# Patient Record
Sex: FEMALE | Race: WHITE | NOT HISPANIC OR LATINO | Employment: OTHER | URBAN - METROPOLITAN AREA
[De-identification: names, ages, dates, MRNs, and addresses within clinical notes are randomized per-mention and may not be internally consistent; named-entity substitution may affect disease eponyms.]

---

## 2020-01-23 ENCOUNTER — OFFICE VISIT CONVERTED (OUTPATIENT)
Dept: GASTROENTEROLOGY | Facility: CLINIC | Age: 55
End: 2020-01-23
Attending: PHYSICIAN ASSISTANT

## 2020-02-06 ENCOUNTER — HOSPITAL ENCOUNTER (OUTPATIENT)
Dept: GASTROENTEROLOGY | Facility: HOSPITAL | Age: 55
Setting detail: HOSPITAL OUTPATIENT SURGERY
Discharge: HOME | End: 2020-02-06
Attending: INTERNAL MEDICINE

## 2020-02-06 LAB — GLUCOSE BLD-MCNC: 160 MG/DL (ref 65–99)

## 2020-05-06 ENCOUNTER — TELEPHONE CONVERTED (OUTPATIENT)
Dept: GASTROENTEROLOGY | Facility: CLINIC | Age: 55
End: 2020-05-06
Attending: PHYSICIAN ASSISTANT

## 2021-01-05 ENCOUNTER — OFFICE VISIT CONVERTED (OUTPATIENT)
Dept: GASTROENTEROLOGY | Facility: CLINIC | Age: 56
End: 2021-01-05
Attending: NURSE PRACTITIONER

## 2021-01-12 ENCOUNTER — HOSPITAL ENCOUNTER (OUTPATIENT)
Dept: MRI IMAGING | Facility: HOSPITAL | Age: 56
Discharge: HOME | End: 2021-01-12
Attending: NURSE PRACTITIONER

## 2021-01-12 LAB
CREAT BLD-MCNC: 0.8 MG/DL (ref 0.6–1.4)
GFR SERPLBLD BASED ON 1.73 SQ M-ARVRAT: >60 ML/MIN/{1.73_M2}

## 2021-05-13 NOTE — PROGRESS NOTES
Quick Note      Patient Name: Tawny Lennon   Patient ID: [id number]   Sex: Female   YOB: 1965    Primary Care Provider: Apolinar Valentine MD    Visit Date: May 6, 2020    Provider: JorgeL uis Raman PA-C   Location: [facility]   Location Address: [address] [place] [address]  [id number]   Location Phone: [phone]          History Of Present Illness  TELEHEALTH TELEPHONE VISIT  Chief Complaint: Follow up EGD/colonoscopy and cirrhosis   Tawny Lennon is a 54 year old /White female who is presenting for evaluation via telehealth telephone visit. Verbal consent obtained before beginning visit.   Provider spent 11 minutes with the patient during telehealth visit.   The following staff were present during this visit: Hugo Hennessy MA   Past Medical History/Overview of Patient Symptoms     54 year old female with a history of GERD, DMII, and hepatic steatosis follows up for her EGD/colonoscopy and extensive liver workup results. Review of her EGD/colonoscopy by Dr. Watkins 02/2020 showed intestinal metaplasia of the antrum on biopsy, 2 cord of grade I varices in the lower esophagus, and 3 tubular adenomas removed from the colon.  Recommended EGD in 2 years and colonoscopy in 3 years.  She reports one episode of melena and her esophageal reflux is worse despite omeprazole 20mg daily.  Her most recent labwork showed platelets 107, INR 1.1, AST 57, t. bili 1.6.  Her extensive liver labwork was otherwise unremarkable.  She denies a history of alcohol or family history of liver disease, but admits that she has had a fatty liver since 2011.  Her DMII is also poorly controlled.               Assessment  · Esophageal varices     456.1/I85.00  · Colon polyps     211.3/K63.5  · Cirrhosis     571.5/K74.60  · Intestinal metaplasia of gastric mucosa     537.89/K31.89      Plan  · Orders  o CBC with Auto Diff [facility] (67965) - 456.1/I85.00, 211.3/K63.5, 571.5/K74.60, 537.89/K31.89 - 05/06/2020  o CMP  [facility] (04478) - 456.1/I85.00, 211.3/K63.5, 571.5/K74.60, 537.89/K31.89 - 05/06/2020  o Physician Telephone Evaluation, 11-20 minutes (62060) - 456.1/I85.00, 211.3/K63.5, 571.5/K74.60, 537.89/K31.89 - 05/06/2020  o UGI w/ air & SBFT (77669) - 456.1/I85.00, 211.3/K63.5, 571.5/K74.60, 537.89/K31.89 - 05/06/2020  o RUQ US (right upper quadrant ultrasound) (07267) - 456.1/I85.00, 211.3/K63.5, 571.5/K74.60, 537.89/K31.89 - 05/06/2020  o PT/INR (85517) - 456.1/I85.00, 211.3/K63.5, 571.5/K74.60, 537.89/K31.89 - 05/06/2020  o AFP (26875, 70155) - 456.1/I85.00, 211.3/K63.5, 571.5/K74.60, 537.89/K31.89 - 05/06/2020  o Ammonia blood (08435) - 456.1/I85.00, 211.3/K63.5, 571.5/K74.60, 537.89/K31.89 - 05/06/2020  · Medications  o omeprazole 40 mg oral capsule,delayed release(DR/EC)   SIG: take 1 capsule (40 mg) by oral route once daily before a meal for 90 days   DISP: (90) capsules with 2 refills  Prescribed on 05/06/2020     o Medications have been Reconciled  o Transition of Care or Provider Policy  · Instructions  o Plan Of Care: 54 year old female with cirrhosis in the probable setting of DMII and hepatic steatosis. She is to have a RUQ US, UGI, and the labwork above. I will increase her omeprazole to 40mg. I recommended strict control of blood glucose levels and weight loss. She will follow up in 6 months.  o Chronic conditions reviewed and taken into consideration for today's treatment plan.  o Patient instructed to seek medical attention urgently for new or worsening symptoms.  o Patient was educated/instructed on their diagnosis, treatment and medications prior to discharge from the clinic today.            Electronically Signed by: Jorge Luis Raman PA-C -Author on May 6, 2020 09:16:35 AM  Electronically Co-signed by: Jesse Watkins MD -Reviewer on May 6, 2020 11:45:55 AM

## 2021-05-14 VITALS
HEART RATE: 76 BPM | SYSTOLIC BLOOD PRESSURE: 126 MMHG | DIASTOLIC BLOOD PRESSURE: 70 MMHG | BODY MASS INDEX: 35.15 KG/M2 | HEIGHT: 62 IN | OXYGEN SATURATION: 98 % | WEIGHT: 191 LBS | RESPIRATION RATE: 16 BRPM

## 2021-05-14 NOTE — PROGRESS NOTES
Progress Note      Patient Name: Tawny Lennon   Patient ID: [id number]   Sex: Female   YOB: 1965    Primary Care Provider: Apolinar Valentine MD    Visit Date: January 5, 2021    Provider: ANDRE Singh   [facility]   Location Address: [address] [place], [place] [address]  [id number]   Location Phone: [phone]          Chief Complaint  · Cirrhosis  · Esophageal varices  · Colon polpys  · Intestinal metaplasia of gastric mucosa      History Of Present Illness     55-year-old female with a history of esophageal varices, colon polyps, cirrhosis, intestinal metaplasia of gastric mucosa returns for follow-up.  She denies hematemesis, but she does report having clots of blood with clearing of her throat, especially in the morning that occurs.  She does have occasional melena, and she does not take iron or Pepto-Bismol.  She does complain of excessive.  EGD by Dr. Novoa October 2020 revealed grade 2 esophagitis at the GE junction, 2 cords of grade 1 varices in the lower third of the esophagus which were not bleeding, and mild portal hypertensive gastropathy.  She denies jaundice change in mental status.  She does have occasional pedal edema and her weight fluctuates.  She does not have any pedal edema in office today.  She will have occasional abdominal swelling.  She does have a lot of nausea, and will take Phenergan when she is really nauseated.  She does have lower abdominal pain occasionally.  She is taking nadolol 20 mg daily and pantoprazole 40 mg daily.  She has tried docusate, but is not effective for her bowel movements.  Review of her colonoscopy 02/2020 by Dr. Watkins revealed diverticula in the sigmoid, grade 1 internal hemorrhoids, and 3 tubular adenomas removed from the colon.  Recommend repeat colonoscopy 2023 and EGD 2022         Past Medical History  Colon Polyps; COPD; Depression; Diabetes; Diverticulitis; Hematuria; High blood pressure; High  "cholesterol; Interstitial cystitis; Migraine Headaches; Narcolepsy; Sleep apnea         Past Surgical History  Bladder Repair; carpal tunnel; Cholecystectomy; Colonoscopy; EGD; Hernia Repair; Hysterectomy; Rectocele Repair         Medication List  docusate sodium 100 mg oral tablet; Jardiance 25 mg oral tablet; losartan-hydrochlorothiazide 50-12.5 mg oral tablet; metformin 500 mg oral tablet; metoprolol succinate 100 mg oral tablet extended release 24 hr; nadolol 20 mg oral tablet; pantoprazole 40 mg oral tablet,delayed release (DR/EC); ProAir HFA 90 mcg/actuation inhalation HFA aerosol inhaler; sumatriptan succinate 50 mg oral tablet         Allergy List  NO KNOWN DRUG ALLERGIES       Allergies Reconciled  Family Medical History  Liver Neoplasm, Malignant         Social History  Alcohol (Never); Tobacco (Never)         Review of Systems  · Constitutional  o Denies  o : chills, fever  · Cardiovascular  o Denies  o : chest pain  · Respiratory  o Denies  o : shortness of breath  · Gastrointestinal  o Admits  o : nausea  o Denies  o : vomiting, dysphagia  · Endocrine  o Denies  o : weight gain, weight loss      Vitals  Date Time BP Position Site L\R Cuff Size HR RR TEMP (F) WT  HT  BMI kg/m2 BSA m2 O2 Sat FR L/min FiO2 HC       01/05/2021 12:53 /70 Sitting    76 - R 16  191lbs 0oz 5'  2\" 34.93 1.95 98 %            Physical Examination  · Constitutional  o Appearance  o : obese, well developed  · Head and Face  o Head  o : Normocephalic with no worriesome skin lesions  · Eyes  o Vision  o :   § Visual Fields  § : eyes move symmetrical in all directions  o Sclerae  o : sclerae anicteric  · Neck  o Inspection/Palpation  o : Trachea is midline, no adenopathy  · Respiratory  o Respiratory Effort  o : Breathing is unlabored.  o Inspection of Chest  o : normal appearance  o Auscultation of Lungs  o : Chest is clear to auscultation bilaterally.  · Cardiovascular  o Heart  o :   § Auscultation of Heart  § : no murmurs, " rubs, or gallops  o Peripheral Vascular System  o :   § Extremities  § : no cyanosis, clubbing or edema;   · Gastrointestinal  o Abdominal Examination  o : Abdomen is soft, nontender to palpation, with normal active bowel sounds, no appreciable hepatosplenomegaly.          Assessment  · Cirrhosis Of Liver     571.5/K74.60  · Colonic Polyps, Personal History of     V12.72  · Esophageal Varices     456.1/I85.00  · Nausea     787.02/R11.0  · Intestinal metaplasia of gastric mucosa     537.89/K31.89      Plan  · Orders  o RUQ US (right upper quadrant ultrasound) (80072) - 571.5/K74.60 - 01/05/2021  o PT/INR (41280) - 571.5/K74.60 - 01/05/2021  o Ammonia level (01289) - 571.5/K74.60 - 01/05/2021  o Alpha fetoprotein test (89775, 13442) - 571.5/K74.60 - 01/05/2021  o CMP [facility] (86948) - 571.5/K74.60 - 01/05/2021  o CBC with Auto Diff [facility] (86659) - 571.5/K74.60 - 01/05/2021  o Iron Profile (Iron 04570 TIBC 76113 and Transferrin 05968) (IRONP) - 571.5/K74.60, 537.89/K31.89, 456.1/I85.00 - 01/05/2021  · Medications  o Medications have been Reconciled  o Transition of Care or Provider Policy  · Instructions  o 55-year-old female with a history of esophageal varices, colon polyps, cirrhosis, and intestinal metaplasia of gastric mucosa returns for follow-up. She has been coughing up clots of blood when she clears her throat and has occasional incidences of melena. Her EGD by Dr. Novoa in October 2020 revealed 2 cords of grade 1 varices in the lower third of the esophagus, but they were not bleeding. I have consulted Dr. Watkins to see if she needs a repeat EGD. She is to have her bloodwork done so, and if her Hbg shows anemia, we will schedule her for an EGD. I have recommended increasing fiber by using an over-the-counter supplement to help her bowel movements improve. I have ordered a right upper quadrant ultrasound with labs; we will call her with those results. She is going to follow-up in 6 months.            Electronically  Signed by: ANDRE Singh -Author on January 5, 2021 04:27:56 PM  Electronically Co-signed by: Jesse Watkins MD -Reviewer on January 10, 2021 04:33:19 PM

## 2021-05-15 VITALS
OXYGEN SATURATION: 100 % | HEART RATE: 90 BPM | BODY MASS INDEX: 33.86 KG/M2 | RESPIRATION RATE: 16 BRPM | SYSTOLIC BLOOD PRESSURE: 123 MMHG | HEIGHT: 62 IN | WEIGHT: 184 LBS | DIASTOLIC BLOOD PRESSURE: 71 MMHG

## 2021-07-06 ENCOUNTER — PREP FOR SURGERY (OUTPATIENT)
Dept: OTHER | Facility: HOSPITAL | Age: 56
End: 2021-07-06

## 2021-07-06 ENCOUNTER — OFFICE VISIT (OUTPATIENT)
Dept: GASTROENTEROLOGY | Facility: CLINIC | Age: 56
End: 2021-07-06

## 2021-07-06 VITALS
BODY MASS INDEX: 34.56 KG/M2 | SYSTOLIC BLOOD PRESSURE: 121 MMHG | WEIGHT: 187.8 LBS | HEART RATE: 73 BPM | TEMPERATURE: 98.3 F | RESPIRATION RATE: 18 BRPM | DIASTOLIC BLOOD PRESSURE: 72 MMHG | HEIGHT: 62 IN | OXYGEN SATURATION: 100 %

## 2021-07-06 DIAGNOSIS — R18.8 CIRRHOSIS OF LIVER WITH ASCITES, UNSPECIFIED HEPATIC CIRRHOSIS TYPE (HCC): ICD-10-CM

## 2021-07-06 DIAGNOSIS — I85.10 SECONDARY ESOPHAGEAL VARICES WITHOUT BLEEDING (HCC): ICD-10-CM

## 2021-07-06 DIAGNOSIS — K63.5 POLYP OF COLON, UNSPECIFIED PART OF COLON, UNSPECIFIED TYPE: Primary | ICD-10-CM

## 2021-07-06 DIAGNOSIS — K21.9 GASTROESOPHAGEAL REFLUX DISEASE WITHOUT ESOPHAGITIS: ICD-10-CM

## 2021-07-06 DIAGNOSIS — K74.60 CIRRHOSIS OF LIVER WITHOUT ASCITES, UNSPECIFIED HEPATIC CIRRHOSIS TYPE (HCC): Primary | ICD-10-CM

## 2021-07-06 DIAGNOSIS — K57.92 DIVERTICULITIS: ICD-10-CM

## 2021-07-06 DIAGNOSIS — K74.60 CIRRHOSIS OF LIVER WITH ASCITES, UNSPECIFIED HEPATIC CIRRHOSIS TYPE (HCC): ICD-10-CM

## 2021-07-06 PROBLEM — E78.00 HIGH CHOLESTEROL: Status: ACTIVE | Noted: 2021-07-06

## 2021-07-06 PROBLEM — E66.9 OBESITY: Status: ACTIVE | Noted: 2021-07-06

## 2021-07-06 PROBLEM — R31.9 HEMATURIA: Status: ACTIVE | Noted: 2021-07-06

## 2021-07-06 PROBLEM — F41.0 PANIC ATTACKS: Status: ACTIVE | Noted: 2021-07-06

## 2021-07-06 PROBLEM — J44.9 OTHER SPECIFIED CHRONIC OBSTRUCTIVE AIRWAYS DISEASE: Status: ACTIVE | Noted: 2021-07-06

## 2021-07-06 PROBLEM — I10 HIGH BLOOD PRESSURE: Status: ACTIVE | Noted: 2021-07-06

## 2021-07-06 PROBLEM — N30.10 INTERSTITIAL CYSTITIS: Status: ACTIVE | Noted: 2021-07-06

## 2021-07-06 PROBLEM — G47.30 SLEEP APNEA: Status: ACTIVE | Noted: 2021-07-06

## 2021-07-06 PROBLEM — J44.89 OTHER SPECIFIED CHRONIC OBSTRUCTIVE AIRWAYS DISEASE: Status: ACTIVE | Noted: 2021-07-06

## 2021-07-06 PROBLEM — I10 BENIGN ESSENTIAL HTN: Status: ACTIVE | Noted: 2021-07-06

## 2021-07-06 PROBLEM — G47.419 NARCOLEPSY: Status: ACTIVE | Noted: 2021-07-06

## 2021-07-06 PROBLEM — F32.A DEPRESSION: Status: ACTIVE | Noted: 2021-07-06

## 2021-07-06 PROBLEM — G43.009 MIGRAINE WITHOUT AURA: Status: ACTIVE | Noted: 2021-07-06

## 2021-07-06 PROCEDURE — 99214 OFFICE O/P EST MOD 30 MIN: CPT | Performed by: NURSE PRACTITIONER

## 2021-07-06 RX ORDER — HYDROCHLOROTHIAZIDE 25 MG/1
TABLET ORAL
COMMUNITY
Start: 2021-06-01 | End: 2021-07-06

## 2021-07-06 RX ORDER — NADOLOL 20 MG/1
20 TABLET ORAL DAILY
COMMUNITY
Start: 2021-05-05 | End: 2023-02-28

## 2021-07-06 RX ORDER — ONDANSETRON HYDROCHLORIDE 8 MG/1
8 TABLET, FILM COATED ORAL EVERY 8 HOURS PRN
COMMUNITY
Start: 2021-05-05 | End: 2023-02-28

## 2021-07-06 RX ORDER — SPIRONOLACTONE 25 MG/1
50 TABLET ORAL DAILY
Qty: 30 TABLET | Refills: 2 | Status: SHIPPED | OUTPATIENT
Start: 2021-07-06 | End: 2021-08-02

## 2021-07-06 RX ORDER — PANTOPRAZOLE SODIUM 40 MG/1
40 TABLET, DELAYED RELEASE ORAL DAILY
COMMUNITY
Start: 2021-06-29 | End: 2023-02-28

## 2021-07-06 RX ORDER — ALBUTEROL SULFATE 90 UG/1
1 AEROSOL, METERED RESPIRATORY (INHALATION) EVERY 4 HOURS PRN
COMMUNITY
Start: 2021-05-05 | End: 2023-02-28

## 2021-07-06 RX ORDER — LOSARTAN POTASSIUM 25 MG/1
TABLET ORAL
COMMUNITY
Start: 2021-05-19 | End: 2021-08-02 | Stop reason: SDUPTHER

## 2021-07-06 RX ORDER — DULAGLUTIDE 0.75 MG/.5ML
10 INJECTION, SOLUTION SUBCUTANEOUS
Status: ON HOLD | COMMUNITY
End: 2022-06-23

## 2021-07-06 RX ORDER — LACTULOSE 10 G/15ML
SOLUTION ORAL
COMMUNITY
Start: 2021-06-30 | End: 2021-07-20

## 2021-07-06 RX ORDER — ALBUTEROL SULFATE 90 UG/1
AEROSOL, METERED RESPIRATORY (INHALATION)
COMMUNITY
End: 2021-10-26

## 2021-07-06 RX ORDER — FUROSEMIDE 20 MG/1
20 TABLET ORAL DAILY
Qty: 30 TABLET | Refills: 2 | Status: SHIPPED | OUTPATIENT
Start: 2021-07-06 | End: 2021-08-02

## 2021-07-06 RX ORDER — SUMATRIPTAN 50 MG/1
TABLET, FILM COATED ORAL
COMMUNITY
Start: 2021-05-05 | End: 2022-02-22

## 2021-07-06 NOTE — PATIENT INSTRUCTIONS
Cirrhosis    Cirrhosis is long-term (chronic) liver injury. The liver is the body's largest internal organ, and it performs many functions. It converts food into energy, removes toxic material from the blood, makes important proteins, and absorbs necessary vitamins from food.  In cirrhosis, healthy liver cells are replaced by scar tissue. This prevents blood from flowing through the liver, making it difficult for the liver to function. Scarring of the liver cannot be reversed, but treatment can prevent it from getting worse.  What are the causes?  Common causes of this condition are hepatitis C and long-term alcohol abuse. Other causes include:  · Nonalcoholic fatty liver disease. This happens when fat is deposited in the liver by causes other than alcohol.  · Hepatitis B infection.  · Autoimmune hepatitis. In this condition, the body's defense system (immune system) mistakenly attacks the liver cells, causing irritation and swelling (inflammation).  · Diseases that cause blockage of ducts inside the liver.  · Inherited liver diseases, such as hemochromatosis. This is one of the most common inherited liver diseases. In this disease, deposits of iron collect in the liver and other organs.  · Reactions to certain long-term medicines, such as amiodarone, a heart medicine.  · Parasitic infections. These include schistosomiasis, which is caused by a flatworm.  · Long-term contact to certain toxins. These toxins include certain organic solvents, such as toluene and chloroform.  What increases the risk?  You are more likely to develop this condition if:  · You have certain types of viral hepatitis.  · You abuse alcohol, especially if you are female.  · You are overweight.  · You share needles.  · You have unprotected sex with someone who has viral hepatitis.  What are the signs or symptoms?  You may not have any signs and symptoms at first. Symptoms may not develop until the damage to your liver starts to get worse.  Early  symptoms may include:  · Weakness and tiredness (fatigue).  · Changes in sleep patterns or having trouble sleeping.  · Itchiness.  · Tenderness in the right-upper part of your abdomen.  · Weight loss and muscle loss.  · Nausea.  · Loss of appetite.  · Appearance of tiny blood vessels under the skin.  Later symptoms may include:  · Fatigue or weakness that is getting worse.  · Yellow skin and eyes (jaundice).  · Buildup of fluid in the abdomen (ascites). You may notice that your clothes are tight around your waist.  · Weight gain.  · Swelling of the feet and ankles (edema).  · Trouble breathing.  · Easy bruising and bleeding.  · Vomiting blood.  · Black or bloody stool.  · Mental confusion.  How is this diagnosed?  Your health care provider may suspect cirrhosis based on your symptoms and medical history, especially if you have other medical conditions or a history of alcohol abuse. Your health care provider will do a physical exam to feel your liver and to check for signs of cirrhosis. He or she may perform other tests, including:  · Blood tests to check:  ? For hepatitis B or C.  ? Kidney function.  ? Liver function.  · Imaging tests such as:  ? MRI or CT scan to look for changes seen in advanced cirrhosis.  ? Ultrasound to see if normal liver tissue is being replaced by scar tissue.  · A procedure in which a long needle is used to take a sample of liver tissue to be checked in a lab (biopsy). Liver biopsy can confirm the diagnosis of cirrhosis.  How is this treated?  Treatment for this condition depends on how damaged your liver is and what caused the damage. It may include treating the symptoms of cirrhosis, or treating the underlying causes in order to slow the damage. Treatment may include:  · Making lifestyle changes, such as:  ? Eating a healthy diet. You may need to work with your health care provider or a diet and nutrition specialist (dietitian) to develop an eating plan.  ? Restricting salt  intake.  ? Maintaining a healthy weight.  ? Not abusing drugs or alcohol.  · Taking medicines to:  ? Treat liver infections or other infections.  ? Control itching.  ? Reduce fluid buildup.  ? Reduce certain blood toxins.  ? Reduce risk of bleeding from enlarged blood vessels in the stomach or esophagus (varices).  · Liver transplant. In this procedure, a liver from a donor is used to replace your diseased liver. This is done if cirrhosis has caused liver failure.  Other treatments and procedures may be done depending on the problems that you get from cirrhosis. Common problems include liver-related kidney failure (hepatorenal syndrome).  Follow these instructions at home:    · Take medicines only as told by your health care provider. Do not use medicines that are toxic to your liver. Ask your health care provider before taking any new medicines, including over-the-counter medicines.  · Rest as needed.  · Eat a well-balanced diet. Ask your health care provider or dietitian for more information.  · Limit your salt or water intake, if your health care provider asks you to do this.  · Do not drink alcohol. This is especially important if you are taking acetaminophen.  · Keep all follow-up visits as told by your health care provider. This is important.  Contact a health care provider if you:  · Have fatigue or weakness that is getting worse.  · Develop swelling of the hands, feet, legs, or face.  · Have a fever.  · Develop loss of appetite.  · Have nausea or vomiting.  · Develop jaundice.  · Develop easy bruising or bleeding.  Get help right away if you:  · Vomit bright red blood or a material that looks like coffee grounds.  · Have blood in your stools.  · Notice that your stools appear black and tarry.  · Become confused.  · Have chest pain or trouble breathing.  Summary  · Cirrhosis is chronic liver injury. Liver damage cannot be reversed. Common causes are hepatitis C and long-term alcohol abuse.  · Tests used to  diagnose cirrhosis include blood tests, imaging tests, and liver biopsy.  · Treatment for this condition involves treating the underlying cause. Avoid alcohol, drugs, salt, and medicines that may damage your liver.  · Contact your health care provider if you develop ascites, edema, jaundice, fever, nausea or vomiting, easy bruising or bleeding, or worsening fatigue.  This information is not intended to replace advice given to you by your health care provider. Make sure you discuss any questions you have with your health care provider.  Document Revised: 04/08/2020 Document Reviewed: 11/07/2018  [employer]vier Patient Education © 2021 [employer]

## 2021-07-06 NOTE — PROGRESS NOTES
Chief Complaint  Follow-up (6 months), Cirrhosis, and Nausea    History of Present Illness  Tawny Lennon is a 55 y.o. female who presents to [facility] for follow-up with a history of esophageal varices, colon polyps, nonalcoholic cirrhosis, intestinal metaplasia of the gastric mucosa.  I have reviewed her previous labs, EGD, colonoscopy and MRI of the abdomen.  Patient continues on nadolol 20 mg daily, lactulose, and pantoprazole 40 mg daily.  Patient reports that she has been experiencing weight gain, abdominal swelling, leg swelling, fatigue, some confusion, intermittent bruising and intermittent shortness of breath.  She reports last week and 1 day she gained 7 pounds.  Patient is a diabetic that was poorly  controlled for several years with an A1c above 15.  She reports A1c currently 7.1.  Patient reports she is taking her lactulose and having 3-5 bowel movements a day.  She denies jaundice yellowing of the eyes or skin.  Patient reports her reflux is doing well on her current regimen she is having some dysphagia 3-5 times a week with solid foods and coughing up some bright red blood occasionally.  Patient reports her stools are dark at times.      Review of her colonoscopy 02/2020 by Dr. Watkins revealed diverticula in the sigmoid, grade 1 internal hemorrhoids, and 3 tubular adenomas removed from the colon.  Recommend repeat colonoscopy 2023 and EGD 2022.     Labs: RBC-3.89, hemoglobin 11.7, hematocrit 35.8, platelet 90 ammonia 80 Glucose 314, AST-49, ALT 22, INR 1.04    EGD: Review of the patient's most recent EGD 10/9/2020 performed by Dr. Novoa revealed grade B esophagitis in the GE junction, 2 cords of grade 1 varices in the lower third of the esophagus mild portal hypertension with gastropathy.    MRI of the abdomen with and without contrast displays a normal pancreas, spleen is enlarged and unchanged from prior CT scan, stomach is mildly distended with fluid and food  debris and I believe the stomach lies very close to the anterior margin of the body of the pancreas and this might have the appearance of a pancreatic mass on ultrasound.    @[facility]@    Past Medical History:   Diagnosis Date   • Cirrhosis (CMS/HCC)     liver   • Colon polyps    • COPD (chronic obstructive pulmonary disease) (CMS/HCC)    • Depression    • Diabetes (CMS/HCC)    • Diverticulitis    • Hematuria    • High blood pressure    • High cholesterol    • Interstitial cystitis    • Migraine headache    • Narcolepsy    • Sleep apnea        Past Surgical History:   Procedure Laterality Date   • BLADDER REPAIR  2009   • CHOLECYSTECTOMY     • COLONOSCOPY  2014 x2 2020   • ENDOSCOPY  2014 2020   • HAND SURGERY     • HERNIA REPAIR     • HYSTERECTOMY  2009   • OTHER SURGICAL HISTORY      rectocele repair         Current Outpatient Medications:   •  albuterol sulfate  (90 Base) MCG/ACT inhaler, Inhale., Disp: , Rfl:   •  Dulaglutide (Trulicity) 0.75 MG/0.5ML solution pen-injector, Inject 10 mL under the skin into the appropriate area as directed. Once per week on Tuesdays, Disp: , Rfl:   •  empagliflozin (Jardiance) 25 MG tablet tablet, Take  by mouth Daily., Disp: , Rfl:   •  lactulose (CHRONULAC) 10 GM/15ML solution, , Disp: , Rfl:   •  losartan (COZAAR) 25 MG tablet, , Disp: , Rfl:   •  metFORMIN (GLUCOPHAGE) 500 MG tablet, metformin oral tablet 500 mg take 1 tablet (500 mg) by oral route 2 times per day with morning and evening meals   Active, Disp: , Rfl:   •  nadolol (CORGARD) 20 MG tablet, , Disp: , Rfl:   •  ondansetron (ZOFRAN) 8 MG tablet, , Disp: , Rfl:   •  pantoprazole (PROTONIX) 40 MG EC tablet, , Disp: , Rfl:   •  SUMAtriptan (IMITREX) 50 MG tablet, , Disp: , Rfl:   •  albuterol sulfate  (90 Base) MCG/ACT inhaler, , Disp: , Rfl:   •  furosemide (LASIX) 20 MG tablet, Take 1 tablet by mouth Daily., Disp: 30 tablet, Rfl: 2  •  spironolactone (ALDACTONE) 25 MG tablet, Take 2 tablets by mouth  "Daily., Disp: 30 tablet, Rfl: 2     No Known Allergies    Family History   Problem Relation Age of Onset   • Liver cancer Father    • Dementia Mother         Social History     Social History Narrative   • Not on file       Objective     Review of Systems   Constitutional: Positive for fatigue and unexpected weight gain. Negative for fever and unexpected weight loss.   Gastrointestinal: Positive for abdominal distention, abdominal pain, blood in stool, nausea and GERD. Negative for anal bleeding, constipation, diarrhea, rectal pain, vomiting and indigestion.        Vital Signs:   /72 (BP Location: Right arm, Patient Position: Sitting, Cuff Size: Adult)   Pulse 73   Temp 98.3 °F (36.8 °C) (Oral)   Resp 18   Ht 157.5 cm (62\")   Wt 85.2 kg (187 lb 12.8 oz)   SpO2 100% Comment: room air  BMI 34.35 kg/m²       Physical Exam  Constitutional:       General: She is not in acute distress.     Appearance: Normal appearance.   HENT:      Head: Normocephalic.   Eyes:      Conjunctiva/sclera: Conjunctivae normal.      Pupils: Pupils are equal, round, and reactive to light.      Visual Fields: Right eye visual fields normal and left eye visual fields normal.   Neck:      Trachea: Trachea normal.   Cardiovascular:      Rate and Rhythm: Normal rate and regular rhythm.      Heart sounds: Normal heart sounds.   Pulmonary:      Effort: Pulmonary effort is normal.      Breath sounds: Normal breath sounds and air entry.      Comments: Inspection of chest: normal appearance  Abdominal:      General: Abdomen is flat. Bowel sounds are normal. There is distension.      Palpations: Abdomen is soft. There is fluid wave, hepatomegaly and splenomegaly. There is no mass.      Tenderness: There is abdominal tenderness in the right upper quadrant. There is no guarding or rebound. Negative signs include Diggs's sign and McBurney's sign.   Musculoskeletal:      Right lower leg: No edema.      Left lower leg: No edema.   Skin:     " Findings: No lesion.      Comments: Turgor is normal   Neurological:      Mental Status: She is alert and oriented to person, place, and time.   Psychiatric:         Mood and Affect: Mood and affect normal.         Result Review :                  Assessment and Plan    Diagnoses and all orders for this visit:    1. Polyp of colon, unspecified part of colon, unspecified type (Primary)  -     Cancel: US Abdomen Limited; Future  -     Cancel: CBC (No Diff); Future  -     Cancel: Comprehensive Metabolic Panel; Future  -     Cancel: Protime-INR; Future  -     Cancel: AFP Tumor Marker; Future  -     Cancel: Ammonia; Future  -     AFP Tumor Marker; Future  -     Ammonia; Future  -     CBC (No Diff); Future  -     Comprehensive Metabolic Panel; Future  -     Protime-INR; Future  -     US Abdomen Limited; Future  -     Comprehensive Metabolic Panel; Future    2. Diverticulitis  -     Cancel: US Abdomen Limited; Future  -     Cancel: CBC (No Diff); Future  -     Cancel: Comprehensive Metabolic Panel; Future  -     Cancel: Protime-INR; Future  -     Cancel: AFP Tumor Marker; Future  -     Cancel: Ammonia; Future  -     AFP Tumor Marker; Future  -     Ammonia; Future  -     CBC (No Diff); Future  -     Comprehensive Metabolic Panel; Future  -     Protime-INR; Future  -     US Abdomen Limited; Future  -     Comprehensive Metabolic Panel; Future    3. Cirrhosis of liver with ascites, unspecified hepatic cirrhosis type (CMS/HCC)  -     Cancel: US Abdomen Limited; Future  -     Cancel: CBC (No Diff); Future  -     Cancel: Comprehensive Metabolic Panel; Future  -     Cancel: Protime-INR; Future  -     Cancel: AFP Tumor Marker; Future  -     Cancel: Ammonia; Future  -     AFP Tumor Marker; Future  -     Ammonia; Future  -     CBC (No Diff); Future  -     Comprehensive Metabolic Panel; Future  -     Protime-INR; Future  -     US Abdomen Limited; Future  -     Comprehensive Metabolic Panel; Future    4. Gastroesophageal reflux disease  without esophagitis  -     Cancel: US Abdomen Limited; Future  -     Cancel: CBC (No Diff); Future  -     Cancel: Comprehensive Metabolic Panel; Future  -     Cancel: Protime-INR; Future  -     Cancel: AFP Tumor Marker; Future  -     Cancel: Ammonia; Future  -     AFP Tumor Marker; Future  -     Ammonia; Future  -     CBC (No Diff); Future  -     Comprehensive Metabolic Panel; Future  -     Protime-INR; Future  -     US Abdomen Limited; Future  -     Comprehensive Metabolic Panel; Future    5. Secondary esophageal varices without bleeding (CMS/HCC)  -     Cancel: US Abdomen Limited; Future  -     Cancel: CBC (No Diff); Future  -     Cancel: Comprehensive Metabolic Panel; Future  -     Cancel: Protime-INR; Future  -     Cancel: AFP Tumor Marker; Future  -     Cancel: Ammonia; Future  -     AFP Tumor Marker; Future  -     Ammonia; Future  -     CBC (No Diff); Future  -     Comprehensive Metabolic Panel; Future  -     Protime-INR; Future  -     US Abdomen Limited; Future  -     Comprehensive Metabolic Panel; Future    Other orders  -     furosemide (LASIX) 20 MG tablet; Take 1 tablet by mouth Daily.  Dispense: 30 tablet; Refill: 2  -     spironolactone (ALDACTONE) 25 MG tablet; Take 2 tablets by mouth Daily.  Dispense: 30 tablet; Refill: 2    55-year-old female for follow-up with a history of esophageal varices, colon polyps, cirrhosis, intestinal metaplasia of the gastric mucosa.  Patient continues on nadolol 20 mg daily, lactulose, and pantoprazole 40 mg daily.  Patient is experiencing uncompensated cirrhosis she is having bruising, leg swelling, ascites fatigue and intermittent shortness of breath.  I will discontinue her hydrochlorothiazide and start Lasix at 20 mg and spironolactone at 50 mg.  I have ordered lab work to include CBC, CMP, PT/INR, AFP, ammonia.  I have ordered an ultrasound of her right upper quadrant.  I have set the patient up next week for an EGD and I have reviewed the risk and benefits of the  procedure.  I have ordered a CMP to repeat in 2 weeks prior to her follow-up appointment in 2 weeks.  We discussed healthy lifestyle including control of her diabetes, weight loss, diet choices and being active.  We discussed adding vitamin E into her current regimen.  Patient to call with any questions or concerns.  We discussed reasons to go to the emergency department.  Patient is agreeable to this plan.  Reinforced healthy diet, lifestyle, and exercise.  EGD recommended,         Follow Up   Return in about 2 weeks (around 7/20/2021).  Patient was given instructions and counseling regarding her condition or for health maintenance advice. Please see specific information pulled into the AVS if appropriate.

## 2021-07-07 ENCOUNTER — LAB (OUTPATIENT)
Dept: LAB | Facility: HOSPITAL | Age: 56
End: 2021-07-07

## 2021-07-07 DIAGNOSIS — I85.10 SECONDARY ESOPHAGEAL VARICES WITHOUT BLEEDING (HCC): ICD-10-CM

## 2021-07-07 DIAGNOSIS — K74.60 CIRRHOSIS OF LIVER WITHOUT ASCITES, UNSPECIFIED HEPATIC CIRRHOSIS TYPE (HCC): ICD-10-CM

## 2021-07-07 LAB — SARS-COV-2 RNA RESP QL NAA+PROBE: NOT DETECTED

## 2021-07-07 PROCEDURE — C9803 HOPD COVID-19 SPEC COLLECT: HCPCS

## 2021-07-07 PROCEDURE — U0003 INFECTIOUS AGENT DETECTION BY NUCLEIC ACID (DNA OR RNA); SEVERE ACUTE RESPIRATORY SYNDROME CORONAVIRUS 2 (SARS-COV-2) (CORONAVIRUS DISEASE [COVID-19]), AMPLIFIED PROBE TECHNIQUE, MAKING USE OF HIGH THROUGHPUT TECHNOLOGIES AS DESCRIBED BY CMS-2020-01-R: HCPCS

## 2021-07-07 PROCEDURE — U0005 INFEC AGEN DETEC AMPLI PROBE: HCPCS

## 2021-07-07 NOTE — PRE-PROCEDURE INSTRUCTIONS
Pt. Instructed on NPO after midnight, pre-op meds. Ok to take inhaler, Zofran, Imitrex a.m. of procedure

## 2021-07-12 ENCOUNTER — ANESTHESIA (OUTPATIENT)
Dept: GASTROENTEROLOGY | Facility: HOSPITAL | Age: 56
End: 2021-07-12

## 2021-07-12 ENCOUNTER — ANESTHESIA EVENT (OUTPATIENT)
Dept: GASTROENTEROLOGY | Facility: HOSPITAL | Age: 56
End: 2021-07-12

## 2021-07-12 ENCOUNTER — HOSPITAL ENCOUNTER (OUTPATIENT)
Facility: HOSPITAL | Age: 56
Setting detail: HOSPITAL OUTPATIENT SURGERY
Discharge: HOME | End: 2021-07-12
Attending: INTERNAL MEDICINE | Admitting: INTERNAL MEDICINE

## 2021-07-12 VITALS
DIASTOLIC BLOOD PRESSURE: 73 MMHG | RESPIRATION RATE: 20 BRPM | SYSTOLIC BLOOD PRESSURE: 119 MMHG | OXYGEN SATURATION: 99 % | WEIGHT: 187.39 LBS | BODY MASS INDEX: 34.27 KG/M2 | HEART RATE: 79 BPM | TEMPERATURE: 97 F

## 2021-07-12 DIAGNOSIS — I85.10 SECONDARY ESOPHAGEAL VARICES WITHOUT BLEEDING (HCC): ICD-10-CM

## 2021-07-12 DIAGNOSIS — K74.60 CIRRHOSIS OF LIVER WITHOUT ASCITES, UNSPECIFIED HEPATIC CIRRHOSIS TYPE (HCC): ICD-10-CM

## 2021-07-12 LAB — GLUCOSE BLDC GLUCOMTR-MCNC: 87 MG/DL (ref 70–130)

## 2021-07-12 PROCEDURE — 43239 EGD BIOPSY SINGLE/MULTIPLE: CPT | Performed by: INTERNAL MEDICINE

## 2021-07-12 PROCEDURE — 88305 TISSUE EXAM BY PATHOLOGIST: CPT | Performed by: INTERNAL MEDICINE

## 2021-07-12 PROCEDURE — 82962 GLUCOSE BLOOD TEST: CPT

## 2021-07-12 PROCEDURE — 25010000002 PROPOFOL 10 MG/ML EMULSION: Performed by: NURSE ANESTHETIST, CERTIFIED REGISTERED

## 2021-07-12 RX ORDER — PROPOFOL 10 MG/ML
VIAL (ML) INTRAVENOUS AS NEEDED
Status: DISCONTINUED | OUTPATIENT
Start: 2021-07-12 | End: 2021-07-12 | Stop reason: SURG

## 2021-07-12 RX ORDER — SODIUM CHLORIDE, SODIUM LACTATE, POTASSIUM CHLORIDE, CALCIUM CHLORIDE 600; 310; 30; 20 MG/100ML; MG/100ML; MG/100ML; MG/100ML
30 INJECTION, SOLUTION INTRAVENOUS CONTINUOUS
Status: DISCONTINUED | OUTPATIENT
Start: 2021-07-12 | End: 2021-07-12 | Stop reason: HOSPADM

## 2021-07-12 RX ORDER — LIDOCAINE HYDROCHLORIDE 20 MG/ML
INJECTION, SOLUTION INFILTRATION; PERINEURAL AS NEEDED
Status: DISCONTINUED | OUTPATIENT
Start: 2021-07-12 | End: 2021-07-12 | Stop reason: SURG

## 2021-07-12 RX ADMIN — SODIUM CHLORIDE, POTASSIUM CHLORIDE, SODIUM LACTATE AND CALCIUM CHLORIDE 30 ML/HR: 600; 310; 30; 20 INJECTION, SOLUTION INTRAVENOUS at 13:37

## 2021-07-12 RX ADMIN — PROPOFOL 100 MG: 10 INJECTION, EMULSION INTRAVENOUS at 14:47

## 2021-07-12 RX ADMIN — LIDOCAINE HYDROCHLORIDE 100 MG: 20 INJECTION, SOLUTION INFILTRATION; PERINEURAL at 14:47

## 2021-07-12 RX ADMIN — PROPOFOL 250 MCG/KG/MIN: 10 INJECTION, EMULSION INTRAVENOUS at 14:47

## 2021-07-12 RX ADMIN — PROPOFOL 20 MCG/KG/MIN: 10 INJECTION, EMULSION INTRAVENOUS at 14:51

## 2021-07-12 NOTE — ANESTHESIA POSTPROCEDURE EVALUATION
Patient: Tawny Lennon    Procedure Summary     Date: 07/12/21 Room / Location: [facility] ENDOSCOPY 5 / [facility] ENDOSCOPY    Anesthesia Start: 1446 Anesthesia Stop: 1459    Procedure: ESOPHAGOGASTRODUODENOSCOPY with biopsies (N/A ) Diagnosis:       Cirrhosis of liver without ascites, unspecified hepatic cirrhosis type (CMS/HCC)      Secondary esophageal varices without bleeding (CMS/HCC)      (Cirrhosis of liver without ascites, unspecified hepatic cirrhosis type (CMS/HCC) [K74.60])      (Secondary esophageal varices without bleeding (CMS/HCC) [I85.10])    Surgeons: Jesse Watkins MD Provider: Stephie Norris MD    Anesthesia Type: general ASA Status: 4          Anesthesia Type: general    Vitals  Vitals Value Taken Time   /65 07/12/21 1508   Temp 36.2 °C (97.2 °F) 07/12/21 1502   Pulse 79 07/12/21 1510   Resp 16 07/12/21 1507   SpO2 99 % 07/12/21 1510   Vitals shown include unvalidated device data.        Anesthesia Post Evaluation

## 2021-07-12 NOTE — ANESTHESIA PREPROCEDURE EVALUATION
Anesthesia Evaluation     history of anesthetic complications: PONV  NPO Solid Status: > 8 hours  NPO Liquid Status: > 8 hours           Airway   Mallampati: I  TM distance: >3 FB  Neck ROM: limited  No difficulty expected  Dental      Pulmonary - normal exam    breath sounds clear to auscultation  (+) COPD moderate, sleep apnea on CPAP,   Cardiovascular - normal exam    Rhythm: regular    (+) hypertension well controlled, dysrhythmias, hyperlipidemia,     ROS comment: SVT    Neuro/Psych  (+) CVA, headaches, psychiatric history,     GI/Hepatic/Renal/Endo    (+) obesity, morbid obesity,  liver disease, diabetes mellitus,     ROS Comment: MEDRANO, with cirrhosis.  Pt has esophageal varices and is here for re-evaluation.    Musculoskeletal     Abdominal    Substance History      OB/GYN          Other                        Anesthesia Plan    ASA 4     general   total IV anesthesia  intravenous induction     Anesthetic plan, all risks, benefits, and alternatives have been provided, discussed and informed consent has been obtained with: patient.    Plan discussed with CRNA.

## 2021-07-12 NOTE — ANESTHESIA POSTPROCEDURE EVALUATION
Patient: Tawny Lennon    Procedure Summary     Date: 07/12/21 Room / Location: [facility] ENDOSCOPY 5 / [facility] ENDOSCOPY    Anesthesia Start: 1446 Anesthesia Stop: 1459    Procedure: ESOPHAGOGASTRODUODENOSCOPY with biopsies (N/A ) Diagnosis:       Cirrhosis of liver without ascites, unspecified hepatic cirrhosis type (CMS/HCC)      Secondary esophageal varices without bleeding (CMS/HCC)      (Cirrhosis of liver without ascites, unspecified hepatic cirrhosis type (CMS/HCC) [K74.60])      (Secondary esophageal varices without bleeding (CMS/HCC) [I85.10])    Surgeons: Jesse Watkins MD Provider: Stephie Norris MD    Anesthesia Type: general ASA Status: 4          Anesthesia Type: general    Vitals  Vitals Value Taken Time   /73 07/12/21 1518   Temp 36.1 °C (97 °F) 07/12/21 1517   Pulse 79 07/12/21 1519   Resp 20 07/12/21 1517   SpO2 99 % 07/12/21 1519   Vitals shown include unvalidated device data.        Post Anesthesia Care and Evaluation    Patient location during evaluation: bedside  Patient participation: complete - patient participated  Level of consciousness: awake  Pain score: 0  Pain management: adequate  Airway patency: patent  Anesthetic complications: No anesthetic complications  PONV Status: none  Cardiovascular status: acceptable and stable  Respiratory status: acceptable and room air  Hydration status: acceptable

## 2021-07-12 NOTE — H&P
Pre Procedure History & Physical    Chief Complaint:   cirrhosis    Subjective     HPI:   Cirrhosis  vomiting    Past Medical History:   Past Medical History:   Diagnosis Date   • Cirrhosis (CMS/HCC)     liver   • Colon polyps    • COPD (chronic obstructive pulmonary disease) (CMS/HCC)    • Depression    • Diabetes (CMS/HCC)    • Diverticulitis    • Hematuria    • High blood pressure    • High cholesterol    • Interstitial cystitis    • Migraine headache    • Narcolepsy    • PONV (postoperative nausea and vomiting)    • Sleep apnea    • Spinal headache     DURING PREGNANCY       Past Surgical History:  Past Surgical History:   Procedure Laterality Date   • BLADDER REPAIR  2009   • CHOLECYSTECTOMY     • COLONOSCOPY  2014 x2 2020   • ENDOSCOPY  2014 2020   • HAND SURGERY     • HERNIA REPAIR     • HYSTERECTOMY  2009   • OTHER SURGICAL HISTORY      rectocele repair       Family History:  Family History   Problem Relation Age of Onset   • Liver cancer Father    • Dementia Mother    • Malig Hyperthermia Neg Hx        Social History:   reports that she has never smoked. She has never used smokeless tobacco. She reports that she does not drink alcohol and does not use drugs.    Medications:   Medications Prior to Admission   Medication Sig Dispense Refill Last Dose   • albuterol sulfate  (90 Base) MCG/ACT inhaler    7/11/2021   • albuterol sulfate  (90 Base) MCG/ACT inhaler Inhale.   7/11/2021   • Dulaglutide (Trulicity) 0.75 MG/0.5ML solution pen-injector Inject 10 mL under the skin into the appropriate area as directed. Once per week on Tuesdays 7/5/2021 at Unknown time   • empagliflozin (Jardiance) 25 MG tablet tablet Take  by mouth Daily.   7/11/2021   • furosemide (LASIX) 20 MG tablet Take 1 tablet by mouth Daily. 30 tablet 2 7/11/2021   • lactulose (CHRONULAC) 10 GM/15ML solution    7/11/2021   • losartan (COZAAR) 25 MG tablet    7/11/2021   • metFORMIN (GLUCOPHAGE) 500 MG tablet metformin oral tablet  500 mg take 1 tablet (500 mg) by oral route 2 times per day with morning and evening meals   Active   7/11/2021   • nadolol (CORGARD) 20 MG tablet    7/11/2021   • ondansetron (ZOFRAN) 8 MG tablet    7/11/2021   • pantoprazole (PROTONIX) 40 MG EC tablet    7/11/2021   • spironolactone (ALDACTONE) 25 MG tablet Take 2 tablets by mouth Daily. 30 tablet 2 7/11/2021   • SUMAtriptan (IMITREX) 50 MG tablet    7/11/2021       Allergies:  Patient has no known allergies.        Objective     Blood pressure 114/69, pulse 73, temperature 97.7 °F (36.5 °C), temperature source Temporal, resp. rate 20, weight 85 kg (187 lb 6.3 oz), SpO2 98 %.    Physical Exam   Constitutional: Pt is oriented to person, place, and time and well-developed, well-nourished, and in no distress.   Mouth/Throat: Oropharynx is clear and moist.   Neck: Normal range of motion.   Cardiovascular: Normal rate, regular rhythm and normal heart sounds.    Pulmonary/Chest: Effort normal and breath sounds normal.   Abdominal: Soft. Nontender  Skin: Skin is warm and dry.   Psychiatric: Mood, memory, affect and judgment normal.     Assessment/Plan     Diagnosis:  Cirrhosis  vomiting    Anticipated Surgical Procedure:  egd    The risks, benefits, and alternatives of this procedure have been discussed with the patient or the responsible party- the patient understands and agrees to proceed.

## 2021-07-14 LAB
CYTO UR: NORMAL
LAB AP CASE REPORT: NORMAL
LAB AP CLINICAL INFORMATION: NORMAL
PATH REPORT.FINAL DX SPEC: NORMAL
PATH REPORT.GROSS SPEC: NORMAL

## 2021-07-20 ENCOUNTER — OFFICE VISIT (OUTPATIENT)
Dept: GASTROENTEROLOGY | Facility: CLINIC | Age: 56
End: 2021-07-20

## 2021-07-20 VITALS
SYSTOLIC BLOOD PRESSURE: 117 MMHG | BODY MASS INDEX: 34.91 KG/M2 | RESPIRATION RATE: 18 BRPM | DIASTOLIC BLOOD PRESSURE: 64 MMHG | HEART RATE: 73 BPM | OXYGEN SATURATION: 100 % | HEIGHT: 62 IN | WEIGHT: 189.7 LBS

## 2021-07-20 DIAGNOSIS — R18.8 CIRRHOSIS OF LIVER WITH ASCITES, UNSPECIFIED HEPATIC CIRRHOSIS TYPE (HCC): ICD-10-CM

## 2021-07-20 DIAGNOSIS — K74.60 CIRRHOSIS OF LIVER WITH ASCITES, UNSPECIFIED HEPATIC CIRRHOSIS TYPE (HCC): ICD-10-CM

## 2021-07-20 DIAGNOSIS — I85.10 SECONDARY ESOPHAGEAL VARICES WITHOUT BLEEDING (HCC): ICD-10-CM

## 2021-07-20 DIAGNOSIS — K74.60 CIRRHOSIS OF LIVER WITHOUT ASCITES, UNSPECIFIED HEPATIC CIRRHOSIS TYPE (HCC): Primary | ICD-10-CM

## 2021-07-20 PROCEDURE — 99214 OFFICE O/P EST MOD 30 MIN: CPT | Performed by: NURSE PRACTITIONER

## 2021-07-20 RX ORDER — METOPROLOL SUCCINATE 100 MG/1
100 TABLET, EXTENDED RELEASE ORAL DAILY
COMMUNITY
End: 2022-07-11 | Stop reason: HOSPADM

## 2021-07-20 RX ORDER — METRONIDAZOLE 500 MG/1
500 TABLET ORAL 3 TIMES DAILY
COMMUNITY
Start: 2021-04-24 | End: 2021-10-26

## 2021-07-20 RX ORDER — LACTULOSE 10 G/15ML
20 SOLUTION ORAL 2 TIMES DAILY
Qty: 1800 ML | Refills: 1 | Status: SHIPPED | OUTPATIENT
Start: 2021-07-20 | End: 2021-08-02

## 2021-07-20 RX ORDER — METOCLOPRAMIDE 5 MG/1
5 TABLET ORAL
COMMUNITY
Start: 2021-06-29 | End: 2023-02-28

## 2021-07-20 RX ORDER — LOSARTAN POTASSIUM AND HYDROCHLOROTHIAZIDE 12.5; 5 MG/1; MG/1
TABLET ORAL
COMMUNITY
End: 2021-08-02 | Stop reason: SDUPTHER

## 2021-07-20 RX ORDER — DULAGLUTIDE 1.5 MG/.5ML
1.5 INJECTION, SOLUTION SUBCUTANEOUS
COMMUNITY
End: 2021-10-26

## 2021-07-20 RX ORDER — PROMETHAZINE HYDROCHLORIDE 25 MG/1
25 TABLET ORAL
Status: ON HOLD | COMMUNITY
End: 2022-06-23

## 2021-07-20 NOTE — PROGRESS NOTES
Chief Complaint  Follow-up (2 week) and EGD    History of Present Illness  Tawny Lennon is a 55 y.o. female who presents to [facility] for follow-up     55-year-old female presented to the office today for a 2-week follow-up with a history of esophageal varices, colon polyps, nonalcoholic cirrhosis, intestinal metaplasia of the gastric mucosa.   At the last office visit patient was experiencing decompensated cirrhosis with abdominal swelling, leg swelling, fatigue, confusion, intermittent bruising and shortness of breath.  She was set up for an EGD.  We have reviewed EGD and pathology at this time.  Patient continues on nadolol 20 mg daily, lactulose 15 mL twice daily , pantoprazole 40 mg, Lasix 20 mg and spironolactone 50 mg.  Patient has labs pending to include a CBC, CMP, PT/INR, AFP, ammonia.  She will also have an ultrasound of the liver upcoming.  Patient continued to like she has fluid on her abdomen.  She has minimal swelling in the lower legs and feels like this is improved since starting the Lasix and spironolactone.  She reports that she is going to the bathroom a lot voiding multiple times a day.  Patient reports that her bowel movements are only occurring 1-2 times a day and she is experiencing some confusion.  She is currently taking lactulose 15 mL twice a day.  Patient has had provement in shortness of breath since starting Lasix and spironolactone.  She denies jaundice.    Past Medical History:   Diagnosis Date   • Cirrhosis (CMS/HCC)     liver   • Colon polyps    • COPD (chronic obstructive pulmonary disease) (CMS/HCC)    • Depression    • Diabetes (CMS/HCC)    • Diverticulitis    • Hematuria    • High blood pressure    • High cholesterol    • Interstitial cystitis    • Migraine headache    • Narcolepsy    • PONV (postoperative nausea and vomiting)    • Sleep apnea    • Spinal headache     DURING PREGNANCY       Past Surgical History:   Procedure Laterality Date    • BLADDER REPAIR  2009   • CHOLECYSTECTOMY     • COLONOSCOPY  2014 x2 2020   • ENDOSCOPY  2014 2020   • ENDOSCOPY N/A 7/12/2021    Procedure: ESOPHAGOGASTRODUODENOSCOPY with biopsies;  Surgeon: Jesse Watkins MD;  Location: [facility] ENDOSCOPY;  Service: Gastroenterology;  Laterality: N/A;  esophageal varices   • HAND SURGERY     • HERNIA REPAIR     • HYSTERECTOMY  2009   • OTHER SURGICAL HISTORY      rectocele repair         Current Outpatient Medications:   •  albuterol sulfate  (90 Base) MCG/ACT inhaler, , Disp: , Rfl:   •  Dulaglutide (Trulicity) 1.5 MG/0.5ML solution pen-injector, Inject 1.5 mg under the skin into the appropriate area as directed., Disp: , Rfl:   •  empagliflozin (Jardiance) 25 MG tablet tablet, Take  by mouth Daily., Disp: , Rfl:   •  furosemide (LASIX) 20 MG tablet, Take 1 tablet by mouth Daily., Disp: 30 tablet, Rfl: 2  •  losartan (COZAAR) 25 MG tablet, , Disp: , Rfl:   •  metFORMIN (GLUCOPHAGE) 500 MG tablet, metformin oral tablet 500 mg take 1 tablet (500 mg) by oral route 2 times per day with morning and evening meals   Active, Disp: , Rfl:   •  metoclopramide (REGLAN) 5 MG tablet, , Disp: , Rfl:   •  metoprolol succinate XL (TOPROL-XL) 100 MG 24 hr tablet, metoprolol succinate oral tablet extended release 24 hr 100 mg take 1 tablet (100 mg) by oral route once daily   Active, Disp: , Rfl:   •  metroNIDAZOLE (FLAGYL) 500 MG tablet, Take 500 mg by mouth 3 (Three) Times a Day., Disp: , Rfl:   •  nadolol (CORGARD) 20 MG tablet, , Disp: , Rfl:   •  ondansetron (ZOFRAN) 8 MG tablet, , Disp: , Rfl:   •  pantoprazole (PROTONIX) 40 MG EC tablet, , Disp: , Rfl:   •  promethazine (PHENERGAN) 25 MG tablet, promethazine oral tablet 25 mg take 1 tablet (25 mg) by oral route once daily at bedtime   Suspended, Disp: , Rfl:   •  spironolactone (ALDACTONE) 25 MG tablet, Take 2 tablets by mouth Daily., Disp: 30 tablet, Rfl: 2  •  SUMAtriptan (IMITREX) 50 MG tablet, , Disp: , Rfl:   •   "albuterol sulfate  (90 Base) MCG/ACT inhaler, Inhale., Disp: , Rfl:   •  Dulaglutide (Trulicity) 0.75 MG/0.5ML solution pen-injector, Inject 10 mL under the skin into the appropriate area as directed. Once per week on Tuesdays, Disp: , Rfl:   •  empagliflozin (Jardiance) 25 MG tablet tablet, Jardiance 25 mg oral tablet take 1 tablet (25 mg) by oral route once daily in the morning   Active, Disp: , Rfl:   •  lactulose (CHRONULAC) 10 GM/15ML solution, Take 30 mL by mouth 2 (Two) Times a Day for 30 days., Disp: 1800 mL, Rfl: 1  •  losartan-hydrochlorothiazide (HYZAAR) 50-12.5 MG per tablet, losartan-hydrochlorothiazide oral tablet 50-12.5 mg take 1 tablet by oral route once daily   Active, Disp: , Rfl:      No Known Allergies    Family History   Problem Relation Age of Onset   • Liver cancer Father    • Dementia Mother    • Malig Hyperthermia Neg Hx         Social History     Social History Narrative   • Not on file       Objective     Review of Systems   Constitutional: Positive for fatigue. Negative for fever, unexpected weight gain and unexpected weight loss.   Gastrointestinal: Positive for abdominal distention. Negative for abdominal pain, blood in stool, constipation, diarrhea, GERD and indigestion.        Vital Signs:   /64 (BP Location: Left arm, Patient Position: Sitting, Cuff Size: Adult)   Pulse 73   Resp 18   Ht 157.5 cm (62\")   Wt 86 kg (189 lb 11.2 oz)   SpO2 100% Comment: room air  BMI 34.70 kg/m²       Physical Exam  Constitutional:       General: She is not in acute distress.     Appearance: Normal appearance.   HENT:      Head: Normocephalic.   Eyes:      Conjunctiva/sclera: Conjunctivae normal.      Pupils: Pupils are equal, round, and reactive to light.      Visual Fields: Right eye visual fields normal and left eye visual fields normal.   Neck:      Trachea: Trachea normal.   Cardiovascular:      Rate and Rhythm: Normal rate and regular rhythm.      Heart sounds: Normal heart " sounds.   Pulmonary:      Effort: Pulmonary effort is normal.      Breath sounds: Normal breath sounds and air entry.      Comments: Inspection of chest: normal appearance  Abdominal:      General: Bowel sounds are normal. There is distension.      Palpations: Abdomen is soft. There is no mass.      Tenderness: There is no guarding.   Musculoskeletal:      Right lower leg: No edema.      Left lower leg: No edema.   Skin:     Findings: No lesion.      Comments: Turgor is normal   Neurological:      Mental Status: She is alert and oriented to person, place, and time.   Psychiatric:         Mood and Affect: Mood and affect normal.         Result Review :                  Assessment and Plan    Diagnoses and all orders for this visit:    1. Cirrhosis of liver without ascites, unspecified hepatic cirrhosis type (CMS/HCC) (Primary)  -     CBC (No Diff); Future  -     Comprehensive Metabolic Panel; Future  -     Protime-INR; Future  -     Ammonia; Future  -     Cancel: ABDOMINAL PARACENTESIS  -     ABDOMINAL PARACENTESIS    2. Secondary esophageal varices without bleeding (CMS/HCC)  -     CBC (No Diff); Future  -     Comprehensive Metabolic Panel; Future  -     Protime-INR; Future  -     Ammonia; Future  -     Cancel: ABDOMINAL PARACENTESIS  -     ABDOMINAL PARACENTESIS    3. Cirrhosis of liver with ascites, unspecified hepatic cirrhosis type (CMS/HCC)  -     CBC (No Diff); Future  -     Comprehensive Metabolic Panel; Future  -     Protime-INR; Future  -     Ammonia; Future  -     Cancel: ABDOMINAL PARACENTESIS  -     ABDOMINAL PARACENTESIS    Other orders  -     lactulose (CHRONULAC) 10 GM/15ML solution; Take 30 mL by mouth 2 (Two) Times a Day for 30 days.  Dispense: 1800 mL; Refill: 1      55-year-old female presented to the office today for a 2-week follow-up with a history of esophageal varices, colon polyps, nonalcoholic cirrhosis, intestinal metaplasia of the gastric mucosa.   At the last office visit patient was  experiencing decompensated cirrhosis with abdominal swelling, leg swelling, fatigue, confusion, intermittent bruising and shortness of breath.  She was set up for an EGD.  We have reviewed EGD and pathology at this time.  Patient continues on nadolol 20 mg daily, lactulose 15 mL twice daily , pantoprazole 40 mg, Lasix 20 mg and spironolactone 50 mg.  Patient has labs pending to include a CBC, CMP, PT/INR, AFP, ammonia.  Patient continues to have some abdominal swelling I will set her up for a paracentesis today and she will have labs completed today.  Patient is tolerating Lasix and spironolactone well we will increase her dose to 40mg of Lasix and 100mg of spironolactone to help decrease fluid on her abdomen and legs.  Patient will also have right upper quadrant ultrasound.  I have increased her lactulose to 30 mL twice daily.  Patient is agreeable to this plan.  I continue to educate about healthy lifestyle.  We will set up for a follow-up in 2 weeks.        Follow Up   Return in about 2 weeks (around 8/3/2021).  Patient was given instructions and counseling regarding her condition or for health maintenance advice. Please see specific information pulled into the AVS if appropriate.

## 2021-07-23 DIAGNOSIS — K74.60 CIRRHOSIS OF LIVER WITHOUT ASCITES, UNSPECIFIED HEPATIC CIRRHOSIS TYPE (HCC): Primary | ICD-10-CM

## 2021-07-23 DIAGNOSIS — K21.9 GASTROESOPHAGEAL REFLUX DISEASE WITHOUT ESOPHAGITIS: ICD-10-CM

## 2021-07-23 DIAGNOSIS — I85.10 SECONDARY ESOPHAGEAL VARICES WITHOUT BLEEDING (HCC): ICD-10-CM

## 2021-07-28 ENCOUNTER — TRANSCRIBE ORDERS (OUTPATIENT)
Dept: GASTROENTEROLOGY | Facility: CLINIC | Age: 56
End: 2021-07-28

## 2021-08-02 ENCOUNTER — TELEPHONE (OUTPATIENT)
Dept: GASTROENTEROLOGY | Facility: CLINIC | Age: 56
End: 2021-08-02

## 2021-08-02 DIAGNOSIS — R18.8 CIRRHOSIS OF LIVER WITH ASCITES, UNSPECIFIED HEPATIC CIRRHOSIS TYPE (HCC): ICD-10-CM

## 2021-08-02 DIAGNOSIS — K74.60 CIRRHOSIS OF LIVER WITHOUT ASCITES, UNSPECIFIED HEPATIC CIRRHOSIS TYPE (HCC): ICD-10-CM

## 2021-08-02 DIAGNOSIS — K21.9 GASTROESOPHAGEAL REFLUX DISEASE WITHOUT ESOPHAGITIS: ICD-10-CM

## 2021-08-02 DIAGNOSIS — K74.60 CIRRHOSIS OF LIVER WITH ASCITES, UNSPECIFIED HEPATIC CIRRHOSIS TYPE (HCC): ICD-10-CM

## 2021-08-02 DIAGNOSIS — K63.5 POLYP OF COLON, UNSPECIFIED PART OF COLON, UNSPECIFIED TYPE: ICD-10-CM

## 2021-08-02 DIAGNOSIS — I85.10 SECONDARY ESOPHAGEAL VARICES WITHOUT BLEEDING (HCC): ICD-10-CM

## 2021-08-02 DIAGNOSIS — K57.92 DIVERTICULITIS: ICD-10-CM

## 2021-08-02 RX ORDER — SPIRONOLACTONE 100 MG/1
100 TABLET, FILM COATED ORAL DAILY
Qty: 30 TABLET | Refills: 2 | Status: ON HOLD | OUTPATIENT
Start: 2021-08-02 | End: 2022-06-23

## 2021-08-02 RX ORDER — FUROSEMIDE 40 MG/1
40 TABLET ORAL DAILY
Qty: 30 TABLET | Refills: 2 | Status: SHIPPED | OUTPATIENT
Start: 2021-08-02 | End: 2022-04-04 | Stop reason: SDUPTHER

## 2021-08-02 RX ORDER — LACTULOSE 10 G/15ML
20 SOLUTION ORAL 2 TIMES DAILY
Qty: 1800 ML | Refills: 1 | Status: SHIPPED | OUTPATIENT
Start: 2021-08-02 | End: 2021-09-01

## 2021-08-03 ENCOUNTER — OFFICE VISIT (OUTPATIENT)
Dept: GASTROENTEROLOGY | Facility: CLINIC | Age: 56
End: 2021-08-03

## 2021-08-03 VITALS
WEIGHT: 192.4 LBS | OXYGEN SATURATION: 99 % | DIASTOLIC BLOOD PRESSURE: 62 MMHG | BODY MASS INDEX: 35.41 KG/M2 | SYSTOLIC BLOOD PRESSURE: 126 MMHG | HEART RATE: 77 BPM | HEIGHT: 62 IN

## 2021-08-03 DIAGNOSIS — K74.60 CIRRHOSIS OF LIVER WITHOUT ASCITES, UNSPECIFIED HEPATIC CIRRHOSIS TYPE (HCC): ICD-10-CM

## 2021-08-03 DIAGNOSIS — D12.6 ADENOMATOUS POLYP OF COLON, UNSPECIFIED PART OF COLON: Primary | ICD-10-CM

## 2021-08-03 DIAGNOSIS — I85.00 ESOPHAGEAL VARICES WITHOUT BLEEDING, UNSPECIFIED ESOPHAGEAL VARICES TYPE (HCC): ICD-10-CM

## 2021-08-03 PROCEDURE — 99214 OFFICE O/P EST MOD 30 MIN: CPT | Performed by: INTERNAL MEDICINE

## 2021-08-03 NOTE — PROGRESS NOTES
Chief Complaint    Tawny Lennon is a 55 y.o. 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  Is doing fairly well she had recent labs that showed hemoglobin over 10 platelets 91,000.  She also had an ultrasound for possible paracentesis but not enough fluid was available to be drained.  She continues on Lasix, Aldactone, nadolol for history of grade 1 esophageal varices by EGD in February of this year.  She denies any lower extremity swelling.  Her weight is up 3 pounds from her appointment 3 weeks ago.  Her heart rate is 77 today.  She recently moved in with her sister and lives in [place].    Result Review :     The following data was reviewed by: Jesse Watkins MD on 08/03/2021:    CMP    CMP 10/8/20   Glucose 127 (A)   BUN 15   Creatinine 0.79   Sodium 136   Potassium 4.3   Chloride 104   Calcium 10.9 (A)   Albumin 3.4 (A)   Total Bilirubin 1.31 (A)   Alkaline Phosphatase 115   AST (SGOT) 44   ALT (SGPT) 21   (A) Abnormal value       Comments are available for some flowsheets but are not being displayed.           CBC    CBC 10/8/20 10/9/20 10/9/20 10/10/20     0014 0427    WBC 5.64      RBC 3.94 (A)      Hemoglobin 11.9 (A) 11.1 (A) 10.8 (A) 10.7 (A)   Hematocrit 35.7 (A) 32.6 (A) 32.5 (A) 31.7 (A)   MCV 90.6      MCH 30.2      MCHC 33.3      RDW 13.8      Platelets 93 (A)      (A) Abnormal value              Data reviewed: GI studies EGD from February 2020 reviewed showing grade 1 esophageal varices     Past Medical History:   Diagnosis Date   • Cirrhosis (CMS/HCC)     liver   • Colon polyps    • COPD (chronic obstructive pulmonary disease) (CMS/HCC)    • Depression    • Diabetes (CMS/HCC)    • Diverticulitis    • Hematuria    • High blood pressure    • High cholesterol    • Interstitial cystitis    • Migraine headache    • Narcolepsy    • PONV (postoperative nausea and vomiting)    • Sleep apnea    •  "Spinal headache     DURING PREGNANCY       Past Surgical History:   Procedure Laterality Date   • BLADDER REPAIR  2009   • CHOLECYSTECTOMY     • COLONOSCOPY  2014 x2 2020   • ENDOSCOPY  2014 2020   • ENDOSCOPY N/A 7/12/2021    Procedure: ESOPHAGOGASTRODUODENOSCOPY with biopsies;  Surgeon: Jesse Watkins MD;  Location: [facility] ENDOSCOPY;  Service: Gastroenterology;  Laterality: N/A;  esophageal varices   • HAND SURGERY     • HERNIA REPAIR     • HYSTERECTOMY  2009   • OTHER SURGICAL HISTORY      rectocele repair       Social History     Social History Narrative   • Not on file       Objective     Vital Signs:   /62 (BP Location: Right arm, Patient Position: Sitting, Cuff Size: Adult)   Pulse 77   Ht 157.5 cm (62\")   Wt 87.3 kg (192 lb 6.4 oz)   SpO2 99%   BMI 35.19 kg/m²     Body mass index is 35.19 kg/m².    Physical Exam            Assessment and Plan    Diagnoses and all orders for this visit:    1. Adenomatous polyp of colon, unspecified part of colon (Primary)    2. Cirrhosis of liver without ascites, unspecified hepatic cirrhosis type (CMS/HCC)    The patient has no obvious ascites today nor lower extremity edema and therefore I will continue her dose of spironolactone 100 mg daily, Lasix 40 mg daily, nadolol 20 mg daily, and have her follow-up with us again in 6 to 8 weeks.  She will need right upper quadrant ultrasound every 6 months as well as periodic lab evaluation.  She is relatively stable at this point without significant decompensation.              Follow Up   No follow-ups on file.  Patient was given instructions and counseling regarding her condition or for health maintenance advice. Please see specific information pulled into the AVS if appropriate.     "

## 2021-08-12 DIAGNOSIS — K74.60 CIRRHOSIS OF LIVER WITHOUT ASCITES, UNSPECIFIED HEPATIC CIRRHOSIS TYPE (HCC): Primary | ICD-10-CM

## 2021-08-12 DIAGNOSIS — D64.9 ANEMIA, UNSPECIFIED TYPE: ICD-10-CM

## 2021-09-01 DIAGNOSIS — D64.9 ANEMIA, UNSPECIFIED TYPE: ICD-10-CM

## 2021-09-01 DIAGNOSIS — K74.60 CIRRHOSIS OF LIVER WITHOUT ASCITES, UNSPECIFIED HEPATIC CIRRHOSIS TYPE (HCC): ICD-10-CM

## 2021-10-25 NOTE — PROGRESS NOTES
Chief Complaint   Cirrhosis     History of Present Illness       Tawny Lennon is a 56 y.o. 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  Patient continues on Lasix, Aldactone and nadolol for grade 1 esophageal varices by EGD in February.  Heart rate in the office today is 69.  Blood pressure stable at 117/52.  Weight is stable at 195.  Most recent hemoglobin over 10 and platelets 91,000.  Patient reports overall she is doing well.  On the way to the office she developed back pain that was intense causing some mild shortness of breath.  She denies jaw pain, dizziness, headache, and vomiting, nausea.  She denies any abdominal swelling reports only mild leg swelling, denies confusion.  She is experiencing some itching at night.  She reports normal bowel movements daily.    Abdominal ultrasound performed on 10/11/2021 fatty liver infiltrates    EGD: Review of the patient's most recent EGD performed by Dr. Watkins on 07/12/2021 2 columns of grade 1 esophageal varices.    Endoscopy: Review of the patient's most recent EGD and colonoscopy performed by Dr. Watkins on 02/06/2020 2 cords of grade 1 esophageal varices.  Mild thickening of the antrum.  Diverticuli grade 1 internal hemorrhoids.    Labs: 8/31/21 iron saturation- 16%, iron 59,  RBC- 3.59, hemoglobin-11, platelets-83    Results       Result Review :                   Past Medical History       Past Medical History:   Diagnosis Date   • Cirrhosis (HCC)     liver   • Colon polyps    • COPD (chronic obstructive pulmonary disease) (HCC)    • Depression    • Diabetes (HCC)    • Diverticulitis    • Hematuria    • High blood pressure    • High cholesterol    • Interstitial cystitis    • Migraine headache    • Narcolepsy    • PONV (postoperative nausea and vomiting)    • Sleep apnea    • Spinal headache     DURING PREGNANCY       Past Surgical History:   Procedure  Laterality Date   • BLADDER REPAIR  2009   • CHOLECYSTECTOMY     • COLONOSCOPY  2014 x2 2020   • ENDOSCOPY  2014 2020   • ENDOSCOPY N/A 7/12/2021    Procedure: ESOPHAGOGASTRODUODENOSCOPY with biopsies;  Surgeon: Jesse Watkins MD;  Location: [facility] ENDOSCOPY;  Service: Gastroenterology;  Laterality: N/A;  esophageal varices   • HAND SURGERY     • HERNIA REPAIR     • HYSTERECTOMY  2009   • OTHER SURGICAL HISTORY      rectocele repair   • UPPER GASTROINTESTINAL ENDOSCOPY           Current Outpatient Medications:   •  albuterol sulfate  (90 Base) MCG/ACT inhaler, , Disp: , Rfl:   •  Dulaglutide (Trulicity) 0.75 MG/0.5ML solution pen-injector, Inject 10 mL under the skin into the appropriate area as directed. Once per week on Tuesdays, Disp: , Rfl:   •  empagliflozin (Jardiance) 25 MG tablet tablet, Take  by mouth Daily., Disp: , Rfl:   •  furosemide (LASIX) 40 MG tablet, Take 1 tablet by mouth Daily., Disp: 30 tablet, Rfl: 2  •  lactulose (CHRONULAC) 10 GM/15ML solution, , Disp: , Rfl:   •  metFORMIN (GLUCOPHAGE) 500 MG tablet, metformin oral tablet 500 mg take 1 tablet (500 mg) by oral route 2 times per day with morning and evening meals   Active, Disp: , Rfl:   •  metoclopramide (REGLAN) 5 MG tablet, , Disp: , Rfl:   •  metoprolol succinate XL (TOPROL-XL) 100 MG 24 hr tablet, metoprolol succinate oral tablet extended release 24 hr 100 mg take 1 tablet (100 mg) by oral route once daily   Active, Disp: , Rfl:   •  nadolol (CORGARD) 20 MG tablet, , Disp: , Rfl:   •  ondansetron (ZOFRAN) 8 MG tablet, , Disp: , Rfl:   •  pantoprazole (PROTONIX) 40 MG EC tablet, , Disp: , Rfl:   •  promethazine (PHENERGAN) 25 MG tablet, promethazine oral tablet 25 mg take 1 tablet (25 mg) by oral route once daily at bedtime   Suspended, Disp: , Rfl:   •  spironolactone (ALDACTONE) 100 MG tablet, Take 1 tablet by mouth Daily., Disp: 30 tablet, Rfl: 2  •  SUMAtriptan (IMITREX) 50 MG tablet, , Disp: , Rfl:   •  colestipol  "(Colestid) 1 g tablet, Take 1 tablet by mouth Daily., Disp: 30 tablet, Rfl: 3     No Known Allergies    Family History   Problem Relation Age of Onset   • Liver cancer Father    • Dementia Mother    • Malig Hyperthermia Neg Hx    • Colon cancer Neg Hx         Social History     Social History Narrative   • Not on file       Objective       Vital Signs:   /52 (BP Location: Left arm, Patient Position: Sitting, Cuff Size: Adult)   Pulse 69   Ht 157.5 cm (62\")   Wt 88.5 kg (195 lb 3.2 oz)   SpO2 96%   BMI 35.70 kg/m²       Physical Exam  Constitutional:       General: She is not in acute distress.     Appearance: Normal appearance. She is well-developed. She is obese.   Eyes:      Conjunctiva/sclera: Conjunctivae normal.      Pupils: Pupils are equal, round, and reactive to light.      Visual Fields: Right eye visual fields normal and left eye visual fields normal.   Cardiovascular:      Rate and Rhythm: Normal rate and regular rhythm.      Heart sounds: Normal heart sounds.   Pulmonary:      Effort: Pulmonary effort is normal. No retractions.      Breath sounds: Normal breath sounds and air entry.      Comments: Inspection of chest: normal appearance  Abdominal:      General: Bowel sounds are normal.      Palpations: Abdomen is soft.      Tenderness: There is no abdominal tenderness.      Comments: No appreciable hepatosplenomegaly   Musculoskeletal:      Cervical back: Neck supple.      Right lower leg: No edema.      Left lower leg: No edema.   Lymphadenopathy:      Cervical: No cervical adenopathy.   Skin:     Findings: No lesion.      Comments: Turgor normal   Neurological:      Mental Status: She is alert and oriented to person, place, and time.   Psychiatric:         Mood and Affect: Mood and affect normal.           Assessment & Plan          Assessment and Plan    Diagnoses and all orders for this visit:    1. MEDRANO (nonalcoholic steatohepatitis) (Primary)    2. Cirrhosis of liver with ascites, " unspecified hepatic cirrhosis type (HCC)    3. Secondary esophageal varices without bleeding (HCC)    Other orders  -     colestipol (Colestid) 1 g tablet; Take 1 tablet by mouth Daily.  Dispense: 30 tablet; Refill: 3      56-year-old female presenting the office today for a follow-up with a history of Reich related cirrhosis and esophageal varices.  Patient continues Lasix, Aldactone and nadolol.  She is experiencing itching I have prescribed colestipol 1 g daily and educated the patient how to use this medication and that this could cause constipation.  With the patient experiencing new onset of back pain in the center of her back and shortness of breath I have recommended the patient be evaluated in the emergency department.  I have educated her on in my symptoms and a female adult.  Patient plans to get evaluated at [facility].  I have offered the patient ambulance which she refused.  We will follow up in the office in 1 month.  Patient agreeable to this plan will call with any questions or concerns.  At the follow-up well we will order labs to include a CBC, CMP, PT/INR, alpha-fetoprotein and ultrasound of the liver.          Follow Up       Follow Up   Return in about 1 month (around 11/26/2021) for Follow up with .  Patient was given instructions and counseling regarding her condition or for health maintenance advice. Please see specific information pulled into the AVS if appropriate.

## 2021-10-26 ENCOUNTER — OFFICE VISIT (OUTPATIENT)
Dept: GASTROENTEROLOGY | Facility: CLINIC | Age: 56
End: 2021-10-26

## 2021-10-26 VITALS
OXYGEN SATURATION: 96 % | HEIGHT: 62 IN | DIASTOLIC BLOOD PRESSURE: 52 MMHG | WEIGHT: 195.2 LBS | HEART RATE: 69 BPM | BODY MASS INDEX: 35.92 KG/M2 | SYSTOLIC BLOOD PRESSURE: 117 MMHG

## 2021-10-26 DIAGNOSIS — K74.60 CIRRHOSIS OF LIVER WITH ASCITES, UNSPECIFIED HEPATIC CIRRHOSIS TYPE (HCC): ICD-10-CM

## 2021-10-26 DIAGNOSIS — I85.10 SECONDARY ESOPHAGEAL VARICES WITHOUT BLEEDING (HCC): ICD-10-CM

## 2021-10-26 DIAGNOSIS — R18.8 CIRRHOSIS OF LIVER WITH ASCITES, UNSPECIFIED HEPATIC CIRRHOSIS TYPE (HCC): ICD-10-CM

## 2021-10-26 DIAGNOSIS — K75.81 NASH (NONALCOHOLIC STEATOHEPATITIS): Primary | ICD-10-CM

## 2021-10-26 PROCEDURE — 99213 OFFICE O/P EST LOW 20 MIN: CPT | Performed by: NURSE PRACTITIONER

## 2021-10-26 RX ORDER — LACTULOSE 10 G/15ML
SOLUTION ORAL
Status: ON HOLD | COMMUNITY
Start: 2021-07-20 | End: 2022-06-23

## 2021-10-26 RX ORDER — MONTELUKAST SODIUM 4 MG/1
1 TABLET, CHEWABLE ORAL DAILY
Qty: 30 TABLET | Refills: 3 | Status: ON HOLD | OUTPATIENT
Start: 2021-10-26 | End: 2022-06-23

## 2021-10-27 ENCOUNTER — TELEPHONE (OUTPATIENT)
Dept: GASTROENTEROLOGY | Facility: CLINIC | Age: 56
End: 2021-10-27

## 2021-10-27 NOTE — TELEPHONE ENCOUNTER
Called patient to schedule a follow up in December per Sharri POWELL request.  Left voicemail for patient to call back and sched appt

## 2022-02-21 ENCOUNTER — TELEPHONE (OUTPATIENT)
Dept: GASTROENTEROLOGY | Facility: CLINIC | Age: 57
End: 2022-02-21

## 2022-02-21 NOTE — TELEPHONE ENCOUNTER
I spoke with pt. She called requesting a sooner appointment due to recent ER visit at [facility]. I offered patient two separate appointments for this week, but she states she doesn't know if she will be able to come due to transportation. I advised pt to keep appt at the end of March, and follow up with PCP in the mean time since the PCP is closer to home for her. Pt agreed to this plan.

## 2022-02-22 ENCOUNTER — PREP FOR SURGERY (OUTPATIENT)
Dept: OTHER | Facility: HOSPITAL | Age: 57
End: 2022-02-22

## 2022-02-22 ENCOUNTER — OFFICE VISIT (OUTPATIENT)
Dept: GASTROENTEROLOGY | Facility: CLINIC | Age: 57
End: 2022-02-22

## 2022-02-22 VITALS
HEART RATE: 65 BPM | DIASTOLIC BLOOD PRESSURE: 74 MMHG | OXYGEN SATURATION: 98 % | WEIGHT: 188.4 LBS | BODY MASS INDEX: 34.67 KG/M2 | HEIGHT: 62 IN | SYSTOLIC BLOOD PRESSURE: 121 MMHG

## 2022-02-22 DIAGNOSIS — R18.8 CIRRHOSIS OF LIVER WITH ASCITES, UNSPECIFIED HEPATIC CIRRHOSIS TYPE: ICD-10-CM

## 2022-02-22 DIAGNOSIS — D64.9 ANEMIA, UNSPECIFIED TYPE: ICD-10-CM

## 2022-02-22 DIAGNOSIS — K74.60 CIRRHOSIS OF LIVER WITHOUT ASCITES, UNSPECIFIED HEPATIC CIRRHOSIS TYPE: ICD-10-CM

## 2022-02-22 DIAGNOSIS — K75.81 NASH (NONALCOHOLIC STEATOHEPATITIS): Primary | ICD-10-CM

## 2022-02-22 DIAGNOSIS — D12.6 ADENOMATOUS POLYP OF COLON, UNSPECIFIED PART OF COLON: ICD-10-CM

## 2022-02-22 DIAGNOSIS — K74.60 CIRRHOSIS OF LIVER WITH ASCITES, UNSPECIFIED HEPATIC CIRRHOSIS TYPE: ICD-10-CM

## 2022-02-22 DIAGNOSIS — I85.10 SECONDARY ESOPHAGEAL VARICES WITHOUT BLEEDING: ICD-10-CM

## 2022-02-22 DIAGNOSIS — K21.9 GASTROESOPHAGEAL REFLUX DISEASE WITHOUT ESOPHAGITIS: ICD-10-CM

## 2022-02-22 DIAGNOSIS — K92.0 HEMATEMESIS, PRESENCE OF NAUSEA NOT SPECIFIED: ICD-10-CM

## 2022-02-22 DIAGNOSIS — I85.00 ESOPHAGEAL VARICES WITHOUT BLEEDING, UNSPECIFIED ESOPHAGEAL VARICES TYPE: ICD-10-CM

## 2022-02-22 PROCEDURE — 99215 OFFICE O/P EST HI 40 MIN: CPT | Performed by: NURSE PRACTITIONER

## 2022-02-22 PROCEDURE — G2212 PROLONG OUTPT/OFFICE VIS: HCPCS | Performed by: NURSE PRACTITIONER

## 2022-02-22 RX ORDER — LOSARTAN POTASSIUM 25 MG/1
1 TABLET ORAL DAILY
Status: ON HOLD | COMMUNITY
Start: 2022-01-13 | End: 2022-06-23

## 2022-02-22 RX ORDER — DICYCLOMINE HCL 20 MG
20 TABLET ORAL EVERY 6 HOURS
COMMUNITY
Start: 2022-02-12 | End: 2023-02-28

## 2022-02-22 NOTE — PROGRESS NOTES
Chief Complaint   Cirrhosis     History of Present Illness       Tawny Lennon is a 56 y.o. female who presents to [facility] GASTROENTEROLOGY for follow-up       Results       Result Review :                       Past Medical History       Past Medical History:   Diagnosis Date   • Cirrhosis (HCC)     liver   • Colon polyps    • COPD (chronic obstructive pulmonary disease) (HCC)    • Depression    • Diabetes (HCC)    • Diverticulitis    • Hematuria    • High blood pressure    • High cholesterol    • Interstitial cystitis    • Migraine headache    • Narcolepsy    • PONV (postoperative nausea and vomiting)    • Sleep apnea    • Spinal headache     DURING PREGNANCY       Past Surgical History:   Procedure Laterality Date   • BLADDER REPAIR  2009   • CHOLECYSTECTOMY     • COLONOSCOPY  2014 x2 2020   • ENDOSCOPY  2014 2020   • ENDOSCOPY N/A 7/12/2021    Procedure: ESOPHAGOGASTRODUODENOSCOPY with biopsies;  Surgeon: Jesse Watkins MD;  Location: [facility] ENDOSCOPY;  Service: Gastroenterology;  Laterality: N/A;  esophageal varices   • HAND SURGERY     • HERNIA REPAIR     • HYSTERECTOMY  2009   • OTHER SURGICAL HISTORY      rectocele repair   • UPPER GASTROINTESTINAL ENDOSCOPY           Current Outpatient Medications:   •  albuterol sulfate  (90 Base) MCG/ACT inhaler, , Disp: , Rfl:   •  colestipol (Colestid) 1 g tablet, Take 1 tablet by mouth Daily., Disp: 30 tablet, Rfl: 3  •  Dulaglutide (Trulicity) 0.75 MG/0.5ML solution pen-injector, Inject 10 mL under the skin into the appropriate area as directed. Once per week on Tuesdays, Disp: , Rfl:   •  empagliflozin (Jardiance) 25 MG tablet tablet, Take  by mouth Daily., Disp: , Rfl:   •  furosemide (LASIX) 40 MG tablet, Take 1 tablet by mouth Daily., Disp: 30 tablet, Rfl: 2  •  losartan (COZAAR) 25 MG tablet, Take 1 tablet by mouth Daily., Disp: , Rfl:   •  metFORMIN (GLUCOPHAGE) 500 MG tablet, metformin oral tablet 500 mg take 1 tablet (500  "mg) by oral route 2 times per day with morning and evening meals   Active, Disp: , Rfl:   •  metoclopramide (REGLAN) 5 MG tablet, , Disp: , Rfl:   •  metoprolol succinate XL (TOPROL-XL) 100 MG 24 hr tablet, metoprolol succinate oral tablet extended release 24 hr 100 mg take 1 tablet (100 mg) by oral route once daily   Active, Disp: , Rfl:   •  nadolol (CORGARD) 20 MG tablet, , Disp: , Rfl:   •  ondansetron (ZOFRAN) 8 MG tablet, , Disp: , Rfl:   •  pantoprazole (PROTONIX) 40 MG EC tablet, , Disp: , Rfl:   •  promethazine (PHENERGAN) 25 MG tablet, promethazine oral tablet 25 mg take 1 tablet (25 mg) by oral route once daily at bedtime   Suspended, Disp: , Rfl:   •  dicyclomine (BENTYL) 20 MG tablet, , Disp: , Rfl:   •  lactulose (CHRONULAC) 10 GM/15ML solution, , Disp: , Rfl:   •  spironolactone (ALDACTONE) 100 MG tablet, Take 1 tablet by mouth Daily., Disp: 30 tablet, Rfl: 2     No Known Allergies    Family History   Problem Relation Age of Onset   • Liver cancer Father    • Dementia Mother    • Malig Hyperthermia Neg Hx    • Colon cancer Neg Hx         Social History     Social History Narrative   • Not on file       Objective       Review of Systems     Vital Signs:   /74 (BP Location: Left arm, Patient Position: Sitting, Cuff Size: Adult)   Pulse 65   Ht 157.5 cm (62\")   Wt 85.5 kg (188 lb 6.4 oz)   SpO2 98%   BMI 34.46 kg/m²       Physical Exam      Assessment & Plan          Assessment and Plan    There are no diagnoses linked to this encounter.            Follow Up       Follow Up   No follow-ups on file.  Patient was given instructions and counseling regarding her condition or for health maintenance advice. Please see specific information pulled into the AVS if appropriate.       "

## 2022-02-22 NOTE — PROGRESS NOTES
Chief Complaint   No chief complaint on file.  Cirrhosis    History of Present Illness       Tawny Lennon is a 56 y.o. 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  Patient continues on Lasix, Aldactone, lactulose, and nadolol for grade 1 esophageal varices by EGD.    Patient reports that she is not compliant with her medications and does not take that Aldactone as prescribed.  Patient reports she also does not take the lactulose daily as she does not like to have that many bowel movements.  She reports some lower extremity swelling.  Patient reports occasionally she will feel her abdomen has fluid present.  Patient has lost weight since her last office visit prior she was 195 pounds today she is 188 pounds.  Her blood pressure is stable at 121/74.  Patient's heart rate is stable at 65 while on nadolol.  Patient is experiencing confusion and fatigue, she reports that she is only having about 1 bowel movement per day and is not taking her lactulose as prescribed.  Patient reports that she was seen in the emergency department 2/11/2022 for right upper quadrant abdominal pain, nausea and vomiting.  She was diagnosed with viral gastroenteritis.  Patient reports she continues to have right-sided lower back pain which she does have a bulging disc in the lower back.  She reports the pain radiates around to the side of her abdomen.  Patient reports over the past week she has been having blood in her mouth in the morning time.  Patient reports that her hemorrhoids have been flaring up and she has had some rectal bleeding.  She is unsure if this is coming from her esophagus or her gums.  Patient denies fever, nausea, vomiting, weight loss, night sweats, melena.    CT scan abdomen and pelvis with contrast displayed no acute abnormalities, cirrhosis with mild splenomegaly and portosystemic shunting consistent with  portal hypertension.    Abdominal ultrasound performed on 10/11/2021 fatty liver infiltrates     EGD: Review of the patient's most recent EGD performed by Dr. Watkins on 07/12/2021 2 columns of grade 1 esophageal varices.     Endoscopy: Review of the patient's most recent EGD and colonoscopy performed by Dr. Watkins on 02/06/2020 2 cords of grade 1 esophageal varices.  Mild thickening of the antrum.  Diverticuli grade 1 internal hemorrhoids.    Labs: 2/11/2022 hemoglobin 13.0, platelets 110, AST 69, ALT 26, bilirubin 3.26  Results       Result Review :                             Past Medical History       Past Medical History:   Diagnosis Date   • Cirrhosis (HCC)     liver   • Colon polyps    • COPD (chronic obstructive pulmonary disease) (HCC)    • Depression    • Diabetes (HCC)    • Diverticulitis    • Hematuria    • High blood pressure    • High cholesterol    • Interstitial cystitis    • Migraine headache    • Narcolepsy    • PONV (postoperative nausea and vomiting)    • Sleep apnea    • Spinal headache     DURING PREGNANCY       Past Surgical History:   Procedure Laterality Date   • BLADDER REPAIR  2009   • CHOLECYSTECTOMY     • COLONOSCOPY  2014 x2 2020   • ENDOSCOPY  2014 2020   • ENDOSCOPY N/A 7/12/2021    Procedure: ESOPHAGOGASTRODUODENOSCOPY with biopsies;  Surgeon: Jesse Watkins MD;  Location: [facility];  Service: Gastroenterology;  Laterality: N/A;  esophageal varices   • HAND SURGERY     • HERNIA REPAIR     • HYSTERECTOMY  2009   • OTHER SURGICAL HISTORY      rectocele repair   • UPPER GASTROINTESTINAL ENDOSCOPY           Current Outpatient Medications:   •  albuterol sulfate  (90 Base) MCG/ACT inhaler, , Disp: , Rfl:   •  colestipol (Colestid) 1 g tablet, Take 1 tablet by mouth Daily., Disp: 30 tablet, Rfl: 3  •  Dulaglutide (Trulicity) 0.75 MG/0.5ML solution pen-injector, Inject 10 mL under the skin into the appropriate area as directed. Once per week on Tuesdays, Disp: , Rfl:  "  •  empagliflozin (Jardiance) 25 MG tablet tablet, Take  by mouth Daily., Disp: , Rfl:   •  furosemide (LASIX) 40 MG tablet, Take 1 tablet by mouth Daily., Disp: 30 tablet, Rfl: 2  •  losartan (COZAAR) 25 MG tablet, Take 1 tablet by mouth Daily., Disp: , Rfl:   •  metFORMIN (GLUCOPHAGE) 500 MG tablet, metformin oral tablet 500 mg take 1 tablet (500 mg) by oral route 2 times per day with morning and evening meals   Active, Disp: , Rfl:   •  metoclopramide (REGLAN) 5 MG tablet, , Disp: , Rfl:   •  metoprolol succinate XL (TOPROL-XL) 100 MG 24 hr tablet, metoprolol succinate oral tablet extended release 24 hr 100 mg take 1 tablet (100 mg) by oral route once daily   Active, Disp: , Rfl:   •  nadolol (CORGARD) 20 MG tablet, , Disp: , Rfl:   •  ondansetron (ZOFRAN) 8 MG tablet, , Disp: , Rfl:   •  pantoprazole (PROTONIX) 40 MG EC tablet, , Disp: , Rfl:   •  promethazine (PHENERGAN) 25 MG tablet, promethazine oral tablet 25 mg take 1 tablet (25 mg) by oral route once daily at bedtime   Suspended, Disp: , Rfl:   •  dicyclomine (BENTYL) 20 MG tablet, , Disp: , Rfl:   •  lactulose (CHRONULAC) 10 GM/15ML solution, , Disp: , Rfl:   •  spironolactone (ALDACTONE) 100 MG tablet, Take 1 tablet by mouth Daily., Disp: 30 tablet, Rfl: 2     No Known Allergies    Family History   Problem Relation Age of Onset   • Liver cancer Father    • Dementia Mother    • Malig Hyperthermia Neg Hx    • Colon cancer Neg Hx         Social History     Social History Narrative   • Not on file       Objective     Vital Signs:   /74 (BP Location: Left arm, Patient Position: Sitting, Cuff Size: Adult)   Pulse 65   Ht 157.5 cm (62\")   Wt 85.5 kg (188 lb 6.4 oz)   SpO2 98%   BMI 34.46 kg/m²       Physical Exam  Constitutional:       General: She is not in acute distress.     Appearance: Normal appearance. She is well-developed and normal weight.   Eyes:      Conjunctiva/sclera: Conjunctivae normal.      Pupils: Pupils are equal, round, and " reactive to light.      Visual Fields: Right eye visual fields normal and left eye visual fields normal.   Cardiovascular:      Rate and Rhythm: Normal rate and regular rhythm.      Heart sounds: Normal heart sounds.   Pulmonary:      Effort: Pulmonary effort is normal. No retractions.      Breath sounds: Normal breath sounds and air entry.      Comments: Inspection of chest: normal appearance  Abdominal:      General: Bowel sounds are normal.      Palpations: Abdomen is soft.      Tenderness: There is no abdominal tenderness.      Comments: No appreciable hepatosplenomegaly   Musculoskeletal:      Cervical back: Neck supple.      Right lower leg: Edema present.      Left lower leg: Edema present.      Comments: Trace edema   Lymphadenopathy:      Cervical: No cervical adenopathy.   Skin:     Findings: No lesion.      Comments: Turgor normal   Neurological:      Mental Status: She is alert and oriented to person, place, and time.   Psychiatric:         Mood and Affect: Mood and affect normal.           Assessment & Plan          Assessment and Plan {CC Problem List  Visit Diagnosis  ROS  Review (Popup)  [facility] Maintenance  Quality  BestPractice  Medications  SmartSets  SnapShot Encounters  Media :23}   Diagnoses and all orders for this visit:    1. MEDRANO (nonalcoholic steatohepatitis) (Primary)  -     MRI Abdomen With & Without Contrast; Future  -     CBC (No Diff); Future  -     Comprehensive Metabolic Panel; Future  -     Protime-INR; Future  -     AFP Tumor Marker; Future  -     Ammonia; Future  -     COVID-19,APTIMA PANTHER(CAROLYN),[facility], NP/OP SWAB IN UTM/VTM/SALINE TRANSPORT MEDIA,24 HR TAT - Swab, Nasal Cavity; Future    2. Cirrhosis of liver with ascites, unspecified hepatic cirrhosis type (HCC)  -     MRI Abdomen With & Without Contrast; Future  -     CBC (No Diff); Future  -     Comprehensive Metabolic Panel; Future  -     Protime-INR; Future  -     AFP Tumor Marker; Future  -     Ammonia;  Future  -     COVID-19,APTIMA PANTHER(CAROLYN),[facility], NP/OP SWAB IN UTM/VTM/SALINE TRANSPORT MEDIA,24 HR TAT - Swab, Nasal Cavity; Future    3. Secondary esophageal varices without bleeding (HCC)  -     MRI Abdomen With & Without Contrast; Future  -     CBC (No Diff); Future  -     Comprehensive Metabolic Panel; Future  -     Protime-INR; Future  -     AFP Tumor Marker; Future  -     Ammonia; Future  -     COVID-19,APTIMA PANTHER(CAROLYN),[facility], NP/OP SWAB IN UTM/VTM/SALINE TRANSPORT MEDIA,24 HR TAT - Swab, Nasal Cavity; Future    4. Cirrhosis of liver without ascites, unspecified hepatic cirrhosis type (HCC)  -     MRI Abdomen With & Without Contrast; Future  -     CBC (No Diff); Future  -     Comprehensive Metabolic Panel; Future  -     Protime-INR; Future  -     AFP Tumor Marker; Future  -     Ammonia; Future  -     COVID-19,APTIMA PANTHER(CAROLYN),[facility], NP/OP SWAB IN UTM/VTM/SALINE TRANSPORT MEDIA,24 HR TAT - Swab, Nasal Cavity; Future    5. Anemia, unspecified type  -     MRI Abdomen With & Without Contrast; Future  -     CBC (No Diff); Future  -     Comprehensive Metabolic Panel; Future  -     Protime-INR; Future  -     AFP Tumor Marker; Future  -     Ammonia; Future  -     COVID-19,APTIMA PANTHER(CAROLYN),[facility], NP/OP SWAB IN UTM/VTM/SALINE TRANSPORT MEDIA,24 HR TAT - Swab, Nasal Cavity; Future    6. Adenomatous polyp of colon, unspecified part of colon  -     MRI Abdomen With & Without Contrast; Future  -     CBC (No Diff); Future  -     Comprehensive Metabolic Panel; Future  -     Protime-INR; Future  -     AFP Tumor Marker; Future  -     Ammonia; Future  -     COVID-19,APTIMA PANTHER(CAROLYN),[facility], NP/OP SWAB IN UTM/VTM/SALINE TRANSPORT MEDIA,24 HR TAT - Swab, Nasal Cavity; Future    7. Esophageal varices without bleeding, unspecified esophageal varices type (HCC)  -     MRI Abdomen With & Without Contrast; Future  -     CBC (No Diff); Future  -     Comprehensive Metabolic Panel;  Future  -     Protime-INR; Future  -     AFP Tumor Marker; Future  -     Ammonia; Future  -     COVID-19,APTIMA PANTHER(CAROLYN),[facility], NP/OP SWAB IN UTM/VTM/SALINE TRANSPORT MEDIA,24 HR TAT - Swab, Nasal Cavity; Future    8. Gastroesophageal reflux disease without esophagitis  -     MRI Abdomen With & Without Contrast; Future  -     CBC (No Diff); Future  -     Comprehensive Metabolic Panel; Future  -     Protime-INR; Future  -     AFP Tumor Marker; Future  -     Ammonia; Future  -     COVID-19,APTIMA PANTHER(CAROLYN),[facility], NP/OP SWAB IN UTM/VTM/SALINE TRANSPORT MEDIA,24 HR TAT - Swab, Nasal Cavity; Future    9. 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  Patient continues on Lasix 40 mg, Aldactone 100 mg, lactulose 30 mils twice daily, and nadolol 20 mg for grade 1 esophageal varices by EGD.  I have stressed the importance of compliance the patient and the importance of each of her medications.  I have ordered labs to include CBC, CMP, PT/INR, alpha-fetoprotein and ammonia level.  Have ordered further imaging of her abdomen with an MRI with and without contrast.  I have recommended that the patient undergo further evaluation with an EGD.  I have discussed this procedure in detail with the patient.  I have discussed the risks, benefits and alternatives.  I have discussed the risk of anesthesia, bleeding and perforation.  Patient understands these risks, benefits and alternatives and wishes to proceed.  I will schedule her at her earliest convenience.        I spent 70 minutes caring for Tawny on this date of service. This time includes time spent by me in the following activities:preparing for the visit, reviewing tests, obtaining and/or reviewing a separately obtained history, performing a medically appropriate examination  and/or evaluation , counseling and educating the patient/family/caregiver, ordering medications, tests, or procedures, documenting information in the medical record, independently interpreting results and communicating that information with the patient/family/caregiver and care coordination    Follow Up       Follow Up   Return in about 1 month (around 3/22/2022).  Patient was given instructions and counseling regarding her condition or for health maintenance advice. Please see specific information pulled into the AVS if appropriate.

## 2022-02-23 DIAGNOSIS — K21.9 GASTROESOPHAGEAL REFLUX DISEASE WITHOUT ESOPHAGITIS: ICD-10-CM

## 2022-02-23 DIAGNOSIS — K75.81 NASH (NONALCOHOLIC STEATOHEPATITIS): ICD-10-CM

## 2022-02-23 DIAGNOSIS — R18.8 CIRRHOSIS OF LIVER WITH ASCITES, UNSPECIFIED HEPATIC CIRRHOSIS TYPE: ICD-10-CM

## 2022-02-23 DIAGNOSIS — D12.6 ADENOMATOUS POLYP OF COLON, UNSPECIFIED PART OF COLON: ICD-10-CM

## 2022-02-23 DIAGNOSIS — K74.60 CIRRHOSIS OF LIVER WITHOUT ASCITES, UNSPECIFIED HEPATIC CIRRHOSIS TYPE: ICD-10-CM

## 2022-02-23 DIAGNOSIS — K74.60 CIRRHOSIS OF LIVER WITH ASCITES, UNSPECIFIED HEPATIC CIRRHOSIS TYPE: ICD-10-CM

## 2022-02-23 DIAGNOSIS — I85.10 SECONDARY ESOPHAGEAL VARICES WITHOUT BLEEDING: ICD-10-CM

## 2022-02-23 DIAGNOSIS — D64.9 ANEMIA, UNSPECIFIED TYPE: ICD-10-CM

## 2022-02-23 DIAGNOSIS — I85.00 ESOPHAGEAL VARICES WITHOUT BLEEDING, UNSPECIFIED ESOPHAGEAL VARICES TYPE: ICD-10-CM

## 2022-02-28 DIAGNOSIS — K74.60 CIRRHOSIS OF LIVER WITHOUT ASCITES, UNSPECIFIED HEPATIC CIRRHOSIS TYPE: ICD-10-CM

## 2022-02-28 DIAGNOSIS — I85.00 ESOPHAGEAL VARICES WITHOUT BLEEDING, UNSPECIFIED ESOPHAGEAL VARICES TYPE: ICD-10-CM

## 2022-02-28 DIAGNOSIS — K75.81 NASH (NONALCOHOLIC STEATOHEPATITIS): ICD-10-CM

## 2022-02-28 DIAGNOSIS — R18.8 CIRRHOSIS OF LIVER WITH ASCITES, UNSPECIFIED HEPATIC CIRRHOSIS TYPE: ICD-10-CM

## 2022-02-28 DIAGNOSIS — I85.10 SECONDARY ESOPHAGEAL VARICES WITHOUT BLEEDING: ICD-10-CM

## 2022-02-28 DIAGNOSIS — K74.60 CIRRHOSIS OF LIVER WITH ASCITES, UNSPECIFIED HEPATIC CIRRHOSIS TYPE: ICD-10-CM

## 2022-02-28 DIAGNOSIS — D64.9 ANEMIA, UNSPECIFIED TYPE: ICD-10-CM

## 2022-02-28 DIAGNOSIS — D12.6 ADENOMATOUS POLYP OF COLON, UNSPECIFIED PART OF COLON: ICD-10-CM

## 2022-02-28 DIAGNOSIS — K21.9 GASTROESOPHAGEAL REFLUX DISEASE WITHOUT ESOPHAGITIS: ICD-10-CM

## 2022-03-01 ENCOUNTER — TELEPHONE (OUTPATIENT)
Dept: GASTROENTEROLOGY | Facility: CLINIC | Age: 57
End: 2022-03-01

## 2022-03-01 NOTE — TELEPHONE ENCOUNTER
----- Message from Sharri Mei NP sent at 2/28/2022  4:05 PM EST -----  Please call patient inform her that I reviewed her labs.  Her ammonia is elevated at 38 ensure that she is taking her lactulose and having 3-5 loose bowel movements per day.  Glucose was elevated at 258 when labs are drawn.  Liver enzymes mildly elevated at 49.  Bilirubin elevated at 2.69.  Alpha-fetoprotein is a tumor marker is normal.  Have patient maintain follow-up appointment in April.  Stressed the importance of adherence to her medications Lasix 40 mg daily, Aldactone 100 mg daily, lactulose 30 mL twice daily and nadolol 20 mg daily.

## 2022-03-08 DIAGNOSIS — D12.6 ADENOMATOUS POLYP OF COLON, UNSPECIFIED PART OF COLON: ICD-10-CM

## 2022-03-08 DIAGNOSIS — K74.60 CIRRHOSIS OF LIVER WITHOUT ASCITES, UNSPECIFIED HEPATIC CIRRHOSIS TYPE: ICD-10-CM

## 2022-03-08 DIAGNOSIS — K21.9 GASTROESOPHAGEAL REFLUX DISEASE WITHOUT ESOPHAGITIS: ICD-10-CM

## 2022-03-08 DIAGNOSIS — D64.9 ANEMIA, UNSPECIFIED TYPE: ICD-10-CM

## 2022-03-08 DIAGNOSIS — K74.60 CIRRHOSIS OF LIVER WITH ASCITES, UNSPECIFIED HEPATIC CIRRHOSIS TYPE: ICD-10-CM

## 2022-03-08 DIAGNOSIS — I85.00 ESOPHAGEAL VARICES WITHOUT BLEEDING, UNSPECIFIED ESOPHAGEAL VARICES TYPE: ICD-10-CM

## 2022-03-08 DIAGNOSIS — K75.81 NASH (NONALCOHOLIC STEATOHEPATITIS): ICD-10-CM

## 2022-03-08 DIAGNOSIS — I85.10 SECONDARY ESOPHAGEAL VARICES WITHOUT BLEEDING: ICD-10-CM

## 2022-03-08 DIAGNOSIS — R18.8 CIRRHOSIS OF LIVER WITH ASCITES, UNSPECIFIED HEPATIC CIRRHOSIS TYPE: ICD-10-CM

## 2022-03-09 ENCOUNTER — TELEPHONE (OUTPATIENT)
Dept: GASTROENTEROLOGY | Facility: CLINIC | Age: 57
End: 2022-03-09

## 2022-03-09 NOTE — TELEPHONE ENCOUNTER
Kalpana from [employer] contacted me and states pt's MRI needed a prior authorization. [facility] presented to her MRI appointment with the insurance of [employer]. I had the prior authorization attained through pt's previous insurance companies, Medicare and [place] Medicaid, in which no authorization was required due to Covid 19. I spoke with Jacquie NGUYEN At [facility] the [facility], who states a retro authorization cannot be completed, and that I must contact the pt's health plan directly. I spoke with Reshma NGUYEN (Reference # [id number]) who states we cannot initiate a retro authorization. Reshma states that the claim will be considered once it is submitted through billing. I spoke with Kalpana at [facility] and made her aware of the situation.

## 2022-03-21 ENCOUNTER — LAB (OUTPATIENT)
Dept: LAB | Facility: HOSPITAL | Age: 57
End: 2022-03-21

## 2022-03-21 DIAGNOSIS — K21.9 GASTROESOPHAGEAL REFLUX DISEASE WITHOUT ESOPHAGITIS: ICD-10-CM

## 2022-03-21 DIAGNOSIS — D64.9 ANEMIA, UNSPECIFIED TYPE: ICD-10-CM

## 2022-03-21 DIAGNOSIS — K74.60 CIRRHOSIS OF LIVER WITHOUT ASCITES, UNSPECIFIED HEPATIC CIRRHOSIS TYPE: ICD-10-CM

## 2022-03-21 DIAGNOSIS — I85.10 SECONDARY ESOPHAGEAL VARICES WITHOUT BLEEDING: ICD-10-CM

## 2022-03-21 DIAGNOSIS — R18.8 CIRRHOSIS OF LIVER WITH ASCITES, UNSPECIFIED HEPATIC CIRRHOSIS TYPE: ICD-10-CM

## 2022-03-21 DIAGNOSIS — D12.6 ADENOMATOUS POLYP OF COLON, UNSPECIFIED PART OF COLON: ICD-10-CM

## 2022-03-21 DIAGNOSIS — I85.00 ESOPHAGEAL VARICES WITHOUT BLEEDING, UNSPECIFIED ESOPHAGEAL VARICES TYPE: ICD-10-CM

## 2022-03-21 DIAGNOSIS — K75.81 NASH (NONALCOHOLIC STEATOHEPATITIS): ICD-10-CM

## 2022-03-21 DIAGNOSIS — K74.60 CIRRHOSIS OF LIVER WITH ASCITES, UNSPECIFIED HEPATIC CIRRHOSIS TYPE: ICD-10-CM

## 2022-03-21 PROCEDURE — U0004 COV-19 TEST NON-CDC HGH THRU: HCPCS

## 2022-03-21 NOTE — PRE-PROCEDURE INSTRUCTIONS
Called patient to discuss instructions for laxative. Patient stated understanding for 2 part prep. Discussed skin prep, clear liquid diet, and pre-op medications. Patient aware they can take Albuterol, Colestipol, Metoprolol, Nadolol, Zofran PRN, Phenergan PRN, and Protonix.  Patient stated understanding, No other issues or concerns noted currently per patient.  Blood glucose ordered.  Patient had covid swab completed on 3/21/22.

## 2022-03-22 LAB — SARS-COV-2 RNA PNL SPEC NAA+PROBE: NOT DETECTED

## 2022-03-25 ENCOUNTER — ANESTHESIA EVENT (OUTPATIENT)
Dept: GASTROENTEROLOGY | Facility: HOSPITAL | Age: 57
End: 2022-03-25

## 2022-03-25 ENCOUNTER — HOSPITAL ENCOUNTER (OUTPATIENT)
Facility: HOSPITAL | Age: 57
Setting detail: HOSPITAL OUTPATIENT SURGERY
Discharge: HOME | End: 2022-03-25
Attending: INTERNAL MEDICINE | Admitting: INTERNAL MEDICINE

## 2022-03-25 ENCOUNTER — ANESTHESIA (OUTPATIENT)
Dept: GASTROENTEROLOGY | Facility: HOSPITAL | Age: 57
End: 2022-03-25

## 2022-03-25 VITALS
HEART RATE: 84 BPM | RESPIRATION RATE: 19 BRPM | TEMPERATURE: 97.4 F | SYSTOLIC BLOOD PRESSURE: 111 MMHG | BODY MASS INDEX: 35.05 KG/M2 | HEIGHT: 62 IN | DIASTOLIC BLOOD PRESSURE: 67 MMHG | OXYGEN SATURATION: 97 % | WEIGHT: 190.48 LBS

## 2022-03-25 DIAGNOSIS — R18.8 CIRRHOSIS OF LIVER WITH ASCITES, UNSPECIFIED HEPATIC CIRRHOSIS TYPE: ICD-10-CM

## 2022-03-25 DIAGNOSIS — K75.81 NASH (NONALCOHOLIC STEATOHEPATITIS): ICD-10-CM

## 2022-03-25 DIAGNOSIS — K21.9 GASTROESOPHAGEAL REFLUX DISEASE WITHOUT ESOPHAGITIS: ICD-10-CM

## 2022-03-25 DIAGNOSIS — I85.10 SECONDARY ESOPHAGEAL VARICES WITHOUT BLEEDING: ICD-10-CM

## 2022-03-25 DIAGNOSIS — K74.60 CIRRHOSIS OF LIVER WITH ASCITES, UNSPECIFIED HEPATIC CIRRHOSIS TYPE: ICD-10-CM

## 2022-03-25 LAB — GLUCOSE BLDC GLUCOMTR-MCNC: 143 MG/DL (ref 70–99)

## 2022-03-25 PROCEDURE — 88305 TISSUE EXAM BY PATHOLOGIST: CPT | Performed by: INTERNAL MEDICINE

## 2022-03-25 PROCEDURE — 43239 EGD BIOPSY SINGLE/MULTIPLE: CPT | Performed by: INTERNAL MEDICINE

## 2022-03-25 PROCEDURE — 25010000002 PROPOFOL 10 MG/ML EMULSION

## 2022-03-25 PROCEDURE — 82962 GLUCOSE BLOOD TEST: CPT

## 2022-03-25 RX ORDER — PROPOFOL 10 MG/ML
VIAL (ML) INTRAVENOUS AS NEEDED
Status: DISCONTINUED | OUTPATIENT
Start: 2022-03-25 | End: 2022-03-25 | Stop reason: SURG

## 2022-03-25 RX ORDER — LIDOCAINE HYDROCHLORIDE 20 MG/ML
INJECTION, SOLUTION INFILTRATION; PERINEURAL AS NEEDED
Status: DISCONTINUED | OUTPATIENT
Start: 2022-03-25 | End: 2022-03-25 | Stop reason: SURG

## 2022-03-25 RX ORDER — SODIUM CHLORIDE, SODIUM LACTATE, POTASSIUM CHLORIDE, CALCIUM CHLORIDE 600; 310; 30; 20 MG/100ML; MG/100ML; MG/100ML; MG/100ML
30 INJECTION, SOLUTION INTRAVENOUS CONTINUOUS
Status: DISCONTINUED | OUTPATIENT
Start: 2022-03-25 | End: 2022-03-25 | Stop reason: HOSPADM

## 2022-03-25 RX ADMIN — PROPOFOL 100 MG: 10 INJECTION, EMULSION INTRAVENOUS at 15:18

## 2022-03-25 RX ADMIN — PROPOFOL 200 MCG/KG/MIN: 10 INJECTION, EMULSION INTRAVENOUS at 15:18

## 2022-03-25 RX ADMIN — SODIUM CHLORIDE, POTASSIUM CHLORIDE, SODIUM LACTATE AND CALCIUM CHLORIDE 30 ML/HR: 600; 310; 30; 20 INJECTION, SOLUTION INTRAVENOUS at 14:39

## 2022-03-25 RX ADMIN — LIDOCAINE HYDROCHLORIDE 50 MG: 20 INJECTION, SOLUTION INFILTRATION; PERINEURAL at 15:18

## 2022-03-25 NOTE — H&P
Pre Procedure History & Physical    Chief Complaint:   cirrhosis    Subjective     HPI:   cirrhosis    Past Medical History:   Past Medical History:   Diagnosis Date   • Cirrhosis (HCC)     liver   • Colon polyps    • COPD (chronic obstructive pulmonary disease) (HCC)    • Depression    • Diabetes (HCC)    • Diverticulitis    • Hematuria    • High blood pressure    • High cholesterol    • Interstitial cystitis    • Migraine headache    • Narcolepsy    • PONV (postoperative nausea and vomiting)    • Sleep apnea    • Spinal headache     DURING PREGNANCY       Past Surgical History:  Past Surgical History:   Procedure Laterality Date   • BLADDER REPAIR  2009   • CHOLECYSTECTOMY     • COLONOSCOPY  2014 x2 2020   • ENDOSCOPY  2014 2020   • ENDOSCOPY N/A 7/12/2021    Procedure: ESOPHAGOGASTRODUODENOSCOPY with biopsies;  Surgeon: Jesse Watkins MD;  Location: [facility] ENDOSCOPY;  Service: Gastroenterology;  Laterality: N/A;  esophageal varices   • HAND SURGERY     • HERNIA REPAIR     • HYSTERECTOMY  2009   • OTHER SURGICAL HISTORY      rectocele repair   • UPPER GASTROINTESTINAL ENDOSCOPY         Family History:  Family History   Problem Relation Age of Onset   • Liver cancer Father    • Dementia Mother    • Malig Hyperthermia Neg Hx    • Colon cancer Neg Hx        Social History:   reports that she is a non-smoker but has been exposed to tobacco smoke. She has never used smokeless tobacco. She reports that she does not drink alcohol and does not use drugs.    Medications:   Medications Prior to Admission   Medication Sig Dispense Refill Last Dose   • albuterol sulfate  (90 Base) MCG/ACT inhaler    Past Week at Unknown time   • dicyclomine (BENTYL) 20 MG tablet Take 20 mg by mouth Every 6 (Six) Hours.   3/21/2022 at Unknown time   • Dulaglutide (Trulicity) 0.75 MG/0.5ML solution pen-injector Inject 10 mL under the skin into the appropriate area as directed. Once per week on Tuesdays   3/19/2022 at Unknown time  "  • empagliflozin (JARDIANCE) 25 MG tablet tablet Take 25 mg by mouth Daily.   3/23/2022 at Unknown time   • furosemide (LASIX) 40 MG tablet Take 1 tablet by mouth Daily. 30 tablet 2 3/21/2022 at Unknown time   • lactulose (CHRONULAC) 10 GM/15ML solution    3/20/2022 at Unknown time   • losartan (COZAAR) 25 MG tablet Take 1 tablet by mouth Daily.   3/23/2022 at Unknown time   • metFORMIN (GLUCOPHAGE) 500 MG tablet Take 500 mg by mouth 2 (Two) Times a Day With Meals.   3/23/2022 at Unknown time   • metoclopramide (REGLAN) 5 MG tablet Take 5 mg by mouth.   3/21/2022 at Unknown time   • metoprolol succinate XL (TOPROL-XL) 100 MG 24 hr tablet Take 100 mg by mouth Daily.   3/23/2022 at Unknown time   • nadolol (CORGARD) 20 MG tablet Take 20 mg by mouth Daily.   3/23/2022 at Unknown time   • ondansetron (ZOFRAN) 8 MG tablet Take 8 mg by mouth.   Past Week at Unknown time   • pantoprazole (PROTONIX) 40 MG EC tablet Take 40 mg by mouth Daily.   3/24/2022 at Unknown time   • promethazine (PHENERGAN) 25 MG tablet Take 25 mg by mouth.   Past Week at Unknown time   • spironolactone (ALDACTONE) 100 MG tablet Take 1 tablet by mouth Daily. 30 tablet 2 Past Month at Unknown time   • colestipol (Colestid) 1 g tablet Take 1 tablet by mouth Daily. 30 tablet 3        Allergies:  Patient has no known allergies.        Objective     Blood pressure 116/60, pulse 79, temperature 97.8 °F (36.6 °C), temperature source Temporal, resp. rate 16, height 157.5 cm (62\"), weight 86.4 kg (190 lb 7.6 oz), SpO2 97 %.    Physical Exam   Constitutional: Pt is oriented to person, place, and time and well-developed, well-nourished, and in no distress.   Mouth/Throat: Oropharynx is clear and moist.   Neck: Normal range of motion.   Cardiovascular: Normal rate, regular rhythm and normal heart sounds.    Pulmonary/Chest: Effort normal and breath sounds normal.   Abdominal: Soft. Nontender  Skin: Skin is warm and dry.   Psychiatric: Mood, memory, affect and " judgment normal.     Assessment/Plan     Diagnosis:  cirrhosis    Anticipated Surgical Procedure:  egd    The risks, benefits, and alternatives of this procedure have been discussed with the patient or the responsible party- the patient understands and agrees to proceed.

## 2022-03-25 NOTE — ANESTHESIA POSTPROCEDURE EVALUATION
Patient: Tawny Lennon    Procedure Summary     Date: 03/25/22 Room / Location: [facility] ENDOSCOPY 4 / [facility]    Anesthesia Start: 1515 Anesthesia Stop: 1535    Procedure: ESOPHAGOGASTRODUODENOSCOPY WITH BIOPSIES (N/A ) Diagnosis:       MEDRANO (nonalcoholic steatohepatitis)      Cirrhosis of liver with ascites, unspecified hepatic cirrhosis type (HCC)      Secondary esophageal varices without bleeding (HCC)      Gastroesophageal reflux disease without esophagitis      (MEDRANO (nonalcoholic steatohepatitis) [K75.81])      (Cirrhosis of liver with ascites, unspecified hepatic cirrhosis type (HCC) [K74.60, R18.8])      (Secondary esophageal varices without bleeding (HCC) [I85.10])      (Gastroesophageal reflux disease without esophagitis [K21.9])    Surgeons: Jesse Watkins MD Provider: Priscila Carreon MD    Anesthesia Type: general ASA Status: 3          Anesthesia Type: general    Vitals  Vitals Value Taken Time   BP 98/56 03/25/22 1542   Temp 36.4 °C (97.6 °F) 03/25/22 1537   Pulse 83 03/25/22 1546   Resp 19 03/25/22 1542   SpO2 97 % 03/25/22 1546   Vitals shown include unvalidated device data.        Post Anesthesia Care and Evaluation    Patient location during evaluation: bedside  Patient participation: complete - patient participated  Level of consciousness: awake  Pain management: adequate  Airway patency: patent  Anesthetic complications: No anesthetic complications  PONV Status: none  Cardiovascular status: acceptable and stable  Respiratory status: acceptable  Hydration status: acceptable    Comments: An Anesthesiologist personally participated in the most demanding procedures (including induction and emergence if applicable) in the anesthesia plan, monitored the course of anesthesia administration at frequent intervals and remained physically present and available for immediate diagnosis and treatment of emergencies.

## 2022-03-25 NOTE — ANESTHESIA PREPROCEDURE EVALUATION
Anesthesia Evaluation     Patient summary reviewed and Nursing notes reviewed   history of anesthetic complications (spinal HA): PONV  NPO Solid Status: > 8 hours  NPO Liquid Status: > 2 hours           Airway   Mallampati: II  TM distance: >3 FB  No difficulty expected  Dental - normal exam     Pulmonary - normal exam   (+) COPD, sleep apnea,   Cardiovascular - normal exam  Exercise tolerance: poor (<4 METS)    (+) hypertension, dysrhythmias (SVT), DÍAZ, hyperlipidemia,       Neuro/Psych  (+) headaches, psychiatric history Depression,      ROS Comment: Narcolepsy  GI/Hepatic/Renal/Endo    (+) obesity, morbid obesity, GERD,  hepatitis, liver disease cirrhosis, diabetes mellitus,     ROS Comment: Esophageal varices    Musculoskeletal (-) negative ROS    Abdominal  - normal exam   Substance History - negative use     OB/GYN negative ob/gyn ROS         Other - negative ROS       ROS/Med Hx Other:                       Anesthesia Plan    ASA 3     general   (Total IV Anesthesia    Patient understands anesthesia not responsible for dental damage.  )  intravenous induction     Anesthetic plan, all risks, benefits, and alternatives have been provided, discussed and informed consent has been obtained with: patient.    Plan discussed with CRNA.        CODE STATUS:

## 2022-04-04 DIAGNOSIS — K74.60 CIRRHOSIS OF LIVER WITH ASCITES, UNSPECIFIED HEPATIC CIRRHOSIS TYPE: Primary | ICD-10-CM

## 2022-04-04 DIAGNOSIS — R18.8 CIRRHOSIS OF LIVER WITH ASCITES, UNSPECIFIED HEPATIC CIRRHOSIS TYPE: Primary | ICD-10-CM

## 2022-04-04 RX ORDER — FUROSEMIDE 40 MG/1
40 TABLET ORAL DAILY
Qty: 30 TABLET | Refills: 2 | Status: ON HOLD | OUTPATIENT
Start: 2022-04-04 | End: 2022-06-23

## 2022-06-08 ENCOUNTER — HOSPITAL ENCOUNTER (EMERGENCY)
Facility: HOSPITAL | Age: 57
Discharge: HOME | End: 2022-06-08
Attending: EMERGENCY MEDICINE | Admitting: EMERGENCY MEDICINE

## 2022-06-08 ENCOUNTER — TELEPHONE (OUTPATIENT)
Dept: ONCOLOGY | Facility: HOSPITAL | Age: 57
End: 2022-06-08

## 2022-06-08 VITALS
HEIGHT: 62 IN | DIASTOLIC BLOOD PRESSURE: 44 MMHG | HEART RATE: 74 BPM | WEIGHT: 202.38 LBS | RESPIRATION RATE: 16 BRPM | SYSTOLIC BLOOD PRESSURE: 101 MMHG | OXYGEN SATURATION: 91 % | BODY MASS INDEX: 37.24 KG/M2 | TEMPERATURE: 98.3 F

## 2022-06-08 DIAGNOSIS — D61.818 PANCYTOPENIA: Primary | ICD-10-CM

## 2022-06-08 LAB
ABO GROUP BLD: NORMAL
ABO GROUP BLD: NORMAL
ALBUMIN SERPL-MCNC: 3 G/DL (ref 3.5–5.2)
ALBUMIN/GLOB SERPL: 0.8 G/DL
ALP SERPL-CCNC: 99 U/L (ref 39–117)
ALT SERPL W P-5'-P-CCNC: 10 U/L (ref 1–33)
ANION GAP SERPL CALCULATED.3IONS-SCNC: 11.6 MMOL/L (ref 5–15)
AST SERPL-CCNC: 45 U/L (ref 1–32)
BASOPHILS # BLD AUTO: 0.02 10*3/MM3 (ref 0–0.2)
BASOPHILS NFR BLD AUTO: 0.6 % (ref 0–1.5)
BILIRUB SERPL-MCNC: 2.9 MG/DL (ref 0–1.2)
BLD GP AB SCN SERPL QL: NEGATIVE
BUN SERPL-MCNC: 11 MG/DL (ref 6–20)
BUN/CREAT SERPL: 12 (ref 7–25)
CALCIUM SPEC-SCNC: 9.7 MG/DL (ref 8.6–10.5)
CHLORIDE SERPL-SCNC: 100 MMOL/L (ref 98–107)
CO2 SERPL-SCNC: 19.4 MMOL/L (ref 22–29)
CREAT SERPL-MCNC: 0.92 MG/DL (ref 0.57–1)
DEPRECATED RDW RBC AUTO: 52.7 FL (ref 37–54)
EGFRCR SERPLBLD CKD-EPI 2021: 72.3 ML/MIN/1.73
EOSINOPHIL # BLD AUTO: 0.09 10*3/MM3 (ref 0–0.4)
EOSINOPHIL NFR BLD AUTO: 2.9 % (ref 0.3–6.2)
ERYTHROCYTE [DISTWIDTH] IN BLOOD BY AUTOMATED COUNT: 15.3 % (ref 12.3–15.4)
GLOBULIN UR ELPH-MCNC: 3.8 GM/DL
GLUCOSE SERPL-MCNC: 165 MG/DL (ref 65–99)
HCT VFR BLD AUTO: 27.1 % (ref 34–46.6)
HGB BLD-MCNC: 9.4 G/DL (ref 12–15.9)
HOLD SPECIMEN: NORMAL
IMM GRANULOCYTES # BLD AUTO: 0 10*3/MM3 (ref 0–0.05)
IMM GRANULOCYTES NFR BLD AUTO: 0 % (ref 0–0.5)
INR PPP: 1.6 (ref 0.86–1.15)
LYMPHOCYTES # BLD AUTO: 0.61 10*3/MM3 (ref 0.7–3.1)
LYMPHOCYTES NFR BLD AUTO: 19.8 % (ref 19.6–45.3)
MCH RBC QN AUTO: 33.1 PG (ref 26.6–33)
MCHC RBC AUTO-ENTMCNC: 34.7 G/DL (ref 31.5–35.7)
MCV RBC AUTO: 95.4 FL (ref 79–97)
MONOCYTES # BLD AUTO: 0.14 10*3/MM3 (ref 0.1–0.9)
MONOCYTES NFR BLD AUTO: 4.5 % (ref 5–12)
NEUTROPHILS NFR BLD AUTO: 2.22 10*3/MM3 (ref 1.7–7)
NEUTROPHILS NFR BLD AUTO: 72.2 % (ref 42.7–76)
NRBC BLD AUTO-RTO: 0 /100 WBC (ref 0–0.2)
PLATELET # BLD AUTO: 50 10*3/MM3 (ref 140–450)
PMV BLD AUTO: 12.6 FL (ref 6–12)
POTASSIUM SERPL-SCNC: 4.6 MMOL/L (ref 3.5–5.2)
PROT SERPL-MCNC: 6.8 G/DL (ref 6–8.5)
PROTHROMBIN TIME: 19.3 SECONDS (ref 11.8–14.9)
RBC # BLD AUTO: 2.84 10*6/MM3 (ref 3.77–5.28)
RH BLD: POSITIVE
RH BLD: POSITIVE
SODIUM SERPL-SCNC: 131 MMOL/L (ref 136–145)
T&S EXPIRATION DATE: NORMAL
WBC NRBC COR # BLD: 3.08 10*3/MM3 (ref 3.4–10.8)
WHOLE BLOOD HOLD COAG: NORMAL

## 2022-06-08 PROCEDURE — 86900 BLOOD TYPING SEROLOGIC ABO: CPT | Performed by: EMERGENCY MEDICINE

## 2022-06-08 PROCEDURE — 85610 PROTHROMBIN TIME: CPT | Performed by: EMERGENCY MEDICINE

## 2022-06-08 PROCEDURE — 86901 BLOOD TYPING SEROLOGIC RH(D): CPT | Performed by: EMERGENCY MEDICINE

## 2022-06-08 PROCEDURE — 80053 COMPREHEN METABOLIC PANEL: CPT | Performed by: EMERGENCY MEDICINE

## 2022-06-08 PROCEDURE — 86850 RBC ANTIBODY SCREEN: CPT | Performed by: EMERGENCY MEDICINE

## 2022-06-08 PROCEDURE — 85025 COMPLETE CBC W/AUTO DIFF WBC: CPT | Performed by: EMERGENCY MEDICINE

## 2022-06-08 PROCEDURE — 86901 BLOOD TYPING SEROLOGIC RH(D): CPT

## 2022-06-08 PROCEDURE — 36415 COLL VENOUS BLD VENIPUNCTURE: CPT

## 2022-06-08 PROCEDURE — 86900 BLOOD TYPING SEROLOGIC ABO: CPT

## 2022-06-08 PROCEDURE — 99283 EMERGENCY DEPT VISIT LOW MDM: CPT

## 2022-06-08 RX ORDER — SODIUM CHLORIDE 0.9 % (FLUSH) 0.9 %
10 SYRINGE (ML) INJECTION AS NEEDED
Status: DISCONTINUED | OUTPATIENT
Start: 2022-06-08 | End: 2022-06-08 | Stop reason: HOSPADM

## 2022-06-08 NOTE — ED PROVIDER NOTES
Time: 12:14 PM EDT  Arrived by: ambulance  Chief Complaint: abnormal lab  History provided by: Pt  History is limited by: N/A     History of Present Illness:  Patient is a 56 y.o. female that presents to the emergency department with an abnormal lab. Pt reports she had abdominal surgery 1 month ago. Also states she was sent to this ED for low platelets. Pt reports fatigue and difficulty breathing, but doesn't feel any worse today. Colostomy in RLQ filled with green stool. Pt also reports moderate diffuse lower abdominal pain that is intermittent. Nothing improves or worsens symptoms.       Pt denies hematemesis, hematochezia, fever, and SOB.     HPI    Similar Symptoms Previously: yes  Recently seen: Pt was seen by her provider today for an abnormal lab before being referred this to this ED.       Patient Care Team  Primary Care Provider: Zain Valentine MD    Past Medical History:     No Known Allergies  Past Medical History:   Diagnosis Date   • Cirrhosis (HCC)     liver   • Colon polyps    • COPD (chronic obstructive pulmonary disease) (HCC)    • Depression    • Diabetes (HCC)    • Diverticulitis    • Hematuria    • High blood pressure    • High cholesterol    • Interstitial cystitis    • Migraine headache    • Narcolepsy    • PONV (postoperative nausea and vomiting)    • Sleep apnea    • Spinal headache     DURING PREGNANCY     Past Surgical History:   Procedure Laterality Date   • BLADDER REPAIR  2009   • CHOLECYSTECTOMY     • COLONOSCOPY  2014 x2 2020   • ENDOSCOPY  2014 2020   • ENDOSCOPY N/A 7/12/2021    Procedure: ESOPHAGOGASTRODUODENOSCOPY with biopsies;  Surgeon: Jesse Watkins MD;  Location: [facility] ENDOSCOPY;  Service: Gastroenterology;  Laterality: N/A;  esophageal varices   • ENDOSCOPY N/A 3/25/2022    Procedure: ESOPHAGOGASTRODUODENOSCOPY WITH BIOPSIES;  Surgeon: Jesse Watkins MD;  Location: [facility] ENDOSCOPY;  Service: Gastroenterology;  Laterality: N/A;  ESOPHAGEAL VARICIES   •  HAND SURGERY     • HERNIA REPAIR     • HYSTERECTOMY  2009   • OTHER SURGICAL HISTORY      rectocele repair   • UPPER GASTROINTESTINAL ENDOSCOPY       Family History   Problem Relation Age of Onset   • Liver cancer Father    • Dementia Mother    • Malig Hyperthermia Neg Hx    • Colon cancer Neg Hx        Home Medications:  Prior to Admission medications    Medication Sig Start Date End Date Taking? Authorizing Provider   albuterol sulfate  (90 Base) MCG/ACT inhaler  5/5/21   Corrine Degroot MD   colestipol (Colestid) 1 g tablet Take 1 tablet by mouth Daily. 10/26/21   Sharri Mei APRN   dicyclomine (BENTYL) 20 MG tablet Take 20 mg by mouth Every 6 (Six) Hours. 2/12/22   Corrine Degroot MD   Dulaglutide (Trulicity) 0.75 MG/0.5ML solution pen-injector Inject 10 mL under the skin into the appropriate area as directed. Once per week on Tuesdays    Corrine Degroot MD   empagliflozin (JARDIANCE) 25 MG tablet tablet Take 25 mg by mouth Daily.    Corrine Degroot MD   furosemide (LASIX) 40 MG tablet Take 1 tablet by mouth Daily for 30 days. 4/4/22 5/4/22  Sharri Mei APRN   lactulose (CHRONULAC) 10 GM/15ML solution  7/20/21   Corrine Degroot MD   losartan (COZAAR) 25 MG tablet Take 1 tablet by mouth Daily. 1/13/22   Corrine Degroot MD   metFORMIN (GLUCOPHAGE) 500 MG tablet Take 500 mg by mouth 2 (Two) Times a Day With Meals.    Corrine Degroot MD   metoclopramide (REGLAN) 5 MG tablet Take 5 mg by mouth. 6/29/21   Corrine Degroot MD   metoprolol succinate XL (TOPROL-XL) 100 MG 24 hr tablet Take 100 mg by mouth Daily.    Corrine Degroot MD   nadolol (CORGARD) 20 MG tablet Take 20 mg by mouth Daily. 5/5/21   Corrine Degroot MD   ondansetron (ZOFRAN) 8 MG tablet Take 8 mg by mouth. 5/5/21   Corrine Degroot MD   pantoprazole (PROTONIX) 40 MG EC tablet Take 40 mg by mouth Daily. 6/29/21   Corrine Degroot MD   promethazine (PHENERGAN) 25 MG tablet  "Take 25 mg by mouth.    Provider, MD Corrine   spironolactone (ALDACTONE) 100 MG tablet Take 1 tablet by mouth Daily. 8/2/21   Sharri Mei APRN        Social History:   Social History     Tobacco Use   • Smoking status: Passive Smoke Exposure - Never Smoker   • Smokeless tobacco: Never Used   Vaping Use   • Vaping Use: Never used   Substance Use Topics   • Alcohol use: Never   • Drug use: Never     Recent travel: no     Review of Systems:  Review of Systems   Constitutional: Positive for fatigue. Negative for chills, diaphoresis and fever.   HENT: Negative for ear discharge and nosebleeds.    Eyes: Negative for photophobia.   Respiratory: Negative for shortness of breath.         Difficulty breathing   Cardiovascular: Negative for chest pain.   Gastrointestinal: Positive for abdominal pain. Negative for blood in stool, diarrhea, nausea and vomiting.   Genitourinary: Negative for dysuria.   Musculoskeletal: Negative for back pain and neck pain.   Skin: Negative for rash.   Neurological: Negative for headaches.        Physical Exam:  /44 (BP Location: Right arm, Patient Position: Sitting)   Pulse 74   Temp 98.3 °F (36.8 °C) (Oral)   Resp 16   Ht 157.5 cm (62\")   Wt 91.8 kg (202 lb 6.1 oz)   SpO2 91%   BMI 37.02 kg/m²     Physical Exam  Vitals and nursing note reviewed.   Constitutional:       General: She is not in acute distress.     Appearance: Normal appearance.   HENT:      Head: Normocephalic and atraumatic.      Nose: Nose normal.   Eyes:      General: No scleral icterus.  Cardiovascular:      Rate and Rhythm: Normal rate and regular rhythm.      Heart sounds: Normal heart sounds.   Pulmonary:      Effort: Pulmonary effort is normal. No respiratory distress.      Breath sounds: Normal breath sounds.   Abdominal:      General: The ostomy site is clean.      Palpations: Abdomen is soft.      Tenderness: There is abdominal tenderness (mild; diffuse). There is no guarding or rebound.      " Comments: RLQ ostomy; skin is clear, dry, and intact around ostomy site.    Musculoskeletal:         General: Normal range of motion.      Cervical back: Neck supple.      Right lower leg: No edema.      Left lower leg: No edema.   Skin:     General: Skin is warm and dry.      Findings: No rash.   Neurological:      General: No focal deficit present.      Mental Status: She is alert and oriented to person, place, and time.      Sensory: No sensory deficit.      Motor: No weakness.                Medications in the Emergency Department:  Medications - No data to display     Labs  Lab Results (last 24 hours)     Procedure Component Value Units Date/Time    Protime-INR [[id number]]  (Abnormal) Collected: 06/08/22 1206    Specimen: Blood Updated: 06/08/22 1235     Protime 19.3 Seconds      INR 1.60    Narrative:      Suggested Therapeutic Ranges For Oral Anticoagulant Therapy:  Level of Therapy                      INR Target Range  Standard Dose                            2.0-3.0  High Dose                                2.5-3.5  Patients not receiving anticoagulant  Therapy Normal Range                     0.86-1.15    Comprehensive Metabolic Panel [[id number]]  (Abnormal) Collected: 06/08/22 1207    Specimen: Blood Updated: 06/08/22 1251     Glucose 165 mg/dL      BUN 11 mg/dL      Creatinine 0.92 mg/dL      Sodium 131 mmol/L      Potassium 4.6 mmol/L      Comment: Specimen hemolyzed.  Results may be affected.        Chloride 100 mmol/L      CO2 19.4 mmol/L      Calcium 9.7 mg/dL      Total Protein 6.8 g/dL      Albumin 3.00 g/dL      ALT (SGPT) 10 U/L      Comment: Specimen hemolyzed.  Results may be affected.        AST (SGOT) 45 U/L      Comment: Specimen hemolyzed.  Results may be affected.        Alkaline Phosphatase 99 U/L      Total Bilirubin 2.9 mg/dL      Globulin 3.8 gm/dL      A/G Ratio 0.8 g/dL      BUN/Creatinine Ratio 12.0     Anion Gap 11.6 mmol/L      eGFR 72.3 mL/min/1.73      Comment: National Kidney  Foundation and American Society of Nephrology (ASN) Task Force recommended calculation based on the Chronic Kidney Disease Epidemiology Collaboration (CKD-EPI) equation refit without adjustment for race.       Narrative:      GFR Normal >60  Chronic Kidney Disease <60  Kidney Failure <15      CBC & Differential [[id number]]  (Abnormal) Collected: 06/08/22 1207    Specimen: Blood Updated: 06/08/22 1228    Narrative:      The following orders were created for panel order CBC & Differential.  Procedure                               Abnormality         Status                     ---------                               -----------         ------                     CBC Auto Differential[[id number]]        Abnormal            Final result                 Please view results for these tests on the individual orders.    CBC Auto Differential [[id number]]  (Abnormal) Collected: 06/08/22 1207    Specimen: Blood Updated: 06/08/22 1228     WBC 3.08 10*3/mm3      RBC 2.84 10*6/mm3      Hemoglobin 9.4 g/dL      Hematocrit 27.1 %      MCV 95.4 fL      MCH 33.1 pg      MCHC 34.7 g/dL      RDW 15.3 %      RDW-SD 52.7 fl      MPV 12.6 fL      Platelets 50 10*3/mm3      Neutrophil % 72.2 %      Lymphocyte % 19.8 %      Monocyte % 4.5 %      Eosinophil % 2.9 %      Basophil % 0.6 %      Immature Grans % 0.0 %      Neutrophils, Absolute 2.22 10*3/mm3      Lymphocytes, Absolute 0.61 10*3/mm3      Monocytes, Absolute 0.14 10*3/mm3      Eosinophils, Absolute 0.09 10*3/mm3      Basophils, Absolute 0.02 10*3/mm3      Immature Grans, Absolute 0.00 10*3/mm3      nRBC 0.0 /100 WBC            Imaging:  No Radiology Exams Resulted Within Past 24 Hours    Procedures:  Procedures    Progress  ED Course as of 06/08/22 2038 Wed Jun 08, 2022   1324 Hemoglobin(!): 9.4  Patient has had anemia/thrombocytopenia for years.  None of this is new.  No active bleeding.  States she has never seen a heme-onc specialist outside of the hospital.  Recent admission  to [place] [facility] confirms thrombocytopenia as low as 62 on 6/2/2022.  No indication for emergent transfusion or admission to the hospital. [RP]      ED Course User Index  [RP] Dennis Rich MD                            Medical Decision Making:  MDM  Number of Diagnoses or Management Options  Pancytopenia (HCC): established and worsening     Amount and/or Complexity of Data Reviewed  Clinical lab tests: reviewed  Decide to obtain previous medical records or to obtain history from someone other than the patient: yes  Independent visualization of images, tracings, or specimens: yes    Risk of Complications, Morbidity, and/or Mortality  Presenting problems: moderate  Management options: low    Patient Progress  Patient progress: stable       Final diagnoses:   Pancytopenia (HCC)        Disposition:  ED Disposition     ED Disposition   Discharge    Condition   Stable    Comment   --             Documentation assistance provided by CELINE FRANK acting as scribe for Dennis Rich MD. Information recorded by the scribe was done at my direction and has been verified and validated by me.        Celine Frank  06/08/22 1227       Dennis Rich MD  06/08/22 2039

## 2022-06-08 NOTE — TELEPHONE ENCOUNTER
Caller: SAVANNAH    Relationship: [facility]    Best call back number: [phone]      What was the call regarding: FACILITY CALLED TO SCHEDULE HOSP FOLLOW UP FOR PATIENT     Do you require a callback: YES

## 2022-06-08 NOTE — DISCHARGE INSTRUCTIONS
Your low blood counts today are not a new issue and this needs to be followed up as an outpatient with heme-onc specialist.  Return to the ER as needed for uncontrolled bleeding or if the hemoglobin is less than 7, platelet count less than 20,000.

## 2022-06-22 ENCOUNTER — HOSPITAL ENCOUNTER (INPATIENT)
Facility: HOSPITAL | Age: 57
LOS: 17 days | Discharge: HOME HEALTH | End: 2022-07-11
Attending: EMERGENCY MEDICINE | Admitting: INTERNAL MEDICINE

## 2022-06-22 ENCOUNTER — APPOINTMENT (OUTPATIENT)
Dept: CT IMAGING | Facility: HOSPITAL | Age: 57
End: 2022-06-22

## 2022-06-22 DIAGNOSIS — Z78.9 DECREASED ACTIVITIES OF DAILY LIVING (ADL): ICD-10-CM

## 2022-06-22 DIAGNOSIS — E87.5 HYPERKALEMIA: Primary | ICD-10-CM

## 2022-06-22 DIAGNOSIS — N17.0 ACUTE KIDNEY INJURY (AKI) WITH ACUTE TUBULAR NECROSIS (ATN): ICD-10-CM

## 2022-06-22 DIAGNOSIS — R26.2 DIFFICULTY IN WALKING: ICD-10-CM

## 2022-06-22 LAB
ALBUMIN SERPL-MCNC: 3.3 G/DL (ref 3.5–5.2)
ALBUMIN/GLOB SERPL: 0.8 G/DL
ALP SERPL-CCNC: 242 U/L (ref 39–117)
ALT SERPL W P-5'-P-CCNC: 15 U/L (ref 1–33)
ANION GAP SERPL CALCULATED.3IONS-SCNC: 10.2 MMOL/L (ref 5–15)
ANION GAP SERPL CALCULATED.3IONS-SCNC: 7.2 MMOL/L (ref 5–15)
AST SERPL-CCNC: 38 U/L (ref 1–32)
BASOPHILS # BLD AUTO: 0.05 10*3/MM3 (ref 0–0.2)
BASOPHILS NFR BLD AUTO: 0.6 % (ref 0–1.5)
BILIRUB SERPL-MCNC: 1.5 MG/DL (ref 0–1.2)
BUN SERPL-MCNC: 21 MG/DL (ref 6–20)
BUN SERPL-MCNC: 23 MG/DL (ref 6–20)
BUN/CREAT SERPL: 14.5 (ref 7–25)
BUN/CREAT SERPL: 15.8 (ref 7–25)
CALCIUM SPEC-SCNC: 10.2 MG/DL (ref 8.6–10.5)
CALCIUM SPEC-SCNC: 10.5 MG/DL (ref 8.6–10.5)
CHLORIDE SERPL-SCNC: 103 MMOL/L (ref 98–107)
CHLORIDE SERPL-SCNC: 99 MMOL/L (ref 98–107)
CO2 SERPL-SCNC: 15.8 MMOL/L (ref 22–29)
CO2 SERPL-SCNC: 18.8 MMOL/L (ref 22–29)
CREAT SERPL-MCNC: 1.33 MG/DL (ref 0.57–1)
CREAT SERPL-MCNC: 1.59 MG/DL (ref 0.57–1)
DEPRECATED RDW RBC AUTO: 51.8 FL (ref 37–54)
EGFRCR SERPLBLD CKD-EPI 2021: 37.5 ML/MIN/1.73
EGFRCR SERPLBLD CKD-EPI 2021: 46.5 ML/MIN/1.73
EOSINOPHIL # BLD AUTO: 0.17 10*3/MM3 (ref 0–0.4)
EOSINOPHIL NFR BLD AUTO: 1.9 % (ref 0.3–6.2)
ERYTHROCYTE [DISTWIDTH] IN BLOOD BY AUTOMATED COUNT: 15.2 % (ref 12.3–15.4)
GLOBULIN UR ELPH-MCNC: 4.4 GM/DL
GLUCOSE BLDC GLUCOMTR-MCNC: 152 MG/DL (ref 70–99)
GLUCOSE SERPL-MCNC: 137 MG/DL (ref 65–99)
GLUCOSE SERPL-MCNC: 196 MG/DL (ref 65–99)
HCT VFR BLD AUTO: 32 % (ref 34–46.6)
HGB BLD-MCNC: 10.7 G/DL (ref 12–15.9)
HOLD SPECIMEN: NORMAL
HOLD SPECIMEN: NORMAL
IMM GRANULOCYTES # BLD AUTO: 0.04 10*3/MM3 (ref 0–0.05)
IMM GRANULOCYTES NFR BLD AUTO: 0.5 % (ref 0–0.5)
LYMPHOCYTES # BLD AUTO: 1.23 10*3/MM3 (ref 0.7–3.1)
LYMPHOCYTES NFR BLD AUTO: 14.1 % (ref 19.6–45.3)
MCH RBC QN AUTO: 31.7 PG (ref 26.6–33)
MCHC RBC AUTO-ENTMCNC: 33.4 G/DL (ref 31.5–35.7)
MCV RBC AUTO: 94.7 FL (ref 79–97)
MONOCYTES # BLD AUTO: 0.67 10*3/MM3 (ref 0.1–0.9)
MONOCYTES NFR BLD AUTO: 7.7 % (ref 5–12)
NEUTROPHILS NFR BLD AUTO: 6.56 10*3/MM3 (ref 1.7–7)
NEUTROPHILS NFR BLD AUTO: 75.2 % (ref 42.7–76)
NRBC BLD AUTO-RTO: 0 /100 WBC (ref 0–0.2)
PLATELET # BLD AUTO: 165 10*3/MM3 (ref 140–450)
PMV BLD AUTO: 9.8 FL (ref 6–12)
POTASSIUM SERPL-SCNC: 5.8 MMOL/L (ref 3.5–5.2)
POTASSIUM SERPL-SCNC: 6.3 MMOL/L (ref 3.5–5.2)
PROT SERPL-MCNC: 7.7 G/DL (ref 6–8.5)
RBC # BLD AUTO: 3.38 10*6/MM3 (ref 3.77–5.28)
SODIUM SERPL-SCNC: 125 MMOL/L (ref 136–145)
SODIUM SERPL-SCNC: 129 MMOL/L (ref 136–145)
WBC NRBC COR # BLD: 8.72 10*3/MM3 (ref 3.4–10.8)
WHOLE BLOOD HOLD COAG: NORMAL
WHOLE BLOOD HOLD SPECIMEN: NORMAL

## 2022-06-22 PROCEDURE — 80053 COMPREHEN METABOLIC PANEL: CPT | Performed by: EMERGENCY MEDICINE

## 2022-06-22 PROCEDURE — 85025 COMPLETE CBC W/AUTO DIFF WBC: CPT

## 2022-06-22 PROCEDURE — 99284 EMERGENCY DEPT VISIT MOD MDM: CPT

## 2022-06-22 PROCEDURE — 0 IOPAMIDOL PER 1 ML: Performed by: EMERGENCY MEDICINE

## 2022-06-22 PROCEDURE — 93005 ELECTROCARDIOGRAM TRACING: CPT | Performed by: EMERGENCY MEDICINE

## 2022-06-22 PROCEDURE — 74177 CT ABD & PELVIS W/CONTRAST: CPT

## 2022-06-22 PROCEDURE — 93010 ELECTROCARDIOGRAM REPORT: CPT | Performed by: INTERNAL MEDICINE

## 2022-06-22 PROCEDURE — 63710000001 INSULIN REGULAR HUMAN PER 5 UNITS: Performed by: EMERGENCY MEDICINE

## 2022-06-22 PROCEDURE — 25010000002 CALCIUM GLUCONATE-NACL 1-0.675 GM/50ML-% SOLUTION: Performed by: EMERGENCY MEDICINE

## 2022-06-22 PROCEDURE — 82962 GLUCOSE BLOOD TEST: CPT

## 2022-06-22 RX ORDER — CALCIUM GLUCONATE 20 MG/ML
1 INJECTION, SOLUTION INTRAVENOUS ONCE
Status: COMPLETED | OUTPATIENT
Start: 2022-06-22 | End: 2022-06-22

## 2022-06-22 RX ORDER — SODIUM CHLORIDE 0.9 % (FLUSH) 0.9 %
10 SYRINGE (ML) INJECTION AS NEEDED
Status: DISCONTINUED | OUTPATIENT
Start: 2022-06-22 | End: 2022-07-11 | Stop reason: HOSPADM

## 2022-06-22 RX ORDER — DEXTROSE MONOHYDRATE 25 G/50ML
25 INJECTION, SOLUTION INTRAVENOUS ONCE
Status: COMPLETED | OUTPATIENT
Start: 2022-06-22 | End: 2022-06-22

## 2022-06-22 RX ADMIN — DEXTROSE MONOHYDRATE 25 G: 25 INJECTION, SOLUTION INTRAVENOUS at 20:03

## 2022-06-22 RX ADMIN — INSULIN HUMAN 10 UNITS: 100 INJECTION, SOLUTION PARENTERAL at 20:04

## 2022-06-22 RX ADMIN — CALCIUM GLUCONATE 1 G: 20 INJECTION, SOLUTION INTRAVENOUS at 19:23

## 2022-06-22 RX ADMIN — IOPAMIDOL 100 ML: 755 INJECTION, SOLUTION INTRAVENOUS at 20:48

## 2022-06-22 RX ADMIN — SODIUM BICARBONATE 50 MEQ: 84 INJECTION, SOLUTION INTRAVENOUS at 19:56

## 2022-06-22 RX ADMIN — SODIUM CHLORIDE 1000 ML: 9 INJECTION, SOLUTION INTRAVENOUS at 19:22

## 2022-06-23 PROBLEM — E87.5 HYPERKALEMIA: Status: ACTIVE | Noted: 2022-06-23

## 2022-06-23 LAB
ANION GAP SERPL CALCULATED.3IONS-SCNC: 9.2 MMOL/L (ref 5–15)
ANION GAP SERPL CALCULATED.3IONS-SCNC: 9.3 MMOL/L (ref 5–15)
BASOPHILS # BLD AUTO: 0.03 10*3/MM3 (ref 0–0.2)
BASOPHILS NFR BLD AUTO: 0.5 % (ref 0–1.5)
BUN SERPL-MCNC: 17 MG/DL (ref 6–20)
BUN SERPL-MCNC: 19 MG/DL (ref 6–20)
BUN/CREAT SERPL: 15.5 (ref 7–25)
BUN/CREAT SERPL: 17.8 (ref 7–25)
CALCIUM SPEC-SCNC: 10 MG/DL (ref 8.6–10.5)
CALCIUM SPEC-SCNC: 9.9 MG/DL (ref 8.6–10.5)
CHLORIDE SERPL-SCNC: 103 MMOL/L (ref 98–107)
CHLORIDE SERPL-SCNC: 105 MMOL/L (ref 98–107)
CO2 SERPL-SCNC: 16.7 MMOL/L (ref 22–29)
CO2 SERPL-SCNC: 16.8 MMOL/L (ref 22–29)
CREAT SERPL-MCNC: 1.07 MG/DL (ref 0.57–1)
CREAT SERPL-MCNC: 1.1 MG/DL (ref 0.57–1)
DEPRECATED RDW RBC AUTO: 53.2 FL (ref 37–54)
EGFRCR SERPLBLD CKD-EPI 2021: 58.4 ML/MIN/1.73
EGFRCR SERPLBLD CKD-EPI 2021: 60.3 ML/MIN/1.73
EOSINOPHIL # BLD AUTO: 0.1 10*3/MM3 (ref 0–0.4)
EOSINOPHIL NFR BLD AUTO: 1.7 % (ref 0.3–6.2)
ERYTHROCYTE [DISTWIDTH] IN BLOOD BY AUTOMATED COUNT: 15.4 % (ref 12.3–15.4)
GLUCOSE BLDC GLUCOMTR-MCNC: 103 MG/DL (ref 70–99)
GLUCOSE BLDC GLUCOMTR-MCNC: 144 MG/DL (ref 70–99)
GLUCOSE BLDC GLUCOMTR-MCNC: 150 MG/DL (ref 70–99)
GLUCOSE BLDC GLUCOMTR-MCNC: 194 MG/DL (ref 70–99)
GLUCOSE BLDC GLUCOMTR-MCNC: 251 MG/DL (ref 70–99)
GLUCOSE BLDC GLUCOMTR-MCNC: 268 MG/DL (ref 70–99)
GLUCOSE SERPL-MCNC: 101 MG/DL (ref 65–99)
GLUCOSE SERPL-MCNC: 134 MG/DL (ref 65–99)
HCT VFR BLD AUTO: 27.8 % (ref 34–46.6)
HGB BLD-MCNC: 9.5 G/DL (ref 12–15.9)
IMM GRANULOCYTES # BLD AUTO: 0.02 10*3/MM3 (ref 0–0.05)
IMM GRANULOCYTES NFR BLD AUTO: 0.3 % (ref 0–0.5)
LYMPHOCYTES # BLD AUTO: 0.91 10*3/MM3 (ref 0.7–3.1)
LYMPHOCYTES NFR BLD AUTO: 15 % (ref 19.6–45.3)
MCH RBC QN AUTO: 32.1 PG (ref 26.6–33)
MCHC RBC AUTO-ENTMCNC: 34.2 G/DL (ref 31.5–35.7)
MCV RBC AUTO: 93.9 FL (ref 79–97)
MONOCYTES # BLD AUTO: 0.5 10*3/MM3 (ref 0.1–0.9)
MONOCYTES NFR BLD AUTO: 8.3 % (ref 5–12)
NEUTROPHILS NFR BLD AUTO: 4.49 10*3/MM3 (ref 1.7–7)
NEUTROPHILS NFR BLD AUTO: 74.2 % (ref 42.7–76)
NRBC BLD AUTO-RTO: 0 /100 WBC (ref 0–0.2)
PLATELET # BLD AUTO: 109 10*3/MM3 (ref 140–450)
PMV BLD AUTO: 9.6 FL (ref 6–12)
POTASSIUM SERPL-SCNC: 5 MMOL/L (ref 3.5–5.2)
POTASSIUM SERPL-SCNC: 5.4 MMOL/L (ref 3.5–5.2)
QT INTERVAL: 417 MS
QT INTERVAL: 430 MS
RBC # BLD AUTO: 2.96 10*6/MM3 (ref 3.77–5.28)
SODIUM SERPL-SCNC: 129 MMOL/L (ref 136–145)
SODIUM SERPL-SCNC: 131 MMOL/L (ref 136–145)
WBC NRBC COR # BLD: 6.05 10*3/MM3 (ref 3.4–10.8)

## 2022-06-23 PROCEDURE — 80048 BASIC METABOLIC PNL TOTAL CA: CPT | Performed by: INTERNAL MEDICINE

## 2022-06-23 PROCEDURE — 80048 BASIC METABOLIC PNL TOTAL CA: CPT | Performed by: FAMILY MEDICINE

## 2022-06-23 PROCEDURE — 63710000001 INSULIN REGULAR HUMAN PER 5 UNITS: Performed by: FAMILY MEDICINE

## 2022-06-23 PROCEDURE — 93005 ELECTROCARDIOGRAM TRACING: CPT | Performed by: FAMILY MEDICINE

## 2022-06-23 PROCEDURE — 85025 COMPLETE CBC W/AUTO DIFF WBC: CPT | Performed by: INTERNAL MEDICINE

## 2022-06-23 PROCEDURE — 94799 UNLISTED PULMONARY SVC/PX: CPT

## 2022-06-23 PROCEDURE — 82962 GLUCOSE BLOOD TEST: CPT

## 2022-06-23 PROCEDURE — 99223 1ST HOSP IP/OBS HIGH 75: CPT | Performed by: INTERNAL MEDICINE

## 2022-06-23 PROCEDURE — 93010 ELECTROCARDIOGRAM REPORT: CPT | Performed by: INTERNAL MEDICINE

## 2022-06-23 PROCEDURE — G0378 HOSPITAL OBSERVATION PER HR: HCPCS

## 2022-06-23 PROCEDURE — 25010000002 CALCIUM GLUCONATE-NACL 1-0.675 GM/50ML-% SOLUTION: Performed by: FAMILY MEDICINE

## 2022-06-23 PROCEDURE — 63710000001 INSULIN LISPRO (HUMAN) PER 5 UNITS: Performed by: FAMILY MEDICINE

## 2022-06-23 RX ORDER — ONDANSETRON 4 MG/1
8 TABLET, FILM COATED ORAL EVERY 6 HOURS PRN
Status: DISCONTINUED | OUTPATIENT
Start: 2022-06-23 | End: 2022-07-11 | Stop reason: HOSPADM

## 2022-06-23 RX ORDER — ALBUTEROL SULFATE 2.5 MG/3ML
2.5 SOLUTION RESPIRATORY (INHALATION) EVERY 6 HOURS PRN
Status: DISCONTINUED | OUTPATIENT
Start: 2022-06-23 | End: 2022-07-11 | Stop reason: HOSPADM

## 2022-06-23 RX ORDER — NADOLOL 20 MG/1
20 TABLET ORAL DAILY
Status: DISCONTINUED | OUTPATIENT
Start: 2022-06-23 | End: 2022-07-11 | Stop reason: HOSPADM

## 2022-06-23 RX ORDER — SODIUM CHLORIDE 9 MG/ML
100 INJECTION, SOLUTION INTRAVENOUS CONTINUOUS
Status: DISCONTINUED | OUTPATIENT
Start: 2022-06-23 | End: 2022-06-26

## 2022-06-23 RX ORDER — SODIUM CHLORIDE 0.9 % (FLUSH) 0.9 %
10 SYRINGE (ML) INJECTION AS NEEDED
Status: DISCONTINUED | OUTPATIENT
Start: 2022-06-23 | End: 2022-07-11 | Stop reason: HOSPADM

## 2022-06-23 RX ORDER — DEXTROSE MONOHYDRATE 25 G/50ML
50 INJECTION, SOLUTION INTRAVENOUS ONCE
Status: COMPLETED | OUTPATIENT
Start: 2022-06-23 | End: 2022-06-23

## 2022-06-23 RX ORDER — INSULIN LISPRO 100 [IU]/ML
0-9 INJECTION, SOLUTION INTRAVENOUS; SUBCUTANEOUS
Status: DISCONTINUED | OUTPATIENT
Start: 2022-06-23 | End: 2022-07-11 | Stop reason: HOSPADM

## 2022-06-23 RX ORDER — FUROSEMIDE 40 MG/1
40 TABLET ORAL DAILY
COMMUNITY
End: 2023-02-28

## 2022-06-23 RX ORDER — SODIUM CHLORIDE 0.9 % (FLUSH) 0.9 %
10 SYRINGE (ML) INJECTION EVERY 12 HOURS SCHEDULED
Status: DISCONTINUED | OUTPATIENT
Start: 2022-06-23 | End: 2022-07-11 | Stop reason: HOSPADM

## 2022-06-23 RX ORDER — CALCIUM GLUCONATE 20 MG/ML
1 INJECTION, SOLUTION INTRAVENOUS ONCE
Status: COMPLETED | OUTPATIENT
Start: 2022-06-23 | End: 2022-06-23

## 2022-06-23 RX ORDER — PANTOPRAZOLE SODIUM 40 MG/1
40 TABLET, DELAYED RELEASE ORAL DAILY
Status: DISCONTINUED | OUTPATIENT
Start: 2022-06-23 | End: 2022-07-11 | Stop reason: HOSPADM

## 2022-06-23 RX ORDER — METOPROLOL SUCCINATE 50 MG/1
100 TABLET, EXTENDED RELEASE ORAL DAILY
Status: DISCONTINUED | OUTPATIENT
Start: 2022-06-23 | End: 2022-06-23

## 2022-06-23 RX ORDER — DULAGLUTIDE 1.5 MG/.5ML
0.5 INJECTION, SOLUTION SUBCUTANEOUS
COMMUNITY

## 2022-06-23 RX ORDER — LACTULOSE 10 G/15ML
10 SOLUTION ORAL 3 TIMES DAILY
Status: DISCONTINUED | OUTPATIENT
Start: 2022-06-23 | End: 2022-07-11 | Stop reason: HOSPADM

## 2022-06-23 RX ORDER — NICOTINE POLACRILEX 4 MG
24 LOZENGE BUCCAL
Status: DISCONTINUED | OUTPATIENT
Start: 2022-06-23 | End: 2022-07-11 | Stop reason: HOSPADM

## 2022-06-23 RX ORDER — METOCLOPRAMIDE 5 MG/1
5 TABLET ORAL
Status: DISCONTINUED | OUTPATIENT
Start: 2022-06-23 | End: 2022-06-23

## 2022-06-23 RX ORDER — NITROGLYCERIN 0.4 MG/1
0.4 TABLET SUBLINGUAL
Status: DISCONTINUED | OUTPATIENT
Start: 2022-06-23 | End: 2022-07-11 | Stop reason: HOSPADM

## 2022-06-23 RX ORDER — DEXTROSE MONOHYDRATE 25 G/50ML
25 INJECTION, SOLUTION INTRAVENOUS
Status: DISCONTINUED | OUTPATIENT
Start: 2022-06-23 | End: 2022-07-11 | Stop reason: HOSPADM

## 2022-06-23 RX ORDER — SODIUM CHLORIDE 9 MG/ML
100 INJECTION, SOLUTION INTRAVENOUS CONTINUOUS
Status: DISCONTINUED | OUTPATIENT
Start: 2022-06-23 | End: 2022-06-23

## 2022-06-23 RX ADMIN — LACTULOSE 10 G: 20 SOLUTION ORAL at 16:55

## 2022-06-23 RX ADMIN — INSULIN HUMAN 10 UNITS: 100 INJECTION, SOLUTION PARENTERAL at 09:21

## 2022-06-23 RX ADMIN — CALCIUM GLUCONATE 1 G: 20 INJECTION, SOLUTION INTRAVENOUS at 14:17

## 2022-06-23 RX ADMIN — PANTOPRAZOLE SODIUM 40 MG: 40 TABLET, DELAYED RELEASE ORAL at 09:05

## 2022-06-23 RX ADMIN — INSULIN LISPRO 2 UNITS: 100 INJECTION, SOLUTION INTRAVENOUS; SUBCUTANEOUS at 12:45

## 2022-06-23 RX ADMIN — SODIUM CHLORIDE 100 ML/HR: 9 INJECTION, SOLUTION INTRAVENOUS at 20:27

## 2022-06-23 RX ADMIN — SODIUM ZIRCONIUM CYCLOSILICATE 10 G: 10 POWDER, FOR SUSPENSION ORAL at 14:17

## 2022-06-23 RX ADMIN — DEXTROSE MONOHYDRATE 50 ML: 25 INJECTION, SOLUTION INTRAVENOUS at 14:18

## 2022-06-23 RX ADMIN — SODIUM CHLORIDE 100 ML/HR: 9 INJECTION, SOLUTION INTRAVENOUS at 03:23

## 2022-06-23 RX ADMIN — INSULIN HUMAN 10 UNITS: 100 INJECTION, SOLUTION PARENTERAL at 14:17

## 2022-06-23 RX ADMIN — Medication 10 ML: at 03:23

## 2022-06-23 RX ADMIN — SODIUM BICARBONATE 50 MEQ: 84 INJECTION INTRAVENOUS at 14:18

## 2022-06-23 RX ADMIN — LACTULOSE 10 G: 20 SOLUTION ORAL at 09:05

## 2022-06-23 RX ADMIN — NADOLOL 20 MG: 20 TABLET ORAL at 09:05

## 2022-06-23 RX ADMIN — SODIUM ZIRCONIUM CYCLOSILICATE 10 G: 10 POWDER, FOR SUSPENSION ORAL at 09:05

## 2022-06-23 RX ADMIN — INSULIN LISPRO 6 UNITS: 100 INJECTION, SOLUTION INTRAVENOUS; SUBCUTANEOUS at 10:17

## 2022-06-23 RX ADMIN — DEXTROSE MONOHYDRATE 50 ML: 25 INJECTION, SOLUTION INTRAVENOUS at 09:21

## 2022-06-23 RX ADMIN — LACTULOSE 10 G: 20 SOLUTION ORAL at 20:26

## 2022-06-23 RX ADMIN — CALCIUM GLUCONATE 1 G: 20 INJECTION, SOLUTION INTRAVENOUS at 09:05

## 2022-06-24 PROBLEM — E43 SEVERE MALNUTRITION: Status: ACTIVE | Noted: 2022-06-24

## 2022-06-24 LAB
ALBUMIN SERPL-MCNC: 2.6 G/DL (ref 3.5–5.2)
ALP SERPL-CCNC: 192 U/L (ref 39–117)
ALT SERPL W P-5'-P-CCNC: 14 U/L (ref 1–33)
ANION GAP SERPL CALCULATED.3IONS-SCNC: 9 MMOL/L (ref 5–15)
AST SERPL-CCNC: 33 U/L (ref 1–32)
BASOPHILS # BLD AUTO: 0.01 10*3/MM3 (ref 0–0.2)
BASOPHILS NFR BLD AUTO: 0.2 % (ref 0–1.5)
BILIRUB CONJ SERPL-MCNC: 0.6 MG/DL (ref 0–0.3)
BILIRUB INDIRECT SERPL-MCNC: 0.6 MG/DL
BILIRUB SERPL-MCNC: 1.2 MG/DL (ref 0–1.2)
BUN SERPL-MCNC: 15 MG/DL (ref 6–20)
BUN/CREAT SERPL: 16.3 (ref 7–25)
CALCIUM SPEC-SCNC: 9.3 MG/DL (ref 8.6–10.5)
CHLORIDE SERPL-SCNC: 106 MMOL/L (ref 98–107)
CO2 SERPL-SCNC: 16 MMOL/L (ref 22–29)
CREAT SERPL-MCNC: 0.92 MG/DL (ref 0.57–1)
DEPRECATED RDW RBC AUTO: 54.5 FL (ref 37–54)
EGFRCR SERPLBLD CKD-EPI 2021: 72.3 ML/MIN/1.73
EOSINOPHIL # BLD AUTO: 0.1 10*3/MM3 (ref 0–0.4)
EOSINOPHIL NFR BLD AUTO: 2.1 % (ref 0.3–6.2)
ERYTHROCYTE [DISTWIDTH] IN BLOOD BY AUTOMATED COUNT: 15.6 % (ref 12.3–15.4)
GLUCOSE BLDC GLUCOMTR-MCNC: 103 MG/DL (ref 70–99)
GLUCOSE BLDC GLUCOMTR-MCNC: 159 MG/DL (ref 70–99)
GLUCOSE BLDC GLUCOMTR-MCNC: 174 MG/DL (ref 70–99)
GLUCOSE SERPL-MCNC: 90 MG/DL (ref 65–99)
HCT VFR BLD AUTO: 25.4 % (ref 34–46.6)
HGB BLD-MCNC: 8.4 G/DL (ref 12–15.9)
IMM GRANULOCYTES # BLD AUTO: 0.02 10*3/MM3 (ref 0–0.05)
IMM GRANULOCYTES NFR BLD AUTO: 0.4 % (ref 0–0.5)
LYMPHOCYTES # BLD AUTO: 1.02 10*3/MM3 (ref 0.7–3.1)
LYMPHOCYTES NFR BLD AUTO: 21.3 % (ref 19.6–45.3)
MAGNESIUM SERPL-MCNC: 1.5 MG/DL (ref 1.6–2.6)
MCH RBC QN AUTO: 31.7 PG (ref 26.6–33)
MCHC RBC AUTO-ENTMCNC: 33.1 G/DL (ref 31.5–35.7)
MCV RBC AUTO: 95.8 FL (ref 79–97)
MONOCYTES # BLD AUTO: 0.39 10*3/MM3 (ref 0.1–0.9)
MONOCYTES NFR BLD AUTO: 8.1 % (ref 5–12)
NEUTROPHILS NFR BLD AUTO: 3.26 10*3/MM3 (ref 1.7–7)
NEUTROPHILS NFR BLD AUTO: 67.9 % (ref 42.7–76)
NRBC BLD AUTO-RTO: 0 /100 WBC (ref 0–0.2)
PHOSPHATE SERPL-MCNC: 3.7 MG/DL (ref 2.5–4.5)
PLATELET # BLD AUTO: 99 10*3/MM3 (ref 140–450)
PMV BLD AUTO: 9.8 FL (ref 6–12)
POTASSIUM SERPL-SCNC: 4.8 MMOL/L (ref 3.5–5.2)
PROT SERPL-MCNC: 6 G/DL (ref 6–8.5)
RBC # BLD AUTO: 2.65 10*6/MM3 (ref 3.77–5.28)
SODIUM SERPL-SCNC: 131 MMOL/L (ref 136–145)
WBC NRBC COR # BLD: 4.8 10*3/MM3 (ref 3.4–10.8)

## 2022-06-24 PROCEDURE — 97165 OT EVAL LOW COMPLEX 30 MIN: CPT

## 2022-06-24 PROCEDURE — 80048 BASIC METABOLIC PNL TOTAL CA: CPT | Performed by: FAMILY MEDICINE

## 2022-06-24 PROCEDURE — 63710000001 INSULIN LISPRO (HUMAN) PER 5 UNITS: Performed by: FAMILY MEDICINE

## 2022-06-24 PROCEDURE — 94799 UNLISTED PULMONARY SVC/PX: CPT

## 2022-06-24 PROCEDURE — 83735 ASSAY OF MAGNESIUM: CPT | Performed by: FAMILY MEDICINE

## 2022-06-24 PROCEDURE — 84100 ASSAY OF PHOSPHORUS: CPT | Performed by: FAMILY MEDICINE

## 2022-06-24 PROCEDURE — 99233 SBSQ HOSP IP/OBS HIGH 50: CPT | Performed by: FAMILY MEDICINE

## 2022-06-24 PROCEDURE — 97161 PT EVAL LOW COMPLEX 20 MIN: CPT

## 2022-06-24 PROCEDURE — 85025 COMPLETE CBC W/AUTO DIFF WBC: CPT | Performed by: FAMILY MEDICINE

## 2022-06-24 PROCEDURE — 82962 GLUCOSE BLOOD TEST: CPT

## 2022-06-24 PROCEDURE — 80076 HEPATIC FUNCTION PANEL: CPT | Performed by: FAMILY MEDICINE

## 2022-06-24 PROCEDURE — 25010000002 MAGNESIUM SULFATE IN D5W 1G/100ML (PREMIX) 1-5 GM/100ML-% SOLUTION: Performed by: FAMILY MEDICINE

## 2022-06-24 RX ORDER — MAGNESIUM SULFATE 1 G/100ML
1 INJECTION INTRAVENOUS
Status: COMPLETED | OUTPATIENT
Start: 2022-06-24 | End: 2022-06-24

## 2022-06-24 RX ADMIN — MAGNESIUM SULFATE 1 G: 1 INJECTION INTRAVENOUS at 10:38

## 2022-06-24 RX ADMIN — LACTULOSE 10 G: 20 SOLUTION ORAL at 20:06

## 2022-06-24 RX ADMIN — MAGNESIUM SULFATE 1 G: 1 INJECTION INTRAVENOUS at 12:40

## 2022-06-24 RX ADMIN — LACTULOSE 10 G: 20 SOLUTION ORAL at 16:26

## 2022-06-24 RX ADMIN — INSULIN LISPRO 2 UNITS: 100 INJECTION, SOLUTION INTRAVENOUS; SUBCUTANEOUS at 17:46

## 2022-06-24 RX ADMIN — PANTOPRAZOLE SODIUM 40 MG: 40 TABLET, DELAYED RELEASE ORAL at 08:23

## 2022-06-24 RX ADMIN — LACTULOSE 10 G: 20 SOLUTION ORAL at 08:23

## 2022-06-24 RX ADMIN — INSULIN LISPRO 2 UNITS: 100 INJECTION, SOLUTION INTRAVENOUS; SUBCUTANEOUS at 11:15

## 2022-06-24 RX ADMIN — SODIUM CHLORIDE 100 ML/HR: 9 INJECTION, SOLUTION INTRAVENOUS at 06:32

## 2022-06-24 RX ADMIN — MAGNESIUM SULFATE 1 G: 1 INJECTION INTRAVENOUS at 11:30

## 2022-06-24 RX ADMIN — Medication 10 ML: at 08:23

## 2022-06-24 RX ADMIN — MAGNESIUM SULFATE 1 G: 1 INJECTION INTRAVENOUS at 09:34

## 2022-06-25 LAB
ALBUMIN SERPL-MCNC: 2.8 G/DL (ref 3.5–5.2)
ALP SERPL-CCNC: 196 U/L (ref 39–117)
ALT SERPL W P-5'-P-CCNC: 13 U/L (ref 1–33)
ANION GAP SERPL CALCULATED.3IONS-SCNC: 8.4 MMOL/L (ref 5–15)
AST SERPL-CCNC: 29 U/L (ref 1–32)
BASOPHILS # BLD AUTO: 0.02 10*3/MM3 (ref 0–0.2)
BASOPHILS NFR BLD AUTO: 0.5 % (ref 0–1.5)
BILIRUB CONJ SERPL-MCNC: 0.6 MG/DL (ref 0–0.3)
BILIRUB INDIRECT SERPL-MCNC: 0.5 MG/DL
BILIRUB SERPL-MCNC: 1.1 MG/DL (ref 0–1.2)
BUN SERPL-MCNC: 11 MG/DL (ref 6–20)
BUN/CREAT SERPL: 13.9 (ref 7–25)
CALCIUM SPEC-SCNC: 9.6 MG/DL (ref 8.6–10.5)
CHLORIDE SERPL-SCNC: 109 MMOL/L (ref 98–107)
CO2 SERPL-SCNC: 13.6 MMOL/L (ref 22–29)
CREAT SERPL-MCNC: 0.79 MG/DL (ref 0.57–1)
DEPRECATED RDW RBC AUTO: 54.9 FL (ref 37–54)
EGFRCR SERPLBLD CKD-EPI 2021: 86.8 ML/MIN/1.73
EOSINOPHIL # BLD AUTO: 0.12 10*3/MM3 (ref 0–0.4)
EOSINOPHIL NFR BLD AUTO: 2.8 % (ref 0.3–6.2)
ERYTHROCYTE [DISTWIDTH] IN BLOOD BY AUTOMATED COUNT: 15.6 % (ref 12.3–15.4)
GLUCOSE BLDC GLUCOMTR-MCNC: 112 MG/DL (ref 70–99)
GLUCOSE BLDC GLUCOMTR-MCNC: 151 MG/DL (ref 70–99)
GLUCOSE BLDC GLUCOMTR-MCNC: 153 MG/DL (ref 70–99)
GLUCOSE SERPL-MCNC: 127 MG/DL (ref 65–99)
HCT VFR BLD AUTO: 25.4 % (ref 34–46.6)
HGB BLD-MCNC: 8.5 G/DL (ref 12–15.9)
IMM GRANULOCYTES # BLD AUTO: 0.02 10*3/MM3 (ref 0–0.05)
IMM GRANULOCYTES NFR BLD AUTO: 0.5 % (ref 0–0.5)
LYMPHOCYTES # BLD AUTO: 0.78 10*3/MM3 (ref 0.7–3.1)
LYMPHOCYTES NFR BLD AUTO: 18.3 % (ref 19.6–45.3)
MAGNESIUM SERPL-MCNC: 1.8 MG/DL (ref 1.6–2.6)
MCH RBC QN AUTO: 32.2 PG (ref 26.6–33)
MCHC RBC AUTO-ENTMCNC: 33.5 G/DL (ref 31.5–35.7)
MCV RBC AUTO: 96.2 FL (ref 79–97)
MONOCYTES # BLD AUTO: 0.35 10*3/MM3 (ref 0.1–0.9)
MONOCYTES NFR BLD AUTO: 8.2 % (ref 5–12)
NEUTROPHILS NFR BLD AUTO: 2.98 10*3/MM3 (ref 1.7–7)
NEUTROPHILS NFR BLD AUTO: 69.7 % (ref 42.7–76)
NRBC BLD AUTO-RTO: 0 /100 WBC (ref 0–0.2)
PHOSPHATE SERPL-MCNC: 3.4 MG/DL (ref 2.5–4.5)
PLATELET # BLD AUTO: 96 10*3/MM3 (ref 140–450)
PMV BLD AUTO: 10 FL (ref 6–12)
POTASSIUM SERPL-SCNC: 4.8 MMOL/L (ref 3.5–5.2)
PROT SERPL-MCNC: 6.2 G/DL (ref 6–8.5)
RBC # BLD AUTO: 2.64 10*6/MM3 (ref 3.77–5.28)
SODIUM SERPL-SCNC: 131 MMOL/L (ref 136–145)
WBC NRBC COR # BLD: 4.27 10*3/MM3 (ref 3.4–10.8)

## 2022-06-25 PROCEDURE — 85025 COMPLETE CBC W/AUTO DIFF WBC: CPT | Performed by: FAMILY MEDICINE

## 2022-06-25 PROCEDURE — 82962 GLUCOSE BLOOD TEST: CPT

## 2022-06-25 PROCEDURE — 94799 UNLISTED PULMONARY SVC/PX: CPT

## 2022-06-25 PROCEDURE — 80048 BASIC METABOLIC PNL TOTAL CA: CPT | Performed by: FAMILY MEDICINE

## 2022-06-25 PROCEDURE — 84100 ASSAY OF PHOSPHORUS: CPT | Performed by: FAMILY MEDICINE

## 2022-06-25 PROCEDURE — 83735 ASSAY OF MAGNESIUM: CPT | Performed by: FAMILY MEDICINE

## 2022-06-25 PROCEDURE — 80076 HEPATIC FUNCTION PANEL: CPT | Performed by: FAMILY MEDICINE

## 2022-06-25 PROCEDURE — 99233 SBSQ HOSP IP/OBS HIGH 50: CPT | Performed by: INTERNAL MEDICINE

## 2022-06-25 RX ADMIN — LACTULOSE 10 G: 20 SOLUTION ORAL at 20:14

## 2022-06-25 RX ADMIN — PANTOPRAZOLE SODIUM 40 MG: 40 TABLET, DELAYED RELEASE ORAL at 08:41

## 2022-06-25 RX ADMIN — NADOLOL 20 MG: 20 TABLET ORAL at 08:41

## 2022-06-25 RX ADMIN — Medication 10 ML: at 08:42

## 2022-06-25 RX ADMIN — LACTULOSE 10 G: 20 SOLUTION ORAL at 16:40

## 2022-06-25 RX ADMIN — LACTULOSE 10 G: 20 SOLUTION ORAL at 08:41

## 2022-06-26 LAB
GLUCOSE BLDC GLUCOMTR-MCNC: 101 MG/DL (ref 70–99)
GLUCOSE BLDC GLUCOMTR-MCNC: 143 MG/DL (ref 70–99)
GLUCOSE BLDC GLUCOMTR-MCNC: 155 MG/DL (ref 70–99)
GLUCOSE BLDC GLUCOMTR-MCNC: 158 MG/DL (ref 70–99)

## 2022-06-26 PROCEDURE — 99233 SBSQ HOSP IP/OBS HIGH 50: CPT | Performed by: STUDENT IN AN ORGANIZED HEALTH CARE EDUCATION/TRAINING PROGRAM

## 2022-06-26 PROCEDURE — 63710000001 INSULIN LISPRO (HUMAN) PER 5 UNITS: Performed by: FAMILY MEDICINE

## 2022-06-26 PROCEDURE — 82962 GLUCOSE BLOOD TEST: CPT

## 2022-06-26 PROCEDURE — 94799 UNLISTED PULMONARY SVC/PX: CPT

## 2022-06-26 RX ADMIN — PANTOPRAZOLE SODIUM 40 MG: 40 TABLET, DELAYED RELEASE ORAL at 09:01

## 2022-06-26 RX ADMIN — Medication 10 ML: at 19:23

## 2022-06-26 RX ADMIN — INSULIN LISPRO 2 UNITS: 100 INJECTION, SOLUTION INTRAVENOUS; SUBCUTANEOUS at 17:27

## 2022-06-26 RX ADMIN — INSULIN LISPRO 2 UNITS: 100 INJECTION, SOLUTION INTRAVENOUS; SUBCUTANEOUS at 12:26

## 2022-06-26 RX ADMIN — LACTULOSE 10 G: 20 SOLUTION ORAL at 09:00

## 2022-06-27 ENCOUNTER — APPOINTMENT (OUTPATIENT)
Dept: CT IMAGING | Facility: HOSPITAL | Age: 57
End: 2022-06-27

## 2022-06-27 LAB
ALBUMIN SERPL-MCNC: 2.8 G/DL (ref 3.5–5.2)
ALBUMIN/GLOB SERPL: 0.8 G/DL
ALP SERPL-CCNC: 218 U/L (ref 39–117)
ALT SERPL W P-5'-P-CCNC: 15 U/L (ref 1–33)
ANION GAP SERPL CALCULATED.3IONS-SCNC: 9.3 MMOL/L (ref 5–15)
APTT PPP: 36.2 SECONDS (ref 24.2–34.2)
AST SERPL-CCNC: 33 U/L (ref 1–32)
BACTERIA UR QL AUTO: ABNORMAL /HPF
BASOPHILS # BLD AUTO: 0.02 10*3/MM3 (ref 0–0.2)
BASOPHILS NFR BLD AUTO: 0.4 % (ref 0–1.5)
BILIRUB SERPL-MCNC: 1.2 MG/DL (ref 0–1.2)
BILIRUB UR QL STRIP: NEGATIVE
BUN SERPL-MCNC: 12 MG/DL (ref 6–20)
BUN/CREAT SERPL: 16.4 (ref 7–25)
CALCIUM SPEC-SCNC: 9.3 MG/DL (ref 8.6–10.5)
CHLORIDE SERPL-SCNC: 112 MMOL/L (ref 98–107)
CLARITY UR: ABNORMAL
CO2 SERPL-SCNC: 13.7 MMOL/L (ref 22–29)
COLOR UR: YELLOW
CREAT SERPL-MCNC: 0.73 MG/DL (ref 0.57–1)
DEPRECATED RDW RBC AUTO: 54.5 FL (ref 37–54)
EGFRCR SERPLBLD CKD-EPI 2021: 95.5 ML/MIN/1.73
EOSINOPHIL # BLD AUTO: 0.17 10*3/MM3 (ref 0–0.4)
EOSINOPHIL NFR BLD AUTO: 3.8 % (ref 0.3–6.2)
ERYTHROCYTE [DISTWIDTH] IN BLOOD BY AUTOMATED COUNT: 15.5 % (ref 12.3–15.4)
GLOBULIN UR ELPH-MCNC: 3.4 GM/DL
GLUCOSE BLDC GLUCOMTR-MCNC: 104 MG/DL (ref 70–99)
GLUCOSE BLDC GLUCOMTR-MCNC: 104 MG/DL (ref 70–99)
GLUCOSE BLDC GLUCOMTR-MCNC: 86 MG/DL (ref 70–99)
GLUCOSE SERPL-MCNC: 106 MG/DL (ref 65–99)
GLUCOSE UR STRIP-MCNC: NEGATIVE MG/DL
HCT VFR BLD AUTO: 26.3 % (ref 34–46.6)
HGB BLD-MCNC: 8.6 G/DL (ref 12–15.9)
HGB UR QL STRIP.AUTO: ABNORMAL
HOLD SPECIMEN: NORMAL
HYALINE CASTS UR QL AUTO: ABNORMAL /LPF
IMM GRANULOCYTES # BLD AUTO: 0.02 10*3/MM3 (ref 0–0.05)
IMM GRANULOCYTES NFR BLD AUTO: 0.4 % (ref 0–0.5)
INR PPP: 1.35 (ref 0.86–1.15)
KETONES UR QL STRIP: NEGATIVE
LEUKOCYTE ESTERASE UR QL STRIP.AUTO: ABNORMAL
LYMPHOCYTES # BLD AUTO: 1 10*3/MM3 (ref 0.7–3.1)
LYMPHOCYTES NFR BLD AUTO: 22.1 % (ref 19.6–45.3)
MAGNESIUM SERPL-MCNC: 1.4 MG/DL (ref 1.6–2.6)
MCH RBC QN AUTO: 31.7 PG (ref 26.6–33)
MCHC RBC AUTO-ENTMCNC: 32.7 G/DL (ref 31.5–35.7)
MCV RBC AUTO: 97 FL (ref 79–97)
MONOCYTES # BLD AUTO: 0.32 10*3/MM3 (ref 0.1–0.9)
MONOCYTES NFR BLD AUTO: 7.1 % (ref 5–12)
NEUTROPHILS NFR BLD AUTO: 2.99 10*3/MM3 (ref 1.7–7)
NEUTROPHILS NFR BLD AUTO: 66.2 % (ref 42.7–76)
NITRITE UR QL STRIP: POSITIVE
NRBC BLD AUTO-RTO: 0 /100 WBC (ref 0–0.2)
PH UR STRIP.AUTO: 6 [PH] (ref 5–8)
PHOSPHATE SERPL-MCNC: 3 MG/DL (ref 2.5–4.5)
PLATELET # BLD AUTO: 89 10*3/MM3 (ref 140–450)
PMV BLD AUTO: 10 FL (ref 6–12)
POTASSIUM SERPL-SCNC: 4.2 MMOL/L (ref 3.5–5.2)
PROT SERPL-MCNC: 6.2 G/DL (ref 6–8.5)
PROT UR QL STRIP: NEGATIVE
PROTHROMBIN TIME: 16.9 SECONDS (ref 11.8–14.9)
RBC # BLD AUTO: 2.71 10*6/MM3 (ref 3.77–5.28)
RBC # UR STRIP: ABNORMAL /HPF
REF LAB TEST METHOD: ABNORMAL
SODIUM SERPL-SCNC: 135 MMOL/L (ref 136–145)
SP GR UR STRIP: >=1.03 (ref 1–1.03)
SQUAMOUS #/AREA URNS HPF: ABNORMAL /HPF
UROBILINOGEN UR QL STRIP: ABNORMAL
WBC # UR STRIP: ABNORMAL /HPF
WBC NRBC COR # BLD: 4.52 10*3/MM3 (ref 3.4–10.8)

## 2022-06-27 PROCEDURE — 81001 URINALYSIS AUTO W/SCOPE: CPT | Performed by: STUDENT IN AN ORGANIZED HEALTH CARE EDUCATION/TRAINING PROGRAM

## 2022-06-27 PROCEDURE — 85610 PROTHROMBIN TIME: CPT | Performed by: STUDENT IN AN ORGANIZED HEALTH CARE EDUCATION/TRAINING PROGRAM

## 2022-06-27 PROCEDURE — 85025 COMPLETE CBC W/AUTO DIFF WBC: CPT | Performed by: STUDENT IN AN ORGANIZED HEALTH CARE EDUCATION/TRAINING PROGRAM

## 2022-06-27 PROCEDURE — 87070 CULTURE OTHR SPECIMN AEROBIC: CPT | Performed by: STUDENT IN AN ORGANIZED HEALTH CARE EDUCATION/TRAINING PROGRAM

## 2022-06-27 PROCEDURE — 25010000002 MAGNESIUM SULFATE 2 GM/50ML SOLUTION: Performed by: STUDENT IN AN ORGANIZED HEALTH CARE EDUCATION/TRAINING PROGRAM

## 2022-06-27 PROCEDURE — 75989 ABSCESS DRAINAGE UNDER X-RAY: CPT

## 2022-06-27 PROCEDURE — 87147 CULTURE TYPE IMMUNOLOGIC: CPT | Performed by: STUDENT IN AN ORGANIZED HEALTH CARE EDUCATION/TRAINING PROGRAM

## 2022-06-27 PROCEDURE — 94799 UNLISTED PULMONARY SVC/PX: CPT

## 2022-06-27 PROCEDURE — 49406 IMAGE CATH FLUID PERI/RETRO: CPT

## 2022-06-27 PROCEDURE — 80053 COMPREHEN METABOLIC PANEL: CPT | Performed by: STUDENT IN AN ORGANIZED HEALTH CARE EDUCATION/TRAINING PROGRAM

## 2022-06-27 PROCEDURE — 87106 FUNGI IDENTIFICATION YEAST: CPT | Performed by: STUDENT IN AN ORGANIZED HEALTH CARE EDUCATION/TRAINING PROGRAM

## 2022-06-27 PROCEDURE — 84100 ASSAY OF PHOSPHORUS: CPT | Performed by: STUDENT IN AN ORGANIZED HEALTH CARE EDUCATION/TRAINING PROGRAM

## 2022-06-27 PROCEDURE — 82962 GLUCOSE BLOOD TEST: CPT

## 2022-06-27 PROCEDURE — 83735 ASSAY OF MAGNESIUM: CPT | Performed by: STUDENT IN AN ORGANIZED HEALTH CARE EDUCATION/TRAINING PROGRAM

## 2022-06-27 PROCEDURE — 87181 SC STD AGAR DILUTION PER AGT: CPT | Performed by: STUDENT IN AN ORGANIZED HEALTH CARE EDUCATION/TRAINING PROGRAM

## 2022-06-27 PROCEDURE — 85730 THROMBOPLASTIN TIME PARTIAL: CPT | Performed by: STUDENT IN AN ORGANIZED HEALTH CARE EDUCATION/TRAINING PROGRAM

## 2022-06-27 PROCEDURE — 87205 SMEAR GRAM STAIN: CPT | Performed by: STUDENT IN AN ORGANIZED HEALTH CARE EDUCATION/TRAINING PROGRAM

## 2022-06-27 PROCEDURE — 99233 SBSQ HOSP IP/OBS HIGH 50: CPT | Performed by: STUDENT IN AN ORGANIZED HEALTH CARE EDUCATION/TRAINING PROGRAM

## 2022-06-27 PROCEDURE — 0W9G3ZZ DRAINAGE OF PERITONEAL CAVITY, PERCUTANEOUS APPROACH: ICD-10-PCS | Performed by: STUDENT IN AN ORGANIZED HEALTH CARE EDUCATION/TRAINING PROGRAM

## 2022-06-27 PROCEDURE — 87186 SC STD MICRODIL/AGAR DIL: CPT | Performed by: STUDENT IN AN ORGANIZED HEALTH CARE EDUCATION/TRAINING PROGRAM

## 2022-06-27 PROCEDURE — 87086 URINE CULTURE/COLONY COUNT: CPT | Performed by: STUDENT IN AN ORGANIZED HEALTH CARE EDUCATION/TRAINING PROGRAM

## 2022-06-27 RX ORDER — MAGNESIUM SULFATE HEPTAHYDRATE 40 MG/ML
2 INJECTION, SOLUTION INTRAVENOUS ONCE
Status: COMPLETED | OUTPATIENT
Start: 2022-06-27 | End: 2022-06-27

## 2022-06-27 RX ORDER — LIDOCAINE HYDROCHLORIDE 20 MG/ML
INJECTION, SOLUTION INFILTRATION; PERINEURAL
Status: DISPENSED
Start: 2022-06-27 | End: 2022-06-28

## 2022-06-27 RX ORDER — DICYCLOMINE HYDROCHLORIDE 10 MG/1
10 CAPSULE ORAL 4 TIMES DAILY PRN
Status: DISCONTINUED | OUTPATIENT
Start: 2022-06-27 | End: 2022-07-11 | Stop reason: HOSPADM

## 2022-06-27 RX ADMIN — Medication 10 ML: at 08:36

## 2022-06-27 RX ADMIN — Medication 10 ML: at 20:00

## 2022-06-27 RX ADMIN — MAGNESIUM SULFATE HEPTAHYDRATE 2 G: 2 INJECTION, SOLUTION INTRAVENOUS at 16:43

## 2022-06-28 LAB
ALBUMIN SERPL-MCNC: 3.1 G/DL (ref 3.5–5.2)
ALBUMIN/GLOB SERPL: 0.8 G/DL
ALP SERPL-CCNC: 231 U/L (ref 39–117)
ALT SERPL W P-5'-P-CCNC: 16 U/L (ref 1–33)
ANION GAP SERPL CALCULATED.3IONS-SCNC: 9.9 MMOL/L (ref 5–15)
AST SERPL-CCNC: 33 U/L (ref 1–32)
BASOPHILS # BLD AUTO: 0.02 10*3/MM3 (ref 0–0.2)
BASOPHILS NFR BLD AUTO: 0.3 % (ref 0–1.5)
BILIRUB SERPL-MCNC: 1.6 MG/DL (ref 0–1.2)
BUN SERPL-MCNC: 15 MG/DL (ref 6–20)
BUN/CREAT SERPL: 21.1 (ref 7–25)
CALCIUM SPEC-SCNC: 9.9 MG/DL (ref 8.6–10.5)
CHLORIDE SERPL-SCNC: 108 MMOL/L (ref 98–107)
CO2 SERPL-SCNC: 15.1 MMOL/L (ref 22–29)
CREAT SERPL-MCNC: 0.71 MG/DL (ref 0.57–1)
DEPRECATED RDW RBC AUTO: 53.5 FL (ref 37–54)
EGFRCR SERPLBLD CKD-EPI 2021: 98.7 ML/MIN/1.73
EOSINOPHIL # BLD AUTO: 0.23 10*3/MM3 (ref 0–0.4)
EOSINOPHIL NFR BLD AUTO: 4 % (ref 0.3–6.2)
ERYTHROCYTE [DISTWIDTH] IN BLOOD BY AUTOMATED COUNT: 15.3 % (ref 12.3–15.4)
GLOBULIN UR ELPH-MCNC: 3.9 GM/DL
GLUCOSE BLDC GLUCOMTR-MCNC: 117 MG/DL (ref 70–99)
GLUCOSE BLDC GLUCOMTR-MCNC: 177 MG/DL (ref 70–99)
GLUCOSE BLDC GLUCOMTR-MCNC: 205 MG/DL (ref 70–99)
GLUCOSE SERPL-MCNC: 115 MG/DL (ref 65–99)
HCT VFR BLD AUTO: 27.5 % (ref 34–46.6)
HGB BLD-MCNC: 9.3 G/DL (ref 12–15.9)
IMM GRANULOCYTES # BLD AUTO: 0.01 10*3/MM3 (ref 0–0.05)
IMM GRANULOCYTES NFR BLD AUTO: 0.2 % (ref 0–0.5)
LYMPHOCYTES # BLD AUTO: 0.95 10*3/MM3 (ref 0.7–3.1)
LYMPHOCYTES NFR BLD AUTO: 16.4 % (ref 19.6–45.3)
MAGNESIUM SERPL-MCNC: 1.8 MG/DL (ref 1.6–2.6)
MCH RBC QN AUTO: 32.1 PG (ref 26.6–33)
MCHC RBC AUTO-ENTMCNC: 33.8 G/DL (ref 31.5–35.7)
MCV RBC AUTO: 94.8 FL (ref 79–97)
MONOCYTES # BLD AUTO: 0.45 10*3/MM3 (ref 0.1–0.9)
MONOCYTES NFR BLD AUTO: 7.8 % (ref 5–12)
NEUTROPHILS NFR BLD AUTO: 4.12 10*3/MM3 (ref 1.7–7)
NEUTROPHILS NFR BLD AUTO: 71.3 % (ref 42.7–76)
NRBC BLD AUTO-RTO: 0 /100 WBC (ref 0–0.2)
PHOSPHATE SERPL-MCNC: 3.5 MG/DL (ref 2.5–4.5)
PLATELET # BLD AUTO: 90 10*3/MM3 (ref 140–450)
PMV BLD AUTO: 10.4 FL (ref 6–12)
POTASSIUM SERPL-SCNC: 4.4 MMOL/L (ref 3.5–5.2)
PROT SERPL-MCNC: 7 G/DL (ref 6–8.5)
RBC # BLD AUTO: 2.9 10*6/MM3 (ref 3.77–5.28)
SODIUM SERPL-SCNC: 133 MMOL/L (ref 136–145)
WBC NRBC COR # BLD: 5.78 10*3/MM3 (ref 3.4–10.8)

## 2022-06-28 PROCEDURE — 99232 SBSQ HOSP IP/OBS MODERATE 35: CPT | Performed by: INTERNAL MEDICINE

## 2022-06-28 PROCEDURE — 97530 THERAPEUTIC ACTIVITIES: CPT

## 2022-06-28 PROCEDURE — 84100 ASSAY OF PHOSPHORUS: CPT | Performed by: STUDENT IN AN ORGANIZED HEALTH CARE EDUCATION/TRAINING PROGRAM

## 2022-06-28 PROCEDURE — 80053 COMPREHEN METABOLIC PANEL: CPT | Performed by: STUDENT IN AN ORGANIZED HEALTH CARE EDUCATION/TRAINING PROGRAM

## 2022-06-28 PROCEDURE — 82962 GLUCOSE BLOOD TEST: CPT

## 2022-06-28 PROCEDURE — 63710000001 INSULIN LISPRO (HUMAN) PER 5 UNITS: Performed by: FAMILY MEDICINE

## 2022-06-28 PROCEDURE — 94799 UNLISTED PULMONARY SVC/PX: CPT

## 2022-06-28 PROCEDURE — 85025 COMPLETE CBC W/AUTO DIFF WBC: CPT | Performed by: STUDENT IN AN ORGANIZED HEALTH CARE EDUCATION/TRAINING PROGRAM

## 2022-06-28 PROCEDURE — 83735 ASSAY OF MAGNESIUM: CPT | Performed by: STUDENT IN AN ORGANIZED HEALTH CARE EDUCATION/TRAINING PROGRAM

## 2022-06-28 RX ORDER — HYDROCODONE BITARTRATE AND ACETAMINOPHEN 5; 325 MG/1; MG/1
1 TABLET ORAL EVERY 6 HOURS PRN
Status: DISPENSED | OUTPATIENT
Start: 2022-06-28 | End: 2022-07-05

## 2022-06-28 RX ADMIN — Medication 10 ML: at 09:36

## 2022-06-28 RX ADMIN — PANTOPRAZOLE SODIUM 40 MG: 40 TABLET, DELAYED RELEASE ORAL at 09:35

## 2022-06-28 RX ADMIN — Medication 10 ML: at 20:04

## 2022-06-28 RX ADMIN — Medication 3 ML: at 19:58

## 2022-06-28 RX ADMIN — HYDROCODONE BITARTRATE AND ACETAMINOPHEN 1 TABLET: 5; 325 TABLET ORAL at 10:42

## 2022-06-28 RX ADMIN — INSULIN LISPRO 2 UNITS: 100 INJECTION, SOLUTION INTRAVENOUS; SUBCUTANEOUS at 13:04

## 2022-06-28 RX ADMIN — INSULIN LISPRO 4 UNITS: 100 INJECTION, SOLUTION INTRAVENOUS; SUBCUTANEOUS at 17:12

## 2022-06-29 LAB
ANION GAP SERPL CALCULATED.3IONS-SCNC: 10 MMOL/L (ref 5–15)
BUN SERPL-MCNC: 21 MG/DL (ref 6–20)
BUN/CREAT SERPL: 28.4 (ref 7–25)
CALCIUM SPEC-SCNC: 9.8 MG/DL (ref 8.6–10.5)
CHLORIDE SERPL-SCNC: 107 MMOL/L (ref 98–107)
CO2 SERPL-SCNC: 15 MMOL/L (ref 22–29)
CREAT SERPL-MCNC: 0.74 MG/DL (ref 0.57–1)
DEPRECATED RDW RBC AUTO: 52 FL (ref 37–54)
EGFRCR SERPLBLD CKD-EPI 2021: 93.9 ML/MIN/1.73
ERYTHROCYTE [DISTWIDTH] IN BLOOD BY AUTOMATED COUNT: 15 % (ref 12.3–15.4)
GLUCOSE BLDC GLUCOMTR-MCNC: 111 MG/DL (ref 70–99)
GLUCOSE BLDC GLUCOMTR-MCNC: 182 MG/DL (ref 70–99)
GLUCOSE BLDC GLUCOMTR-MCNC: 199 MG/DL (ref 70–99)
GLUCOSE SERPL-MCNC: 120 MG/DL (ref 65–99)
HCT VFR BLD AUTO: 26.6 % (ref 34–46.6)
HGB BLD-MCNC: 9.2 G/DL (ref 12–15.9)
MAGNESIUM SERPL-MCNC: 1.7 MG/DL (ref 1.6–2.6)
MCH RBC QN AUTO: 32.5 PG (ref 26.6–33)
MCHC RBC AUTO-ENTMCNC: 34.6 G/DL (ref 31.5–35.7)
MCV RBC AUTO: 94 FL (ref 79–97)
PHOSPHATE SERPL-MCNC: 3.7 MG/DL (ref 2.5–4.5)
PLATELET # BLD AUTO: 81 10*3/MM3 (ref 140–450)
PMV BLD AUTO: 10.4 FL (ref 6–12)
POTASSIUM SERPL-SCNC: 4.1 MMOL/L (ref 3.5–5.2)
RBC # BLD AUTO: 2.83 10*6/MM3 (ref 3.77–5.28)
SODIUM SERPL-SCNC: 132 MMOL/L (ref 136–145)
WBC NRBC COR # BLD: 5.11 10*3/MM3 (ref 3.4–10.8)

## 2022-06-29 PROCEDURE — 80048 BASIC METABOLIC PNL TOTAL CA: CPT | Performed by: INTERNAL MEDICINE

## 2022-06-29 PROCEDURE — 82962 GLUCOSE BLOOD TEST: CPT

## 2022-06-29 PROCEDURE — 85027 COMPLETE CBC AUTOMATED: CPT | Performed by: INTERNAL MEDICINE

## 2022-06-29 PROCEDURE — 97110 THERAPEUTIC EXERCISES: CPT

## 2022-06-29 PROCEDURE — 94799 UNLISTED PULMONARY SVC/PX: CPT

## 2022-06-29 PROCEDURE — 25010000002 VANCOMYCIN 5 G RECONSTITUTED SOLUTION: Performed by: INTERNAL MEDICINE

## 2022-06-29 PROCEDURE — 83735 ASSAY OF MAGNESIUM: CPT | Performed by: INTERNAL MEDICINE

## 2022-06-29 PROCEDURE — 99233 SBSQ HOSP IP/OBS HIGH 50: CPT | Performed by: INTERNAL MEDICINE

## 2022-06-29 PROCEDURE — 63710000001 INSULIN LISPRO (HUMAN) PER 5 UNITS: Performed by: FAMILY MEDICINE

## 2022-06-29 PROCEDURE — 84100 ASSAY OF PHOSPHORUS: CPT | Performed by: INTERNAL MEDICINE

## 2022-06-29 RX ADMIN — INSULIN LISPRO 2 UNITS: 100 INJECTION, SOLUTION INTRAVENOUS; SUBCUTANEOUS at 11:19

## 2022-06-29 RX ADMIN — Medication 10 ML: at 08:52

## 2022-06-29 RX ADMIN — PANTOPRAZOLE SODIUM 40 MG: 40 TABLET, DELAYED RELEASE ORAL at 08:52

## 2022-06-29 RX ADMIN — HYDROCODONE BITARTRATE AND ACETAMINOPHEN 1 TABLET: 5; 325 TABLET ORAL at 11:19

## 2022-06-29 RX ADMIN — VANCOMYCIN HYDROCHLORIDE 1500 MG: 5 INJECTION, POWDER, LYOPHILIZED, FOR SOLUTION INTRAVENOUS at 14:07

## 2022-06-29 RX ADMIN — INSULIN LISPRO 2 UNITS: 100 INJECTION, SOLUTION INTRAVENOUS; SUBCUTANEOUS at 16:47

## 2022-06-30 LAB
ANION GAP SERPL CALCULATED.3IONS-SCNC: 10.2 MMOL/L (ref 5–15)
BACTERIA SPEC AEROBE CULT: ABNORMAL
BACTERIA SPEC AEROBE CULT: ABNORMAL
BUN SERPL-MCNC: 20 MG/DL (ref 6–20)
BUN/CREAT SERPL: 27 (ref 7–25)
CALCIUM SPEC-SCNC: 9.8 MG/DL (ref 8.6–10.5)
CHLORIDE SERPL-SCNC: 107 MMOL/L (ref 98–107)
CO2 SERPL-SCNC: 14.8 MMOL/L (ref 22–29)
CREAT SERPL-MCNC: 0.74 MG/DL (ref 0.57–1)
EGFRCR SERPLBLD CKD-EPI 2021: 93.9 ML/MIN/1.73
GLUCOSE BLDC GLUCOMTR-MCNC: 101 MG/DL (ref 70–99)
GLUCOSE BLDC GLUCOMTR-MCNC: 154 MG/DL (ref 70–99)
GLUCOSE BLDC GLUCOMTR-MCNC: 162 MG/DL (ref 70–99)
GLUCOSE SERPL-MCNC: 128 MG/DL (ref 65–99)
MAGNESIUM SERPL-MCNC: 1.7 MG/DL (ref 1.6–2.6)
POTASSIUM SERPL-SCNC: 4.3 MMOL/L (ref 3.5–5.2)
SODIUM SERPL-SCNC: 132 MMOL/L (ref 136–145)
VANCOMYCIN TROUGH SERPL-MCNC: 33.09 MCG/ML (ref 5–20)

## 2022-06-30 PROCEDURE — 87040 BLOOD CULTURE FOR BACTERIA: CPT | Performed by: INTERNAL MEDICINE

## 2022-06-30 PROCEDURE — 25010000002 MICAFUNGIN SODIUM 100 MG RECONSTITUTED SOLUTION: Performed by: INTERNAL MEDICINE

## 2022-06-30 PROCEDURE — 94799 UNLISTED PULMONARY SVC/PX: CPT

## 2022-06-30 PROCEDURE — 80202 ASSAY OF VANCOMYCIN: CPT | Performed by: INTERNAL MEDICINE

## 2022-06-30 PROCEDURE — 25010000002 VANCOMYCIN 5 G RECONSTITUTED SOLUTION: Performed by: INTERNAL MEDICINE

## 2022-06-30 PROCEDURE — 99232 SBSQ HOSP IP/OBS MODERATE 35: CPT | Performed by: INTERNAL MEDICINE

## 2022-06-30 PROCEDURE — 83735 ASSAY OF MAGNESIUM: CPT | Performed by: INTERNAL MEDICINE

## 2022-06-30 PROCEDURE — 97110 THERAPEUTIC EXERCISES: CPT

## 2022-06-30 PROCEDURE — 63710000001 INSULIN LISPRO (HUMAN) PER 5 UNITS: Performed by: FAMILY MEDICINE

## 2022-06-30 PROCEDURE — 82962 GLUCOSE BLOOD TEST: CPT

## 2022-06-30 PROCEDURE — 80048 BASIC METABOLIC PNL TOTAL CA: CPT | Performed by: INTERNAL MEDICINE

## 2022-06-30 RX ORDER — ECHINACEA PURPUREA EXTRACT 125 MG
2 TABLET ORAL AS NEEDED
Status: DISCONTINUED | OUTPATIENT
Start: 2022-06-30 | End: 2022-07-11 | Stop reason: HOSPADM

## 2022-06-30 RX ADMIN — INSULIN LISPRO 2 UNITS: 100 INJECTION, SOLUTION INTRAVENOUS; SUBCUTANEOUS at 11:40

## 2022-06-30 RX ADMIN — INSULIN LISPRO 2 UNITS: 100 INJECTION, SOLUTION INTRAVENOUS; SUBCUTANEOUS at 17:00

## 2022-06-30 RX ADMIN — Medication 10 ML: at 08:56

## 2022-06-30 RX ADMIN — VANCOMYCIN HYDROCHLORIDE 1000 MG: 5 INJECTION, POWDER, LYOPHILIZED, FOR SOLUTION INTRAVENOUS at 01:23

## 2022-06-30 RX ADMIN — LACTULOSE 10 G: 20 SOLUTION ORAL at 21:53

## 2022-06-30 RX ADMIN — Medication 10 ML: at 01:30

## 2022-06-30 RX ADMIN — MICAFUNGIN SODIUM 100 MG: 100 INJECTION, POWDER, LYOPHILIZED, FOR SOLUTION INTRAVENOUS at 14:43

## 2022-06-30 RX ADMIN — PANTOPRAZOLE SODIUM 40 MG: 40 TABLET, DELAYED RELEASE ORAL at 08:56

## 2022-06-30 RX ADMIN — Medication 10 ML: at 21:00

## 2022-07-01 ENCOUNTER — APPOINTMENT (OUTPATIENT)
Dept: CT IMAGING | Facility: HOSPITAL | Age: 57
End: 2022-07-01

## 2022-07-01 LAB
ANION GAP SERPL CALCULATED.3IONS-SCNC: 10.7 MMOL/L (ref 5–15)
APTT PPP: 35.9 SECONDS (ref 24.2–34.2)
BACTERIA FLD CULT: ABNORMAL
BUN SERPL-MCNC: 18 MG/DL (ref 6–20)
BUN/CREAT SERPL: 25.4 (ref 7–25)
CALCIUM SPEC-SCNC: 9.9 MG/DL (ref 8.6–10.5)
CHLORIDE SERPL-SCNC: 106 MMOL/L (ref 98–107)
CO2 SERPL-SCNC: 16.3 MMOL/L (ref 22–29)
CREAT SERPL-MCNC: 0.71 MG/DL (ref 0.57–1)
D-LACTATE SERPL-SCNC: 1.3 MMOL/L (ref 0.5–2)
DEPRECATED RDW RBC AUTO: 53.8 FL (ref 37–54)
EGFRCR SERPLBLD CKD-EPI 2021: 98.7 ML/MIN/1.73
ERYTHROCYTE [DISTWIDTH] IN BLOOD BY AUTOMATED COUNT: 15.6 % (ref 12.3–15.4)
GLUCOSE BLDC GLUCOMTR-MCNC: 116 MG/DL (ref 70–99)
GLUCOSE BLDC GLUCOMTR-MCNC: 138 MG/DL (ref 70–99)
GLUCOSE BLDC GLUCOMTR-MCNC: 173 MG/DL (ref 70–99)
GLUCOSE SERPL-MCNC: 116 MG/DL (ref 65–99)
GRAM STN SPEC: ABNORMAL
HCT VFR BLD AUTO: 25.5 % (ref 34–46.6)
HGB BLD-MCNC: 8.5 G/DL (ref 12–15.9)
INR PPP: 1.37 (ref 0.86–1.15)
MAGNESIUM SERPL-MCNC: 1.6 MG/DL (ref 1.6–2.6)
MCH RBC QN AUTO: 31.7 PG (ref 26.6–33)
MCHC RBC AUTO-ENTMCNC: 33.3 G/DL (ref 31.5–35.7)
MCV RBC AUTO: 95.1 FL (ref 79–97)
PLATELET # BLD AUTO: 76 10*3/MM3 (ref 140–450)
PMV BLD AUTO: 10.7 FL (ref 6–12)
POTASSIUM SERPL-SCNC: 4 MMOL/L (ref 3.5–5.2)
PROTHROMBIN TIME: 17.1 SECONDS (ref 11.8–14.9)
RBC # BLD AUTO: 2.68 10*6/MM3 (ref 3.77–5.28)
SODIUM SERPL-SCNC: 133 MMOL/L (ref 136–145)
VANCOMYCIN SERPL-MCNC: 9.74 MCG/ML (ref 5–40)
WBC NRBC COR # BLD: 5.34 10*3/MM3 (ref 3.4–10.8)

## 2022-07-01 PROCEDURE — 25010000002 MAGNESIUM SULFATE 2 GM/50ML SOLUTION: Performed by: INTERNAL MEDICINE

## 2022-07-01 PROCEDURE — 80048 BASIC METABOLIC PNL TOTAL CA: CPT | Performed by: INTERNAL MEDICINE

## 2022-07-01 PROCEDURE — 0 IOPAMIDOL PER 1 ML: Performed by: INTERNAL MEDICINE

## 2022-07-01 PROCEDURE — 94799 UNLISTED PULMONARY SVC/PX: CPT

## 2022-07-01 PROCEDURE — 99233 SBSQ HOSP IP/OBS HIGH 50: CPT | Performed by: INTERNAL MEDICINE

## 2022-07-01 PROCEDURE — 63710000001 INSULIN LISPRO (HUMAN) PER 5 UNITS: Performed by: FAMILY MEDICINE

## 2022-07-01 PROCEDURE — 80202 ASSAY OF VANCOMYCIN: CPT | Performed by: INTERNAL MEDICINE

## 2022-07-01 PROCEDURE — 25010000002 VANCOMYCIN 5 G RECONSTITUTED SOLUTION: Performed by: INTERNAL MEDICINE

## 2022-07-01 PROCEDURE — 74177 CT ABD & PELVIS W/CONTRAST: CPT

## 2022-07-01 PROCEDURE — 85027 COMPLETE CBC AUTOMATED: CPT | Performed by: INTERNAL MEDICINE

## 2022-07-01 PROCEDURE — 83735 ASSAY OF MAGNESIUM: CPT | Performed by: INTERNAL MEDICINE

## 2022-07-01 PROCEDURE — 85730 THROMBOPLASTIN TIME PARTIAL: CPT | Performed by: INTERNAL MEDICINE

## 2022-07-01 PROCEDURE — 83605 ASSAY OF LACTIC ACID: CPT | Performed by: INTERNAL MEDICINE

## 2022-07-01 PROCEDURE — 85610 PROTHROMBIN TIME: CPT | Performed by: INTERNAL MEDICINE

## 2022-07-01 PROCEDURE — 82962 GLUCOSE BLOOD TEST: CPT

## 2022-07-01 RX ORDER — HYDROXYZINE HYDROCHLORIDE 25 MG/1
25 TABLET, FILM COATED ORAL ONCE AS NEEDED
Status: COMPLETED | OUTPATIENT
Start: 2022-07-01 | End: 2022-07-01

## 2022-07-01 RX ORDER — MAGNESIUM SULFATE HEPTAHYDRATE 40 MG/ML
2 INJECTION, SOLUTION INTRAVENOUS ONCE
Status: COMPLETED | OUTPATIENT
Start: 2022-07-01 | End: 2022-07-01

## 2022-07-01 RX ORDER — DOXYCYCLINE 100 MG/1
100 CAPSULE ORAL EVERY 12 HOURS SCHEDULED
Status: COMPLETED | OUTPATIENT
Start: 2022-07-01 | End: 2022-07-07

## 2022-07-01 RX ORDER — HEPARIN SODIUM 10000 [USP'U]/100ML
18 INJECTION, SOLUTION INTRAVENOUS
Status: DISCONTINUED | OUTPATIENT
Start: 2022-07-01 | End: 2022-07-01

## 2022-07-01 RX ADMIN — INSULIN LISPRO 2 UNITS: 100 INJECTION, SOLUTION INTRAVENOUS; SUBCUTANEOUS at 12:08

## 2022-07-01 RX ADMIN — Medication 10 ML: at 09:32

## 2022-07-01 RX ADMIN — Medication 10 ML: at 20:38

## 2022-07-01 RX ADMIN — APIXABAN 10 MG: 5 TABLET, FILM COATED ORAL at 19:36

## 2022-07-01 RX ADMIN — PANTOPRAZOLE SODIUM 40 MG: 40 TABLET, DELAYED RELEASE ORAL at 09:31

## 2022-07-01 RX ADMIN — DOXYCYCLINE 100 MG: 100 CAPSULE ORAL at 13:29

## 2022-07-01 RX ADMIN — FLUCONAZOLE 400 MG: 150 TABLET ORAL at 13:28

## 2022-07-01 RX ADMIN — DOXYCYCLINE 100 MG: 100 CAPSULE ORAL at 20:38

## 2022-07-01 RX ADMIN — IOPAMIDOL 100 ML: 755 INJECTION, SOLUTION INTRAVENOUS at 17:27

## 2022-07-01 RX ADMIN — HYDROXYZINE HYDROCHLORIDE 25 MG: 25 TABLET, FILM COATED ORAL at 20:33

## 2022-07-01 RX ADMIN — VANCOMYCIN HYDROCHLORIDE 1000 MG: 5 INJECTION, POWDER, LYOPHILIZED, FOR SOLUTION INTRAVENOUS at 09:32

## 2022-07-01 RX ADMIN — MAGNESIUM SULFATE HEPTAHYDRATE 2 G: 2 INJECTION, SOLUTION INTRAVENOUS at 11:29

## 2022-07-01 NOTE — PROGRESS NOTES
"Pharmacy to Dose Vancomycin Day: 3    Tawny Lennon is a 56 y.o.female with MRSA UTI. Pharmacy has been consulted to dose IV Vancomycin.    Consulting Provider: Noah  Clinical Indication: MRSA UTI  Pertinent Past Medical History: COPD, MEDRANO cirrhosis, HTN, rectosigmoid resection with diverting colostomy  Goal -600 mg/L.hr  Duration of therapy: 7 days per consult    157.5 cm (62\")       06/22/22  2215      Weight: 79.2 kg (174 lb 9.7 oz)         Estimated Creatinine Clearance: 84.1 mL/min (by C-G formula based on SCr of 0.71 mg/dL).  Results from last 7 days  Lab  Units  07/01/22  0414  06/30/22  0408  06/29/22  0409  06/28/22  0408  BUN  mg/dL  18  20  21*  15  CREATININE  mg/dL  0.71  0.74  0.74  0.71    HD/PD/CRRT?: no    Lab Results   Component Value Date    WBC 5.34 07/01/2022      Temperature    06/30/22 1910 06/30/22 2329 07/01/22 0355   Temp: 97.5 °F (36.4 °C) 98.5 °F (36.9 °C) 98.6 °F (37 °C)     Contrast Administered: Yes, Isovue on 6/22    Relevant Micro:   Microbiology Results (last 10 days)       Procedure Component Value - Date/Time    Urine Culture - Urine, Urine, Clean Catch [[id number]]  (Abnormal)  (Susceptibility) Collected: 06/27/22 1954    Lab Status: Final result Specimen: Urine, Clean Catch Updated: 06/30/22 1255     Urine Culture >100,000 CFU/mL Staphylococcus aureus, MRSA     Comment: Methicillin resistant Staph aureus, patient may be an isolation risk.  Staphylococcus aureus is not typically regarded as a uropathogen, except in cases with genitourinary instrumentation present.  It's presence suggests an alternate source (e.g. bloodstream, abscess, SSTI, etc.).  Please evaluate accordingly.         <25,000 CFU/mL Gram Negative Bacilli     Comment: Call if further workup needed.         Narrative:      Colonization of the urinary tract without infection is common. Treatment is discouraged unless the patient is symptomatic, pregnant, or undergoing an invasive urologic procedure.    " Susceptibility        Staphylococcus aureus, MRSA      SEB      Nitrofurantoin Susceptible      Oxacillin Resistant      Tetracycline Susceptible      Trimethoprim + Sulfamethoxazole Resistant      Vancomycin Susceptible                       Susceptibility Comments       Staphylococcus aureus, MRSA    This isolate does not demonstrate inducible clindamycin resistance in vitro.                 Body Fluid Culture - Body Fluid, Peritoneum [[id number]]  (Abnormal)  (Susceptibility) Collected: 06/27/22 1517    Lab Status: Preliminary result Specimen: Body Fluid from Peritoneum Updated: 06/30/22 1023     Body Fluid Culture Scant growth (1+) Candida glabrata     Gram Stain No organisms seen    Narrative:      Fluconazole to follow    Susceptibility        Candida glabrata      SEB      Micafungin Susceptible                                  Relevant Radiology: none    Other Antimicrobial Therapy: Micafungin     Assessment/Plan  Loading dose: Vancomycin 1500mg (~18.9mg/kg) scheduled for 6/29  Regimen: 1000 mg IV every 24 hours.  Exposure target: AUC24 (range)400-600 mg/L.hr   AUC24,ss: 420 mg/L.hr  PAUC*: 65 %  Ctrough,ss: 9.5 mg/L  Pconc*: 0 %  Tox.: 5 %      Note: Starting patient back on a lower regimen, patients clearance is below population data predictions. Dose was held yesterday based on elevated trough.  Will draw a level tomorrow morning with AM labs just to monitor clearance.     Labs ordered: Vancomycin random tomorrow morning

## 2022-07-01 NOTE — PLAN OF CARE
Goal Outcome Evaluation:VSS, ostomy changed x2 due to leakage, needs wound care to see her to evaluate for proper fitting appliance and skin care around ostomy.

## 2022-07-01 NOTE — PROGRESS NOTES
[facility]   Hospitalist Progress Note  Date: 2022  Patient Name: Tawny Lennon  : 1965  MRN: [id number]  Date of admission: 2022    Subjective   Subjective     Chief Complaint: Blood in ostomy    Summary:   Tawny Lennon is a 56 y.o. female with COPD, Reich cirrhosis, hypertension, migraines, rectosigmoid resection with diverting colostomy in May 2022 who was hospitalized at our facility on 2022 with hyperkalemia and reported blood in her ostomy bag.  Blood in ostomy bag has since resolved.  Hyperkalemia was managed with insulin/glucose/calcium gluconate/Lokelma and patient did receive some bicarb infusion.  This is since improved.  Patient came to hospital from nursing facility.  PT/OT consulted.  Renal function has improved to baseline. CT-guided aspiration of abdominal wall fluid done on 2022, patient tolerated well.  Blood-tinged cloudy fluid aspirated.  Patient did have urinalysis done during admission, findings consistent with UTI, urine culture returned positive for MRSA and antibiotic treatment initiated.    Interval Followup:   No acute events overnight.  Body fluid culture returned positive for Candida glabrata.  Patient started on micafungin.  Patient denied any chest pain, shortness of breath, abdominal pain, nausea or vomiting.  Patient has remained afebrile.  Nursing with no additional acute issues to report.    Antibiotics:  -Vancomycin    Procedures:   -CT-guided aspiration of abdominal wall fluid (2022)    Pain Medication:   -Brayton    Review of Systems   All systems reviewed and negative unless stated otherwise under subjective.    Objective   Objective     Vitals:   Temp:  [97.5 °F (36.4 °C)-98.6 °F (37 °C)] 98.2 °F (36.8 °C)  Heart Rate:  [80-87] 80  Resp:  [14-18] 14  BP: (109-139)/(47-80) 110/47  Physical Exam   Gen: No acute distress, Conversant, pleasant, sitting up in bed watching TV  HEENT: MMM, Atraumatic  Neck: Supple, Trachea midline  Resp: CTAB,  No w/r/r, good aeration, normal respiratory effort, equal chest rise bilaterally  Card: RRR, No m/r/g  Abd: Soft, Nontender, Nondistended, + bowel sounds, ostomy bag present, midline scar appreciated  Ext: No cyanosis, No clubbing  Neuro: CN II-XII grossly intact, No focal deficits appreciated  Psych: AAO x 3, Normal mood, Normal affect    Result Review    Result Review:  I have personally reviewed the results from the time of this admission to 7/1/2022 09:30 EDT and agree with these findings:  [x]  Laboratory: Creatinine and electrolytes stable.  Magnesium low normal.  WBC, hemoglobin and platelet stable.  [x]  Microbiology: Body fluid culture (06/27/2022): Candida glabrata. Urine culture (06/27/2022): MRSA  []  Radiology:   []  EKG/Telemetry:   []  Cardiology/Vascular:    []  Pathology:  []  Old records:  []  Other:    Assessment & Plan   Assessment / Plan     Assessment:  Hyperkalemia, resolved  Acute renal failure, resolved  Urinary tract infection due to MRSA  Therapeutic drug level monitoring, vancomycin  Abdominal abscess due to Candida glabrata s/p drainage  Migraine headaches  Blood in ostomy bag, resolved  MEDRANO cirrhosis  Hypomagnesemia, improved  COPD, not acutely exacerbated  Essential hypertension  Hyponatremia, stable  Type 2 diabetes mellitus  Dyslipidemia  Depression  History of esophageal varices    Plan:  -Continue vancomycin for urinary tract infection due to MRSA  -Continue micafungin  -Repeat CT abdomen/pelvis with contrast ordered.  If fluid recurs patient may require transfer to a facility where surgery was performed previously.  -Blood cultures obtained, follow-up results.  If blood cultures returned positive patient may benefit from infectious disease consultation and thus would require transfer from [facility] to facility with infectious disease services  -Monitor vancomycin level  -PT/OT consulted  -Continue appropriate home medications  -Continue current insulin  regimen  -Monitor electrolytes and renal function with BMP and magnesium level in the AM  -Clinical course will dictate further management     DVT Prophylaxis: SCDs   GI Prophylaxis: Pantoprazole  Diet: Cardiac/Diabetic  Dispo: Pending clinical course  Code Status: Full Code     Personally reviewed patients labs and imaging, discussed with patient and nurse at bedside.     Part of this note may be an electronic transcription/translation of spoken language to printed text using the Dragon dictation system.    DVT prophylaxis:  Mechanical DVT prophylaxis orders are present.    CODE STATUS:   Level Of Support Discussed With: Patient  Code Status (Patient has no pulse and is not breathing): CPR (Attempt to Resuscitate)  Medical Interventions (Patient has pulse or is breathing): Full Support      Electronically signed by Richard Zamora MD, 07/01/22, 9:30 AM EDT.

## 2022-07-01 NOTE — PLAN OF CARE
Goal Outcome Evaluation:      Pt stable throughout this shift, vitals WNL. Pt went for CT this evening. Blood sugar checked this evening WNL. Pt has been up with 1x assist and walking/transferring in the room with walker.

## 2022-07-02 ENCOUNTER — APPOINTMENT (OUTPATIENT)
Dept: CT IMAGING | Facility: HOSPITAL | Age: 57
End: 2022-07-02

## 2022-07-02 LAB
ANION GAP SERPL CALCULATED.3IONS-SCNC: 10.2 MMOL/L (ref 5–15)
BUN SERPL-MCNC: 13 MG/DL (ref 6–20)
BUN/CREAT SERPL: 16 (ref 7–25)
CALCIUM SPEC-SCNC: 9.8 MG/DL (ref 8.6–10.5)
CHLORIDE SERPL-SCNC: 108 MMOL/L (ref 98–107)
CO2 SERPL-SCNC: 15.8 MMOL/L (ref 22–29)
CREAT SERPL-MCNC: 0.81 MG/DL (ref 0.57–1)
DEPRECATED RDW RBC AUTO: 55.1 FL (ref 37–54)
EGFRCR SERPLBLD CKD-EPI 2021: 84.3 ML/MIN/1.73
ERYTHROCYTE [DISTWIDTH] IN BLOOD BY AUTOMATED COUNT: 15.9 % (ref 12.3–15.4)
GLUCOSE BLDC GLUCOMTR-MCNC: 107 MG/DL (ref 70–99)
GLUCOSE BLDC GLUCOMTR-MCNC: 194 MG/DL (ref 70–99)
GLUCOSE BLDC GLUCOMTR-MCNC: 87 MG/DL (ref 70–99)
GLUCOSE SERPL-MCNC: 104 MG/DL (ref 65–99)
HCT VFR BLD AUTO: 25.1 % (ref 34–46.6)
HCT VFR BLD AUTO: 26.1 % (ref 34–46.6)
HGB BLD-MCNC: 8.5 G/DL (ref 12–15.9)
HGB BLD-MCNC: 8.8 G/DL (ref 12–15.9)
MAGNESIUM SERPL-MCNC: 1.8 MG/DL (ref 1.6–2.6)
MCH RBC QN AUTO: 32.1 PG (ref 26.6–33)
MCHC RBC AUTO-ENTMCNC: 33.9 G/DL (ref 31.5–35.7)
MCV RBC AUTO: 94.7 FL (ref 79–97)
PLATELET # BLD AUTO: 74 10*3/MM3 (ref 140–450)
PMV BLD AUTO: 11.1 FL (ref 6–12)
POTASSIUM SERPL-SCNC: 4 MMOL/L (ref 3.5–5.2)
RBC # BLD AUTO: 2.65 10*6/MM3 (ref 3.77–5.28)
SODIUM SERPL-SCNC: 134 MMOL/L (ref 136–145)
WBC NRBC COR # BLD: 5.26 10*3/MM3 (ref 3.4–10.8)

## 2022-07-02 PROCEDURE — 80048 BASIC METABOLIC PNL TOTAL CA: CPT | Performed by: INTERNAL MEDICINE

## 2022-07-02 PROCEDURE — 85018 HEMOGLOBIN: CPT | Performed by: INTERNAL MEDICINE

## 2022-07-02 PROCEDURE — 94799 UNLISTED PULMONARY SVC/PX: CPT

## 2022-07-02 PROCEDURE — 83735 ASSAY OF MAGNESIUM: CPT | Performed by: INTERNAL MEDICINE

## 2022-07-02 PROCEDURE — 74174 CTA ABD&PLVS W/CONTRAST: CPT

## 2022-07-02 PROCEDURE — 85014 HEMATOCRIT: CPT | Performed by: INTERNAL MEDICINE

## 2022-07-02 PROCEDURE — 0 IOPAMIDOL PER 1 ML: Performed by: INTERNAL MEDICINE

## 2022-07-02 PROCEDURE — 63710000001 INSULIN LISPRO (HUMAN) PER 5 UNITS: Performed by: FAMILY MEDICINE

## 2022-07-02 PROCEDURE — 85027 COMPLETE CBC AUTOMATED: CPT | Performed by: INTERNAL MEDICINE

## 2022-07-02 PROCEDURE — 97164 PT RE-EVAL EST PLAN CARE: CPT

## 2022-07-02 PROCEDURE — 63710000001 DIPHENHYDRAMINE PER 50 MG: Performed by: INTERNAL MEDICINE

## 2022-07-02 PROCEDURE — 99233 SBSQ HOSP IP/OBS HIGH 50: CPT | Performed by: INTERNAL MEDICINE

## 2022-07-02 PROCEDURE — 82962 GLUCOSE BLOOD TEST: CPT

## 2022-07-02 RX ORDER — DIPHENHYDRAMINE HCL 25 MG
25 CAPSULE ORAL EVERY 12 HOURS PRN
Status: DISCONTINUED | OUTPATIENT
Start: 2022-07-02 | End: 2022-07-11 | Stop reason: HOSPADM

## 2022-07-02 RX ADMIN — INSULIN LISPRO 2 UNITS: 100 INJECTION, SOLUTION INTRAVENOUS; SUBCUTANEOUS at 12:33

## 2022-07-02 RX ADMIN — APIXABAN 10 MG: 5 TABLET, FILM COATED ORAL at 09:28

## 2022-07-02 RX ADMIN — DOXYCYCLINE 100 MG: 100 CAPSULE ORAL at 20:00

## 2022-07-02 RX ADMIN — DOXYCYCLINE 100 MG: 100 CAPSULE ORAL at 09:30

## 2022-07-02 RX ADMIN — IOPAMIDOL 100 ML: 755 INJECTION, SOLUTION INTRAVENOUS at 19:35

## 2022-07-02 RX ADMIN — FLUCONAZOLE 400 MG: 150 TABLET ORAL at 13:28

## 2022-07-02 RX ADMIN — PANTOPRAZOLE SODIUM 40 MG: 40 TABLET, DELAYED RELEASE ORAL at 09:28

## 2022-07-02 RX ADMIN — DIPHENHYDRAMINE HYDROCHLORIDE 25 MG: 25 CAPSULE ORAL at 11:34

## 2022-07-02 RX ADMIN — Medication 10 ML: at 20:08

## 2022-07-02 NOTE — THERAPY RE-EVALUATION
Acute Care - Physical Therapy Re-Evaluation  CARLOS Lennon     Patient Name: Tawny Lennon  : 1965  MRN: [id number]  Today's Date: 2022      Visit Dx:     ICD-10-CM ICD-9-CM   1. Hyperkalemia  E87.5 276.7   2. Acute kidney injury (GINNY) with acute tubular necrosis (ATN) (HCC)  N17.0 584.5   3. Difficulty in walking  R26.2 719.7   4. Decreased activities of daily living (ADL)  Z78.9 V49.89     Patient Active Problem List   Diagnosis   • Benign essential HTN   • Colon polyps   • Depression   • Diabetes mellitus without complication (HCC)   • Diverticulitis   • Hematuria   • High blood pressure   • High cholesterol   • Interstitial cystitis   • Migraine without aura   • Narcolepsy   • Obesity   • Other specified chronic obstructive airways disease   • Panic attacks   • Paroxysmal SVT (supraventricular tachycardia) (HCC)   • Sleep apnea   • Cirrhosis of liver without ascites (HCC)   • Esophageal varices without bleeding (HCC)   • Preop cardiovascular exam   • MEDRANO (nonalcoholic steatohepatitis)   • Gastroesophageal reflux disease without esophagitis   • Hyperkalemia   • Severe malnutrition (HCC)     Past Medical History:   Diagnosis Date   • Cirrhosis (HCC)     liver   • Colon polyps    • COPD (chronic obstructive pulmonary disease) (HCC)    • Depression    • Diabetes (HCC)    • Diverticulitis    • Hematuria    • High blood pressure    • High cholesterol    • Interstitial cystitis    • Migraine headache    • Narcolepsy    • PONV (postoperative nausea and vomiting)    • Sleep apnea    • Spinal headache     DURING PREGNANCY     Past Surgical History:   Procedure Laterality Date   • BLADDER REPAIR     • CHOLECYSTECTOMY     • COLONOSCOPY  2014 x2 2020   • ENDOSCOPY  2014   • ENDOSCOPY N/A 2021    Procedure: ESOPHAGOGASTRODUODENOSCOPY with biopsies;  Surgeon: Jesse Watkins MD;  Location: [facility] ENDOSCOPY;  Service: Gastroenterology;  Laterality: N/A;  esophageal varices   • ENDOSCOPY N/A  3/25/2022    Procedure: ESOPHAGOGASTRODUODENOSCOPY WITH BIOPSIES;  Surgeon: Jesse Watkins MD;  Location: [facility] ENDOSCOPY;  Service: Gastroenterology;  Laterality: N/A;  ESOPHAGEAL VARICIES   • HAND SURGERY     • HERNIA REPAIR     • HYSTERECTOMY  2009   • OTHER SURGICAL HISTORY      rectocele repair   • UPPER GASTROINTESTINAL ENDOSCOPY       PT Assessment (last 12 hours)     PT Evaluation and Treatment     Row Name 07/02/22 1100          Physical Therapy Time and Intention    Subjective Information no complaints  -DP     Document Type re-evaluation  -DP     Mode of Treatment physical therapy;individual therapy  -DP     Patient Effort fair  -DP     Row Name 07/02/22 1100          General Information    Patient Profile Reviewed yes  -DP     Row Name 07/02/22 1100          Bed Mobility    Bed Mobility supine-sit  -DP     Supine-Sit Nye (Bed Mobility) contact guard  -DP     Row Name 07/02/22 1100          Transfers    Transfers sit-stand transfer;stand-sit transfer  -DP     Sit-Stand Nye (Transfers) contact guard  -DP     Row Name 07/02/22 1100          Gait/Stairs (Locomotion)    Gait/Stairs Locomotion gait/ambulation independence;gait/ambulation assistive device;distance ambulated;gait pattern;gait deviations  -DP     [place]ye Level (Gait) contact guard  -DP     Assistive Device (Gait) walker, front-wheeled  -DP     Distance in Feet (Gait) 60  -DP     Row Name             Wound 06/23/22 0134 coccyx Pressure Injury    Wound - Properties Group Placement Date: 06/23/22  -JN Placement Time: 0134 -JN Present on Hospital Admission: Y  -JN Location: coccyx  -JN Primary Wound Type: Pressure inj  -JN Removal Date: --  -GERMAN Removal Time: --  -GERMAN Wound Outcome: --  -GERMAN, no open wounds      Retired Wound - Properties Group Placement Date: 06/23/22  -JN Placement Time: 0134 -JN Present on Hospital Admission: Y  -JN Location: coccyx  -JN Primary Wound Type: Pressure inj  -JN Removal Date: --  -GERMAN  Removal Time: --  -GERMAN Wound Outcome: --  -GERMAN, no open wounds      Retired Wound - Properties Group Date first assessed: 06/23/22  -JN Time first assessed: 0134 -JN Present on Hospital Admission: Y  -JN Location: coccyx  -JN Primary Wound Type: Pressure inj  -JN Resolution Date: --  -GERMAN Resolution Time: --  -GERMAN Wound Outcome: --  -GERMAN, no open wounds      Row Name             Wound 06/27/22 1512 Right midline abdomen Incision    Wound - Properties Group Placement Date: 06/27/22  -SM Placement Time: 1512  -SM Side: Right  -SM Orientation: midline  -SM Location: abdomen  -SM Primary Wound Type: Incision  -SM Additional Comments: CT Guided ABD abscess aspiration  -SM     Retired Wound - Properties Group Placement Date: 06/27/22  -SM Placement Time: 1512  -SM Side: Right  -SM Orientation: midline  -SM Location: abdomen  -SM Primary Wound Type: Incision  -SM Additional Comments: CT Guided ABD abscess aspiration  -SM     Retired Wound - Properties Group Date first assessed: 06/27/22  -SM Time first assessed: 1512  -SM Side: Right  -SM Location: abdomen  -SM Primary Wound Type: Incision  -SM Additional Comments: CT Guided ABD abscess aspiration  -SM     Row Name 07/02/22 1100          Plan of Care Review    Plan of Care Reviewed With patient  -DP     Outcome Evaluation Patient presents with decree strength, transfers, and functional mobility.  She is to continue working with PT services to maximize independence with functional mobility.  -DP     Row Name 07/02/22 1100          Therapy Assessment/Plan (PT)    Rehab Potential (PT) good, to achieve stated therapy goals  -DP     Criteria for Skilled Interventions Met (PT) skilled treatment is necessary  -DP     Therapy Frequency (PT) daily  -DP     Predicted Duration of Therapy Intervention (PT) 10 days  -DP     Problem List (PT) problems related to;balance;strength;pain;mobility  -DP     Activity Limitations Related to Problem List (PT) unable to ambulate safely  -TRACY Ochoa  Name 07/02/22 1100          Physical Therapy Goals    Transfer Goal Selection (PT) transfer, PT goal 1  -DP     Gait Training Goal Selection (PT) gait training, PT goal 1  -DP     Row Name 07/02/22 1100          Transfer Goal 1 (PT)    Activity/Assistive Device (Transfer Goal 1, PT) transfers, all  -DP     [place] Level/Cues Needed (Transfer Goal 1, PT) independent  -DP     Time Frame (Transfer Goal 1, PT) long term goal (LTG);10 days  -DP     Progress/Outcome (Transfer Goal 1, PT) goal ongoing  -DP     Row Name 07/02/22 1100          Gait Training Goal 1 (PT)    Activity/Assistive Device (Gait Training Goal 1, PT) gait (walking locomotion);assistive device use;walker, rolling  -DP     [place] Level (Gait Training Goal 1, PT) independent  -DP     Distance (Gait Training Goal 1, PT) 300  -DP     Time Frame (Gait Training Goal 1, PT) long term goal (LTG);10 days  -DP     Progress/Outcome (Gait Training Goal 1, PT) goal ongoing  -DP           User Key  (r) = Recorded By, (t) = Taken By, (c) = Cosigned By    Initials Name Provider Type    Sabrina Eaton, RN Registered Nurse    Francine Balderas RN Registered Nurse    Francine Parr, RN Registered Nurse    Soren Reyes, PT Physical Therapist                Physical Therapy Education                 Title: PT OT SLP Therapies (Done)     Topic: Physical Therapy (Done)     Point: Mobility training (Done)     Learning Progress Summary           Patient Acceptance, E, VU by WINSOME at 6/29/2022 1313    Acceptance, E, VU by AMBER at 6/26/2022 0756    Acceptance, E,TB, VU by AMBER at 6/25/2022 1010    Acceptance, E,TB, VU by PASCALE at 6/24/2022 1102                   Point: Precautions (Done)     Learning Progress Summary           Patient Acceptance, E, VU by AMBER at 6/26/2022 0756    Acceptance, E,TB, VU by AMBER at 6/25/2022 1010    Acceptance, E,TB, VU by PASCALE at 6/24/2022 1102                               User Key     Initials Effective Dates Name Provider  Type Discipline    BB 06/16/21 -  Diana Hendrickson, RN Registered Nurse Nurse    PASCALE 06/03/21 -  Jack Garcia PT Physical Therapist PT     06/06/22 -  Justin Hernandez PT Student PT Student PT              PT Recommendation and Plan  Anticipated Discharge Disposition (PT): sub acute care setting, inpatient rehabilitation facility  Planned Therapy Interventions (PT): balance training, bed mobility training, gait training, transfer training, strengthening  Therapy Frequency (PT): daily  Plan of Care Reviewed With: patient  Outcome Evaluation: Patient presents with decree strength, transfers, and functional mobility.  She is to continue working with PT services to maximize independence with functional mobility.   Outcome Measures     Row Name 07/02/22 1100 06/30/22 1200          How much help from another person do you currently need...    Turning from your back to your side while in flat bed without using bedrails? 4  -DP 4  -RH     Moving from lying on back to sitting on the side of a flat bed without bedrails? 3  -DP 3  -RH     Moving to and from a bed to a chair (including a wheelchair)? 3  -DP 3  -RH     Standing up from a chair using your arms (e.g., wheelchair, bedside chair)? 3  -DP 3  -RH     Climbing 3-5 steps with a railing? 3  -DP 2  -RH     To walk in hospital room? 3  -DP 2  -RH     AM-PAC 6 Clicks Score (PT) 19  -DP 17  -RH            Functional Assessment    Outcome Measure Options AM-PAC 6 Clicks Basic Mobility (PT)  -DP --           User Key  (r) = Recorded By, (t) = Taken By, (c) = Cosigned By    Initials Name Provider Type    RH Manolo Rosado, SOIRN Physical Therapist Assistant    Soren Reyes, PT Physical Therapist                 Time Calculation:    PT Charges     Row Name 07/02/22 6591             Time Calculation    PT Received On 07/02/22  -DP      PT Goal Re-Cert Due Date 07/11/22  -DP              Untimed Charges    PT Eval/Re-eval Minutes 30  -DP              Total Minutes    Untimed  Charges Total Minutes 30  -DP       Total Minutes 30  -DP            User Key  (r) = Recorded By, (t) = Taken By, (c) = Cosigned By    Initials Name Provider Type    DP Soren Booth, PT Physical Therapist              Therapy Charges for Today     Code Description Service Date Service Provider Modifiers Qty    [id number] HC PT RE-EVAL ESTABLISHED PLAN 2 7/2/2022 Soren Booth, PT GP 1          PT G-Codes  Outcome Measure Options: AM-PAC 6 Clicks Basic Mobility (PT)  AM-PAC 6 Clicks Score (PT): 19  AM-PAC 6 Clicks Score (OT): 17    Soren Booth PT  7/2/2022

## 2022-07-02 NOTE — PLAN OF CARE
Goal Outcome Evaluation:  Plan of Care Reviewed With: patient           Outcome Evaluation: Patient was started on oral eliquis today instead of the heparin drip. When explained the reasoning behind this she became very anxious and explained that her sister  of a blood clot in . Explained plan of care to patient which she said helped to calm her. MD ordered oral hydroxyzine after page sent for anxiety medication. Patient says this helped.

## 2022-07-02 NOTE — PLAN OF CARE
Goal Outcome Evaluation:  Plan of Care Reviewed With: patient        Progress: no change   Patient doing okay today. Patient c/o itching after taking the po doxycycline. MD aware, benadryl ordered and given. Colostomy bag changed x1. Bright red blood seen in colostomy bag, MD made aware. Eliquis on hold for now and H/H ordered. Stat CT abdomen and pelvis ordered. No other issues.      Problem: Adult Inpatient Plan of Care  Goal: Plan of Care Review  Outcome: Ongoing, Progressing  Flowsheets (Taken 7/2/2022 1746)  Progress: no change  Plan of Care Reviewed With: patient  Goal: Patient-Specific Goal (Individualized)  Outcome: Ongoing, Progressing  Goal: Absence of Hospital-Acquired Illness or Injury  Outcome: Ongoing, Progressing  Intervention: Identify and Manage Fall Risk  Recent Flowsheet Documentation  Taken 7/2/2022 0755 by Flakito Dorsey RN  Safety Promotion/Fall Prevention: safety round/check completed  Intervention: Prevent Skin Injury  Recent Flowsheet Documentation  Taken 7/2/2022 0755 by Flakito Dorsey RN  Body Position: position changed independently  Intervention: Prevent and Manage VTE (Venous Thromboembolism) Risk  Recent Flowsheet Documentation  Taken 7/2/2022 1320 by Flakito Dorsey RN  Activity Management: ambulated to bathroom  Taken 7/2/2022 0930 by Flakito Dorsey RN  VTE Prevention/Management:  • bilateral  • sequential compression devices on  Taken 7/2/2022 0755 by Flakito Dorsey RN  Activity Management: activity adjusted per tolerance  VTE Prevention/Management:  • bilateral  • compression stockings off  Range of Motion: active ROM (range of motion) encouraged  Intervention: Prevent Infection  Recent Flowsheet Documentation  Taken 7/2/2022 0755 by Flakito Dorsey RN  Infection Prevention:  • environmental surveillance performed  • hand hygiene promoted  • personal protective equipment utilized  • rest/sleep promoted  • single patient room provided  Goal: Optimal Comfort and  Wellbeing  Outcome: Ongoing, Progressing  Intervention: Monitor Pain and Promote Comfort  Recent Flowsheet Documentation  Taken 7/2/2022 0755 by Falkito Dorsey, RN  Pain Management Interventions:  • care clustered  • quiet environment facilitated  • relaxation techniques promoted  Intervention: Provide Person-Centered Care  Recent Flowsheet Documentation  Taken 7/2/2022 0755 by Flakito Dorsey, RN  Trust Relationship/Rapport:  • care explained  • empathic listening provided  • questions answered  • questions encouraged  • thoughts/feelings acknowledged  Goal: Readiness for Transition of Care  Outcome: Ongoing, Progressing

## 2022-07-02 NOTE — PROGRESS NOTES
[facility]   Hospitalist Progress Note  Date: 2022  Patient Name: Tawny Lennon  : 1965  MRN: [id number]  Date of admission: 2022    Subjective   Subjective     Chief Complaint: Blood in ostomy    Summary:   Tawny Lennon is a 56 y.o. female with COPD, Reich cirrhosis, hypertension, migraines, rectosigmoid resection with diverting colostomy in May 2022 who was hospitalized at our facility on 2022 with hyperkalemia and reported blood in her ostomy bag.  Blood in ostomy bag has since resolved.  Hyperkalemia was managed with insulin/glucose/calcium gluconate/Lokelma and patient did receive some bicarb infusion.  This is since improved.  Patient came to hospital from nursing facility.  PT/OT consulted.  Renal function has improved to baseline. CT-guided aspiration of abdominal wall fluid done on 2022, patient tolerated well.  Blood-tinged cloudy fluid aspirated.  Patient did have urinalysis done during admission, findings consistent with UTI, urine culture returned positive for MRSA and antibiotic treatment initiated.    Interval Followup:   No acute events overnight.  CT scan significant for partial superior mesenteric vein thrombosis.  Patient started on Eliquis therapy.  Discussion had with patient's surgeon at [facility], Dr. Jasper Kang, who stated that patient does not require transfer at this time and recommended that patient be placed on Eliquis and that he will follow up with patient as an outpatient in his clinic.  Patient denied any chest pain, shortness of breath, nausea or vomiting.  Endorsed minimal if any abdominal pain.  Nursing with no additional acute issues to report.    Antibiotics:  -Doxycycline    Procedures:   -CT-guided aspiration of abdominal wall fluid (2022)    Pain Medication:   -[place]    Review of Systems   All systems reviewed and negative unless stated otherwise under subjective.    Objective   Objective     Vitals:   Temp:  [97.9 °F  (36.6 °C)-98.2 °F (36.8 °C)] 98.2 °F (36.8 °C)  Heart Rate:  [76-86] 76  Resp:  [15-18] 16  BP: (104-126)/(43-57) 104/43  Physical Exam   Gen: No acute distress, Conversant, pleasant, sitting up on edge of bed  HEENT: MMM, Atraumatic  Neck: Supple, Trachea midline  Resp: CTAB, No w/r/r, good aeration, equal chest rise bilaterally, no increased work of breathing  Card: RRR, No m/r/g  Abd: Soft, Nontender, Nondistended, + bowel sounds, ostomy bag present, midline scar appreciated  Ext: No cyanosis, No clubbing  Neuro: CN II-XII grossly intact, No focal deficits appreciated  Psych: AAO x 3, Normal mood, Normal affect    Result Review    Result Review:  I have personally reviewed the results from the time of this admission to 7/2/2022 11:22 EDT and agree with these findings:  [x]  Laboratory: Electrolytes and creatinine stable.  WC, hemoglobin and platelets stable.  [x]  Microbiology: Body fluid culture (06/27/2022): Candida glabrata. Urine culture (06/27/2022): MRSA.  Blood culture (06/30/2022): No growth to date.  []  Radiology:   []  EKG/Telemetry:   []  Cardiology/Vascular:    []  Pathology:  []  Old records:  []  Other:    Assessment & Plan   Assessment / Plan     Assessment:  Hyperkalemia, resolved  Acute renal failure, resolved  Urinary tract infection due to MRSA  Abdominal abscess due to Candida glabrata s/p drainage  Partial superior mesenteric vein thrombosis  Migraine headaches  Blood in ostomy bag, resolved  MEDRANO cirrhosis  Hypomagnesemia, improved  COPD, not acutely exacerbated  Essential hypertension  Hyponatremia, stable  Type 2 diabetes mellitus  Dyslipidemia  Depression  History of esophageal varices    Plan:  -Continue doxycycline  -Continue micafungin  -Patient started on apixaban partial superior mesenteric vein thrombosis, discussion of patient's surgeon at outlying facility.  Recommended patient be placed on anticoagulation and patient to follow-up with him in outpatient clinic next week.  -PT/OT  consulted  -Continue appropriate home medications  -Continue current insulin regimen  -Will monitor electrolytes and renal function with BMP and magnesium level in the AM  -Monitor hgb, hct and platelets with CBC to be obtained in the AM  -Clinical course will dictate further management     DVT Prophylaxis: Apixaban  GI Prophylaxis: Pantoprazole  Diet: Cardiac/Diabetic  Dispo: Placement pending at [facility]  Code Status: Full Code     Personally reviewed patients labs and imaging, discussed with patient and nurse at bedside.     Part of this note may be an electronic transcription/translation of spoken language to printed text using the Dragon dictation system.    DVT prophylaxis:  Medical and mechanical DVT prophylaxis orders are present.    CODE STATUS:   Level Of Support Discussed With: Patient  Code Status (Patient has no pulse and is not breathing): CPR (Attempt to Resuscitate)  Medical Interventions (Patient has pulse or is breathing): Full Support      Electronically signed by Richard Zamora MD, 07/02/22, 11:22 AM EDT.

## 2022-07-03 LAB
ANION GAP SERPL CALCULATED.3IONS-SCNC: 11.7 MMOL/L (ref 5–15)
APTT PPP: 165.9 SECONDS (ref 24.2–34.2)
APTT PPP: 39.8 SECONDS (ref 24.2–34.2)
APTT PPP: >200 SECONDS (ref 24.2–34.2)
APTT PPP: >200 SECONDS (ref 24.2–34.2)
BUN SERPL-MCNC: 12 MG/DL (ref 6–20)
BUN/CREAT SERPL: 15.2 (ref 7–25)
CALCIUM SPEC-SCNC: 10.1 MG/DL (ref 8.6–10.5)
CHLORIDE SERPL-SCNC: 106 MMOL/L (ref 98–107)
CO2 SERPL-SCNC: 17.3 MMOL/L (ref 22–29)
CREAT SERPL-MCNC: 0.79 MG/DL (ref 0.57–1)
DEPRECATED RDW RBC AUTO: 56 FL (ref 37–54)
EGFRCR SERPLBLD CKD-EPI 2021: 86.8 ML/MIN/1.73
ERYTHROCYTE [DISTWIDTH] IN BLOOD BY AUTOMATED COUNT: 15.9 % (ref 12.3–15.4)
GLUCOSE BLDC GLUCOMTR-MCNC: 113 MG/DL (ref 70–99)
GLUCOSE BLDC GLUCOMTR-MCNC: 132 MG/DL (ref 70–99)
GLUCOSE BLDC GLUCOMTR-MCNC: 92 MG/DL (ref 70–99)
GLUCOSE BLDC GLUCOMTR-MCNC: 95 MG/DL (ref 70–99)
GLUCOSE SERPL-MCNC: 116 MG/DL (ref 65–99)
HCT VFR BLD AUTO: 25.6 % (ref 34–46.6)
HCT VFR BLD AUTO: 25.6 % (ref 34–46.6)
HCT VFR BLD AUTO: 26.3 % (ref 34–46.6)
HCT VFR BLD AUTO: 26.7 % (ref 34–46.6)
HGB BLD-MCNC: 8.5 G/DL (ref 12–15.9)
HGB BLD-MCNC: 8.5 G/DL (ref 12–15.9)
HGB BLD-MCNC: 8.7 G/DL (ref 12–15.9)
HGB BLD-MCNC: 9.1 G/DL (ref 12–15.9)
INR PPP: 1.59 (ref 0.86–1.15)
MAGNESIUM SERPL-MCNC: 1.7 MG/DL (ref 1.6–2.6)
MCH RBC QN AUTO: 31.8 PG (ref 26.6–33)
MCHC RBC AUTO-ENTMCNC: 33.2 G/DL (ref 31.5–35.7)
MCV RBC AUTO: 95.9 FL (ref 79–97)
PLATELET # BLD AUTO: 76 10*3/MM3 (ref 140–450)
PMV BLD AUTO: 10.5 FL (ref 6–12)
POTASSIUM SERPL-SCNC: 4 MMOL/L (ref 3.5–5.2)
PROTHROMBIN TIME: 19.2 SECONDS (ref 11.8–14.9)
RBC # BLD AUTO: 2.67 10*6/MM3 (ref 3.77–5.28)
SODIUM SERPL-SCNC: 135 MMOL/L (ref 136–145)
WBC NRBC COR # BLD: 4.81 10*3/MM3 (ref 3.4–10.8)

## 2022-07-03 PROCEDURE — 85610 PROTHROMBIN TIME: CPT | Performed by: INTERNAL MEDICINE

## 2022-07-03 PROCEDURE — 80048 BASIC METABOLIC PNL TOTAL CA: CPT | Performed by: INTERNAL MEDICINE

## 2022-07-03 PROCEDURE — 85018 HEMOGLOBIN: CPT | Performed by: INTERNAL MEDICINE

## 2022-07-03 PROCEDURE — 85014 HEMATOCRIT: CPT | Performed by: INTERNAL MEDICINE

## 2022-07-03 PROCEDURE — 82962 GLUCOSE BLOOD TEST: CPT

## 2022-07-03 PROCEDURE — 63710000001 DIPHENHYDRAMINE PER 50 MG: Performed by: INTERNAL MEDICINE

## 2022-07-03 PROCEDURE — 85027 COMPLETE CBC AUTOMATED: CPT | Performed by: INTERNAL MEDICINE

## 2022-07-03 PROCEDURE — 25010000002 HEPARIN (PORCINE) 25000-0.45 UT/250ML-% SOLUTION: Performed by: INTERNAL MEDICINE

## 2022-07-03 PROCEDURE — 94799 UNLISTED PULMONARY SVC/PX: CPT

## 2022-07-03 PROCEDURE — 83735 ASSAY OF MAGNESIUM: CPT | Performed by: INTERNAL MEDICINE

## 2022-07-03 PROCEDURE — 99233 SBSQ HOSP IP/OBS HIGH 50: CPT | Performed by: INTERNAL MEDICINE

## 2022-07-03 PROCEDURE — 85730 THROMBOPLASTIN TIME PARTIAL: CPT | Performed by: INTERNAL MEDICINE

## 2022-07-03 RX ORDER — HEPARIN SODIUM 10000 [USP'U]/100ML
18 INJECTION, SOLUTION INTRAVENOUS
Status: DISCONTINUED | OUTPATIENT
Start: 2022-07-03 | End: 2022-07-04

## 2022-07-03 RX ADMIN — DOXYCYCLINE 100 MG: 100 CAPSULE ORAL at 20:08

## 2022-07-03 RX ADMIN — HEPARIN SODIUM 18 UNITS/KG/HR: 10000 INJECTION, SOLUTION INTRAVENOUS at 09:39

## 2022-07-03 RX ADMIN — PANTOPRAZOLE SODIUM 40 MG: 40 TABLET, DELAYED RELEASE ORAL at 09:47

## 2022-07-03 RX ADMIN — DIPHENHYDRAMINE HYDROCHLORIDE 25 MG: 25 CAPSULE ORAL at 11:27

## 2022-07-03 RX ADMIN — DOXYCYCLINE 100 MG: 100 CAPSULE ORAL at 09:47

## 2022-07-03 RX ADMIN — FLUCONAZOLE 400 MG: 150 TABLET ORAL at 14:46

## 2022-07-03 RX ADMIN — Medication 3 ML: at 20:09

## 2022-07-03 NOTE — PLAN OF CARE
Goal Outcome Evaluation:  Plan of Care Reviewed With: patient        Progress: no change  Outcome Evaluation: No significant changes. Vital signs normal. Ostomy came loose during shift, reinforced with tegaderm.

## 2022-07-03 NOTE — PLAN OF CARE
Goal Outcome Evaluation:  Plan of Care Reviewed With: patient        Progress: no change   Patient placed on heparin drip today. aPTT came back critical at >200, MD made aware and said to hold it for now and follow the heparin drip protocol. Repeat aPTT due at 1915. Patient was made NPO this am. Colostomy changed. She still had a little bit of blood in the colostomy today, MD made aware and said to keep monitoring her Hgb for now. No other issues.     Problem: Adult Inpatient Plan of Care  Goal: Plan of Care Review  Outcome: Ongoing, Progressing  Flowsheets (Taken 7/3/2022 1816)  Progress: no change  Plan of Care Reviewed With: patient  Goal: Patient-Specific Goal (Individualized)  Outcome: Ongoing, Progressing  Goal: Absence of Hospital-Acquired Illness or Injury  Outcome: Ongoing, Progressing  Intervention: Identify and Manage Fall Risk  Recent Flowsheet Documentation  Taken 7/3/2022 0950 by Flakito Dorsey RN  Safety Promotion/Fall Prevention: safety round/check completed  Intervention: Prevent Skin Injury  Recent Flowsheet Documentation  Taken 7/3/2022 0950 by Flakito Dorsey RN  Body Position: position changed independently  Intervention: Prevent and Manage VTE (Venous Thromboembolism) Risk  Recent Flowsheet Documentation  Taken 7/3/2022 1138 by Flakito Dorsey RN  Activity Management: ambulated to bathroom  Taken 7/3/2022 0950 by Flakito Dorsey, RN  Activity Management: activity adjusted per tolerance  VTE Prevention/Management:  • bilateral  • sequential compression devices off  Intervention: Prevent Infection  Recent Flowsheet Documentation  Taken 7/3/2022 0950 by Flakito Dorsey, RN  Infection Prevention:  • environmental surveillance performed  • hand hygiene promoted  • personal protective equipment utilized  • rest/sleep promoted  • single patient room provided  Goal: Optimal Comfort and Wellbeing  Outcome: Ongoing, Progressing  Intervention: Provide Person-Centered Care  Recent Flowsheet  Documentation  Taken 7/3/2022 0950 by Flakito Dorsey, RN  Trust Relationship/Rapport:  • care explained  • empathic listening provided  • questions answered  • questions encouraged  • thoughts/feelings acknowledged  Goal: Readiness for Transition of Care  Outcome: Ongoing, Progressing

## 2022-07-03 NOTE — PROGRESS NOTES
[facility]   Hospitalist Progress Note  Date: 7/3/2022  Patient Name: Tawny Lennon  : 1965  MRN: [id number]  Date of admission: 2022    Subjective   Subjective     Chief Complaint: Blood in ostomy    Summary:   Tawny Lennon is a 56 y.o. female with COPD, Reich cirrhosis, hypertension, migraines, rectosigmoid resection with diverting colostomy in May 2022 who was hospitalized at our facility on 2022 with hyperkalemia and reported blood in her ostomy bag.  Blood in ostomy bag has since resolved.  Hyperkalemia was managed with insulin/glucose/calcium gluconate/Lokelma and patient did receive some bicarb infusion.  This is since improved.  Patient came to hospital from nursing facility.  PT/OT consulted.  Renal function has improved to baseline. CT-guided aspiration of abdominal wall fluid done on 2022, patient tolerated well.  Blood-tinged cloudy fluid aspirated.  Patient did have urinalysis done during admission, findings consistent with UTI, urine culture returned positive for MRSA and antibiotic treatment initiated.  CT abdomen/pelvis with contrast repeated to see if residual abscess present, no residual abscess present but with significant finding of partial superior mesenteric vein thrombosis.  Discussion had with patient and surgeon at [facility], [place], Dr. Neel Kang, who stated patient did not require transfer at that time and recommended patient be started on Eliquis.    Interval Followup:   Eliquis was initiated and bright red blood noted in ostomy bag per nursing.  Eliquis discontinued.  CTA abdomen pelvis obtained, no active extravasation of contrast appreciated on imaging.  Hemoglobin monitored and remained stable.  Patient again discussed with her surgeon at [facility], [place], Dr. Neel Kang, who stated to continue monitoring hemoglobin level and to resume anticoagulation if this stabilizes and possibly obtain tagged RBC scan but the patient did not  require transfer at this time.  Patient denied any chest pain, shortness of breath, nausea or vomiting.  Endorsed minimal abdominal discomfort.  Nursing with no additional acute issues to report.    Antibiotics:  -Doxycycline    Procedures:   -CT-guided aspiration of abdominal wall fluid (06/27/2022)    Pain Medication:   -[place]    Review of Systems   All systems reviewed and negative unless stated otherwise under subjective.    Objective   Objective     Vitals:   Temp:  [97.8 °F (36.6 °C)-98.3 °F (36.8 °C)] 97.9 °F (36.6 °C)  Heart Rate:  [72-85] 78  Resp:  [15-16] 16  BP: (102-118)/(47-61) 107/48  Physical Exam   Gen: No acute distress, Conversant, pleasant, sitting up in bed watching TV  HEENT: MMM, Atraumatic  Neck: Supple, Trachea midline  Resp: CTAB, No w/r/r, good aeration, normal respiratory effort, equal chest rise bilaterally  Card: RRR, No m/r/g  Abd: Soft, Nontender, Nondistended, + bowel sounds, normal color stool present in ostomy bag, midline scar appreciated  Ext: No cyanosis, No clubbing  Neuro: CN II-XII grossly intact, No focal deficits appreciated  Psych: AAO x 3, Normal mood, Normal affect    Result Review    Result Review:  I have personally reviewed the results from the time of this admission to 7/3/2022 10:00 EDT and agree with these findings:  [x]  Laboratory: Creatinine and electrolytes stable.  Hemoglobin stable.  WC and platelets also stable.  [x]  Microbiology: Body fluid culture (06/27/2022): Candida glabrata. Urine culture (06/27/2022): MRSA.  Blood culture (06/30/2022): No growth to date.  [x]  Radiology: CTA abdomen pelvis reviewed.  No mechanical bowel obstruction.  No pneumoperitoneum or pneumatosis.  No evidence of active extravasation of contrast material appreciated.  []  EKG/Telemetry:   []  Cardiology/Vascular:    []  Pathology:  []  Old records:  []  Other:    Assessment & Plan   Assessment / Plan     Assessment:  Blood in ostomy bag, recurrent  Anemia of chronic disease    Therapeutic drug level monitoring, heparin drip  Hyperkalemia, resolved  Acute renal failure, resolved  Urinary tract infection due to MRSA  Abdominal abscess due to Candida glabrata s/p drainage  Partial superior mesenteric vein thrombosis  Migraine headaches  MEDRANO cirrhosis  Hypomagnesemia, improved  COPD, not acutely exacerbated  Essential hypertension  Hyponatremia, stable  Type 2 diabetes mellitus  Dyslipidemia  Depression  History of esophageal varices    Plan:  -Continue doxycycline  -Continue micafungin  -As noted above patient had bright red blood in ostomy bag yesterday and Eliquis was discontinued.  Discussion had with patient's surgeon at [facility], Dr. Neel Sher, as noted above.  At this time we will start patient on heparin drip and monitor hemoglobin with every 6 hour labs and monitor for any bright red blood output through ostomy.  Patient will also be made n.p.o. except sips with meds and ice chips.  -PT/OT consulted  -Continue appropriate home medications  -Continue current insulin regimen  -Will monitor electrolytes and renal function with BMP and magnesium level in the AM  -Monitor hgb, hct and platelets with CBC to be obtained in the AM  -Clinical course will dictate further management     DVT Prophylaxis: Heparin  GI Prophylaxis: Pantoprazole  Diet: n.p.o. except sips with meds and ice chips.  Dispo: Placement pending at [place]  Code Status: Full Code     Personally reviewed patients labs and imaging, discussed with patient and nurse at bedside.  Discussed patient's care with patient's general surgeon at [facility], Dr. Neel Sher.     Part of this note may be an electronic transcription/translation of spoken language to printed text using the Dragon dictation system.    DVT prophylaxis:  Medical and mechanical DVT prophylaxis orders are present.    CODE STATUS:   Level Of Support Discussed With: Patient  Code Status (Patient has no pulse and is not  breathing): CPR (Attempt to Resuscitate)  Medical Interventions (Patient has pulse or is breathing): Full Support      Electronically signed by Richard Zamora MD, 07/03/22, 10:00 AM EDT.

## 2022-07-04 LAB
ANION GAP SERPL CALCULATED.3IONS-SCNC: 12.5 MMOL/L (ref 5–15)
APTT PPP: 40.7 SECONDS (ref 24.2–34.2)
BASOPHILS # BLD AUTO: 0.02 10*3/MM3 (ref 0–0.2)
BASOPHILS NFR BLD AUTO: 0.5 % (ref 0–1.5)
BUN SERPL-MCNC: 12 MG/DL (ref 6–20)
BUN/CREAT SERPL: 15.4 (ref 7–25)
CALCIUM SPEC-SCNC: 10.3 MG/DL (ref 8.6–10.5)
CHLORIDE SERPL-SCNC: 105 MMOL/L (ref 98–107)
CO2 SERPL-SCNC: 17.5 MMOL/L (ref 22–29)
CREAT SERPL-MCNC: 0.78 MG/DL (ref 0.57–1)
DEPRECATED RDW RBC AUTO: 56.1 FL (ref 37–54)
EGFRCR SERPLBLD CKD-EPI 2021: 88.2 ML/MIN/1.73
EOSINOPHIL # BLD AUTO: 0.1 10*3/MM3 (ref 0–0.4)
EOSINOPHIL NFR BLD AUTO: 2.3 % (ref 0.3–6.2)
ERYTHROCYTE [DISTWIDTH] IN BLOOD BY AUTOMATED COUNT: 16 % (ref 12.3–15.4)
GLUCOSE BLDC GLUCOMTR-MCNC: 113 MG/DL (ref 70–99)
GLUCOSE BLDC GLUCOMTR-MCNC: 120 MG/DL (ref 70–99)
GLUCOSE BLDC GLUCOMTR-MCNC: 89 MG/DL (ref 70–99)
GLUCOSE SERPL-MCNC: 94 MG/DL (ref 65–99)
HCT VFR BLD AUTO: 26.1 % (ref 34–46.6)
HCT VFR BLD AUTO: 26.1 % (ref 34–46.6)
HCT VFR BLD AUTO: 27.5 % (ref 34–46.6)
HCT VFR BLD AUTO: 27.8 % (ref 34–46.6)
HCT VFR BLD AUTO: 27.8 % (ref 34–46.6)
HGB BLD-MCNC: 8.7 G/DL (ref 12–15.9)
HGB BLD-MCNC: 8.7 G/DL (ref 12–15.9)
HGB BLD-MCNC: 9.3 G/DL (ref 12–15.9)
HGB BLD-MCNC: 9.3 G/DL (ref 12–15.9)
HGB BLD-MCNC: 9.4 G/DL (ref 12–15.9)
IMM GRANULOCYTES # BLD AUTO: 0.01 10*3/MM3 (ref 0–0.05)
IMM GRANULOCYTES NFR BLD AUTO: 0.2 % (ref 0–0.5)
LYMPHOCYTES # BLD AUTO: 0.78 10*3/MM3 (ref 0.7–3.1)
LYMPHOCYTES NFR BLD AUTO: 17.7 % (ref 19.6–45.3)
MAGNESIUM SERPL-MCNC: 1.6 MG/DL (ref 1.6–2.6)
MCH RBC QN AUTO: 31.6 PG (ref 26.6–33)
MCHC RBC AUTO-ENTMCNC: 33.3 G/DL (ref 31.5–35.7)
MCV RBC AUTO: 94.9 FL (ref 79–97)
MONOCYTES # BLD AUTO: 0.3 10*3/MM3 (ref 0.1–0.9)
MONOCYTES NFR BLD AUTO: 6.8 % (ref 5–12)
NEUTROPHILS NFR BLD AUTO: 3.19 10*3/MM3 (ref 1.7–7)
NEUTROPHILS NFR BLD AUTO: 72.5 % (ref 42.7–76)
NRBC BLD AUTO-RTO: 0 /100 WBC (ref 0–0.2)
PHOSPHATE SERPL-MCNC: 4.1 MG/DL (ref 2.5–4.5)
PLATELET # BLD AUTO: 76 10*3/MM3 (ref 140–450)
PMV BLD AUTO: 10.4 FL (ref 6–12)
POTASSIUM SERPL-SCNC: 4.2 MMOL/L (ref 3.5–5.2)
RBC # BLD AUTO: 2.75 10*6/MM3 (ref 3.77–5.28)
SODIUM SERPL-SCNC: 135 MMOL/L (ref 136–145)
WBC NRBC COR # BLD: 4.4 10*3/MM3 (ref 3.4–10.8)

## 2022-07-04 PROCEDURE — 80048 BASIC METABOLIC PNL TOTAL CA: CPT | Performed by: INTERNAL MEDICINE

## 2022-07-04 PROCEDURE — 25010000002 MAGNESIUM SULFATE 2 GM/50ML SOLUTION: Performed by: INTERNAL MEDICINE

## 2022-07-04 PROCEDURE — 83735 ASSAY OF MAGNESIUM: CPT | Performed by: INTERNAL MEDICINE

## 2022-07-04 PROCEDURE — 63710000001 DIPHENHYDRAMINE PER 50 MG: Performed by: INTERNAL MEDICINE

## 2022-07-04 PROCEDURE — 85018 HEMOGLOBIN: CPT | Performed by: INTERNAL MEDICINE

## 2022-07-04 PROCEDURE — 94760 N-INVAS EAR/PLS OXIMETRY 1: CPT

## 2022-07-04 PROCEDURE — 82962 GLUCOSE BLOOD TEST: CPT

## 2022-07-04 PROCEDURE — 84100 ASSAY OF PHOSPHORUS: CPT | Performed by: INTERNAL MEDICINE

## 2022-07-04 PROCEDURE — 85025 COMPLETE CBC W/AUTO DIFF WBC: CPT | Performed by: INTERNAL MEDICINE

## 2022-07-04 PROCEDURE — 99233 SBSQ HOSP IP/OBS HIGH 50: CPT | Performed by: INTERNAL MEDICINE

## 2022-07-04 PROCEDURE — 85014 HEMATOCRIT: CPT | Performed by: INTERNAL MEDICINE

## 2022-07-04 PROCEDURE — 94799 UNLISTED PULMONARY SVC/PX: CPT

## 2022-07-04 RX ORDER — MAGNESIUM SULFATE HEPTAHYDRATE 40 MG/ML
2 INJECTION, SOLUTION INTRAVENOUS ONCE
Status: COMPLETED | OUTPATIENT
Start: 2022-07-04 | End: 2022-07-04

## 2022-07-04 RX ADMIN — Medication 10 ML: at 21:00

## 2022-07-04 RX ADMIN — FLUCONAZOLE 400 MG: 150 TABLET ORAL at 15:18

## 2022-07-04 RX ADMIN — NADOLOL 20 MG: 20 TABLET ORAL at 09:00

## 2022-07-04 RX ADMIN — MAGNESIUM SULFATE HEPTAHYDRATE 2 G: 2 INJECTION, SOLUTION INTRAVENOUS at 09:00

## 2022-07-04 RX ADMIN — DOXYCYCLINE 100 MG: 100 CAPSULE ORAL at 09:00

## 2022-07-04 RX ADMIN — APIXABAN 5 MG: 5 TABLET, FILM COATED ORAL at 01:59

## 2022-07-04 RX ADMIN — PANTOPRAZOLE SODIUM 40 MG: 40 TABLET, DELAYED RELEASE ORAL at 09:00

## 2022-07-04 RX ADMIN — DOXYCYCLINE 100 MG: 100 CAPSULE ORAL at 20:26

## 2022-07-04 RX ADMIN — DIPHENHYDRAMINE HYDROCHLORIDE 25 MG: 25 CAPSULE ORAL at 00:02

## 2022-07-04 RX ADMIN — Medication 10 ML: at 09:01

## 2022-07-04 NOTE — PLAN OF CARE
Goal Outcome Evaluation:  Plan of Care Reviewed With: patient        Progress: improving  Outcome Evaluation: VSS PATIENT HAS BEEN VERY DEPRESSED TONIGHT PATIENT DID REFUSE TO RESTART HEPARIN DRIP ROXY SCHMITT NOTIFIED

## 2022-07-04 NOTE — SIGNIFICANT NOTE
Contacted by RN that patient's PTT was 40.7 and plan was to resume heparin drip, however patient is actively refusing drip. Stated that she would take eliquis that she was prescribed previously but refused to have heparin drip turned back on.     The bleeding from/around her ostomy has slowed and is scant as per nursing. Vitals and hgb stable.     Discussed with pharmacy regarding when/if to resume eliquis given recent heparin administration. Decision made to give patient dose of 5MG Eliquis now since she was having bleeding with the 10mg dose she had been given previous.     Electronically signed by OSKAR Raza, 07/04/22, 1:52 AM EDT.

## 2022-07-04 NOTE — PLAN OF CARE
Goal Outcome Evaluation:         Pt stable throughout the shift. Vitals WNL. Pt received one run of magnesium today, tolerated well. No signs of bleeding from colostomy site. Pt has ambulated to the bathroom multiple times today and voided without difficulty. Diet increased to full liquid this evening.

## 2022-07-04 NOTE — PROGRESS NOTES
[facility]   Hospitalist Progress Note  Date: 2022  Patient Name: Tawny Lennon  : 1965  MRN: [id number]  Date of admission: 2022    Subjective   Subjective     Chief Complaint: Blood in ostomy    Summary:   Tawny Lennon is a 56 y.o. female with COPD, Reich cirrhosis, hypertension, migraines, rectosigmoid resection with diverting colostomy in May 2022 who was hospitalized at our facility on 2022 with hyperkalemia and reported blood in her ostomy bag.  Blood in ostomy bag has since resolved.  Hyperkalemia was managed with insulin/glucose/calcium gluconate/Lokelma and patient did receive some bicarb infusion.  This is since improved.  Patient came to hospital from nursing facility.  PT/OT consulted.  Renal function has improved to baseline. CT-guided aspiration of abdominal wall fluid done on 2022, patient tolerated well.  Blood-tinged cloudy fluid aspirated.  Patient did have urinalysis done during admission, findings consistent with UTI, urine culture returned positive for MRSA and antibiotic treatment initiated.  CT abdomen/pelvis with contrast repeated to see if residual abscess present, no residual abscess present but with significant finding of partial superior mesenteric vein thrombosis.  Discussion had with patient and surgeon at [facility], Dr. Neel Kang, who stated patient did not require transfer at that time and recommended patient be started on Eliquis.  Eliquis started and patient had recurrent bright red blood in ostomy, Dr. Neel Kang contacted again and he stated patient did not require transfer and to decrease dose and obtain imaging.  CTA abdomen pelvis obtained and no active extravasation contrast was appreciated.  Heparin drip started at that time.    Interval Followup:   PTT elevated on heparin drip held yesterday, PTT monitored and plan had been to resume heparin drip, however, patient refused.  Patient stated she did not want to be put  on heparin drip and wished to be restarted on Eliquis.  Eliquis was restarted overnight at lower dose.  Patient denied any chest pain, shortness of breath, nausea or vomiting.  Endorsed minimal abdominal discomfort.  Stated she was hungry.  Nursing with no additional acute issues to report.    Antibiotics:  -Doxycycline    Procedures:   -CT-guided aspiration of abdominal wall fluid (06/27/2022)    Pain Medication:   -[place]    Review of Systems   All systems reviewed and negative unless stated otherwise under subjective.    Objective   Objective     Vitals:   Temp:  [96.9 °F (36.1 °C)-98.4 °F (36.9 °C)] 98.1 °F (36.7 °C)  Heart Rate:  [70-84] 77  Resp:  [16-18] 16  BP: (114-126)/(46-56) 114/46  Physical Exam   Gen: No acute distress, Conversant, pleasant, lying in bed watching TV  HEENT: MMM, Atraumatic  Neck: Supple, Trachea midline  Resp: CTAB, No w/r/r, good aeration, equal chest rise bilaterally, no increased work of breathing  Card: RRR, No m/r/g  Abd: Soft, Nontender, Nondistended, + bowel sounds, normal color stool present in ostomy bag, midline scar appreciated  Ext: No cyanosis, No clubbing  Neuro: CN II-XII grossly intact, No focal deficits appreciated  Psych: AAO x 3, Normal mood, Normal affect    Result Review    Result Review:  I have personally reviewed the results from the time of this admission to 7/4/2022 09:04 EDT and agree with these findings:  [x]  Laboratory: Hemoglobin stable.  Platelets and WBC stable.  Magnesium low normal.  Creatinine and other electrolytes stable and within normal limits.  [x]  Microbiology: Body fluid culture (06/27/2022): Candida glabrata. Urine culture (06/27/2022): MRSA.  Blood culture (06/30/2022): No growth to date.  []  Radiology:   []  EKG/Telemetry:   []  Cardiology/Vascular:    []  Pathology:  []  Old records:  []  Other:    Assessment & Plan   Assessment / Plan     Assessment:  Blood in ostomy bag, recurrent  Hyperkalemia, resolved  Acute renal failure,  resolved  Urinary tract infection due to MRSA  Abdominal abscess due to Candida glabrata s/p drainage  Partial superior mesenteric vein thrombosis  Migraine headaches  Acute blood loss anemia  Anemia of chronic disease   MEDRANO cirrhosis  Hypomagnesemia, persistent  COPD, not acutely exacerbated  Essential hypertension  Hyponatremia, stable  Type 2 diabetes mellitus  Dyslipidemia  Depression  History of esophageal varices    Plan:  -Continue doxycycline  -Continue micafungin  -Patient refused to have heparin drip restarted.  Eliquis resumed at 5 mg dosing instead of 10 mg dosing.  Monitor hemoglobin level closely.  -And hemoglobin every 6 hours, transfuse as necessary  -Start clear liquid diet  -PT/OT consulted  -Continue appropriate home medications  -Continue current insulin regimen  -Replace magnesium IV  -Monitor electrolytes and renal function with BMP and magnesium level in the AM  -Monitor hgb, hct and platelets with CBC to be obtained in the AM  -Clinical course will dictate further management     DVT Prophylaxis: Eliquis  GI Prophylaxis: Pantoprazole  Diet: Clear liquid  Dispo: Placement pending at [place]  Code Status: Full Code     Personally reviewed patients labs and imaging, discussed with patient and nurse at bedside.       Part of this note may be an electronic transcription/translation of spoken language to printed text using the Dragon dictation system.    DVT prophylaxis:  Medical and mechanical DVT prophylaxis orders are present.    CODE STATUS:   Level Of Support Discussed With: Patient  Code Status (Patient has no pulse and is not breathing): CPR (Attempt to Resuscitate)  Medical Interventions (Patient has pulse or is breathing): Full Support      Electronically signed by Richard Zamora MD, 07/04/22, 9:04 AM EDT.

## 2022-07-05 LAB
ANION GAP SERPL CALCULATED.3IONS-SCNC: 10.8 MMOL/L (ref 5–15)
BACTERIA SPEC AEROBE CULT: NORMAL
BACTERIA SPEC AEROBE CULT: NORMAL
BUN SERPL-MCNC: 11 MG/DL (ref 6–20)
BUN/CREAT SERPL: 13.1 (ref 7–25)
CALCIUM SPEC-SCNC: 10.2 MG/DL (ref 8.6–10.5)
CHLORIDE SERPL-SCNC: 104 MMOL/L (ref 98–107)
CO2 SERPL-SCNC: 17.2 MMOL/L (ref 22–29)
CREAT SERPL-MCNC: 0.84 MG/DL (ref 0.57–1)
DEPRECATED RDW RBC AUTO: 55.4 FL (ref 37–54)
EGFRCR SERPLBLD CKD-EPI 2021: 80.7 ML/MIN/1.73
ERYTHROCYTE [DISTWIDTH] IN BLOOD BY AUTOMATED COUNT: 15.9 % (ref 12.3–15.4)
GLUCOSE BLDC GLUCOMTR-MCNC: 112 MG/DL (ref 70–99)
GLUCOSE BLDC GLUCOMTR-MCNC: 154 MG/DL (ref 70–99)
GLUCOSE BLDC GLUCOMTR-MCNC: 158 MG/DL (ref 70–99)
GLUCOSE SERPL-MCNC: 104 MG/DL (ref 65–99)
HCT VFR BLD AUTO: 27.8 % (ref 34–46.6)
HCT VFR BLD AUTO: 28.2 % (ref 34–46.6)
HCT VFR BLD AUTO: 29.3 % (ref 34–46.6)
HGB BLD-MCNC: 9.2 G/DL (ref 12–15.9)
HGB BLD-MCNC: 9.6 G/DL (ref 12–15.9)
HGB BLD-MCNC: 9.7 G/DL (ref 12–15.9)
MAGNESIUM SERPL-MCNC: 1.7 MG/DL (ref 1.6–2.6)
MCH RBC QN AUTO: 31.5 PG (ref 26.6–33)
MCHC RBC AUTO-ENTMCNC: 33.1 G/DL (ref 31.5–35.7)
MCV RBC AUTO: 95.2 FL (ref 79–97)
PHOSPHATE SERPL-MCNC: 3.5 MG/DL (ref 2.5–4.5)
PLATELET # BLD AUTO: 79 10*3/MM3 (ref 140–450)
PMV BLD AUTO: 10.6 FL (ref 6–12)
POTASSIUM SERPL-SCNC: 3.9 MMOL/L (ref 3.5–5.2)
RBC # BLD AUTO: 2.92 10*6/MM3 (ref 3.77–5.28)
SODIUM SERPL-SCNC: 132 MMOL/L (ref 136–145)
WBC NRBC COR # BLD: 5.31 10*3/MM3 (ref 3.4–10.8)

## 2022-07-05 PROCEDURE — 84100 ASSAY OF PHOSPHORUS: CPT | Performed by: INTERNAL MEDICINE

## 2022-07-05 PROCEDURE — 63710000001 INSULIN LISPRO (HUMAN) PER 5 UNITS: Performed by: FAMILY MEDICINE

## 2022-07-05 PROCEDURE — 85027 COMPLETE CBC AUTOMATED: CPT | Performed by: INTERNAL MEDICINE

## 2022-07-05 PROCEDURE — 97110 THERAPEUTIC EXERCISES: CPT

## 2022-07-05 PROCEDURE — 99233 SBSQ HOSP IP/OBS HIGH 50: CPT | Performed by: FAMILY MEDICINE

## 2022-07-05 PROCEDURE — 83735 ASSAY OF MAGNESIUM: CPT | Performed by: INTERNAL MEDICINE

## 2022-07-05 PROCEDURE — 85018 HEMOGLOBIN: CPT | Performed by: INTERNAL MEDICINE

## 2022-07-05 PROCEDURE — 80048 BASIC METABOLIC PNL TOTAL CA: CPT | Performed by: INTERNAL MEDICINE

## 2022-07-05 PROCEDURE — 82962 GLUCOSE BLOOD TEST: CPT

## 2022-07-05 PROCEDURE — 94799 UNLISTED PULMONARY SVC/PX: CPT

## 2022-07-05 PROCEDURE — 85014 HEMATOCRIT: CPT | Performed by: INTERNAL MEDICINE

## 2022-07-05 RX ORDER — SODIUM BICARBONATE 650 MG/1
650 TABLET ORAL 2 TIMES DAILY WITH MEALS
Status: DISCONTINUED | OUTPATIENT
Start: 2022-07-05 | End: 2022-07-11 | Stop reason: HOSPADM

## 2022-07-05 RX ADMIN — INSULIN LISPRO 2 UNITS: 100 INJECTION, SOLUTION INTRAVENOUS; SUBCUTANEOUS at 12:15

## 2022-07-05 RX ADMIN — APIXABAN 5 MG: 5 TABLET, FILM COATED ORAL at 10:36

## 2022-07-05 RX ADMIN — NADOLOL 20 MG: 20 TABLET ORAL at 08:50

## 2022-07-05 RX ADMIN — DOXYCYCLINE 100 MG: 100 CAPSULE ORAL at 08:50

## 2022-07-05 RX ADMIN — DOXYCYCLINE 100 MG: 100 CAPSULE ORAL at 20:06

## 2022-07-05 RX ADMIN — Medication 10 ML: at 08:51

## 2022-07-05 RX ADMIN — PANTOPRAZOLE SODIUM 40 MG: 40 TABLET, DELAYED RELEASE ORAL at 08:50

## 2022-07-05 RX ADMIN — FLUCONAZOLE 400 MG: 150 TABLET ORAL at 13:34

## 2022-07-05 RX ADMIN — APIXABAN 5 MG: 5 TABLET, FILM COATED ORAL at 20:06

## 2022-07-05 RX ADMIN — INSULIN LISPRO 2 UNITS: 100 INJECTION, SOLUTION INTRAVENOUS; SUBCUTANEOUS at 18:05

## 2022-07-05 RX ADMIN — Medication 10 ML: at 20:06

## 2022-07-05 RX ADMIN — SODIUM BICARBONATE 650 MG TABLET 650 MG: at 17:20

## 2022-07-05 NOTE — THERAPY TREATMENT NOTE
Acute Care - Physical Therapy Treatment Note  CARLOS Lennon     Patient Name: Tawny Lennon  : 1965  MRN: [id number]  Today's Date: 2022      Visit Dx:     ICD-10-CM ICD-9-CM   1. Hyperkalemia  E87.5 276.7   2. Acute kidney injury (GINNY) with acute tubular necrosis (ATN) (HCC)  N17.0 584.5   3. Difficulty in walking  R26.2 719.7   4. Decreased activities of daily living (ADL)  Z78.9 V49.89     Patient Active Problem List   Diagnosis   • Benign essential HTN   • Colon polyps   • Depression   • Diabetes mellitus without complication (HCC)   • Diverticulitis   • Hematuria   • High blood pressure   • High cholesterol   • Interstitial cystitis   • Migraine without aura   • Narcolepsy   • Obesity   • Other specified chronic obstructive airways disease   • Panic attacks   • Paroxysmal SVT (supraventricular tachycardia) (HCC)   • Sleep apnea   • Cirrhosis of liver without ascites (HCC)   • Esophageal varices without bleeding (HCC)   • Preop cardiovascular exam   • MEDRANO (nonalcoholic steatohepatitis)   • Gastroesophageal reflux disease without esophagitis   • Hyperkalemia   • Severe malnutrition (HCC)     Past Medical History:   Diagnosis Date   • Cirrhosis (HCC)     liver   • Colon polyps    • COPD (chronic obstructive pulmonary disease) (HCC)    • Depression    • Diabetes (HCC)    • Diverticulitis    • Hematuria    • High blood pressure    • High cholesterol    • Interstitial cystitis    • Migraine headache    • Narcolepsy    • PONV (postoperative nausea and vomiting)    • Sleep apnea    • Spinal headache     DURING PREGNANCY     Past Surgical History:   Procedure Laterality Date   • BLADDER REPAIR     • CHOLECYSTECTOMY     • COLONOSCOPY  2014 x2 2020   • ENDOSCOPY  2014   • ENDOSCOPY N/A 2021    Procedure: ESOPHAGOGASTRODUODENOSCOPY with biopsies;  Surgeon: Jesse Watkins MD;  Location: [facility] ENDOSCOPY;  Service: Gastroenterology;  Laterality: N/A;  esophageal varices   • ENDOSCOPY N/A  3/25/2022    Procedure: ESOPHAGOGASTRODUODENOSCOPY WITH BIOPSIES;  Surgeon: Jesse Watkins MD;  Location: [facility] ENDOSCOPY;  Service: Gastroenterology;  Laterality: N/A;  ESOPHAGEAL VARICIES   • HAND SURGERY     • HERNIA REPAIR     • HYSTERECTOMY  2009   • OTHER SURGICAL HISTORY      rectocele repair   • UPPER GASTROINTESTINAL ENDOSCOPY       PT Assessment (last 12 hours)     PT Evaluation and Treatment     Row Name 07/05/22 1436          Physical Therapy Time and Intention    Subjective Information no complaints  -     Document Type therapy note (daily note)  -     Mode of Treatment physical therapy;individual therapy  -     Patient Effort fair  -     Row Name 07/05/22 1436          Pain Scale: FACES Pre/Post-Treatment    Pain: FACES Scale, Pretreatment 0-->no hurt  -     Posttreatment Pain Rating 0-->no hurt  -     Row Name 07/05/22 1436          Bed Mobility    Bed Mobility supine-sit;sit-supine  -     All Activities, [place] (Bed Mobility) standby assist  -     Supine-Sit [place] (Bed Mobility) standby assist  -     Sit-Supine [place] (Bed Mobility) standby assist  -     Row Name 07/05/22 1436          Transfers    Transfers sit-stand transfer;stand-sit transfer  -     Sit-Stand [place] (Transfers) standby assist  -     Stand-Sit [place] (Transfers) standby assist  -     Row Name 07/05/22 1436          Sit-Stand Transfer    Assistive Device (Sit-Stand Transfers) --  Pt transfers without AD.  -     Row Name 07/05/22 1436          Gait/Stairs (Locomotion)    Gait/Stairs Locomotion gait/ambulation independence;gait/ambulation assistive device;distance ambulated;gait pattern;gait deviations  -     [place] Level (Gait) standby assist  -     Assistive Device (Gait) --  Pt amb without AD.  -     Distance in Feet (Gait) 80  -     Pattern (Gait) 2-point;step-through  -     Deviations/Abnormal Patterns (Gait) base of support, narrow  -     Row  Name 07/05/22 1436          Motor Skills    Therapeutic Exercise hip;knee;ankle  -     Row Name 07/05/22 1436          Hip (Therapeutic Exercise)    Hip (Therapeutic Exercise) AROM (active range of motion);isometric exercises;strengthening exercise  -     Hip AROM (Therapeutic Exercise) bilateral;flexion;extension;aBduction;aDduction;external rotation;internal rotation;20 repititions;supine  -RH     Hip Isometrics (Therapeutic Exercise) gluteal sets;bilateral;10 repetitions;2 sets;supine  -RH     Hip Strengthening (Therapeutic Exercise) marching while seated;10 repetitions;2 sets  -     Row Name 07/05/22 1436          Knee (Therapeutic Exercise)    Knee (Therapeutic Exercise) AROM (active range of motion);isometric exercises;strengthening exercise  -     Knee AROM (Therapeutic Exercise) bilateral;SAQ (short arc quad);SLR (straight leg raise);LAQ (long arc quad);supine;10 repetitions;2 sets  -     Knee Isometrics (Therapeutic Exercise) quad sets;10 repetitions;2 sets;bilateral;supine  -RH     Knee Strengthening (Therapeutic Exercise) LAQ (long arc quad)  -     Row Name 07/05/22 1436          Ankle (Therapeutic Exercise)    Ankle (Therapeutic Exercise) AROM (active range of motion)  -     Ankle AROM (Therapeutic Exercise) bilateral;dorsiflexion;plantarflexion;10 repetitions;2 sets;supine  -     Row Name             Wound 06/23/22 0134 coccyx Pressure Injury    Wound - Properties Group Placement Date: 06/23/22  -JN Placement Time: 0134 -JN Present on Hospital Admission: Y  -JN Location: coccyx  -JN Primary Wound Type: Pressure inj  -JN Removal Date: --  -GERMAN Removal Time: --  -GERMAN Wound Outcome: --  -GERMAN, no open wounds      Retired Wound - Properties Group Placement Date: 06/23/22  -JN Placement Time: 0134 -JN Present on Hospital Admission: Y  -JN Location: coccyx  -JN Primary Wound Type: Pressure inj  -JN Removal Date: --  -GERMAN Removal Time: --  -GERMAN Wound Outcome: --  -GERMAN, no open wounds      Retired  Wound - Properties Group Date first assessed: 06/23/22  -JN Time first assessed: 0134 -JN Present on Hospital Admission: Y  -JN Location: coccyx  -JN Primary Wound Type: Pressure inj  -JN Resolution Date: --  -GERMAN Resolution Time: --  -GERMAN Wound Outcome: --  -GERMAN, no open wounds      Row Name             Wound 06/27/22 1512 Right midline abdomen Incision    Wound - Properties Group Placement Date: 06/27/22  -SM Placement Time: 1512  -SM Side: Right  -SM Orientation: midline  -SM Location: abdomen  -SM Primary Wound Type: Incision  -SM Additional Comments: CT Guided ABD abscess aspiration  -SM     Retired Wound - Properties Group Placement Date: 06/27/22  -SM Placement Time: 1512  -SM Side: Right  -SM Orientation: midline  -SM Location: abdomen  -SM Primary Wound Type: Incision  -SM Additional Comments: CT Guided ABD abscess aspiration  -SM     Retired Wound - Properties Group Date first assessed: 06/27/22  -SM Time first assessed: 1512  -SM Side: Right  -SM Location: abdomen  -SM Primary Wound Type: Incision  -SM Additional Comments: CT Guided ABD abscess aspiration  -SM     Row Name 07/05/22 1436          Vital Signs    O2 Delivery Intra Treatment room air  -RH     Row Name 07/05/22 1436          Progress Summary (PT)    Progress Toward Functional Goals (PT) progress toward functional goals is fair  -RH           User Key  (r) = Recorded By, (t) = Taken By, (c) = Cosigned By    Initials Name Provider Type     Sabrina Portillo, RN Registered Nurse    Francine Balderas, RN Registered Nurse    Francine Parr, RN Registered Nurse    Manolo Cavazos, SORIN Physical Therapist Assistant                Physical Therapy Education                 Title: PT OT SLP Therapies (Done)     Topic: Physical Therapy (Done)     Point: Mobility training (Done)     Learning Progress Summary           Patient Acceptance, E, VU by  at 6/29/2022 1313      Show all documentation for this point (3)                 Point:  Precautions (Done)     Learning Progress Summary           Patient Acceptance, E, VU by AMBER at 6/26/2022 [id number]      Show all documentation for this point (2)                             User Key     Initials Effective Dates Name Provider Type Discipline     06/16/21 -  Diana Hendrickson RN Registered Nurse Nurse     06/06/22 -  Justin Hernandez, JACUQES Student PT Student PT              PT Recommendation and Plan     Progress Summary (PT)  Progress Toward Functional Goals (PT): progress toward functional goals is fair   Outcome Measures     Row Name 07/05/22 1400             How much help from another person do you currently need...    Turning from your back to your side while in flat bed without using bedrails? 4  -RH      Moving from lying on back to sitting on the side of a flat bed without bedrails? 4  -RH      Moving to and from a bed to a chair (including a wheelchair)? 3  -RH      Standing up from a chair using your arms (e.g., wheelchair, bedside chair)? 3  -RH      Climbing 3-5 steps with a railing? 3  -RH      To walk in hospital room? 4  -RH      AM-PAC 6 Clicks Score (PT) 21  -RH            User Key  (r) = Recorded By, (t) = Taken By, (c) = Cosigned By    Initials Name Provider Type    RH Manolo Rosado PTA Physical Therapist Assistant                 Time Calculation:    PT Charges     Row Name 07/05/22 1434             Time Calculation    PT Received On 07/05/22  -RH              Timed Charges    13728 - PT Therapeutic Exercise Minutes 18  -RH      67689 - Gait Training Minutes  3  -RH      08835 - PT Therapeutic Activity Minutes 3  -RH              Total Minutes    Timed Charges Total Minutes 24  -RH       Total Minutes 24  -RH            User Key  (r) = Recorded By, (t) = Taken By, (c) = Cosigned By    Initials Name Provider Type    RH Manolo Rosado PTA Physical Therapist Assistant              Therapy Charges for Today     Code Description Service Date Service Provider Modifiers Qty    05973386096  PT  THER PROC EA 15 MIN 7/5/2022 Manolo Rosado, PTA GP 2          PT G-Codes  Outcome Measure Options: AM-PAC 6 Clicks Basic Mobility (PT)  AM-PAC 6 Clicks Score (PT): 21  AM-PAC 6 Clicks Score (OT): 17    Manolo Rosado PTA  7/5/2022

## 2022-07-05 NOTE — PLAN OF CARE
Goal Outcome Evaluation:   No significant changes throughout shift. No concerns or complaints noted.

## 2022-07-05 NOTE — PROGRESS NOTES
[facility]   Hospitalist Progress Note  Date: 2022  Patient Name: Tawny Lennon  : 1965  MRN: [id number]  Date of admission: 2022    Subjective   Subjective     Chief Complaint: Blood in ostomy    Summary:   Tawny Lennon is a 56 y.o. female with COPD, Reich cirrhosis, hypertension, migraines, rectosigmoid resection with diverting colostomy in May 2022 who was hospitalized at our facility on 2022 with hyperkalemia and reported blood in her ostomy bag.  Blood in ostomy bag has since resolved.  Hyperkalemia was managed with insulin/glucose/calcium gluconate/Lokelma and patient did receive some bicarb infusion.  This is since improved.  Patient came to hospital from nursing facility.  PT/OT consulted.  Renal function has improved to baseline. CT-guided aspiration of abdominal wall fluid done on 2022, patient tolerated well.  Blood-tinged cloudy fluid aspirated.  Patient did have urinalysis done during admission, findings consistent with UTI, urine culture returned positive for MRSA and antibiotic treatment initiated.  CT abdomen/pelvis with contrast repeated to see if residual abscess present, no residual abscess present but with significant finding of partial superior mesenteric vein thrombosis.  Discussion had with patient and surgeon at [facility], Dr. Neel Kang, who stated patient did not require transfer at that time and recommended patient be started on Eliquis.  Eliquis started and patient had recurrent bright red blood in ostomy, Dr. Neel Kang contacted again and he stated patient did not require transfer and to decrease dose and obtain imaging.  CTA abdomen pelvis obtained and no active extravasation contrast was appreciated.  Heparin drip started at that time.  Patient transitioned to Eliquis 5 mg twice daily without any further bleeding.  Patient worked with physical therapy and Occupational Therapy.    Interval Followup:   Patient lying in bed  resting comfortably.  No further bleeding from ostomy site.  Patient states she is feeling stronger is able to get up and around the room more today.  Patient concerned about going to rehab as she received a call today and says she only has 3 more rehab days left.  Patient was able to ambulate 80 feet with therapy today and is transferring without assistive devices.  Patient's main concern about returning home is that there would not be anyone there to help her change her ostomy bag if it were to start leaking.  Discussed that we could work with wound ostomy education and would have home health see her at home.  Patient was amenable to the plan.  Encouraged to increase activity with standby assist while inpatient to make sure that she was comfortable ambulating and transferring independently.  Patient has been tolerating a diet.  Requesting more substantial food this evening.  No other issues per nursing.      Procedures:   -CT-guided aspiration of abdominal wall fluid (06/27/2022)      Review of Systems  Constitutional: Negative for fatigue and fever.   HENT: Negative for sore throat and trouble swallowing.    Eyes: Negative for pain and discharge.   Respiratory: Negative for cough and shortness of breath.    Cardiovascular: Negative for chest pain and palpitations.   Gastrointestinal: Negative for abdominal pain, nausea and vomiting.   Endocrine: Negative for cold intolerance and heat intolerance.   Genitourinary: Negative for difficulty urinating and dysuria.   Musculoskeletal: Negative for back pain and neck stiffness.   Skin: Negative for color change and rash.   Neurological: Negative for syncope and headaches.   Hematological: Negative for adenopathy.   Psychiatric/Behavioral: Negative for confusion and hallucinations.    Objective   Objective     Vitals:   Temp:  [97.7 °F (36.5 °C)-98.6 °F (37 °C)] 98.1 °F (36.7 °C)  Heart Rate:  [65-68] 65  Resp:  [16-18] 16  BP: (100-115)/(42-62) 112/42  Physical  Exam   Gen. well-developed appearing stated age in no acute distress  HEENT: Normocephalic atraumatic moist membranes pupils equal round reactive light, no scleral icterus no conjunctival injection  Cardiovascular: regular rate and rhythm no murmurs rubs or gallops S1-S2, no lower extremity edema appreciated  Pulmonary: Clear to auscultation bilaterally no wheezes rales or rhonchi symmetric chest expansion, unlabored, no conversational dyspnea appreciated  Gastrointestinal: Soft nontender nondistended positive bowel sounds all 4 quadrants no rebound or guarding ostomy in the right lower quadrant draining brown liquid stool  Musculoskeletal: No clubbing cyanosis, warm and well-perfused, calves soft symmetric nontender bilaterally  Skin: Clean dry without rashs  Neuro: Cranial nerves II through XII intact grossly no sensorimotor deficits appreciated bilateral upper and lower extremities  Psych: Patient is calm cooperative and appropriate with exam not responding to internal stimuli  : No Rodriguez catheter no bladder distention no suprapubic tenderness    Result Review    Result Review:  I have personally reviewed the results from the time of this admission to 7/5/2022 19:26 EDT and agree with these findings:  [x]  Laboratory:   CBC:      Lab 07/05/22  1127 07/05/22  0606 07/04/22  1149 07/04/22  0625 07/03/22  1555 07/03/22  0606 07/02/22 2003 07/02/22  0433 07/01/22  0414   WBC  --  5.31  --  4.40  --  4.81  --  5.26 5.34   HEMOGLOBIN 9.6* 9.2*   < > 8.7*  8.7*   < > 8.5*  8.5*   < > 8.5* 8.5*   HEMATOCRIT 28.2* 27.8*   < > 26.1*  26.1*   < > 25.6*  25.6*   < > 25.1* 25.5*   PLATELETS  --  79*  --  76*  --  76*  --  74* 76*   NEUTROS ABS  --   --   --  3.19  --   --   --   --   --    IMMATURE GRANS (ABS)  --   --   --  0.01  --   --   --   --   --    LYMPHS ABS  --   --   --  0.78  --   --   --   --   --    MONOS ABS  --   --   --  0.30  --   --   --   --   --    EOS ABS  --   --   --  0.10  --   --   --   --    --    MCV  --  95.2  --  94.9  --  95.9  --  94.7 95.1    < > = values in this interval not displayed.     CMP:      Lab 07/05/22  0606 07/04/22  0625 07/03/22  0606 07/02/22  0433 07/01/22  0414 06/30/22  0408 06/29/22  0409   SODIUM 132* 135* 135* 134* 133*   < > 132*   POTASSIUM 3.9 4.2 4.0 4.0 4.0   < > 4.1   CHLORIDE 104 105 106 108* 106   < > 107   CO2 17.2* 17.5* 17.3* 15.8* 16.3*   < > 15.0*   ANION GAP 10.8 12.5 11.7 10.2 10.7   < > 10.0   BUN 11 12 12 13 18   < > 21*   CREATININE 0.84 0.78 0.79 0.81 0.71   < > 0.74   EGFR 80.7 88.2 86.8 84.3 98.7   < > 93.9   GLUCOSE 104* 94 116* 104* 116*   < > 120*   CALCIUM 10.2 10.3 10.1 9.8 9.9   < > 9.8   MAGNESIUM 1.7 1.6 1.7 1.8 1.6   < > 1.7   PHOSPHORUS 3.5 4.1  --   --   --   --  3.7    < > = values in this interval not displayed.     [x]  Microbiology:   Microbiology Results (last 10 days)     Procedure Component Value - Date/Time    Blood Culture - Blood, Arm, Left [[id number]]  (Normal) Collected: 06/30/22 2127    Lab Status: Preliminary result Specimen: Blood from Arm, Left Updated: 07/04/22 2148     Blood Culture No growth at 4 days    Blood Culture - Blood, Arm, Right [[id number]]  (Normal) Collected: 06/30/22 2126    Lab Status: Preliminary result Specimen: Blood from Arm, Right Updated: 07/04/22 2148     Blood Culture No growth at 4 days    Urine Culture - Urine, Urine, Clean Catch [[id number]]  (Abnormal)  (Susceptibility) Collected: 06/27/22 1954    Lab Status: Final result Specimen: Urine, Clean Catch Updated: 06/30/22 1255     Urine Culture >100,000 CFU/mL Staphylococcus aureus, MRSA     Comment: Methicillin resistant Staph aureus, patient may be an isolation risk.  Staphylococcus aureus is not typically regarded as a uropathogen, except in cases with genitourinary instrumentation present.  It's presence suggests an alternate source (e.g. bloodstream, abscess, SSTI, etc.).  Please evaluate accordingly.         <25,000 CFU/mL Gram Negative Bacilli      Comment: Call if further workup needed.         Narrative:      Colonization of the urinary tract without infection is common. Treatment is discouraged unless the patient is symptomatic, pregnant, or undergoing an invasive urologic procedure.    Susceptibility      Staphylococcus aureus, MRSA      SEB      Nitrofurantoin Susceptible      Oxacillin Resistant     Tetracycline Susceptible      Trimethoprim + Sulfamethoxazole Resistant     Vancomycin Susceptible                       Susceptibility Comments     Staphylococcus aureus, MRSA    This isolate does not demonstrate inducible clindamycin resistance in vitro.               Body Fluid Culture - Body Fluid, Peritoneum [[id number]]  (Abnormal)  (Susceptibility) Collected: 06/27/22 1517    Lab Status: Final result Specimen: Body Fluid from Peritoneum Updated: 07/01/22 1158     Body Fluid Culture Scant growth (1+) Candida glabrata     Gram Stain No organisms seen    Susceptibility      Candida glabrata      SEB Not Specified      Fluconazole  Susceptible      Micafungin Susceptible                                    [x]  Radiology:   PROCEDURE: CT ANGIOGRAM ABDOMEN PELVIS W WO CONTRAST       COMPARISONS: [facility], CT, CT GUIDED ABSCESS DRAIN PERITONEAL, 6/27/2022, 14:39.     [facility], CT, CT ABDOMEN PELVIS W CONTRAST, 6/22/2022, 20:46.     [facility], CT, CT ABDOMEN PELVIS W CONTRAST, 7/01/2022, 17:20.       INDICATIONS: GI BLEED IN COLOSTOMY.       PROTOCOL:   Standard CTA imaging protocol performed.       RADIATION:   Total DLP: 846.7 mGy*cm.     Automated exposure control was utilized to minimize radiation dose.   CONTRAST: 93 mL Isovue 370 I.V.   LABS:   eGFR: >60 mL/min/1.73m^2       TECHNIQUE: After obtaining the patient's consent, 1,053 CT/CTA images of the abdomen and pelvis   were created with non-ionic intravenous contrast, and with multi-planar/3-D imaging to optimize   visualization of vascular anatomy.         FINDINGS:  Again, no definite focal fluid collection is seen in the anterior abdominal or pelvic   wall, particularly the subcutaneous regions.  No definite subcutaneous emphysema.  There is thought   to be a combination of granulation tissue and scarring involving the anterior midline right   paramidline abdominal and pelvic wall in the region of a healing incision site.  There is a right   lower quadrant double-barrel colostomy, as before.  Atherosclerotic changes are seen.  No   hemodynamically significant stenoses are identified.  Specifically, the celiac trunk, superior   mesenteric artery, inferior mesenteric artery, single bilateral renal arteries, and bilateral iliac   arterial systems are patent without hemodynamically significant stenoses appreciated by CTA   technique.  Please note that the technique was not tailored in order to evaluate the mesenteric   veins.  Again, cirrhosis is present with portal hypertension and portosystemic venous shunting.     There is splenomegaly.  Minimal, if any, ascites is seen.  There are postoperative changes of the   distal colon.  No mechanical bowel obstruction.  No pneumoperitoneum or pneumatosis.  No portal or   mesenteric venous gas.  No definite CTA evidence of active extravasation of the contrast material.     No other acute findings are seen.  The other findings are as described in the recent CT report from   7/1/2022.       Impression:     No hemodynamically significant stenoses are appreciated.                 COMMENT:  Part of this note is an electronic transcription of spoken language to printed text. The   electronic translation/transcription may permit erroneous, or at times, nonsensical (or even   sensical) words or phrases to be inadvertently transcribed or omitted; this  has   reviewed the note for such errors (as well as additional errors); however, some may still exist.        []  EKG/Telemetry:   []  Cardiology/Vascular:    []  Pathology:  []  Old  records:  [x]  Other:   meds  apixaban, 5 mg, Oral, Q12H  doxycycline, 100 mg, Oral, Q12H  fluconazole, 400 mg, Oral, Q24H  insulin lispro, 0-9 Units, Subcutaneous, TID AC  lactulose, 10 g, Oral, TID  nadolol, 20 mg, Oral, Daily  pantoprazole, 40 mg, Oral, Daily  sodium bicarbonate, 650 mg, Oral, BID With Meals  sodium chloride, 10 mL, Intravenous, Q12H        Assessment & Plan   Assessment / Plan     Assessment:  Blood in ostomy bag, recurrent  Hyperkalemia, resolved  Acute renal failure, resolved  Urinary tract infection due to MRSA  Abdominal abscess due to Candida glabrata s/p drainage  Partial superior mesenteric vein thrombosis  Migraine headaches  Acute blood loss anemia  Anemia of chronic disease   MEDRANO cirrhosis  Hypomagnesemia, persistent  COPD, not acutely exacerbated  Essential hypertension  Hyponatremia, stable  Type 2 diabetes mellitus  Dyslipidemia  Depression  History of esophageal varices    Plan:  -Continue doxycycline  -Continue fluconazole  -Continue Eliquis 5 mg twice daily  -Continue to monitor hemoglobin and transfuse as needed to keep greater than 7  -Advance diet as tolerated  -Continue physical therapy occupational therapy  -Continue sliding scale insulin  -Encourage patient to increase activity as tolerated  -Clinical course will dictate further management     DVT Prophylaxis: Eliquis  GI Prophylaxis: Pantoprazole  Dispo: Patient only has 3 rehab days left.  Discussed potentially returning home with home health.  Patient wishes to increase activity today with further ostomy education tomorrow potentially returning home with home health.  Code Status: Full Code     Personally reviewed patients labs and imaging, discussed with patient and nurse at bedside.       Part of this note may be an electronic transcription/translation of spoken language to printed text using the Dragon dictation system.    DVT prophylaxis:  Medical and mechanical DVT prophylaxis orders are present.    CODE STATUS:    Level Of Support Discussed With: Patient  Code Status (Patient has no pulse and is not breathing): CPR (Attempt to Resuscitate)  Medical Interventions (Patient has pulse or is breathing): Full Support

## 2022-07-05 NOTE — PLAN OF CARE
Goal Outcome Evaluation:   Pt has had no new changes throughout shift and continue to remain stable. Pt's skin care was performed this afternoon. Pt was cleansed with no rinse foam cleanser and powder was applied with pillow cases. Pt's ostomy has remained patent throughout today. Pt started eliquis this morning and has tolerated it well.

## 2022-07-06 LAB
DEPRECATED RDW RBC AUTO: 55.6 FL (ref 37–54)
ERYTHROCYTE [DISTWIDTH] IN BLOOD BY AUTOMATED COUNT: 15.9 % (ref 12.3–15.4)
GLUCOSE BLDC GLUCOMTR-MCNC: 110 MG/DL (ref 70–99)
GLUCOSE BLDC GLUCOMTR-MCNC: 168 MG/DL (ref 70–99)
GLUCOSE BLDC GLUCOMTR-MCNC: 177 MG/DL (ref 70–99)
HCT VFR BLD AUTO: 27.5 % (ref 34–46.6)
HGB BLD-MCNC: 9.3 G/DL (ref 12–15.9)
MCH RBC QN AUTO: 32 PG (ref 26.6–33)
MCHC RBC AUTO-ENTMCNC: 33.8 G/DL (ref 31.5–35.7)
MCV RBC AUTO: 94.5 FL (ref 79–97)
PLATELET # BLD AUTO: 87 10*3/MM3 (ref 140–450)
PMV BLD AUTO: 10.8 FL (ref 6–12)
RBC # BLD AUTO: 2.91 10*6/MM3 (ref 3.77–5.28)
WBC NRBC COR # BLD: 5.69 10*3/MM3 (ref 3.4–10.8)

## 2022-07-06 PROCEDURE — 82962 GLUCOSE BLOOD TEST: CPT

## 2022-07-06 PROCEDURE — 85027 COMPLETE CBC AUTOMATED: CPT | Performed by: FAMILY MEDICINE

## 2022-07-06 PROCEDURE — 63710000001 INSULIN LISPRO (HUMAN) PER 5 UNITS: Performed by: FAMILY MEDICINE

## 2022-07-06 PROCEDURE — 94799 UNLISTED PULMONARY SVC/PX: CPT

## 2022-07-06 PROCEDURE — 99233 SBSQ HOSP IP/OBS HIGH 50: CPT | Performed by: FAMILY MEDICINE

## 2022-07-06 RX ORDER — PANTOPRAZOLE SODIUM 40 MG/1
TABLET, DELAYED RELEASE ORAL
Status: COMPLETED
Start: 2022-07-06 | End: 2022-07-07

## 2022-07-06 RX ORDER — INSULIN LISPRO 100 [IU]/ML
INJECTION, SOLUTION INTRAVENOUS; SUBCUTANEOUS
Status: DISPENSED
Start: 2022-07-06 | End: 2022-07-07

## 2022-07-06 RX ADMIN — SODIUM BICARBONATE 650 MG TABLET 650 MG: at 17:31

## 2022-07-06 RX ADMIN — FLUCONAZOLE 400 MG: 150 TABLET ORAL at 13:19

## 2022-07-06 RX ADMIN — Medication 10 ML: at 20:48

## 2022-07-06 RX ADMIN — Medication 10 ML: at 08:22

## 2022-07-06 RX ADMIN — DOXYCYCLINE 100 MG: 100 CAPSULE ORAL at 20:48

## 2022-07-06 RX ADMIN — PANTOPRAZOLE SODIUM 40 MG: 40 TABLET, DELAYED RELEASE ORAL at 08:22

## 2022-07-06 RX ADMIN — INSULIN LISPRO 2 UNITS: 100 INJECTION, SOLUTION INTRAVENOUS; SUBCUTANEOUS at 17:30

## 2022-07-06 RX ADMIN — INSULIN LISPRO 2 UNITS: 100 INJECTION, SOLUTION INTRAVENOUS; SUBCUTANEOUS at 13:18

## 2022-07-06 RX ADMIN — DOXYCYCLINE 100 MG: 100 CAPSULE ORAL at 08:21

## 2022-07-06 RX ADMIN — SODIUM BICARBONATE 650 MG TABLET 650 MG: at 08:21

## 2022-07-06 NOTE — PROGRESS NOTES
[facility]   Hospitalist Progress Note  Date: 2022  Patient Name: Tawny Lennon  : 1965  MRN: [id number]  Date of admission: 2022    Subjective   Subjective     Chief Complaint: Blood in ostomy    Summary:   Tawny Lennon is a 56 y.o. female with COPD, Reich cirrhosis, hypertension, migraines, rectosigmoid resection with diverting colostomy in May 2022 who was hospitalized at our facility on 2022 with hyperkalemia and reported blood in her ostomy bag.  Blood in ostomy bag has since resolved.  Hyperkalemia was managed with insulin/glucose/calcium gluconate/Lokelma and patient did receive some bicarb infusion.  This is since improved.  Patient came to hospital from nursing facility.  PT/OT consulted.  Renal function has improved to baseline. CT-guided aspiration of abdominal wall fluid done on 2022, patient tolerated well.  Blood-tinged cloudy fluid aspirated.  Patient did have urinalysis done during admission, findings consistent with UTI, urine culture returned positive for MRSA and antibiotic treatment initiated.  CT abdomen/pelvis with contrast repeated to see if residual abscess present, no residual abscess present but with significant finding of partial superior mesenteric vein thrombosis.  Discussion had with patient and surgeon at [facility], Dr. Neel Kang, who stated patient did not require transfer at that time and recommended patient be started on Eliquis.  Eliquis started and patient had recurrent bright red blood in ostomy, Dr. Neel Kang contacted again and he stated patient did not require transfer and to decrease dose and obtain imaging.  CTA abdomen pelvis obtained and no active extravasation contrast was appreciated.  Heparin drip started at that time.  Patient transitioned to Eliquis 5 mg twice daily and bleeding recurred.  Eliquis stopped.  Patient worked with physical therapy and Occupational Therapy.    Interval Followup:   Patient lying  in bed resting comfortably.  Patient began to have bleeding this morning from superior aspect of the ostomy site.  Contacted office of Dr. Neel Kang surgery at [place] and was able to leave message currently awaiting reply.  Patient has been tolerating a diet.  Hemoglobin fairly stable this morning.  Blood sugars well controlled.  Platelet count remained stable but low.  No other issues per nursing.      Procedures:   -CT-guided aspiration of abdominal wall fluid (06/27/2022)      Review of Systems  Constitutional: Negative for fatigue and fever.   HENT: Negative for sore throat and trouble swallowing.    Eyes: Negative for pain and discharge.   Respiratory: Negative for cough and shortness of breath.    Cardiovascular: Negative for chest pain and palpitations.   Gastrointestinal: Negative for abdominal pain, nausea and vomiting.   Endocrine: Negative for cold intolerance and heat intolerance.   Genitourinary: Negative for difficulty urinating and dysuria.   Musculoskeletal: Negative for back pain and neck stiffness.   Skin: Negative for color change and rash.   Neurological: Negative for syncope and headaches.   Hematological: Negative for adenopathy.   Psychiatric/Behavioral: Negative for confusion and hallucinations.    Objective   Objective     Vitals:   Temp:  [97.5 °F (36.4 °C)-98.5 °F (36.9 °C)] 98.5 °F (36.9 °C)  Heart Rate:  [54-72] 72  Resp:  [16-18] 18  BP: (100-128)/(40-74) 112/50  Physical Exam   Gen. well-developed appearing stated age in no acute distress  HEENT: Normocephalic atraumatic moist membranes pupils equal round reactive light, no scleral icterus no conjunctival injection  Cardiovascular: regular rate and rhythm no murmurs rubs or gallops S1-S2, no lower extremity edema appreciated  Pulmonary: Clear to auscultation bilaterally no wheezes rales or rhonchi symmetric chest expansion, unlabored, no conversational dyspnea appreciated  Gastrointestinal: Soft nontender nondistended positive  bowel sounds all 4 quadrants no rebound or guarding ostomy in the right lower quadrant draining brown liquid stool mixed with dark blood appears to be seeping from superior edge of ostomy site.  Musculoskeletal: No clubbing cyanosis, warm and well-perfused, calves soft symmetric nontender bilaterally  Skin: Clean dry without rashs  Neuro: Cranial nerves II through XII intact grossly no sensorimotor deficits appreciated bilateral upper and lower extremities  Psych: Patient is calm cooperative and appropriate with exam not responding to internal stimuli  : No Rodriguez catheter no bladder distention no suprapubic tenderness    Result Review    Result Review:  I have personally reviewed the results from the time of this admission to 7/6/2022 19:41 EDT and agree with these findings:  [x]  Laboratory:   CBC:      Lab 07/06/22  0410 07/05/22  1127 07/05/22  0606 07/04/22  1149 07/04/22  0625 07/03/22  1555 07/03/22  0606 07/02/22 2003 07/02/22  0433   WBC 5.69  --  5.31  --  4.40  --  4.81  --  5.26   HEMOGLOBIN 9.3*   < > 9.2*   < > 8.7*  8.7*   < > 8.5*  8.5*   < > 8.5*   HEMATOCRIT 27.5*   < > 27.8*   < > 26.1*  26.1*   < > 25.6*  25.6*   < > 25.1*   PLATELETS 87*  --  79*  --  76*  --  76*  --  74*   NEUTROS ABS  --   --   --   --  3.19  --   --   --   --    IMMATURE GRANS (ABS)  --   --   --   --  0.01  --   --   --   --    LYMPHS ABS  --   --   --   --  0.78  --   --   --   --    MONOS ABS  --   --   --   --  0.30  --   --   --   --    EOS ABS  --   --   --   --  0.10  --   --   --   --    MCV 94.5  --  95.2  --  94.9  --  95.9  --  94.7    < > = values in this interval not displayed.     CMP:        Lab 07/05/22  0606 07/04/22  0625 07/03/22 0606 07/02/22  0433 07/01/22  0414   SODIUM 132* 135* 135* 134* 133*   POTASSIUM 3.9 4.2 4.0 4.0 4.0   CHLORIDE 104 105 106 108* 106   CO2 17.2* 17.5* 17.3* 15.8* 16.3*   ANION GAP 10.8 12.5 11.7 10.2 10.7   BUN 11 12 12 13 18   CREATININE 0.84 0.78 0.79 0.81 0.71   EGFR  80.7 88.2 86.8 84.3 98.7   GLUCOSE 104* 94 116* 104* 116*   CALCIUM 10.2 10.3 10.1 9.8 9.9   MAGNESIUM 1.7 1.6 1.7 1.8 1.6   PHOSPHORUS 3.5 4.1  --   --   --      [x]  Microbiology:   Microbiology Results (last 10 days)     Procedure Component Value - Date/Time    Blood Culture - Blood, Arm, Left [[id number]]  (Normal) Collected: 06/30/22 2127    Lab Status: Final result Specimen: Blood from Arm, Left Updated: 07/05/22 2148     Blood Culture No growth at 5 days    Blood Culture - Blood, Arm, Right [[id number]]  (Normal) Collected: 06/30/22 2126    Lab Status: Final result Specimen: Blood from Arm, Right Updated: 07/05/22 2148     Blood Culture No growth at 5 days    Urine Culture - Urine, Urine, Clean Catch [[id number]]  (Abnormal)  (Susceptibility) Collected: 06/27/22 1954    Lab Status: Final result Specimen: Urine, Clean Catch Updated: 06/30/22 1255     Urine Culture >100,000 CFU/mL Staphylococcus aureus, MRSA     Comment: Methicillin resistant Staph aureus, patient may be an isolation risk.  Staphylococcus aureus is not typically regarded as a uropathogen, except in cases with genitourinary instrumentation present.  It's presence suggests an alternate source (e.g. bloodstream, abscess, SSTI, etc.).  Please evaluate accordingly.         <25,000 CFU/mL Gram Negative Bacilli     Comment: Call if further workup needed.         Narrative:      Colonization of the urinary tract without infection is common. Treatment is discouraged unless the patient is symptomatic, pregnant, or undergoing an invasive urologic procedure.    Susceptibility      Staphylococcus aureus, MRSA      SEB      Nitrofurantoin Susceptible      Oxacillin Resistant     Tetracycline Susceptible      Trimethoprim + Sulfamethoxazole Resistant     Vancomycin Susceptible                       Susceptibility Comments     Staphylococcus aureus, MRSA    This isolate does not demonstrate inducible clindamycin resistance in vitro.               Body Fluid  Culture - Body Fluid, Peritoneum [[id number]]  (Abnormal)  (Susceptibility) Collected: 06/27/22 1517    Lab Status: Final result Specimen: Body Fluid from Peritoneum Updated: 07/01/22 1158     Body Fluid Culture Scant growth (1+) Candida glabrata     Gram Stain No organisms seen    Susceptibility      Candida glabrata      SEB Not Specified      Fluconazole  Susceptible      Micafungin Susceptible                                    [x]  Radiology:   PROCEDURE: CT ANGIOGRAM ABDOMEN PELVIS W WO CONTRAST       COMPARISONS: [facility], CT, CT GUIDED ABSCESS DRAIN PERITONEAL, 6/27/2022, 14:39.     [facility], CT, CT ABDOMEN PELVIS W CONTRAST, 6/22/2022, 20:46.     [facility], CT, CT ABDOMEN PELVIS W CONTRAST, 7/01/2022, 17:20.       INDICATIONS: GI BLEED IN COLOSTOMY.       PROTOCOL:   Standard CTA imaging protocol performed.       RADIATION:   Total DLP: 846.7 mGy*cm.     Automated exposure control was utilized to minimize radiation dose.   CONTRAST: 93 mL Isovue 370 I.V.   LABS:   eGFR: >60 mL/min/1.73m^2       TECHNIQUE: After obtaining the patient's consent, 1,053 CT/CTA images of the abdomen and pelvis   were created with non-ionic intravenous contrast, and with multi-planar/3-D imaging to optimize   visualization of vascular anatomy.         FINDINGS: Again, no definite focal fluid collection is seen in the anterior abdominal or pelvic   wall, particularly the subcutaneous regions.  No definite subcutaneous emphysema.  There is thought   to be a combination of granulation tissue and scarring involving the anterior midline right   paramidline abdominal and pelvic wall in the region of a healing incision site.  There is a right   lower quadrant double-barrel colostomy, as before.  Atherosclerotic changes are seen.  No   hemodynamically significant stenoses are identified.  Specifically, the celiac trunk, superior   mesenteric artery, inferior mesenteric artery, single bilateral renal  arteries, and bilateral iliac   arterial systems are patent without hemodynamically significant stenoses appreciated by CTA   technique.  Please note that the technique was not tailored in order to evaluate the mesenteric   veins.  Again, cirrhosis is present with portal hypertension and portosystemic venous shunting.     There is splenomegaly.  Minimal, if any, ascites is seen.  There are postoperative changes of the   distal colon.  No mechanical bowel obstruction.  No pneumoperitoneum or pneumatosis.  No portal or   mesenteric venous gas.  No definite CTA evidence of active extravasation of the contrast material.     No other acute findings are seen.  The other findings are as described in the recent CT report from   7/1/2022.       Impression:     No hemodynamically significant stenoses are appreciated.                 COMMENT:  Part of this note is an electronic transcription of spoken language to printed text. The   electronic translation/transcription may permit erroneous, or at times, nonsensical (or even   sensical) words or phrases to be inadvertently transcribed or omitted; this  has   reviewed the note for such errors (as well as additional errors); however, some may still exist.        []  EKG/Telemetry:   []  Cardiology/Vascular:    []  Pathology:  []  Old records:  [x]  Other:   meds  apixaban, , ,   doxycycline, 100 mg, Oral, Q12H  fluconazole, 400 mg, Oral, Q24H  Insulin Lispro, , ,   Insulin Lispro, , ,   insulin lispro, 0-9 Units, Subcutaneous, TID AC  lactulose, 10 g, Oral, TID  nadolol, 20 mg, Oral, Daily  pantoprazole, , ,   pantoprazole, 40 mg, Oral, Daily  sodium bicarbonate, 650 mg, Oral, BID With Meals  sodium chloride, 10 mL, Intravenous, Q12H        Assessment & Plan   Assessment / Plan     Assessment:  Blood in ostomy bag, recurrent  Hyperkalemia, resolved  Acute renal failure, resolved  Urinary tract infection due to MRSA  Abdominal abscess due to Candida glabrata s/p  drainage  Partial superior mesenteric vein thrombosis  Migraine headaches  Acute blood loss anemia  Anemia of chronic disease   MEDRANO cirrhosis  Hypomagnesemia, persistent  COPD, not acutely exacerbated  Essential hypertension  Hyponatremia, stable  Type 2 diabetes mellitus  Dyslipidemia  Depression  History of esophageal varices    Plan:  -Continue doxycycline  -Continue fluconazole  -Stop Eliquis  -Continue to monitor hemoglobin and transfuse as needed to keep greater than 7  -Repeat CBC and INR in a.m.  -Continue to advance diet as tolerated  -Continue physical therapy occupational therapy  -Continue sliding scale insulin  -Continue to encourage patient to increase activity as tolerated  -Clinical course will dictate further management     DVT Prophylaxis: SCDs  GI Prophylaxis: Pantoprazole  Dispo: Patient only has 3 rehab days left.  Discussed potentially returning home with home health.  Patient wishes to increase activity today with further ostomy education tomorrow potentially returning home with home health.  Code Status: Full Code     Personally reviewed patients labs and imaging, discussed with patient and nurse at bedside.       Part of this note may be an electronic transcription/translation of spoken language to printed text using the Dragon dictation system.    DVT prophylaxis:  Medical and mechanical DVT prophylaxis orders are present.    CODE STATUS:   Level Of Support Discussed With: Patient  Code Status (Patient has no pulse and is not breathing): CPR (Attempt to Resuscitate)  Medical Interventions (Patient has pulse or is breathing): Full Support

## 2022-07-06 NOTE — PLAN OF CARE
Goal Outcome Evaluation:           Progress: no change    Patient has had blood in her ostomy bag today. Dr. Baca notified. Vital signs stable.

## 2022-07-06 NOTE — PLAN OF CARE
Goal Outcome Evaluation:   No significant changes throughout shift. Pt has had no bloody stool in colostomy tonight. No concerns or complaints noted.

## 2022-07-06 NOTE — CONSULTS
"Nutrition Services    Patient Name: Tawny Lennon  YOB: 1965  MRN: [id number]  Admission date: 6/22/2022      CLINICAL NUTRITION ASSESSMENT      Reason for Assessment  Nonhealing wound or pressure ulcer     H&P:    Past Medical History:   Diagnosis Date   • Cirrhosis (HCC)     liver   • Colon polyps    • COPD (chronic obstructive pulmonary disease) (HCC)    • Depression    • Diabetes (HCC)    • Diverticulitis    • Hematuria    • High blood pressure    • High cholesterol    • Interstitial cystitis    • Migraine headache    • Narcolepsy    • PONV (postoperative nausea and vomiting)    • Sleep apnea    • Spinal headache     DURING PREGNANCY        Current Problems:   Active Hospital Problems    Diagnosis    • Severe malnutrition (HCC)    • Hyperkalemia         Nutrition/Diet History         Narrative     RD f/u for tolerance of supplement. Patient is receiving Boost Glucose Control BID- patient had 4 on her bedside table. Patient reports her appetite is decreased and seems to be anxious. Encouraged patient to continue drinking supplements while not eating much. Patient agreeable.        Anthropometrics        Current Height, Weight Height: 157.5 cm (62\")  Weight: 79.2 kg (174 lb 9.7 oz)   Current BMI Body mass index is 31.94 kg/m².       Weight Hx  Wt Readings from Last 30 Encounters:   06/22/22 2215 79.2 kg (174 lb 9.7 oz)   06/08/22 1150 91.8 kg (202 lb 6.1 oz)   03/25/22 1427 86.4 kg (190 lb 7.6 oz)   03/25/22 1200 86.4 kg (190 lb 7.6 oz)   03/21/22 1347 84.8 kg (187 lb)   02/22/22 1251 85.5 kg (188 lb 6.4 oz)   10/26/21 1514 88.5 kg (195 lb 3.2 oz)   08/03/21 1536 87.3 kg (192 lb 6.4 oz)   07/20/21 0831 86 kg (189 lb 11.2 oz)   07/12/21 1225 85 kg (187 lb 6.3 oz)   07/06/21 1307 85.2 kg (187 lb 12.8 oz)   01/05/21 0000 86.6 kg (191 lb)   01/23/20 0000 83.5 kg (184 lb)            Wt Change Observation 8.3% wt loss/3 months     Estimated/Assessed Needs       Energy Requirements    EST Needs (kcal/day) " 6119-2653 kcal       Protein Requirements    EST Daily Needs (g/day) 75-90 g       Fluid Requirements     Estimated Needs (mL/day) 1800 ml     Labs/Medications         Pertinent Labs Reviewed.   Results from last 7 days   Lab Units 07/05/22  0606 07/04/22  0625 07/03/22  0606   SODIUM mmol/L 132* 135* 135*   POTASSIUM mmol/L 3.9 4.2 4.0   CHLORIDE mmol/L 104 105 106   CO2 mmol/L 17.2* 17.5* 17.3*   BUN mg/dL 11 12 12   CREATININE mg/dL 0.84 0.78 0.79   CALCIUM mg/dL 10.2 10.3 10.1   GLUCOSE mg/dL 104* 94 116*     Results from last 7 days   Lab Units 07/06/22  0410 07/05/22  1127 07/05/22  0606 07/04/22  1149 07/04/22  0625 07/03/22  1555 07/03/22  0606   MAGNESIUM mg/dL  --   --  1.7  --  1.6  --  1.7   PHOSPHORUS mg/dL  --   --  3.5  --  4.1   < >  --    HEMOGLOBIN g/dL 9.3*   < > 9.2*   < > 8.7*  8.7*   < > 8.5*  8.5*   HEMATOCRIT % 27.5*   < > 27.8*   < > 26.1*  26.1*   < > 25.6*  25.6*    < > = values in this interval not displayed.     COVID19   Date Value Ref Range Status   03/21/2022 Not Detected Not Detected - Ref. Range Final     No results found for: HGBA1C      Pertinent Medications Reviewed.     Current Nutrition Orders & Evaluation of Intake       Oral Nutrition     Current PO Diet Diet Regular; Consistent Carbohydrate   Supplement Orders Placed This Encounter      Dietary Nutrition Supplements Boost Glucose Control (Glucerna Shake)      DIET MESSAGE Do not send coffee, oats, toast or eggs. Pt requests biscuits, gravy, sausage, yogurt or pancakes.       Malnutrition Severity Assessment      Patient meets criteria for : Severe Malnutrition         Nutrition Diagnosis         Nutrition Dx Problem 1 Inadequate oral Intake related to decreased ability to consume sufficient energy as evidenced by decreased appetite. and increased needs for wound healing       Nutrition Intervention         Boost glu control Bid  Record pt preferences with diet office  Low K+ diet restricted and subsequently liberalized      Medical Nutrition Therapy/Nutrition Education          Learner     Readiness Patient  Acceptance     Method     Response Explanation  Verbalizes understanding     Monitor/Evaluation        Monitor PO intake, Supplement intake, Pertinent labs       Nutrition Discharge Plan         To be determined       Electronically signed by:  Tana Ulloa RD  07/06/22 14:27 EDT

## 2022-07-07 LAB
ANION GAP SERPL CALCULATED.3IONS-SCNC: 11.2 MMOL/L (ref 5–15)
BUN SERPL-MCNC: 14 MG/DL (ref 6–20)
BUN/CREAT SERPL: 17.1 (ref 7–25)
CALCIUM SPEC-SCNC: 10.2 MG/DL (ref 8.6–10.5)
CHLORIDE SERPL-SCNC: 104 MMOL/L (ref 98–107)
CO2 SERPL-SCNC: 17.8 MMOL/L (ref 22–29)
CREAT SERPL-MCNC: 0.82 MG/DL (ref 0.57–1)
DEPRECATED RDW RBC AUTO: 55.5 FL (ref 37–54)
EGFRCR SERPLBLD CKD-EPI 2021: 83 ML/MIN/1.73
ERYTHROCYTE [DISTWIDTH] IN BLOOD BY AUTOMATED COUNT: 15.9 % (ref 12.3–15.4)
GLUCOSE BLDC GLUCOMTR-MCNC: 107 MG/DL (ref 70–99)
GLUCOSE BLDC GLUCOMTR-MCNC: 135 MG/DL (ref 70–99)
GLUCOSE BLDC GLUCOMTR-MCNC: 202 MG/DL (ref 70–99)
GLUCOSE SERPL-MCNC: 114 MG/DL (ref 65–99)
HCT VFR BLD AUTO: 27.6 % (ref 34–46.6)
HGB BLD-MCNC: 9.2 G/DL (ref 12–15.9)
INR PPP: 1.42 (ref 0.86–1.15)
MCH RBC QN AUTO: 31.8 PG (ref 26.6–33)
MCHC RBC AUTO-ENTMCNC: 33.3 G/DL (ref 31.5–35.7)
MCV RBC AUTO: 95.5 FL (ref 79–97)
PLATELET # BLD AUTO: 88 10*3/MM3 (ref 140–450)
PMV BLD AUTO: 10.5 FL (ref 6–12)
POTASSIUM SERPL-SCNC: 3.8 MMOL/L (ref 3.5–5.2)
PROTHROMBIN TIME: 17.5 SECONDS (ref 11.8–14.9)
RBC # BLD AUTO: 2.89 10*6/MM3 (ref 3.77–5.28)
SODIUM SERPL-SCNC: 133 MMOL/L (ref 136–145)
WBC NRBC COR # BLD: 5.5 10*3/MM3 (ref 3.4–10.8)

## 2022-07-07 PROCEDURE — 99233 SBSQ HOSP IP/OBS HIGH 50: CPT | Performed by: FAMILY MEDICINE

## 2022-07-07 PROCEDURE — 97168 OT RE-EVAL EST PLAN CARE: CPT

## 2022-07-07 PROCEDURE — 85610 PROTHROMBIN TIME: CPT | Performed by: FAMILY MEDICINE

## 2022-07-07 PROCEDURE — 97110 THERAPEUTIC EXERCISES: CPT

## 2022-07-07 PROCEDURE — 80048 BASIC METABOLIC PNL TOTAL CA: CPT | Performed by: FAMILY MEDICINE

## 2022-07-07 PROCEDURE — 85027 COMPLETE CBC AUTOMATED: CPT | Performed by: FAMILY MEDICINE

## 2022-07-07 PROCEDURE — 63710000001 INSULIN LISPRO (HUMAN) PER 5 UNITS: Performed by: FAMILY MEDICINE

## 2022-07-07 PROCEDURE — 94799 UNLISTED PULMONARY SVC/PX: CPT

## 2022-07-07 PROCEDURE — 82962 GLUCOSE BLOOD TEST: CPT

## 2022-07-07 RX ADMIN — Medication 10 ML: at 10:33

## 2022-07-07 RX ADMIN — SODIUM BICARBONATE 650 MG TABLET 650 MG: at 18:21

## 2022-07-07 RX ADMIN — SODIUM BICARBONATE 650 MG TABLET 650 MG: at 10:28

## 2022-07-07 RX ADMIN — PANTOPRAZOLE SODIUM 40 MG: 40 TABLET, DELAYED RELEASE ORAL at 10:29

## 2022-07-07 RX ADMIN — INSULIN LISPRO 4 UNITS: 100 INJECTION, SOLUTION INTRAVENOUS; SUBCUTANEOUS at 12:15

## 2022-07-07 RX ADMIN — DOXYCYCLINE 100 MG: 100 CAPSULE ORAL at 21:42

## 2022-07-07 RX ADMIN — APIXABAN 2.5 MG: 2.5 TABLET, FILM COATED ORAL at 21:43

## 2022-07-07 RX ADMIN — FLUCONAZOLE 400 MG: 150 TABLET ORAL at 14:04

## 2022-07-07 RX ADMIN — DOXYCYCLINE 100 MG: 100 CAPSULE ORAL at 10:28

## 2022-07-07 NOTE — SIGNIFICANT NOTE
Wound Eval / Progress Noted     [facility]     Patient Name: Tawny Lennon  : 1965  MRN: [id number]  Today's Date: 2022                 Admit Date: 2022    Visit Dx:    ICD-10-CM ICD-9-CM   1. Hyperkalemia  E87.5 276.7   2. Acute kidney injury (GINNY) with acute tubular necrosis (ATN) (HCC)  N17.0 584.5   3. Difficulty in walking  R26.2 719.7   4. Decreased activities of daily living (ADL)  Z78.9 V49.89       Patient Active Problem List   Diagnosis    Benign essential HTN    Colon polyps    Depression    Diabetes mellitus without complication (HCC)    Diverticulitis    Hematuria    High blood pressure    High cholesterol    Interstitial cystitis    Migraine without aura    Narcolepsy    Obesity    Other specified chronic obstructive airways disease    Panic attacks    Paroxysmal SVT (supraventricular tachycardia) (HCC)    Sleep apnea    Cirrhosis of liver without ascites (HCC)    Esophageal varices without bleeding (HCC)    Preop cardiovascular exam    MEDRANO (nonalcoholic steatohepatitis)    Gastroesophageal reflux disease without esophagitis    Hyperkalemia    Severe malnutrition (HCC)        Past Medical History:   Diagnosis Date    Cirrhosis (HCC)     liver    Colon polyps     COPD (chronic obstructive pulmonary disease) (HCC)     Depression     Diabetes (HCC)     Diverticulitis     Hematuria     High blood pressure     High cholesterol     Interstitial cystitis     Migraine headache     Narcolepsy     PONV (postoperative nausea and vomiting)     Sleep apnea     Spinal headache     DURING PREGNANCY        Past Surgical History:   Procedure Laterality Date    BLADDER REPAIR      CHOLECYSTECTOMY      COLONOSCOPY  2014 x2 2020    ENDOSCOPY  2014    ENDOSCOPY N/A 2021    Procedure: ESOPHAGOGASTRODUODENOSCOPY with biopsies;  Surgeon: Jesse Watkins MD;  Location: formerly [facility] ENDOSCOPY;  Service: Gastroenterology;  Laterality: N/A;  esophageal varices    ENDOSCOPY N/A 3/25/2022     Procedure: ESOPHAGOGASTRODUODENOSCOPY WITH BIOPSIES;  Surgeon: Jesse Watkins MD;  Location: [facility] ENDOSCOPY;  Service: Gastroenterology;  Laterality: N/A;  ESOPHAGEAL VARICIES    HAND SURGERY      HERNIA REPAIR      HYSTERECTOMY  2009    OTHER SURGICAL HISTORY      rectocele repair    UPPER GASTROINTESTINAL ENDOSCOPY           Physical Assessment:     07/07/22 1230   Colostomy RLQ   No placement date or time found.   Location: [facility]   Wound Image    Stomal Appliance 2 piece;Changed;Leaking   Stoma Appearance moist;red;flush with skin   Peristomal Assessment Clean;Intact   Accessories/Skin Care cleansed with water;skin barrier ring;other (see comments)  (surgicel applied around stoma)   Stoma Function stool   Stool Color green   Stool Consistency liquid   Treatment Bag change;Site care       Wound Check / Follow-up:  Patient seen today for follow up for concerns to stoma and peristomal tissue. Primary RN reports there is bleeding around the stoma; patient has been on blood thinners. Small area at 12 o'clock and 7 o'clock with minimal sanguineous drainage noted. Dr. Baca present in room and applied surgicel around stoma. Peristomal tissue is pink dry and intact. No areas of irritation to peristomal tissue noted. Stoma is red and moist, flush with skin.    Cleansed peristomal tissue with warm water and wash cloth. Applied skin barrier to tissue and placed 2 piece flat pouch. No areas of lifting or leaking noted.     Francine Hernández RN    7/7/2022    17:28 EDT

## 2022-07-07 NOTE — THERAPY RE-EVALUATION
Patient Name: Tawny Lennon  : 1965    MRN: [id number]                              Today's Date: 2022       Admit Date: 2022    Visit Dx:     ICD-10-CM ICD-9-CM   1. Hyperkalemia  E87.5 276.7   2. Acute kidney injury (GINNY) with acute tubular necrosis (ATN) (HCC)  N17.0 584.5   3. Difficulty in walking  R26.2 719.7   4. Decreased activities of daily living (ADL)  Z78.9 V49.89     Patient Active Problem List   Diagnosis   • Benign essential HTN   • Colon polyps   • Depression   • Diabetes mellitus without complication (HCC)   • Diverticulitis   • Hematuria   • High blood pressure   • High cholesterol   • Interstitial cystitis   • Migraine without aura   • Narcolepsy   • Obesity   • Other specified chronic obstructive airways disease   • Panic attacks   • Paroxysmal SVT (supraventricular tachycardia) (HCC)   • Sleep apnea   • Cirrhosis of liver without ascites (HCC)   • Esophageal varices without bleeding (HCC)   • Preop cardiovascular exam   • MEDRANO (nonalcoholic steatohepatitis)   • Gastroesophageal reflux disease without esophagitis   • Hyperkalemia   • Severe malnutrition (HCC)     Past Medical History:   Diagnosis Date   • Cirrhosis (HCC)     liver   • Colon polyps    • COPD (chronic obstructive pulmonary disease) (HCC)    • Depression    • Diabetes (HCC)    • Diverticulitis    • Hematuria    • High blood pressure    • High cholesterol    • Interstitial cystitis    • Migraine headache    • Narcolepsy    • PONV (postoperative nausea and vomiting)    • Sleep apnea    • Spinal headache     DURING PREGNANCY     Past Surgical History:   Procedure Laterality Date   • BLADDER REPAIR     • CHOLECYSTECTOMY     • COLONOSCOPY  2014 x2 2020   • ENDOSCOPY  2014   • ENDOSCOPY N/A 2021    Procedure: ESOPHAGOGASTRODUODENOSCOPY with biopsies;  Surgeon: Jesse Watkins MD;  Location: [facility] ENDOSCOPY;  Service: Gastroenterology;  Laterality: N/A;  esophageal varices   • ENDOSCOPY N/A 3/25/2022     Procedure: ESOPHAGOGASTRODUODENOSCOPY WITH BIOPSIES;  Surgeon: Jesse Watkins MD;  Location: [facility] ENDOSCOPY;  Service: Gastroenterology;  Laterality: N/A;  ESOPHAGEAL VARICIES   • HAND SURGERY     • HERNIA REPAIR     • HYSTERECTOMY  2009   • OTHER SURGICAL HISTORY      rectocele repair   • UPPER GASTROINTESTINAL ENDOSCOPY        General Information     Row Name 07/07/22 1250          OT Time and Intention    Document Type re-evaluation  -     Mode of Treatment individual therapy;occupational therapy  -     Row Name 07/07/22 1250          General Information    Patient Profile Reviewed yes  -     Existing Precautions/Restrictions fall  -     Row Name 07/07/22 1250          Safety Issues, Functional Mobility    Impairments Affecting Function (Mobility) balance;endurance/activity tolerance;strength  -           User Key  (r) = Recorded By, (t) = Taken By, (c) = Cosigned By    Initials Name Provider Type     Jemima Segal OT Occupational Therapist                 Mobility/ADL's     Row Name 07/07/22 1250          Activities of Daily Living    BADL Assessment/Intervention --  Patient is SBA for most all BADL activities.  -           User Key  (r) = Recorded By, (t) = Taken By, (c) = Cosigned By    Initials Name Provider Type     Jemima Segal OT Occupational Therapist               Obj/Interventions     Row Name 07/07/22 1251          Shoulder (Therapeutic Exercise)    Shoulder Strengthening (Therapeutic Exercise) bilateral;flexion;extension;horizontal aBduction/aDduction;10 repetitions;3 sets  -     Row Name 07/07/22 1251          Elbow/Forearm (Therapeutic Exercise)    Elbow/Forearm (Therapeutic Exercise) strengthening exercise  -     Elbow/Forearm AROM (Therapeutic Exercise) bilateral;flexion;extension;30 repititions  -     Row Name 07/07/22 1251          Motor Skills    Therapeutic Exercise shoulder;elbow/forearm  -           User Key  (r) = Recorded By, (t) = Taken By, (c) =  Cosigned By    Initials Name Provider Type    GC Philipp, Jemima, OT Occupational Therapist               Goals/Plan     Row Name 07/07/22 1254          Transfer Goal 1 (OT)    Activity/Assistive Device (Transfer Goal 1, OT) transfers, all;walker, rolling  -GC     [place] Level/Cues Needed (Transfer Goal 1, OT) modified independence  -GC     Time Frame (Transfer Goal 1, OT) long term goal (LTG);10 days  -GC     Progress/Outcome (Transfer Goal 1, OT) goal ongoing  -GC     Row Name 07/07/22 1254          Bathing Goal 1 (OT)    Activity/Device (Bathing Goal 1, OT) bathing skills, all  -GC     [place] Level/Cues Needed (Bathing Goal 1, OT) modified independence  -GC     Time Frame (Bathing Goal 1, OT) long term goal (LTG);10 days  -GC     Progress/Outcomes (Bathing Goal 1, OT) goal ongoing  -     Row Name 07/07/22 1254          Dressing Goal 1 (OT)    Activity/Device (Dressing Goal 1, OT) dressing skills, all  -GC     [place]/Cues Needed (Dressing Goal 1, OT) modified independence  -GC     Time Frame (Dressing Goal 1, OT) long term goal (LTG);10 days  -GC     Progress/Outcome (Dressing Goal 1, OT) goal ongoing  -     Row Name 07/07/22 1254          Toileting Goal 1 (OT)    Activity/Device (Toileting Goal 1, OT) toileting skills, all  -GC     [place] Level/Cues Needed (Toileting Goal 1, OT) modified independence  -GC     Time Frame (Toileting Goal 1, OT) long term goal (LTG);10 days  -GC     Progress/Outcome (Toileting Goal 1, OT) goal ongoing  -GC     Row Name 07/07/22 1254          Grooming Goal 1 (OT)    Activity/Device (Grooming Goal 1, OT) grooming skills, all  -GC     [place] (Grooming Goal 1, OT) independent  -GC     Time Frame (Grooming Goal 1, OT) long term goal (LTG);10 days  -GC     Progress/Outcome (Grooming Goal 1, OT) goal ongoing  -     Row Name 07/07/22 1254          Strength Goal 1 (OT)    Strength Goal 1 (OT) Pt will increase BUE strength 1/2 muscle grade w/ HEP provided  handouts and demo for increased UE strength required for ADL transfers and task completion  -     Time Frame (Strength Goal 1, OT) long term goal (LTG);10 days  -     Progress/Outcome (Strength Goal 1, OT) goal ongoing  -     Row Name 07/07/22 1254          Therapy Assessment/Plan (OT)    Planned Therapy Interventions (OT) activity tolerance training;BADL retraining;functional balance retraining;occupation/activity based interventions;patient/caregiver education/training;ROM/therapeutic exercise;strengthening exercise;transfer/mobility retraining  -           User Key  (r) = Recorded By, (t) = Taken By, (c) = Cosigned By    Initials Name Provider Type    GC Jemima Segal, OT Occupational Therapist               Clinical Impression     Row Name 07/07/22 1252          Pain Scale: FACES Pre/Post-Treatment    Pain: FACES Scale, Pretreatment 0-->no hurt  -     Posttreatment Pain Rating 0-->no hurt  -     Row Name 07/07/22 1252          Plan of Care Review    Plan of Care Reviewed With patient  -     Progress improving  -     Outcome Evaluation Patient is progressing in all areas of BADLs with improved balance, endurance and general strength. Plan to continue POC for Indep.  -     Row Name 07/07/22 1252          Therapy Assessment/Plan (OT)    Rehab Potential (OT) good, to achieve stated therapy goals  -     Criteria for Skilled Therapeutic Interventions Met (OT) yes;skilled treatment is necessary;meets criteria  -     Therapy Frequency (OT) 5 times/wk  -     Predicted Duration of Therapy Intervention (OT) x10  days  -     Row Name 07/07/22 1252          Therapy Plan Review/Discharge Plan (OT)    Anticipated Discharge Disposition (OT) home;home with assist  -           User Key  (r) = Recorded By, (t) = Taken By, (c) = Cosigned By    Initials Name Provider Type    GC Jemima Segal, OT Occupational Therapist               Outcome Measures     Row Name 07/07/22 1253          How much help from  another is currently needed...    Putting on and taking off regular lower body clothing? 3  -GC     Bathing (including washing, rinsing, and drying) 4  -GC     Toileting (which includes using toilet bed pan or urinal) 3  -GC     Putting on and taking off regular upper body clothing 3  -GC     Taking care of personal grooming (such as brushing teeth) 4  -GC     Eating meals 4  -GC     AM-PAC 6 Clicks Score (OT) 21  -GC     Row Name 07/07/22 0700          How much help from another person do you currently need...    Turning from your back to your side while in flat bed without using bedrails? 4  -SP     Moving from lying on back to sitting on the side of a flat bed without bedrails? 4  -SP     Moving to and from a bed to a chair (including a wheelchair)? 4  -SP     Standing up from a chair using your arms (e.g., wheelchair, bedside chair)? 4  -SP     Climbing 3-5 steps with a railing? 3  -SP     To walk in hospital room? 4  -SP     AM-PAC 6 Clicks Score (PT) 23  -SP     Highest level of mobility 7 --> Walked 25 feet or more  -SP     Row Name 07/07/22 1253          Optimal Instrument    Optimal Instrument Optimal - 3  -GC     Bending/Stooping 2  -GC     Standing 1  -GC     Reaching 1  -GC           User Key  (r) = Recorded By, (t) = Taken By, (c) = Cosigned By    Initials Name Provider Type    GC Jemima Segal OT Occupational Therapist    Sunshine Ahuja RNA Registered Nurse                Occupational Therapy Education                 Title: PT OT SLP Therapies (Done)     Topic: Occupational Therapy (Done)     Point: ADL training (Done)     Description:   Instruct learner(s) on proper safety adaptation and remediation techniques during self care or transfers.   Instruct in proper use of assistive devices.              Learning Progress Summary           Patient Acceptance, E, VU by BB at 6/26/2022 [id number]      Show all documentation for this point (2)                 Point: Home exercise program (Done)     Description:    Instruct learner(s) on appropriate technique for monitoring, assisting and/or progressing therapeutic exercises/activities.              Learning Progress Summary           Patient Acceptance, E, VU by BB at 6/26/2022 0756      Show all documentation for this point (2)                 Point: Precautions (Done)     Description:   Instruct learner(s) on prescribed precautions during self-care and functional transfers.              Learning Progress Summary           Patient Acceptance, E, VU by BB at 6/26/2022 0756      Show all documentation for this point (2)                 Point: Body mechanics (Done)     Description:   Instruct learner(s) on proper positioning and spine alignment during self-care, functional mobility activities and/or exercises.              Learning Progress Summary           Patient Acceptance, E, VU by BB at 6/26/2022 0756      Show all documentation for this point (2)                             User Key     Initials Effective Dates Name Provider Type Discipline     06/16/21 -  Diana Hendrickson RN Registered Nurse Nurse              OT Recommendation and Plan  Planned Therapy Interventions (OT): activity tolerance training, BADL retraining, functional balance retraining, occupation/activity based interventions, patient/caregiver education/training, ROM/therapeutic exercise, strengthening exercise, transfer/mobility retraining  Therapy Frequency (OT): 5 times/wk  Plan of Care Review  Plan of Care Reviewed With: patient  Progress: improving  Outcome Evaluation: Patient is progressing in all areas of BADLs with improved balance, endurance and general strength. Plan to continue POC for Indep.     Time Calculation:    Time Calculation- OT     Row Name 07/07/22 1255             Time Calculation- OT    OT Received On 07/07/22  -      OT Goal Re-Cert Due Date 07/16/22  -GC              Timed Charges    30113 - OT Therapeutic Exercise Minutes 15  -GC              Untimed Charges    OT  Eval/Re-eval Minutes 20  -GC              Total Minutes    Timed Charges Total Minutes 15  -GC      Untimed Charges Total Minutes 20  -GC       Total Minutes 35  -GC            User Key  (r) = Recorded By, (t) = Taken By, (c) = Cosigned By    Initials Name Provider Type     Jemima Segal OT Occupational Therapist              Therapy Charges for Today     Code Description Service Date Service Provider Modifiers Qty    [id number]  OT THER PROC EA 15 MIN 7/7/2022 Jemima Segal OT GO 1    [id number]  OT RE-EVAL 2 7/7/2022 Jemima Segal OT GO 1               Jemima Segal OT  7/7/2022

## 2022-07-07 NOTE — PLAN OF CARE
Goal Outcome Evaluation:      Pt stable throughout shift, vitals WNL, Dr Baca and wound/ostomy nurse assessed ostomy site at bedside, MD applied surgicel around suture line and wound nurse assisted in applying new colostomy appliance. No new bleeding since. New IV placed via ultrasound guidance.

## 2022-07-07 NOTE — PROGRESS NOTES
[facility]   Hospitalist Progress Note  Date: 2022  Patient Name: Tawny Lennon  : 1965  MRN: [id number]  Date of admission: 2022    Subjective   Subjective     Chief Complaint: Blood in ostomy    Summary:   Tawny Lennon is a 56 y.o. female with COPD, Reich cirrhosis, hypertension, migraines, rectosigmoid resection with diverting colostomy in May 2022 who was hospitalized at our facility on 2022 with hyperkalemia and reported blood in her ostomy bag.  Blood in ostomy bag has since resolved.  Hyperkalemia was managed with insulin/glucose/calcium gluconate/Lokelma and patient did receive some bicarb infusion.  This is since improved.  Patient came to hospital from nursing facility.  PT/OT consulted.  Renal function has improved to baseline. CT-guided aspiration of abdominal wall fluid done on 2022, patient tolerated well.  Blood-tinged cloudy fluid aspirated.  Patient did have urinalysis done during admission, findings consistent with UTI, urine culture returned positive for MRSA and antibiotic treatment initiated.  CT abdomen/pelvis with contrast repeated to see if residual abscess present, no residual abscess present but with significant finding of partial superior mesenteric vein thrombosis.  Discussion had with patient and surgeon at [facility], Dr. Neel Kang, who stated patient did not require transfer at that time and recommended patient be started on Eliquis.  Eliquis started and patient had recurrent bright red blood in ostomy, Dr. Neel Kang contacted again and he stated patient did not require transfer and to decrease dose and obtain imaging.  CTA abdomen pelvis obtained and no active extravasation contrast was appreciated.  Heparin drip started at that time.  Patient transitioned to Eliquis 5 mg twice daily and bleeding recurred.  Eliquis stopped.  Patient worked with physical therapy and Occupational Therapy.    Interval Followup:   Patient lying  in bed resting comfortably.  Patient continues to have bleeding this morning from superior and lateral aspect of the ostomy site.  Contacted office of Dr. Neel Kang surgery at [place] recommended placement of Surgicel which was applied along the suture line with hemostasis achieved.  Patient has been tolerating a diet.  Hemoglobin stable this morning.  Blood sugars well controlled.  Platelet count remained stable but low.  No other issues per nursing.      Procedures:   -CT-guided aspiration of abdominal wall fluid (06/27/2022)      Review of Systems  Constitutional: Negative for fatigue and fever.   HENT: Negative for sore throat and trouble swallowing.    Eyes: Negative for pain and discharge.   Respiratory: Negative for cough and shortness of breath.    Cardiovascular: Negative for chest pain and palpitations.   Gastrointestinal: Negative for abdominal pain, nausea and vomiting.   Endocrine: Negative for cold intolerance and heat intolerance.   Genitourinary: Negative for difficulty urinating and dysuria.   Musculoskeletal: Negative for back pain and neck stiffness.   Skin: Negative for color change and rash.   Neurological: Negative for syncope and headaches.   Hematological: Negative for adenopathy.   Psychiatric/Behavioral: Negative for confusion and hallucinations.    Objective   Objective     Vitals:   Temp:  [97.8 °F (36.6 °C)-98.5 °F (36.9 °C)] 98.5 °F (36.9 °C)  Heart Rate:  [71-79] 79  Resp:  [16-18] 16  BP: (107-128)/(46-65) 114/56  Physical Exam   Gen. well-developed appearing stated age in no acute distress  HEENT: Normocephalic atraumatic moist membranes pupils equal round reactive light, no scleral icterus no conjunctival injection  Cardiovascular: regular rate and rhythm no murmurs rubs or gallops S1-S2, no lower extremity edema appreciated  Pulmonary: Clear to auscultation bilaterally no wheezes rales or rhonchi symmetric chest expansion, unlabored, no conversational dyspnea  appreciated  Gastrointestinal: Soft nontender nondistended positive bowel sounds all 4 quadrants no rebound or guarding ostomy in the right lower quadrant draining brown liquid stool mixed with blood appears to be seeping from superior and lateral edge of suture line of the ostomy site.  Surgicel applied at the bedside around the suture line with hemostasis  Musculoskeletal: No clubbing cyanosis, warm and well-perfused, calves soft symmetric nontender bilaterally  Skin: Clean dry without rashs  Neuro: Cranial nerves II through XII intact grossly no sensorimotor deficits appreciated bilateral upper and lower extremities  Psych: Patient is calm cooperative and appropriate with exam not responding to internal stimuli  : No Rodriguez catheter no bladder distention no suprapubic tenderness    Result Review    Result Review:  I have personally reviewed the results from the time of this admission to 7/7/2022 17:51 EDT and agree with these findings:  [x]  Laboratory:   CBC:      Lab 07/07/22  0442 07/06/22  0410 07/05/22  1127 07/05/22  0606 07/04/22  1149 07/04/22  0625 07/03/22  1555 07/03/22  0606   WBC 5.50 5.69  --  5.31  --  4.40  --  4.81   HEMOGLOBIN 9.2* 9.3*   < > 9.2*   < > 8.7*  8.7*   < > 8.5*  8.5*   HEMATOCRIT 27.6* 27.5*   < > 27.8*   < > 26.1*  26.1*   < > 25.6*  25.6*   PLATELETS 88* 87*  --  79*  --  76*  --  76*   NEUTROS ABS  --   --   --   --   --  3.19  --   --    IMMATURE GRANS (ABS)  --   --   --   --   --  0.01  --   --    LYMPHS ABS  --   --   --   --   --  0.78  --   --    MONOS ABS  --   --   --   --   --  0.30  --   --    EOS ABS  --   --   --   --   --  0.10  --   --    MCV 95.5 94.5  --  95.2  --  94.9  --  95.9    < > = values in this interval not displayed.     CMP:        Lab 07/07/22 0442 07/05/22  0606 07/04/22  0625 07/03/22  0606 07/02/22  0433 07/01/22  0414   SODIUM 133* 132* 135* 135* 134* 133*   POTASSIUM 3.8 3.9 4.2 4.0 4.0 4.0   CHLORIDE 104 104 105 106 108* 106   CO2 17.8*  17.2* 17.5* 17.3* 15.8* 16.3*   ANION GAP 11.2 10.8 12.5 11.7 10.2 10.7   BUN 14 11 12 12 13 18   CREATININE 0.82 0.84 0.78 0.79 0.81 0.71   EGFR 83.0 80.7 88.2 86.8 84.3 98.7   GLUCOSE 114* 104* 94 116* 104* 116*   CALCIUM 10.2 10.2 10.3 10.1 9.8 9.9   MAGNESIUM  --  1.7 1.6 1.7 1.8 1.6   PHOSPHORUS  --  3.5 4.1  --   --   --      [x]  Microbiology:   Microbiology Results (last 10 days)     Procedure Component Value - Date/Time    Blood Culture - Blood, Arm, Left [[id number]]  (Normal) Collected: 06/30/22 2127    Lab Status: Final result Specimen: Blood from Arm, Left Updated: 07/05/22 2148     Blood Culture No growth at 5 days    Blood Culture - Blood, Arm, Right [[id number]]  (Normal) Collected: 06/30/22 2126    Lab Status: Final result Specimen: Blood from Arm, Right Updated: 07/05/22 2148     Blood Culture No growth at 5 days    Urine Culture - Urine, Urine, Clean Catch [[id number]]  (Abnormal)  (Susceptibility) Collected: 06/27/22 1954    Lab Status: Final result Specimen: Urine, Clean Catch Updated: 06/30/22 1255     Urine Culture >100,000 CFU/mL Staphylococcus aureus, MRSA     Comment: Methicillin resistant Staph aureus, patient may be an isolation risk.  Staphylococcus aureus is not typically regarded as a uropathogen, except in cases with genitourinary instrumentation present.  It's presence suggests an alternate source (e.g. bloodstream, abscess, SSTI, etc.).  Please evaluate accordingly.         <25,000 CFU/mL Gram Negative Bacilli     Comment: Call if further workup needed.         Narrative:      Colonization of the urinary tract without infection is common. Treatment is discouraged unless the patient is symptomatic, pregnant, or undergoing an invasive urologic procedure.    Susceptibility      Staphylococcus aureus, MRSA      SEB      Nitrofurantoin Susceptible      Oxacillin Resistant     Tetracycline Susceptible      Trimethoprim + Sulfamethoxazole Resistant     Vancomycin Susceptible                        Susceptibility Comments     Staphylococcus aureus, MRSA    This isolate does not demonstrate inducible clindamycin resistance in vitro.                     [x]  Radiology:   PROCEDURE: CT ANGIOGRAM ABDOMEN PELVIS W WO CONTRAST       COMPARISONS: [facility], CT, CT GUIDED ABSCESS DRAIN PERITONEAL, 6/27/2022, 14:39.     [facility], CT, CT ABDOMEN PELVIS W CONTRAST, 6/22/2022, 20:46.     [facility], CT, CT ABDOMEN PELVIS W CONTRAST, 7/01/2022, 17:20.       INDICATIONS: GI BLEED IN COLOSTOMY.       PROTOCOL:   Standard CTA imaging protocol performed.       RADIATION:   Total DLP: 846.7 mGy*cm.     Automated exposure control was utilized to minimize radiation dose.   CONTRAST: 93 mL Isovue 370 I.V.   LABS:   eGFR: >60 mL/min/1.73m^2       TECHNIQUE: After obtaining the patient's consent, 1,053 CT/CTA images of the abdomen and pelvis   were created with non-ionic intravenous contrast, and with multi-planar/3-D imaging to optimize   visualization of vascular anatomy.         FINDINGS: Again, no definite focal fluid collection is seen in the anterior abdominal or pelvic   wall, particularly the subcutaneous regions.  No definite subcutaneous emphysema.  There is thought   to be a combination of granulation tissue and scarring involving the anterior midline right   paramidline abdominal and pelvic wall in the region of a healing incision site.  There is a right   lower quadrant double-barrel colostomy, as before.  Atherosclerotic changes are seen.  No   hemodynamically significant stenoses are identified.  Specifically, the celiac trunk, superior   mesenteric artery, inferior mesenteric artery, single bilateral renal arteries, and bilateral iliac   arterial systems are patent without hemodynamically significant stenoses appreciated by CTA   technique.  Please note that the technique was not tailored in order to evaluate the mesenteric   veins.  Again, cirrhosis is present with portal  hypertension and portosystemic venous shunting.     There is splenomegaly.  Minimal, if any, ascites is seen.  There are postoperative changes of the   distal colon.  No mechanical bowel obstruction.  No pneumoperitoneum or pneumatosis.  No portal or   mesenteric venous gas.  No definite CTA evidence of active extravasation of the contrast material.     No other acute findings are seen.  The other findings are as described in the recent CT report from   7/1/2022.       Impression:     No hemodynamically significant stenoses are appreciated.                 COMMENT:  Part of this note is an electronic transcription of spoken language to printed text. The   electronic translation/transcription may permit erroneous, or at times, nonsensical (or even   sensical) words or phrases to be inadvertently transcribed or omitted; this  has   reviewed the note for such errors (as well as additional errors); however, some may still exist.        []  EKG/Telemetry:   []  Cardiology/Vascular:    []  Pathology:  []  Old records:  [x]  Other:   meds  doxycycline, 100 mg, Oral, Q12H  fluconazole, 400 mg, Oral, Q24H  insulin lispro, 0-9 Units, Subcutaneous, TID AC  lactulose, 10 g, Oral, TID  nadolol, 20 mg, Oral, Daily  pantoprazole, 40 mg, Oral, Daily  sodium bicarbonate, 650 mg, Oral, BID With Meals  sodium chloride, 10 mL, Intravenous, Q12H        Assessment & Plan   Assessment / Plan     Assessment:  Blood in ostomy bag, recurrent  Hyperkalemia, resolved  Acute renal failure, resolved  Urinary tract infection due to MRSA  Abdominal abscess due to Candida glabrata s/p drainage  Partial superior mesenteric vein thrombosis  Migraine headaches  Acute blood loss anemia  Anemia of chronic disease   MEDRANO cirrhosis  Hypomagnesemia, persistent  COPD, not acutely exacerbated  Essential hypertension  Hyponatremia, stable  Type 2 diabetes mellitus  Dyslipidemia  Depression  History of esophageal varices    Plan:  -Complete course  of doxycycline  -Continue fluconazole  -Restart Eliquis this evening at 2.5 mg twice daily and monitor for bleeding  -Continue to monitor hemoglobin and transfuse as needed to keep greater than 7  -Hemoglobin hematocrit in a.m.  -Continue to advance diet as tolerated  -Continue physical therapy occupational therapy  -Continue sliding scale insulin  -Continue to encourage patient to increase activity as tolerated  -Clinical course will dictate further management     DVT Prophylaxis: SCDs  GI Prophylaxis: Pantoprazole  Dispo: Patient only has 3 rehab days left.  Discussed potentially returning home with home health.  Patient amenable if no further issues with bleeding and hemoglobin remained stable tomorrow.  Code Status: Full Code     Personally reviewed patients labs and imaging, discussed with patient and nurse at bedside.       Part of this note may be an electronic transcription/translation of spoken language to printed text using the Dragon dictation system.    DVT prophylaxis:  Mechanical DVT prophylaxis orders are present.    CODE STATUS:   Level Of Support Discussed With: Patient  Code Status (Patient has no pulse and is not breathing): CPR (Attempt to Resuscitate)  Medical Interventions (Patient has pulse or is breathing): Full Support

## 2022-07-07 NOTE — PLAN OF CARE
Goal Outcome Evaluation:   Pt continues to have bloody output in colostomy, MD aware. No significant changes throughout shift. No concerns or complaints noted.

## 2022-07-08 LAB
GLUCOSE BLDC GLUCOMTR-MCNC: 116 MG/DL (ref 70–99)
GLUCOSE BLDC GLUCOMTR-MCNC: 146 MG/DL (ref 70–99)
GLUCOSE BLDC GLUCOMTR-MCNC: 160 MG/DL (ref 70–99)
GLUCOSE BLDC GLUCOMTR-MCNC: 172 MG/DL (ref 70–99)
HCT VFR BLD AUTO: 27.1 % (ref 34–46.6)
HGB BLD-MCNC: 9.1 G/DL (ref 12–15.9)

## 2022-07-08 PROCEDURE — 94799 UNLISTED PULMONARY SVC/PX: CPT

## 2022-07-08 PROCEDURE — 85014 HEMATOCRIT: CPT | Performed by: FAMILY MEDICINE

## 2022-07-08 PROCEDURE — 63710000001 INSULIN LISPRO (HUMAN) PER 5 UNITS: Performed by: FAMILY MEDICINE

## 2022-07-08 PROCEDURE — 82962 GLUCOSE BLOOD TEST: CPT

## 2022-07-08 PROCEDURE — 85018 HEMOGLOBIN: CPT | Performed by: FAMILY MEDICINE

## 2022-07-08 PROCEDURE — 99232 SBSQ HOSP IP/OBS MODERATE 35: CPT | Performed by: FAMILY MEDICINE

## 2022-07-08 RX ADMIN — Medication 10 ML: at 08:45

## 2022-07-08 RX ADMIN — SODIUM BICARBONATE 650 MG TABLET 650 MG: at 08:44

## 2022-07-08 RX ADMIN — INSULIN LISPRO 2 UNITS: 100 INJECTION, SOLUTION INTRAVENOUS; SUBCUTANEOUS at 12:10

## 2022-07-08 RX ADMIN — FLUCONAZOLE 400 MG: 150 TABLET ORAL at 13:34

## 2022-07-08 RX ADMIN — Medication 10 ML: at 01:26

## 2022-07-08 RX ADMIN — SODIUM BICARBONATE 650 MG TABLET 650 MG: at 17:40

## 2022-07-08 RX ADMIN — INSULIN LISPRO 2 UNITS: 100 INJECTION, SOLUTION INTRAVENOUS; SUBCUTANEOUS at 17:40

## 2022-07-08 RX ADMIN — PANTOPRAZOLE SODIUM 40 MG: 40 TABLET, DELAYED RELEASE ORAL at 08:44

## 2022-07-08 RX ADMIN — Medication 10 ML: at 20:46

## 2022-07-08 NOTE — PLAN OF CARE
Goal Outcome Evaluation:  Plan of Care Reviewed With: patient        Progress: improving  Outcome Evaluation: Pt stable throughout shift, no bleeding noted to stoma site. Pt resting quietly no c/o's.

## 2022-07-08 NOTE — PLAN OF CARE
Goal Outcome Evaluation:       Patient has been calm and cooperative throughout shift. Eliquis discontinued this morning due to red/brown output from colostomy. Small amount of bleeding noted around stoma at 1640. Patient stated she needs more education on how to change ostomy bag if it were to fall off at home, does not feel comfortable changing it herself. No significant changes in patient condition noted this shift.

## 2022-07-08 NOTE — PROGRESS NOTES
[facility]   Hospitalist Progress Note  Date: 2022  Patient Name: Tawny Lennon  : 1965  MRN: [id number]  Date of admission: 2022    Subjective   Subjective     Chief Complaint: Blood in ostomy    Summary:   Tawny Lennon is a 56 y.o. female with COPD, Reich cirrhosis, hypertension, migraines, rectosigmoid resection with diverting colostomy in May 2022 who was hospitalized at our facility on 2022 with hyperkalemia and reported blood in her ostomy bag.  Blood in ostomy bag has since resolved.  Hyperkalemia was managed with insulin/glucose/calcium gluconate/Lokelma and patient did receive some bicarb infusion.  This is since improved.  Patient came to hospital from nursing facility.  PT/OT consulted.  Renal function has improved to baseline. CT-guided aspiration of abdominal wall fluid done on 2022, patient tolerated well.  Blood-tinged cloudy fluid aspirated.  Patient did have urinalysis done during admission, findings consistent with UTI, urine culture returned positive for MRSA and antibiotic treatment initiated.  CT abdomen/pelvis with contrast repeated to see if residual abscess present, no residual abscess present but with significant finding of partial superior mesenteric vein thrombosis.  Discussion had with patient and surgeon at [facility], Dr. Neel Kang, who stated patient did not require transfer at that time and recommended patient be started on Eliquis.  Eliquis started and patient had recurrent bright red blood in ostomy, Dr. Neel Kang contacted again and he stated patient did not require transfer and to decrease dose and obtain imaging.  CTA abdomen pelvis obtained and no active extravasation contrast was appreciated.  Heparin drip started at that time.  Patient transitioned to Eliquis 5 mg twice daily and bleeding recurred.  Eliquis stopped. Contacted office of Dr. Neel Kang surgery at [facility] recommended placement of Surgicel which  was applied along the suture line with hemostasis achieved.  Eliquis 2.5 mg twice daily again initiated with patient unfortunately had recurrence of bleeding.  Discussed with patient unfortunately currently not able to tolerate any anticoagulation at this time.  Patient instructed to follow-up with her general surgeon on discharge.  Patient worked with physical therapy and Occupational Therapy.    Interval Followup:   Patient lying in bed resting comfortably.  Patient states she had no bleeding from ostomy site overnight after starting Eliquis though when she got up this morning she noticed that there was dark red in ostomy bag like blood.  Patient has been tolerating a diet.  Hemoglobin stable this morning.  Blood sugars well controlled.  No other issues per nursing.      Procedures:   -CT-guided aspiration of abdominal wall fluid (06/27/2022)      Review of Systems  Constitutional: Negative for fatigue and fever.   HENT: Negative for sore throat and trouble swallowing.    Eyes: Negative for pain and discharge.   Respiratory: Negative for cough and shortness of breath.    Cardiovascular: Negative for chest pain and palpitations.   Gastrointestinal: Negative for abdominal pain, nausea and vomiting.   Endocrine: Negative for cold intolerance and heat intolerance.   Genitourinary: Negative for difficulty urinating and dysuria.   Musculoskeletal: Negative for back pain and neck stiffness.   Skin: Negative for color change and rash.   Neurological: Negative for syncope and headaches.   Hematological: Negative for adenopathy.   Psychiatric/Behavioral: Negative for confusion and hallucinations.    Objective   Objective     Vitals:   Temp:  [97.6 °F (36.4 °C)-98.2 °F (36.8 °C)] 97.6 °F (36.4 °C)  Heart Rate:  [71-82] 80  Resp:  [13-18] 15  BP: (112-124)/(52-70) 112/59  Physical Exam   Gen. well-developed appearing stated age in no acute distress  HEENT: Normocephalic atraumatic moist membranes pupils equal round reactive  light, no scleral icterus no conjunctival injection  Cardiovascular: regular rate and rhythm no murmurs rubs or gallops S1-S2, no lower extremity edema appreciated  Pulmonary: Clear to auscultation bilaterally no wheezes rales or rhonchi symmetric chest expansion, unlabored, no conversational dyspnea appreciated  Gastrointestinal: Soft nontender nondistended positive bowel sounds all 4 quadrants no rebound or guarding ostomy in the right lower quadrant draining brown liquid stool mixed with dark blood, no areas of bleeding appreciated on ostomy   Musculoskeletal: No clubbing cyanosis, warm and well-perfused, calves soft symmetric nontender bilaterally  Skin: Clean dry without rashs  Neuro: Cranial nerves II through XII intact grossly no sensorimotor deficits appreciated bilateral upper and lower extremities  Psych: Patient is calm cooperative and appropriate with exam not responding to internal stimuli  : No Rodriguez catheter no bladder distention no suprapubic tenderness    Result Review    Result Review:  I have personally reviewed the results from the time of this admission to 7/8/2022 16:34 EDT and agree with these findings:  [x]  Laboratory:   CBC:      Lab 07/08/22  0410 07/07/22  0442 07/06/22  0410 07/05/22  1127 07/05/22  0606 07/04/22  1149 07/04/22  0625 07/03/22  1555 07/03/22  0606   WBC  --  5.50 5.69  --  5.31  --  4.40  --  4.81   HEMOGLOBIN 9.1* 9.2* 9.3*   < > 9.2*   < > 8.7*  8.7*   < > 8.5*  8.5*   HEMATOCRIT 27.1* 27.6* 27.5*   < > 27.8*   < > 26.1*  26.1*   < > 25.6*  25.6*   PLATELETS  --  88* 87*  --  79*  --  76*  --  76*   NEUTROS ABS  --   --   --   --   --   --  3.19  --   --    IMMATURE GRANS (ABS)  --   --   --   --   --   --  0.01  --   --    LYMPHS ABS  --   --   --   --   --   --  0.78  --   --    MONOS ABS  --   --   --   --   --   --  0.30  --   --    EOS ABS  --   --   --   --   --   --  0.10  --   --    MCV  --  95.5 94.5  --  95.2  --  94.9  --  95.9    < > = values in this  interval not displayed.     CMP:        Lab 07/07/22  0442 07/05/22  0606 07/04/22  0625 07/03/22  0606 07/02/22  0433   SODIUM 133* 132* 135* 135* 134*   POTASSIUM 3.8 3.9 4.2 4.0 4.0   CHLORIDE 104 104 105 106 108*   CO2 17.8* 17.2* 17.5* 17.3* 15.8*   ANION GAP 11.2 10.8 12.5 11.7 10.2   BUN 14 11 12 12 13   CREATININE 0.82 0.84 0.78 0.79 0.81   EGFR 83.0 80.7 88.2 86.8 84.3   GLUCOSE 114* 104* 94 116* 104*   CALCIUM 10.2 10.2 10.3 10.1 9.8   MAGNESIUM  --  1.7 1.6 1.7 1.8   PHOSPHORUS  --  3.5 4.1  --   --      [x]  Microbiology:   Microbiology Results (last 10 days)     Procedure Component Value - Date/Time    Blood Culture - Blood, Arm, Left [[id number]]  (Normal) Collected: 06/30/22 2127    Lab Status: Final result Specimen: Blood from Arm, Left Updated: 07/05/22 2148     Blood Culture No growth at 5 days    Blood Culture - Blood, Arm, Right [[id number]]  (Normal) Collected: 06/30/22 2126    Lab Status: Final result Specimen: Blood from Arm, Right Updated: 07/05/22 2148     Blood Culture No growth at 5 days          [x]  Radiology:   PROCEDURE: CT ANGIOGRAM ABDOMEN PELVIS W WO CONTRAST       COMPARISONS: [facility], CT, CT GUIDED ABSCESS DRAIN PERITONEAL, 6/27/2022, 14:39.     [facility], CT, CT ABDOMEN PELVIS W CONTRAST, 6/22/2022, 20:46.     [facility], CT, CT ABDOMEN PELVIS W CONTRAST, 7/01/2022, 17:20.       INDICATIONS: GI BLEED IN COLOSTOMY.       PROTOCOL:   Standard CTA imaging protocol performed.       RADIATION:   Total DLP: 846.7 mGy*cm.     Automated exposure control was utilized to minimize radiation dose.   CONTRAST: 93 mL Isovue 370 I.V.   LABS:   eGFR: >60 mL/min/1.73m^2       TECHNIQUE: After obtaining the patient's consent, 1,053 CT/CTA images of the abdomen and pelvis   were created with non-ionic intravenous contrast, and with multi-planar/3-D imaging to optimize   visualization of vascular anatomy.         FINDINGS: Again, no definite focal fluid collection is  seen in the anterior abdominal or pelvic   wall, particularly the subcutaneous regions.  No definite subcutaneous emphysema.  There is thought   to be a combination of granulation tissue and scarring involving the anterior midline right   paramidline abdominal and pelvic wall in the region of a healing incision site.  There is a right   lower quadrant double-barrel colostomy, as before.  Atherosclerotic changes are seen.  No   hemodynamically significant stenoses are identified.  Specifically, the celiac trunk, superior   mesenteric artery, inferior mesenteric artery, single bilateral renal arteries, and bilateral iliac   arterial systems are patent without hemodynamically significant stenoses appreciated by CTA   technique.  Please note that the technique was not tailored in order to evaluate the mesenteric   veins.  Again, cirrhosis is present with portal hypertension and portosystemic venous shunting.     There is splenomegaly.  Minimal, if any, ascites is seen.  There are postoperative changes of the   distal colon.  No mechanical bowel obstruction.  No pneumoperitoneum or pneumatosis.  No portal or   mesenteric venous gas.  No definite CTA evidence of active extravasation of the contrast material.     No other acute findings are seen.  The other findings are as described in the recent CT report from   7/1/2022.       Impression:     No hemodynamically significant stenoses are appreciated.                 COMMENT:  Part of this note is an electronic transcription of spoken language to printed text. The   electronic translation/transcription may permit erroneous, or at times, nonsensical (or even   sensical) words or phrases to be inadvertently transcribed or omitted; this  has   reviewed the note for such errors (as well as additional errors); however, some may still exist.        []  EKG/Telemetry:   []  Cardiology/Vascular:    []  Pathology:  []  Old records:  [x]  Other:   meds  fluconazole, 400  mg, Oral, Q24H  insulin lispro, 0-9 Units, Subcutaneous, TID AC  lactulose, 10 g, Oral, TID  nadolol, 20 mg, Oral, Daily  pantoprazole, 40 mg, Oral, Daily  sodium bicarbonate, 650 mg, Oral, BID With Meals  sodium chloride, 10 mL, Intravenous, Q12H        Assessment & Plan   Assessment / Plan     Assessment:  Blood in ostomy bag, recurrent  Hyperkalemia, resolved  Acute renal failure, resolved  Urinary tract infection due to MRSA  Abdominal abscess due to Candida glabrata s/p drainage  Partial superior mesenteric vein thrombosis  Migraine headaches  Acute blood loss anemia  Anemia of chronic disease   MEDRANO cirrhosis  Hepatic coagulopathy  Hypomagnesemia, persistent  COPD, not acutely exacerbated  Essential hypertension  Hyponatremia, stable  Type 2 diabetes mellitus  Dyslipidemia  Depression  History of esophageal varices    Plan:  -Complete course of doxycycline  -Continue fluconazole  -Patient unfortunately unable to tolerate any anticoagulation at this time likely secondary to hepatic coagulopathy.  Discussed with patient plan to discontinue Eliquis and wait for bleeding to resolve as no source has been identified discharge patient to follow-up with her general surgeon.  -Continue to monitor hemoglobin and transfuse as needed to keep greater than 7  -Repeat CBC in a.m.  -Continue to advance diet as tolerated  -Continue physical therapy occupational therapy  -Continue sliding scale insulin  -Continue to encourage patient to increase activity as tolerated  -Clinical course will dictate further management     DVT Prophylaxis: SCDs  GI Prophylaxis: Pantoprazole  Dispo: Patient only has 3 rehab days left.  Discussed potentially returning home with home health.  Patient amenable if no further issues with bleeding and hemoglobin remained stable and bleeding resolves.  Code Status: Full Code     Personally reviewed patients labs and imaging, discussed with patient and nurse at bedside.       Part of this note may be an  electronic transcription/translation of spoken language to printed text using the Dragon dictation system.    DVT prophylaxis:  Mechanical DVT prophylaxis orders are present.    CODE STATUS:   Level Of Support Discussed With: Patient  Code Status (Patient has no pulse and is not breathing): CPR (Attempt to Resuscitate)  Medical Interventions (Patient has pulse or is breathing): Full Support

## 2022-07-09 LAB
BACTERIA UR QL AUTO: ABNORMAL /HPF
BILIRUB UR QL STRIP: NEGATIVE
CLARITY UR: CLEAR
COLOR UR: ABNORMAL
DEPRECATED RDW RBC AUTO: 55.2 FL (ref 37–54)
ERYTHROCYTE [DISTWIDTH] IN BLOOD BY AUTOMATED COUNT: 15.9 % (ref 12.3–15.4)
GLUCOSE BLDC GLUCOMTR-MCNC: 119 MG/DL (ref 70–99)
GLUCOSE BLDC GLUCOMTR-MCNC: 126 MG/DL (ref 70–99)
GLUCOSE BLDC GLUCOMTR-MCNC: 131 MG/DL (ref 70–99)
GLUCOSE BLDC GLUCOMTR-MCNC: 206 MG/DL (ref 70–99)
GLUCOSE UR STRIP-MCNC: NEGATIVE MG/DL
HCT VFR BLD AUTO: 28.2 % (ref 34–46.6)
HGB BLD-MCNC: 9.5 G/DL (ref 12–15.9)
HGB UR QL STRIP.AUTO: NEGATIVE
HYALINE CASTS UR QL AUTO: ABNORMAL /LPF
KETONES UR QL STRIP: NEGATIVE
LEUKOCYTE ESTERASE UR QL STRIP.AUTO: ABNORMAL
MCH RBC QN AUTO: 31.9 PG (ref 26.6–33)
MCHC RBC AUTO-ENTMCNC: 33.7 G/DL (ref 31.5–35.7)
MCV RBC AUTO: 94.6 FL (ref 79–97)
NITRITE UR QL STRIP: NEGATIVE
PH UR STRIP.AUTO: 5.5 [PH] (ref 5–8)
PLATELET # BLD AUTO: 103 10*3/MM3 (ref 140–450)
PMV BLD AUTO: 10.8 FL (ref 6–12)
PROT UR QL STRIP: NEGATIVE
RBC # BLD AUTO: 2.98 10*6/MM3 (ref 3.77–5.28)
RBC # UR STRIP: ABNORMAL /HPF
REF LAB TEST METHOD: ABNORMAL
SP GR UR STRIP: 1.02 (ref 1–1.03)
SQUAMOUS #/AREA URNS HPF: ABNORMAL /HPF
UROBILINOGEN UR QL STRIP: ABNORMAL
WBC # UR STRIP: ABNORMAL /HPF
WBC NRBC COR # BLD: 6.09 10*3/MM3 (ref 3.4–10.8)

## 2022-07-09 PROCEDURE — 82962 GLUCOSE BLOOD TEST: CPT

## 2022-07-09 PROCEDURE — 85027 COMPLETE CBC AUTOMATED: CPT | Performed by: FAMILY MEDICINE

## 2022-07-09 PROCEDURE — 81001 URINALYSIS AUTO W/SCOPE: CPT | Performed by: FAMILY MEDICINE

## 2022-07-09 PROCEDURE — 87086 URINE CULTURE/COLONY COUNT: CPT | Performed by: FAMILY MEDICINE

## 2022-07-09 PROCEDURE — 63710000001 INSULIN LISPRO (HUMAN) PER 5 UNITS: Performed by: FAMILY MEDICINE

## 2022-07-09 PROCEDURE — 99232 SBSQ HOSP IP/OBS MODERATE 35: CPT | Performed by: FAMILY MEDICINE

## 2022-07-09 PROCEDURE — 94799 UNLISTED PULMONARY SVC/PX: CPT

## 2022-07-09 RX ORDER — DOXYCYCLINE 100 MG/1
100 CAPSULE ORAL EVERY 12 HOURS SCHEDULED
Status: DISCONTINUED | OUTPATIENT
Start: 2022-07-09 | End: 2022-07-11 | Stop reason: HOSPADM

## 2022-07-09 RX ADMIN — Medication 10 ML: at 21:11

## 2022-07-09 RX ADMIN — FLUCONAZOLE 400 MG: 150 TABLET ORAL at 13:45

## 2022-07-09 RX ADMIN — INSULIN LISPRO 4 UNITS: 100 INJECTION, SOLUTION INTRAVENOUS; SUBCUTANEOUS at 12:25

## 2022-07-09 RX ADMIN — PANTOPRAZOLE SODIUM 40 MG: 40 TABLET, DELAYED RELEASE ORAL at 08:22

## 2022-07-09 RX ADMIN — SODIUM BICARBONATE 650 MG TABLET 650 MG: at 17:16

## 2022-07-09 RX ADMIN — DOXYCYCLINE 100 MG: 100 CAPSULE ORAL at 21:11

## 2022-07-09 RX ADMIN — SODIUM BICARBONATE 650 MG TABLET 650 MG: at 08:22

## 2022-07-09 NOTE — PROGRESS NOTES
[facility]   Hospitalist Progress Note  Date: 2022  Patient Name: Tawny Lennon  : 1965  MRN: [id number]  Date of admission: 2022    Subjective   Subjective     Chief Complaint: Blood in ostomy    Summary:   Tawny Lennon is a 56 y.o. female with COPD, Reich cirrhosis, hypertension, migraines, rectosigmoid resection with diverting colostomy in May 2022 who was hospitalized at our facility on 2022 with hyperkalemia and reported blood in her ostomy bag.  Blood in ostomy bag has since resolved.  Hyperkalemia was managed with insulin/glucose/calcium gluconate/Lokelma and patient did receive some bicarb infusion.  This is since improved.  Patient came to hospital from nursing facility.  PT/OT consulted.  Renal function has improved to baseline. CT-guided aspiration of abdominal wall fluid done on 2022, patient tolerated well.  Blood-tinged cloudy fluid aspirated.  Patient did have urinalysis done during admission, findings consistent with UTI, urine culture returned positive for MRSA and antibiotic treatment initiated.  CT abdomen/pelvis with contrast repeated to see if residual abscess present, no residual abscess present but with significant finding of partial superior mesenteric vein thrombosis.  Discussion had with patient and surgeon at [facility], Dr. Neel Kang, who stated patient did not require transfer at that time and recommended patient be started on Eliquis.  Eliquis started and patient had recurrent bright red blood in ostomy, Dr. Neel Kang contacted again and he stated patient did not require transfer and to decrease dose and obtain imaging.  CTA abdomen pelvis obtained and no active extravasation contrast was appreciated.  Heparin drip started at that time.  Patient transitioned to Eliquis 5 mg twice daily and bleeding recurred.  Eliquis stopped. Contacted office of Dr. Neel Kang surgery at [place] recommended placement of Surgicel which  was applied along the suture line with hemostasis achieved.  Eliquis 2.5 mg twice daily again initiated with patient unfortunately had recurrence of bleeding.  Discussed with patient unfortunately currently not able to tolerate any anticoagulation at this time.  Patient instructed to follow-up with her general surgeon on discharge.  Patient worked with physical therapy and Occupational Therapy.    Interval Followup:   Patient lying in bed resting comfortably.  Patient states she had no bleeding from ostomy site overnight though she got up this afternoon following interview and had bleeding from ostomy site per nursing.  Patient endorses burning and itching with urination.  Repeat urinalysis performed concerning for continued urinary tract infection.  Patient has been tolerating a diet.  Hemoglobin stable this morning.  Blood sugars well controlled.  No other issues per nursing.      Procedures:   -CT-guided aspiration of abdominal wall fluid (06/27/2022)      Review of Systems  Constitutional: Negative for fatigue and fever.   HENT: Negative for sore throat and trouble swallowing.    Eyes: Negative for pain and discharge.   Respiratory: Negative for cough and shortness of breath.    Cardiovascular: Negative for chest pain and palpitations.   Gastrointestinal: Negative for abdominal pain, nausea and vomiting.   Endocrine: Negative for cold intolerance and heat intolerance.   Genitourinary: Negative for difficulty urinating and dysuria.   Musculoskeletal: Negative for back pain and neck stiffness.   Skin: Negative for color change and rash.   Neurological: Negative for syncope and headaches.   Hematological: Negative for adenopathy.   Psychiatric/Behavioral: Negative for confusion and hallucinations.    Objective   Objective     Vitals:   Temp:  [97.3 °F (36.3 °C)-98.4 °F (36.9 °C)] 97.9 °F (36.6 °C)  Heart Rate:  [77-84] 78  Resp:  [16-20] 18  BP: (111-133)/(51-63) 111/51  Physical Exam   Gen. well-developed  appearing stated age in no acute distress  HEENT: Normocephalic atraumatic moist membranes pupils equal round reactive light, no scleral icterus no conjunctival injection  Cardiovascular: regular rate and rhythm no murmurs rubs or gallops S1-S2, no lower extremity edema appreciated  Pulmonary: Clear to auscultation bilaterally no wheezes rales or rhonchi symmetric chest expansion, unlabored, no conversational dyspnea appreciated  Gastrointestinal: Soft nontender nondistended positive bowel sounds all 4 quadrants no rebound or guarding ostomy in the right lower quadrant draining brown liquid stool, no areas of bleeding appreciated on ostomy   Musculoskeletal: No clubbing cyanosis, warm and well-perfused, calves soft symmetric nontender bilaterally  Skin: Clean dry without rashs  Neuro: Cranial nerves II through XII intact grossly no sensorimotor deficits appreciated bilateral upper and lower extremities  Psych: Patient is calm cooperative and appropriate with exam not responding to internal stimuli  : No Rodriguez catheter no bladder distention no suprapubic tenderness    Result Review    Result Review:  I have personally reviewed the results from the time of this admission to 7/9/2022 17:34 EDT and agree with these findings:  [x]  Laboratory:   CBC:      Lab 07/09/22  0407 07/08/22  0410 07/07/22  0442 07/06/22  0410 07/05/22  1127 07/05/22  0606 07/04/22  1149 07/04/22  0625   WBC 6.09  --  5.50 5.69  --  5.31  --  4.40   HEMOGLOBIN 9.5*   < > 9.2* 9.3*   < > 9.2*   < > 8.7*  8.7*   HEMATOCRIT 28.2*   < > 27.6* 27.5*   < > 27.8*   < > 26.1*  26.1*   PLATELETS 103*  --  88* 87*  --  79*  --  76*   NEUTROS ABS  --   --   --   --   --   --   --  3.19   IMMATURE GRANS (ABS)  --   --   --   --   --   --   --  0.01   LYMPHS ABS  --   --   --   --   --   --   --  0.78   MONOS ABS  --   --   --   --   --   --   --  0.30   EOS ABS  --   --   --   --   --   --   --  0.10   MCV 94.6  --  95.5 94.5  --  95.2  --  94.9    < > =  values in this interval not displayed.     CMP:        Lab 07/07/22  0442 07/05/22  0606 07/04/22  0625 07/03/22  0606   SODIUM 133* 132* 135* 135*   POTASSIUM 3.8 3.9 4.2 4.0   CHLORIDE 104 104 105 106   CO2 17.8* 17.2* 17.5* 17.3*   ANION GAP 11.2 10.8 12.5 11.7   BUN 14 11 12 12   CREATININE 0.82 0.84 0.78 0.79   EGFR 83.0 80.7 88.2 86.8   GLUCOSE 114* 104* 94 116*   CALCIUM 10.2 10.2 10.3 10.1   MAGNESIUM  --  1.7 1.6 1.7   PHOSPHORUS  --  3.5 4.1  --      [x]  Microbiology:   Microbiology Results (last 10 days)     Procedure Component Value - Date/Time    Blood Culture - Blood, Arm, Left [[id number]]  (Normal) Collected: 06/30/22 2127    Lab Status: Final result Specimen: Blood from Arm, Left Updated: 07/05/22 2148     Blood Culture No growth at 5 days    Blood Culture - Blood, Arm, Right [[id number]]  (Normal) Collected: 06/30/22 2126    Lab Status: Final result Specimen: Blood from Arm, Right Updated: 07/05/22 2148     Blood Culture No growth at 5 days          [x]  Radiology:   PROCEDURE: CT ANGIOGRAM ABDOMEN PELVIS W WO CONTRAST       COMPARISONS: [facility], CT, CT GUIDED ABSCESS DRAIN PERITONEAL, 6/27/2022, 14:39.     [facility], CT, CT ABDOMEN PELVIS W CONTRAST, 6/22/2022, 20:46.     [facility], CT, CT ABDOMEN PELVIS W CONTRAST, 7/01/2022, 17:20.       INDICATIONS: GI BLEED IN COLOSTOMY.       PROTOCOL:   Standard CTA imaging protocol performed.       RADIATION:   Total DLP: 846.7 mGy*cm.     Automated exposure control was utilized to minimize radiation dose.   CONTRAST: 93 mL Isovue 370 I.V.   LABS:   eGFR: >60 mL/min/1.73m^2       TECHNIQUE: After obtaining the patient's consent, 1,053 CT/CTA images of the abdomen and pelvis   were created with non-ionic intravenous contrast, and with multi-planar/3-D imaging to optimize   visualization of vascular anatomy.         FINDINGS: Again, no definite focal fluid collection is seen in the anterior abdominal or pelvic   wall,  particularly the subcutaneous regions.  No definite subcutaneous emphysema.  There is thought   to be a combination of granulation tissue and scarring involving the anterior midline right   paramidline abdominal and pelvic wall in the region of a healing incision site.  There is a right   lower quadrant double-barrel colostomy, as before.  Atherosclerotic changes are seen.  No   hemodynamically significant stenoses are identified.  Specifically, the celiac trunk, superior   mesenteric artery, inferior mesenteric artery, single bilateral renal arteries, and bilateral iliac   arterial systems are patent without hemodynamically significant stenoses appreciated by CTA   technique.  Please note that the technique was not tailored in order to evaluate the mesenteric   veins.  Again, cirrhosis is present with portal hypertension and portosystemic venous shunting.     There is splenomegaly.  Minimal, if any, ascites is seen.  There are postoperative changes of the   distal colon.  No mechanical bowel obstruction.  No pneumoperitoneum or pneumatosis.  No portal or   mesenteric venous gas.  No definite CTA evidence of active extravasation of the contrast material.     No other acute findings are seen.  The other findings are as described in the recent CT report from   7/1/2022.       Impression:     No hemodynamically significant stenoses are appreciated.                 COMMENT:  Part of this note is an electronic transcription of spoken language to printed text. The   electronic translation/transcription may permit erroneous, or at times, nonsensical (or even   sensical) words or phrases to be inadvertently transcribed or omitted; this  has   reviewed the note for such errors (as well as additional errors); however, some may still exist.        []  EKG/Telemetry:   []  Cardiology/Vascular:    []  Pathology:  []  Old records:  [x]  Other:   meds  fluconazole, 400 mg, Oral, Q24H  insulin lispro, 0-9 Units,  Subcutaneous, TID AC  lactulose, 10 g, Oral, TID  nadolol, 20 mg, Oral, Daily  pantoprazole, 40 mg, Oral, Daily  sodium bicarbonate, 650 mg, Oral, BID With Meals  sodium chloride, 10 mL, Intravenous, Q12H        Assessment & Plan   Assessment / Plan     Assessment:  Blood in ostomy bag, recurrent  Hyperkalemia, resolved  Acute renal failure, resolved  Urinary tract infection due to MRSA  Abdominal abscess due to Candida glabrata s/p drainage  Partial superior mesenteric vein thrombosis  Migraine headaches  Acute blood loss anemia  Anemia of chronic disease   MEDRANO cirrhosis  Hepatic coagulopathy  Hypomagnesemia, persistent  COPD, not acutely exacerbated  Essential hypertension  Hyponatremia, stable  Type 2 diabetes mellitus  Dyslipidemia  Depression  History of esophageal varices    Plan:  -Restart doxycycline till repeat urine cultures available  -Continue fluconazole  -Patient unfortunately unable to tolerate any anticoagulation at this time likely secondary to hepatic coagulopathy.  Discussed with patient plan to discontinue Eliquis and wait for bleeding to resolve as no source has been identified discharge patient to follow-up with her general surgeon.  -If bleeding does not resolve this afternoon will need to reapply Surgicel.  -Continue to advance diet as tolerated  -Continue physical therapy occupational therapy  -Continue sliding scale insulin  -Continue to encourage patient to increase activity as tolerated  -Clinical course will dictate further management     DVT Prophylaxis: SCDs  GI Prophylaxis: Pantoprazole  Dispo: Patient only has 3 rehab days left.  Discussed potentially returning home with home health.  Patient amenable if no further issues with bleeding and hemoglobin remained stable and bleeding resolves.  Code Status: Full Code     Personally reviewed patients labs and imaging, discussed with patient and nurse at bedside.       Part of this note may be an electronic transcription/translation of  spoken language to printed text using the Dragon dictation system.    DVT prophylaxis:  Mechanical DVT prophylaxis orders are present.    CODE STATUS:   Level Of Support Discussed With: Patient  Code Status (Patient has no pulse and is not breathing): CPR (Attempt to Resuscitate)  Medical Interventions (Patient has pulse or is breathing): Full Support

## 2022-07-09 NOTE — PLAN OF CARE
Goal Outcome Evaluation:               Urine sent per Dr. Baca shows UTI. He stated he will keep patient one more day and see what the cultures grow. Patient up in room and walking. Minimal assist. Pills whole, vital signs stable. Plan to discharge after tomorrow.

## 2022-07-09 NOTE — PLAN OF CARE
Goal Outcome Evaluation:  Plan of Care Reviewed With: patient        Progress: improving  Outcome Evaluation: pt resting without c/o's. no bleeding to stoma site or in stool . no changes in pt's status.

## 2022-07-10 LAB
BACTERIA SPEC AEROBE CULT: NORMAL
GLUCOSE BLDC GLUCOMTR-MCNC: 107 MG/DL (ref 70–99)
GLUCOSE BLDC GLUCOMTR-MCNC: 113 MG/DL (ref 70–99)
GLUCOSE BLDC GLUCOMTR-MCNC: 175 MG/DL (ref 70–99)

## 2022-07-10 PROCEDURE — 82962 GLUCOSE BLOOD TEST: CPT

## 2022-07-10 PROCEDURE — 99232 SBSQ HOSP IP/OBS MODERATE 35: CPT | Performed by: FAMILY MEDICINE

## 2022-07-10 PROCEDURE — 94799 UNLISTED PULMONARY SVC/PX: CPT

## 2022-07-10 PROCEDURE — 63710000001 INSULIN LISPRO (HUMAN) PER 5 UNITS: Performed by: FAMILY MEDICINE

## 2022-07-10 RX ADMIN — SODIUM BICARBONATE 650 MG TABLET 650 MG: at 08:39

## 2022-07-10 RX ADMIN — NADOLOL 20 MG: 20 TABLET ORAL at 08:39

## 2022-07-10 RX ADMIN — Medication 10 ML: at 08:40

## 2022-07-10 RX ADMIN — PANTOPRAZOLE SODIUM 40 MG: 40 TABLET, DELAYED RELEASE ORAL at 08:40

## 2022-07-10 RX ADMIN — INSULIN LISPRO 2 UNITS: 100 INJECTION, SOLUTION INTRAVENOUS; SUBCUTANEOUS at 12:06

## 2022-07-10 RX ADMIN — DOXYCYCLINE 100 MG: 100 CAPSULE ORAL at 20:10

## 2022-07-10 RX ADMIN — SODIUM BICARBONATE 650 MG TABLET 650 MG: at 17:44

## 2022-07-10 RX ADMIN — Medication 10 ML: at 20:10

## 2022-07-10 RX ADMIN — DOXYCYCLINE 100 MG: 100 CAPSULE ORAL at 08:40

## 2022-07-10 RX ADMIN — FLUCONAZOLE 400 MG: 150 TABLET ORAL at 13:19

## 2022-07-10 NOTE — PLAN OF CARE
Goal Outcome Evaluation:  Plan of Care Reviewed With: patient        Progress: no change  Outcome Evaluation: pt resting without c/o's, no changes in pt's status.

## 2022-07-10 NOTE — PROGRESS NOTES
[facility]   Hospitalist Progress Note  Date: 7/10/2022  Patient Name: Tawny Lennon  : 1965  MRN: [id number]  Date of admission: 2022    Subjective   Subjective     Chief Complaint: Blood in ostomy    Summary:   Tawny Lennon is a 56 y.o. female with COPD, Reich cirrhosis, hypertension, migraines, rectosigmoid resection with diverting colostomy in May 2022 who was hospitalized at our facility on 2022 with hyperkalemia and reported blood in her ostomy bag.  Blood in ostomy bag has since resolved.  Hyperkalemia was managed with insulin/glucose/calcium gluconate/Lokelma and patient did receive some bicarb infusion.  This is since improved.  Patient came to hospital from nursing facility.  PT/OT consulted.  Renal function has improved to baseline. CT-guided aspiration of abdominal wall fluid done on 2022, patient tolerated well.  Blood-tinged cloudy fluid aspirated.  Patient did have urinalysis done during admission, findings consistent with UTI, urine culture returned positive for MRSA and antibiotic treatment initiated.  CT abdomen/pelvis with contrast repeated to see if residual abscess present, no residual abscess present but with significant finding of partial superior mesenteric vein thrombosis.  Discussion had with patient and surgeon at [facility], Dr. Neel Kang, who stated patient did not require transfer at that time and recommended patient be started on Eliquis.  Eliquis started and patient had recurrent bright red blood in ostomy, Dr. Neel Kang contacted again and he stated patient did not require transfer and to decrease dose and obtain imaging.  CTA abdomen pelvis obtained and no active extravasation contrast was appreciated.  Heparin drip started at that time.  Patient transitioned to Eliquis 5 mg twice daily and bleeding recurred.  Eliquis stopped. Contacted office of Dr. Neel Kang surgery at [place] recommended placement of Surgicel which  was applied along the suture line with hemostasis achieved.  Eliquis 2.5 mg twice daily again initiated with patient unfortunately had recurrence of bleeding.  Discussed with patient unfortunately currently not able to tolerate any anticoagulation at this time.  Patient instructed to follow-up with her general surgeon on discharge.  Patient worked with physical therapy and Occupational Therapy.    Interval Followup:   Patient lying in bed resting comfortably.  Patient states she noticed that she still has oozing of blood from ostomy site.  Repeat urine culture negative patient has been tolerating a diet.  Blood sugars well controlled.  No other issues per nursing.      Procedures:   -CT-guided aspiration of abdominal wall fluid (06/27/2022)      Review of Systems  Constitutional: Negative for fatigue and fever.   HENT: Negative for sore throat and trouble swallowing.    Eyes: Negative for pain and discharge.   Respiratory: Negative for cough and shortness of breath.    Cardiovascular: Negative for chest pain and palpitations.   Gastrointestinal: Negative for abdominal pain, nausea and vomiting.   Endocrine: Negative for cold intolerance and heat intolerance.   Genitourinary: Negative for difficulty urinating and dysuria.   Musculoskeletal: Negative for back pain and neck stiffness.   Skin: Negative for color change and rash.   Neurological: Negative for syncope and headaches.   Hematological: Negative for adenopathy.   Psychiatric/Behavioral: Negative for confusion and hallucinations.    Objective   Objective     Vitals:   Temp:  [97.7 °F (36.5 °C)-98.7 °F (37.1 °C)] 98.7 °F (37.1 °C)  Heart Rate:  [70-92] 70  Resp:  [14-20] 14  BP: (107-130)/(48-67) 116/67  Physical Exam   Gen. well-developed appearing stated age in no acute distress  HEENT: Normocephalic atraumatic moist membranes pupils equal round reactive light, no scleral icterus no conjunctival injection  Cardiovascular: regular rate and rhythm no murmurs  rubs or gallops S1-S2, no lower extremity edema appreciated  Pulmonary: Clear to auscultation bilaterally no wheezes rales or rhonchi symmetric chest expansion, unlabored, no conversational dyspnea appreciated  Gastrointestinal: Soft nontender nondistended positive bowel sounds all 4 quadrants no rebound or guarding ostomy in the right lower quadrant draining brown liquid stool appears to be munira colored blood on the bag  Musculoskeletal: No clubbing cyanosis, warm and well-perfused, calves soft symmetric nontender bilaterally  Skin: Clean dry without rashs  Neuro: Cranial nerves II through XII intact grossly no sensorimotor deficits appreciated bilateral upper and lower extremities  Psych: Patient is calm cooperative and appropriate with exam not responding to internal stimuli  : No Rodriguez catheter no bladder distention no suprapubic tenderness    Result Review    Result Review:  I have personally reviewed the results from the time of this admission to 7/10/2022 14:42 EDT and agree with these findings:  [x]  Laboratory:   CBC:      Lab 07/09/22  0407 07/08/22  0410 07/07/22  0442 07/06/22  0410 07/05/22  1127 07/05/22  0606 07/04/22  1149 07/04/22  0625   WBC 6.09  --  5.50 5.69  --  5.31  --  4.40   HEMOGLOBIN 9.5*   < > 9.2* 9.3*   < > 9.2*   < > 8.7*  8.7*   HEMATOCRIT 28.2*   < > 27.6* 27.5*   < > 27.8*   < > 26.1*  26.1*   PLATELETS 103*  --  88* 87*  --  79*  --  76*   NEUTROS ABS  --   --   --   --   --   --   --  3.19   IMMATURE GRANS (ABS)  --   --   --   --   --   --   --  0.01   LYMPHS ABS  --   --   --   --   --   --   --  0.78   MONOS ABS  --   --   --   --   --   --   --  0.30   EOS ABS  --   --   --   --   --   --   --  0.10   MCV 94.6  --  95.5 94.5  --  95.2  --  94.9    < > = values in this interval not displayed.     CMP:        Lab 07/07/22  0442 07/05/22  0606 07/04/22  0625   SODIUM 133* 132* 135*   POTASSIUM 3.8 3.9 4.2   CHLORIDE 104 104 105   CO2 17.8* 17.2* 17.5*   ANION GAP 11.2 10.8  12.5   BUN 14 11 12   CREATININE 0.82 0.84 0.78   EGFR 83.0 80.7 88.2   GLUCOSE 114* 104* 94   CALCIUM 10.2 10.2 10.3   MAGNESIUM  --  1.7 1.6   PHOSPHORUS  --  3.5 4.1     [x]  Microbiology:   Microbiology Results (last 10 days)     Procedure Component Value - Date/Time    Urine Culture - Urine, Urine, Clean Catch [[id number]] Collected: 07/09/22 1151    Lab Status: Final result Specimen: Urine, Clean Catch Updated: 07/10/22 1020     Urine Culture <25,000 CFU/mL Mixed Karen Isolated    Narrative:      Specimen contains mixed organisms of questionable pathogenicity suggestive of contamination. If symptoms persist, suggest recollection.  Colonization of the urinary tract without infection is common. Treatment is discouraged unless the patient is symptomatic, pregnant, or undergoing an invasive urologic procedure.    Blood Culture - Blood, Arm, Left [[id number]]  (Normal) Collected: 06/30/22 2127    Lab Status: Final result Specimen: Blood from Arm, Left Updated: 07/05/22 2148     Blood Culture No growth at 5 days    Blood Culture - Blood, Arm, Right [[id number]]  (Normal) Collected: 06/30/22 2126    Lab Status: Final result Specimen: Blood from Arm, Right Updated: 07/05/22 2148     Blood Culture No growth at 5 days          [x]  Radiology:   PROCEDURE: CT ANGIOGRAM ABDOMEN PELVIS W WO CONTRAST       COMPARISONS: [facility], CT, CT GUIDED ABSCESS DRAIN PERITONEAL, 6/27/2022, 14:39.     [facility], CT, CT ABDOMEN PELVIS W CONTRAST, 6/22/2022, 20:46.     [facility], CT, CT ABDOMEN PELVIS W CONTRAST, 7/01/2022, 17:20.       INDICATIONS: GI BLEED IN COLOSTOMY.       PROTOCOL:   Standard CTA imaging protocol performed.       RADIATION:   Total DLP: 846.7 mGy*cm.     Automated exposure control was utilized to minimize radiation dose.   CONTRAST: 93 mL Isovue 370 I.V.   LABS:   eGFR: >60 mL/min/1.73m^2       TECHNIQUE: After obtaining the patient's consent, 1,053 CT/CTA images of the abdomen and  pelvis   were created with non-ionic intravenous contrast, and with multi-planar/3-D imaging to optimize   visualization of vascular anatomy.         FINDINGS: Again, no definite focal fluid collection is seen in the anterior abdominal or pelvic   wall, particularly the subcutaneous regions.  No definite subcutaneous emphysema.  There is thought   to be a combination of granulation tissue and scarring involving the anterior midline right   paramidline abdominal and pelvic wall in the region of a healing incision site.  There is a right   lower quadrant double-barrel colostomy, as before.  Atherosclerotic changes are seen.  No   hemodynamically significant stenoses are identified.  Specifically, the celiac trunk, superior   mesenteric artery, inferior mesenteric artery, single bilateral renal arteries, and bilateral iliac   arterial systems are patent without hemodynamically significant stenoses appreciated by CTA   technique.  Please note that the technique was not tailored in order to evaluate the mesenteric   veins.  Again, cirrhosis is present with portal hypertension and portosystemic venous shunting.     There is splenomegaly.  Minimal, if any, ascites is seen.  There are postoperative changes of the   distal colon.  No mechanical bowel obstruction.  No pneumoperitoneum or pneumatosis.  No portal or   mesenteric venous gas.  No definite CTA evidence of active extravasation of the contrast material.     No other acute findings are seen.  The other findings are as described in the recent CT report from   7/1/2022.       Impression:     No hemodynamically significant stenoses are appreciated.                 COMMENT:  Part of this note is an electronic transcription of spoken language to printed text. The   electronic translation/transcription may permit erroneous, or at times, nonsensical (or even   sensical) words or phrases to be inadvertently transcribed or omitted; this  has   reviewed the note for  such errors (as well as additional errors); however, some may still exist.        []  EKG/Telemetry:   []  Cardiology/Vascular:    []  Pathology:  []  Old records:  [x]  Other:   meds  doxycycline, 100 mg, Oral, Q12H  fluconazole, 400 mg, Oral, Q24H  insulin lispro, 0-9 Units, Subcutaneous, TID AC  lactulose, 10 g, Oral, TID  nadolol, 20 mg, Oral, Daily  pantoprazole, 40 mg, Oral, Daily  sodium bicarbonate, 650 mg, Oral, BID With Meals  sodium chloride, 10 mL, Intravenous, Q12H        Assessment & Plan   Assessment / Plan     Assessment:  Blood in ostomy bag, recurrent  Hyperkalemia, resolved  Acute renal failure, resolved  Urinary tract infection due to MRSA  Abdominal abscess due to Candida glabrata s/p drainage  Partial superior mesenteric vein thrombosis  Migraine headaches  Acute blood loss anemia  Anemia of chronic disease   MEDRANO cirrhosis  Hepatic coagulopathy  Hypomagnesemia, persistent  COPD, not acutely exacerbated  Essential hypertension  Hyponatremia, stable  Type 2 diabetes mellitus  Dyslipidemia  Depression  History of esophageal varices    Plan:  -Restart doxycycline till repeat urine cultures available  -Continue fluconazole  -Patient unfortunately unable to tolerate any anticoagulation at this time likely secondary to hepatic coagulopathy.  Discussed with patient plan to discontinue Eliquis and wait for bleeding to resolve as no source has been identified discharge patient to follow-up with her general surgeon.  -We will continue to work with wound ostomy nurse tomorrow to apply Surgicel to the ostomy anastomosis site to help stop oozing  -Continue to advance diet as tolerated  -Continue physical therapy occupational therapy  -Continue sliding scale insulin  -Continue to encourage patient to increase activity as tolerated  -Clinical course will dictate further management     DVT Prophylaxis: SCDs  GI Prophylaxis: Pantoprazole  Dispo: Patient only has 3 rehab days left.  Discussed potentially  returning home with home health.  Patient amenable if no further issues with bleeding and hemoglobin remained stable and bleeding resolves.  Code Status: Full Code     Personally reviewed patients labs and imaging, discussed with patient and nurse at bedside.       Part of this note may be an electronic transcription/translation of spoken language to printed text using the Dragon dictation system.    DVT prophylaxis:  Mechanical DVT prophylaxis orders are present.    CODE STATUS:   Level Of Support Discussed With: Patient  Code Status (Patient has no pulse and is not breathing): CPR (Attempt to Resuscitate)  Medical Interventions (Patient has pulse or is breathing): Full Support

## 2022-07-10 NOTE — PLAN OF CARE
Goal Outcome Evaluation:  Plan of Care Reviewed With: patient    PT is AAOx4, VSS. No acute events this shift. No new concerns voiced by patient. She was able to change her colostomy bag herself successfully. Encouragement and support given. No signs of bleeding in colostomy bag and stoma is WNL with adequate output of brown liquid stool.  Patient is compliant with treatment plan. However, she declined Lactulose this shift. Remains on SSI. BG has been WNL and only needed coverage once this shift. No SOA reported/noted, no reports of acute pain. Will continue to monitor and continue with current plan of care at this time. Potential DC with home health in the next few days.

## 2022-07-11 VITALS
DIASTOLIC BLOOD PRESSURE: 62 MMHG | OXYGEN SATURATION: 96 % | BODY MASS INDEX: 32.13 KG/M2 | HEART RATE: 75 BPM | RESPIRATION RATE: 18 BRPM | WEIGHT: 174.6 LBS | HEIGHT: 62 IN | SYSTOLIC BLOOD PRESSURE: 119 MMHG | TEMPERATURE: 98.4 F

## 2022-07-11 LAB
GLUCOSE BLDC GLUCOMTR-MCNC: 113 MG/DL (ref 70–99)
GLUCOSE BLDC GLUCOMTR-MCNC: 148 MG/DL (ref 70–99)
GLUCOSE BLDC GLUCOMTR-MCNC: 157 MG/DL (ref 70–99)

## 2022-07-11 PROCEDURE — 82962 GLUCOSE BLOOD TEST: CPT

## 2022-07-11 PROCEDURE — 99239 HOSP IP/OBS DSCHRG MGMT >30: CPT | Performed by: FAMILY MEDICINE

## 2022-07-11 PROCEDURE — 63710000001 INSULIN LISPRO (HUMAN) PER 5 UNITS: Performed by: FAMILY MEDICINE

## 2022-07-11 RX ORDER — SODIUM BICARBONATE 650 MG/1
650 TABLET ORAL 2 TIMES DAILY WITH MEALS
Qty: 60 TABLET | Refills: 0 | Status: SHIPPED | OUTPATIENT
Start: 2022-07-11 | End: 2022-08-10

## 2022-07-11 RX ORDER — FLUCONAZOLE 200 MG/1
400 TABLET ORAL EVERY 24 HOURS
Qty: 8 TABLET | Refills: 0 | Status: SHIPPED | OUTPATIENT
Start: 2022-07-11 | End: 2022-07-15

## 2022-07-11 RX ORDER — DOXYCYCLINE 100 MG/1
100 CAPSULE ORAL EVERY 12 HOURS SCHEDULED
Qty: 2 CAPSULE | Refills: 0 | Status: SHIPPED | OUTPATIENT
Start: 2022-07-11 | End: 2022-07-12

## 2022-07-11 RX ORDER — ECHINACEA PURPUREA EXTRACT 125 MG
2 TABLET ORAL AS NEEDED
Refills: 12
Start: 2022-07-11 | End: 2023-02-28

## 2022-07-11 RX ADMIN — INSULIN LISPRO 2 UNITS: 100 INJECTION, SOLUTION INTRAVENOUS; SUBCUTANEOUS at 11:41

## 2022-07-11 RX ADMIN — PANTOPRAZOLE SODIUM 40 MG: 40 TABLET, DELAYED RELEASE ORAL at 09:18

## 2022-07-11 RX ADMIN — SODIUM BICARBONATE 650 MG TABLET 650 MG: at 09:18

## 2022-07-11 RX ADMIN — SODIUM BICARBONATE 650 MG TABLET 650 MG: at 17:30

## 2022-07-11 RX ADMIN — FLUCONAZOLE 400 MG: 150 TABLET ORAL at 14:36

## 2022-07-11 RX ADMIN — DOXYCYCLINE 100 MG: 100 CAPSULE ORAL at 09:18

## 2022-07-11 RX ADMIN — Medication 10 ML: at 11:30

## 2022-07-11 NOTE — PLAN OF CARE
Goal Outcome Evaluation:   No significant changes throughout shift. Pt has not had any blood in colostomy output throughout night. No concerns or complaints noted.

## 2022-07-11 NOTE — DISCHARGE SUMMARY
[facility]         HOSPITALIST  DISCHARGE SUMMARY    Patient Name: Tawny [facility]  : 1965  MRN: [id number]    Date of Admission: 2022  Date of Discharge: 2022  Primary Care Physician: Zain Valentine MD    Consults     Date and Time Order Name Status Description    2022 12:17 AM Inpatient Hospitalist Consult            Active and Resolved Hospital Problems:  Blood in ostomy bag, recurrent  Hyperkalemia, resolved  Acute renal failure, resolved  Urinary tract infection due to MRSA  Abdominal abscess due to Candida glabrata s/p drainage  Partial superior mesenteric vein thrombosis  Migraine headaches  Acute blood loss anemia  Anemia of chronic disease   MEDRANO cirrhosis  Hepatic coagulopathy  Hypomagnesemia, persistent  COPD, not acutely exacerbated  Essential hypertension  Hyponatremia, stable  Type 2 diabetes mellitus  Dyslipidemia  Depression  History of esophageal varices  Active Hospital Problems    Diagnosis POA   • Severe malnutrition (HCC) [E43] Yes   • Hyperkalemia [E87.5] Yes      Resolved Hospital Problems   No resolved problems to display.       Hospital Course     Hospital Course:  Tawny [facility] is a 56 y.o. female with COPD, Medrano cirrhosis, hypertension, migraines, rectosigmoid resection with diverting colostomy in May 2022 who was hospitalized at our facility on 2022 with hyperkalemia and reported blood in her ostomy bag.  Blood in ostomy bag has since resolved.  Hyperkalemia was managed with insulin/glucose/calcium gluconate/Lokelma and patient did receive some bicarb infusion.  This is since improved.  Patient came to hospital from nursing facility.  PT/OT consulted.  Renal function has improved to baseline. CT-guided aspiration of abdominal wall fluid done on 2022, patient tolerated well.  Blood-tinged cloudy fluid aspirated.  Patient did have urinalysis done during admission, findings consistent with UTI, urine culture returned positive for MRSA  and antibiotic treatment initiated.  CT abdomen/pelvis with contrast repeated to see if residual abscess present, no residual abscess present but with significant finding of partial superior mesenteric vein thrombosis.  Discussion had with patient and surgeon at [facility], Dr. Neel Kang, who stated patient did not require transfer at that time and recommended patient be started on Eliquis.  Eliquis started and patient had recurrent bright red blood in ostomy, Dr. Neel Kang contacted again and he stated patient did not require transfer and to decrease dose and obtain imaging.  CTA abdomen pelvis obtained and no active extravasation contrast was appreciated.  Heparin drip started at that time.  Patient transitioned to Eliquis 5 mg twice daily and bleeding recurred.  Eliquis stopped. Contacted office of Dr. Neel Kang surgery at [place] recommended placement of Surgicel which was applied along the suture line with hemostasis achieved.  Eliquis 2.5 mg twice daily again initiated with patient unfortunately had recurrence of bleeding.  Bleeding resolved with the withdrawal of anticoagulants. Discussed with patient unfortunately currently not able to tolerate any anticoagulation at this time.  Patient instructed to follow-up with her general surgeon on discharge.  Patient worked with physical therapy and Occupational Therapy.  Patient seen and evaluated on the day of discharge and thus stable for discharge home with home health with close follow-up with her primary care provider and general surgeon.        DISCHARGE Follow Up Recommendations for labs and diagnostics: As above      Day of Discharge     Vital Signs:  Temp:  [97.5 °F (36.4 °C)-97.9 °F (36.6 °C)] 97.8 °F (36.6 °C)  Heart Rate:  [66-70] 67  Resp:  [14-16] 16  BP: (104-124)/(44-66) 104/44  Physical Exam:   Gen. well-developed appearing stated age in no acute distress  HEENT: Normocephalic atraumatic moist membranes pupils equal  round reactive light, no scleral icterus no conjunctival injection  Cardiovascular: regular rate and rhythm no murmurs rubs or gallops S1-S2, no lower extremity edema appreciated  Pulmonary: Clear to auscultation bilaterally no wheezes rales or rhonchi symmetric chest expansion, unlabored, no conversational dyspnea appreciated  Gastrointestinal: Soft nontender nondistended positive bowel sounds all 4 quadrants no rebound or guarding ostomy in the right lower quadrant draining brown liquid stool,  no blood appreciated in the bag  Musculoskeletal: No clubbing cyanosis, warm and well-perfused, calves soft symmetric nontender bilaterally  Skin: Clean dry without rashs  Neuro: Cranial nerves II through XII intact grossly no sensorimotor deficits appreciated bilateral upper and lower extremities  Psych: Patient is calm cooperative and appropriate with exam not responding to internal stimuli  : No Rodriguez catheter no bladder distention no suprapubic tenderness      Discharge Details        Discharge Medications      New Medications      Instructions Start Date   doxycycline 100 MG capsule  Commonly known as: MONODOX   100 mg, Oral, Every 12 Hours Scheduled      fluconazole 200 MG tablet  Commonly known as: DIFLUCAN   400 mg, Oral, Every 24 Hours      sodium bicarbonate 650 MG tablet   650 mg, Oral, 2 Times Daily With Meals      sodium chloride 0.65 % nasal spray   2 sprays, Nasal, As Needed         Continue These Medications      Instructions Start Date   albuterol sulfate  (90 Base) MCG/ACT inhaler  Commonly known as: PROVENTIL HFA;VENTOLIN HFA;PROAIR HFA   1 puff, Inhalation, Every 4 Hours PRN      dicyclomine 20 MG tablet  Commonly known as: BENTYL   20 mg, Oral, Every 6 Hours      furosemide 40 MG tablet  Commonly known as: LASIX   40 mg, Oral, Daily      metoclopramide 5 MG tablet  Commonly known as: REGLAN   5 mg, Oral      nadolol 20 MG tablet  Commonly known as: CORGARD   20 mg, Oral, Daily      ondansetron  8 MG tablet  Commonly known as: ZOFRAN   8 mg, Oral, Every 8 Hours PRN      pantoprazole 40 MG EC tablet  Commonly known as: PROTONIX   40 mg, Oral, Daily      Trulicity 1.5 MG/0.5ML solution pen-injector  Generic drug: Dulaglutide   0.5 mL, Subcutaneous, Every 7 Days         Stop These Medications    metoprolol succinate  MG 24 hr tablet  Commonly known as: TOPROL-XL            No Known Allergies    Discharge Disposition:  Home-Health Care [facility]    Diet:  Hospital:  Diet Order   Procedures   • Diet Regular; Consistent Carbohydrate       Discharge Activity:   Activity Instructions     Gradually Increase Activity Until at Pre-Hospitalization Level            CODE STATUS:  Code Status and Medical Interventions:   Ordered at: 06/23/22 0810     Level Of Support Discussed With:    Patient     Code Status (Patient has no pulse and is not breathing):    CPR (Attempt to Resuscitate)     Medical Interventions (Patient has pulse or is breathing):    Full Support         No future appointments.    Additional Instructions for the Follow-ups that You Need to Schedule     Discharge Follow-up with PCP   As directed       Currently Documented PCP:    Zain Valentine MD    PCP Phone Number:    [phone]     Follow Up Details: Hospital discharge follow-up acute renal failure, hyperkalemia         Discharge Follow-up with Specified Provider: Dr. Neel Pisano general surgery as scheduled tomorrow   As directed      To: Dr. Neel Pisano general surgery as scheduled tomorrow               Pertinent  and/or Most Recent Results     PROCEDURES:   PROCEDURE: CT GUIDED ABSCESS DRAIN PERITONEAL       COMPARISON: None       INDICATIONS: fluid collection in abdominal wall       DESCRIPTION: Patient consent was obtained. The patient's medication list was reviewed and   documented in the medical record. Following a successful time out, a CT-guided biopsy was performed   in the usual sterile manner.   PROTOCOL:   Standard imaging  protocol performed       RADIATION:   DLP: 828mGy*cm     Automated exposure control was utilized to minimize radiation dose.       CONSENT:   Prior to the procedure, the risks, benefits and alternatives were thoroughly explained.     This included the risk of bleeding, infection, and injury to associated structures.  The patient   expressed understanding and wished to continue.  Informed written consent was obtained.       FINDINGS: Initial CT scan performed for localization purposes. An area in the right abdomen was   localized. The skin overlying this area was prepped and draped in normal sterile fashion.     Lidocaine was injected along the anticipated tract of the needle.  A 5 [employer] catheter was   advanced under CT fluoroscopic guidance into the abdominal wall fluid collection.  About 2 cubic   centimeters of a blood tinged fluid was aspirated and sent to the lab for analysis including   culture       Post procedure CT demonstrates no significant complication or change.         Impression:       1. Successful CT-guided aspiration of abdominal wall fluid collection.  Only about 2 cubic   centimeters of a blood tinged cloudy fluid was aspirated and sent to the lab for analysis.              LAB RESULTS:      Lab 07/09/22  0407 07/08/22 0410 07/07/22 0442 07/06/22  0410 07/05/22  1127 07/05/22  0606   WBC 6.09  --  5.50 5.69  --  5.31   HEMOGLOBIN 9.5* 9.1* 9.2* 9.3* 9.6* 9.2*   HEMATOCRIT 28.2* 27.1* 27.6* 27.5* 28.2* 27.8*   PLATELETS 103*  --  88* 87*  --  79*   MCV 94.6  --  95.5 94.5  --  95.2   PROTIME  --   --  17.5*  --   --   --          Lab 07/07/22 0442 07/05/22  0606   SODIUM 133* 132*   POTASSIUM 3.8 3.9   CHLORIDE 104 104   CO2 17.8* 17.2*   ANION GAP 11.2 10.8   BUN 14 11   CREATININE 0.82 0.84   EGFR 83.0 80.7   GLUCOSE 114* 104*   CALCIUM 10.2 10.2   MAGNESIUM  --  1.7   PHOSPHORUS  --  3.5             Lab 07/07/22 0442   PROTIME 17.5*   INR 1.42*                 Brief Urine Lab Results   (Last result in the past 365 days)      Color   Clarity   Blood   Leuk Est   Nitrite   Protein   CREAT   Urine HCG        07/09/22 1151 Dark Yellow   Clear   Negative   Moderate (2+)   Negative   Negative               Microbiology Results (last 10 days)     Procedure Component Value - Date/Time    Urine Culture - Urine, Urine, Clean Catch [[id number]] Collected: 07/09/22 1151    Lab Status: Final result Specimen: Urine, Clean Catch Updated: 07/10/22 1020     Urine Culture <25,000 CFU/mL Mixed Karen Isolated    Narrative:      Specimen contains mixed organisms of questionable pathogenicity suggestive of contamination. If symptoms persist, suggest recollection.  Colonization of the urinary tract without infection is common. Treatment is discouraged unless the patient is symptomatic, pregnant, or undergoing an invasive urologic procedure.          CT Angiogram Abdomen Pelvis    Result Date: 7/2/2022  Impression:  No hemodynamically significant stenoses are appreciated.     COMMENT:  Part of this note is an electronic transcription of spoken language to printed text. The electronic translation/transcription may permit erroneous, or at times, nonsensical (or even sensical) words or phrases to be inadvertently transcribed or omitted; this  has reviewed the note for such errors (as well as additional errors); however, some may still exist.  PATRICIA MCKEON JR, MD       Electronically Signed and Approved By: PATRICIA MCKEON JR, MD on 7/02/2022 at 20:39             CT Abdomen Pelvis With Contrast    Result Date: 7/1/2022  Impression:   1. There is strandy density in the extra-abdominal and extra-pelvic subcutaneous fat consistent with cellulitis/scar tissue.  There is no focal fluid collection to indicate an abscess. 2. There is a small filling defect located in the superior mesenteric vein consistent with partial superior mesenteric vein thrombosis.  This is an interval change from the previous exam.  The abnormal  results were discussed with the patient's nurse (Megha) by telephone.  The referring clinician will be contacted. 3. Cirrhotic liver, splenomegaly, and varices. 4. Postsurgical changes. 5. Sigmoid colonic diverticulosis without acute diverticulitis.     DONNA REID MD       Electronically Signed and Approved By: DONNA REID MD on 7/01/2022 at 18:21             CT Abdomen Pelvis With Contrast    Result Date: 6/22/2022  Impression:   CT scan of the abdomen and pelvis with IV contrast demonstrating small liver with a nodular contour consistent with micronodular cirrhosis.  Splenomegaly with varices consistent with significant portal hypertension.  No ascites is seen.  A colostomy is seen in the right lower quadrant.  Midline pelvic wound is evident, with density tracking posteriorly to the anterior abdominal wall, obliterating fat planes between the anterior abdominal wall and the exiting colon.  3 cm fluid collection is seen in subcutaneous fat in the midline.        PASCUAL TIRADO MD       Electronically Signed and Approved By: PASCUAL TIRADO MD on 6/22/2022 at 21:34             CT Guided Abscess Drain Peritoneal    Result Date: 6/27/2022  Impression:   1. Successful CT-guided aspiration of abdominal wall fluid collection.  Only about 2 cubic centimeters of a blood tinged cloudy fluid was aspirated and sent to the lab for analysis.      DE NAVARRO MD       Electronically Signed and Approved By: DE NAVARRO MD on 6/27/2022 at 16:04                           Labs Pending at Discharge:        Time spent on Discharge including face to face service: Greater than 35 minutes    Electronically signed by Kavon Baca MD, 07/11/22, 11:40 AM EDT.

## 2022-07-11 NOTE — PLAN OF CARE
Goal Outcome Evaluation:      Pt stable throughout shift, vital signs WNL, no new bleeding around stoma during shift. Sent home with colostomy supplies for a couple days.

## 2022-07-12 ENCOUNTER — READMISSION MANAGEMENT (OUTPATIENT)
Dept: CALL CENTER | Facility: HOSPITAL | Age: 57
End: 2022-07-12

## 2022-07-12 NOTE — OUTREACH NOTE
Prep Survey    Flowsheet Row Responses   [facility] facility [facility] discharged from? [facility]   Is LACE score < 7 ? No   Emergency Room discharge w/ pulse ox? No   Eligibility Readm Mgmt   Discharge diagnosis Hyperkalemia, resolved   Does the patient have one of the following disease processes/diagnoses(primary or secondary)? Other   Does the patient have Home health ordered? Yes   What is the Home health agency?  [facility]   Is there a DME ordered? No   Prep survey completed? Yes          ANNIE PEREZ - Registered Nurse

## 2022-07-20 ENCOUNTER — READMISSION MANAGEMENT (OUTPATIENT)
Dept: CALL CENTER | Facility: HOSPITAL | Age: 57
End: 2022-07-20

## 2022-07-20 NOTE — OUTREACH NOTE
Medical Week 2 Survey    Flowsheet Row Responses   [facility] patient discharged from? [facility]   Does the patient have one of the following disease processes/diagnoses(primary or secondary)? Other   Week 2 attempt successful? Yes   Call start time 1622   Discharge diagnosis Hyperkalemia, resolved   Call end time 1624   Meds reviewed with patient/caregiver? Yes   Is the patient having any side effects they believe may be caused by any medication additions or changes? No   Does the patient have all medications ordered at discharge? Yes   Is the patient taking all medications as directed (includes completed medication regime)? Yes   Does the patient have a primary care provider?  Yes   Does the patient have an appointment with their PCP within 7 days of discharge? Yes   Has the patient kept scheduled appointments due by today? Yes   Comments Reversal tomorrow.   What is the Home health agency?  [facility]   Has home health visited the patient within 72 hours of discharge? Yes   Psychosocial issues? No   Did the patient receive a copy of their discharge instructions? Yes   Nursing interventions Reviewed instructions with patient   What is the patient's perception of their health status since discharge? Improving   Is the patient/caregiver able to teach back signs and symptoms related to disease process for when to call PCP? Yes   Is the patient/caregiver able to teach back signs and symptoms related to disease process for when to call 911? Yes   Is the patient/caregiver able to teach back the hierarchy of who to call/visit for symptoms/problems? PCP, Specialist, Home health nurse, Urgent Care, ED, 911 Yes   If the patient is a current smoker, are they able to teach back resources for cessation? Not a smoker   Additional teach back comments Takedown tomorrow. Says BP up at times, BS around 150. No questions at this time.   Week 2 Call Completed? Yes   Wrap up additional comments She is having a reversal at  [facility].          JORDAN HEDRICK - Registered Nurse

## 2022-07-28 ENCOUNTER — READMISSION MANAGEMENT (OUTPATIENT)
Dept: CALL CENTER | Facility: HOSPITAL | Age: 57
End: 2022-07-28

## 2022-07-28 NOTE — OUTREACH NOTE
Medical Week 3 Survey    Flowsheet Row Responses   [facility] facility patient discharged from? [facility]   Does the patient have one of the following disease processes/diagnoses(primary or secondary)? Other   Week 3 attempt successful? No   Unsuccessful attempts Attempt 1          SARI PEREZ - Registered Nurse

## 2023-02-28 ENCOUNTER — OFFICE VISIT (OUTPATIENT)
Dept: GASTROENTEROLOGY | Facility: CLINIC | Age: 58
End: 2023-02-28
Payer: MEDICARE

## 2023-02-28 VITALS
DIASTOLIC BLOOD PRESSURE: 68 MMHG | OXYGEN SATURATION: 93 % | BODY MASS INDEX: 33.31 KG/M2 | WEIGHT: 181 LBS | HEART RATE: 80 BPM | HEIGHT: 62 IN | SYSTOLIC BLOOD PRESSURE: 145 MMHG

## 2023-02-28 DIAGNOSIS — K74.60 CIRRHOSIS OF LIVER WITHOUT ASCITES, UNSPECIFIED HEPATIC CIRRHOSIS TYPE: Primary | ICD-10-CM

## 2023-02-28 DIAGNOSIS — K21.9 GASTROESOPHAGEAL REFLUX DISEASE WITHOUT ESOPHAGITIS: ICD-10-CM

## 2023-02-28 DIAGNOSIS — K75.81 NASH (NONALCOHOLIC STEATOHEPATITIS): ICD-10-CM

## 2023-02-28 DIAGNOSIS — K57.90 DIVERTICULOSIS: ICD-10-CM

## 2023-02-28 PROCEDURE — 99214 OFFICE O/P EST MOD 30 MIN: CPT | Performed by: INTERNAL MEDICINE

## 2023-02-28 RX ORDER — LORATADINE 10 MG/1
10 TABLET ORAL TAKE AS DIRECTED
COMMUNITY
Start: 2022-09-28 | End: 2023-07-03

## 2023-02-28 RX ORDER — MAGNESIUM OXIDE 400 MG/1
400 TABLET ORAL DAILY
Qty: 30 TABLET | Refills: 6 | COMMUNITY
Start: 2023-01-03 | End: 2023-07-03

## 2023-02-28 RX ORDER — POTASSIUM CHLORIDE 750 MG/1
10 TABLET, FILM COATED, EXTENDED RELEASE ORAL DAILY
Qty: 30 TABLET | Refills: 6 | COMMUNITY
Start: 2023-01-03 | End: 2023-07-03

## 2023-02-28 RX ORDER — MULTIPLE VITAMINS W/ MINERALS TAB 9MG-400MCG
1 TAB ORAL DAILY
Qty: 30 TABLET | Refills: 6 | COMMUNITY
Start: 2023-01-03 | End: 2023-07-03

## 2023-02-28 RX ORDER — DIPHENHYDRAMINE HCL 25 MG
25 CAPSULE ORAL TAKE AS DIRECTED
COMMUNITY
Start: 2022-09-28 | End: 2023-07-03

## 2023-02-28 NOTE — PROGRESS NOTES
Chief Complaint    Tawny [facility]on is a 57 y.o. female who presents to [facility] for returns in follow-up of Reich related cirrhosis, esophageal varices, but no history of ascites.  Patient unfortunately had perforated diverticulitis last summer that required exploratory laparotomy, temporary ileostomy and subsequent reversal of her ileostomy in July 2023.  This was performed by Dr. Kang at [place].  She had a prolonged recovery requiring rehabilitation for a few weeks.  She is also had recurrent abscess at her ostomy site appears to have required drainage intermittently.  She continues to follow closely with Dr. Kang for management thereof.  Overall she is feeling better but continues to feel somewhat weak.  She previously had a lot of reflux and most lately is having recurrent reflux and occasional episodes of vomiting.  She has not been taking her PPI therapy of late.  She also had longstanding diabetes gastroparesis would be a consideration.  It does appear that she has Reglan on her medication list.  She reports normal bowel movements.  She occasionally has some rectal bleeding but colonoscopy prior to her ostomy takedown showed diversion colitis.      Additionally during the course of her recovery imaging suggested SMV thrombosis and anticoagulation was started however she developed bleeding from the ostomy site and therefore anticoagulation had to be discontinued.    Result Review :     The following data was reviewed by: Jesse Watkins MD on 02/28/2023:    CMP    CMP 8/23/22 9/28/22 1/3/23   Glucose 100 97 131 (A)   BUN 14 15 16   Creatinine 0.7 0.6 (A) 0.7   Sodium 139 138 138   Potassium 3.9 3.9 4.1   Chloride 105 105 108 (A)   Calcium 9.2 9.5 9.5   Albumin 3.2 (A) 3.1 (A) 3.2 (A)   Total Bilirubin 2.14 (A) 1.84 (A) 2.58 (A)   Alkaline Phosphatase 131 (A) 119 (A) 123 (A)   AST (SGOT) 31 34 46 (A)   ALT (SGPT) 11 15 22   Anion Gap  11 8   (A) Abnormal value   "          CBC    CBC 7/7/22 7/8/22 7/9/22   WBC 5.50  6.09   RBC 2.89 (A)  2.98 (A)   Hemoglobin 9.2 (A) 9.1 (A) 9.5 (A)   Hematocrit 27.6 (A) 27.1 (A) 28.2 (A)   MCV 95.5  94.6   MCH 31.8  31.9   MCHC 33.3  33.7   RDW 15.9 (A)  15.9 (A)   Platelets 88 (A)  103 (A)   (A) Abnormal value              Data reviewed: GI studies EGD and colonoscopy performed recently by Dr. Kang, 2022     Past Medical History:   Diagnosis Date   • Cirrhosis (HCC)     liver   • Colon polyps    • COPD (chronic obstructive pulmonary disease) (HCC)    • Depression    • Diabetes (HCC)    • Diverticulitis    • Hematuria    • High blood pressure    • High cholesterol    • Interstitial cystitis    • Migraine headache    • Narcolepsy    • PONV (postoperative nausea and vomiting)    • Sleep apnea    • Spinal headache     DURING PREGNANCY       Past Surgical History:   Procedure Laterality Date   • BLADDER REPAIR  2009   • CHOLECYSTECTOMY     • COLONOSCOPY  2014 x2 2020   • ENDOSCOPY  2014 2020   • ENDOSCOPY N/A 07/12/2021    Procedure: ESOPHAGOGASTRODUODENOSCOPY with biopsies;  Surgeon: Jesse Watkins MD;  Location: [facility] END[facility];  Service: Gastroenterology;  Laterality: N/A;  esophageal varices   • ENDOSCOPY N/A 03/25/2022    Procedure: ESOPHAGOGASTRODUODENOSCOPY WITH BIOPSIES;  Surgeon: Jesse Watkins MD;  Location: [facility] ENDOSCOPY;  Service: Gastroenterology;  Laterality: N/A;  ESOPHAGEAL VARICIES   • HAND SURGERY     • HERNIA REPAIR     • HYSTERECTOMY  2009   • ILEOSTOMY  07/2022   • OTHER SURGICAL HISTORY      rectocele repair   • UPPER GASTROINTESTINAL ENDOSCOPY         Social History     Social History Narrative   • Not on file       Objective     Vital Signs:   /68 (BP Location: Right arm, Patient Position: Sitting, Cuff Size: Adult)   Pulse 80   Ht 157.5 cm (62\")   Wt 82.1 kg (181 lb)   SpO2 93%   BMI 33.11 kg/m²     Body mass index is 33.11 kg/m².    Physical Exam            Assessment and Plan  "   Diagnoses and all orders for this visit:    1. Cirrhosis of liver without ascites, unspecified hepatic cirrhosis type (HCC) (Primary)    2. MEDRANO (nonalcoholic steatohepatitis)    3. Gastroesophageal reflux disease without esophagitis    4. Diverticulosis    Patient had a prolonged recovery from perforated sigmoid diverticulitis requiring exploratory laparotomy, diverting colostomy with ultimate takedown, recurrent wound abscess, SMV thrombosis.  She stopped a lot of her medications if she continues to recovery but encouraged her to restart her Prilosec as I think she is having more reflux symptoms.  She does have esophageal varices history and stopped her nadolol but her heart rate today is 80 and therefore I will defer repeat starting for now as she is hesitant to restart a lot of medications.  She will continue to follow-up with Dr. Kang for wound care.  She will follow-up with me again in 4 to 5 months at which time we will restart surveillance imaging and possibly restart nadolol if she continues to feel better.  She will continue management of her diabetes with her primary care physician              Follow Up   No follow-ups on file.  Patient was given instructions and counseling regarding her condition or for health maintenance advice. Please see specific information pulled into the AVS if appropriate.

## 2023-08-29 ENCOUNTER — PREP FOR SURGERY (OUTPATIENT)
Dept: OTHER | Facility: HOSPITAL | Age: 58
End: 2023-08-29
Payer: MEDICARE

## 2023-08-29 ENCOUNTER — OFFICE VISIT (OUTPATIENT)
Dept: GASTROENTEROLOGY | Facility: CLINIC | Age: 58
End: 2023-08-29
Payer: MEDICARE

## 2023-08-29 VITALS
BODY MASS INDEX: 35.33 KG/M2 | WEIGHT: 192 LBS | DIASTOLIC BLOOD PRESSURE: 65 MMHG | HEART RATE: 83 BPM | HEIGHT: 62 IN | OXYGEN SATURATION: 94 % | SYSTOLIC BLOOD PRESSURE: 133 MMHG

## 2023-08-29 DIAGNOSIS — K21.9 GASTROESOPHAGEAL REFLUX DISEASE WITHOUT ESOPHAGITIS: ICD-10-CM

## 2023-08-29 DIAGNOSIS — R18.8 CIRRHOSIS OF LIVER WITH ASCITES, UNSPECIFIED HEPATIC CIRRHOSIS TYPE: Primary | ICD-10-CM

## 2023-08-29 DIAGNOSIS — K75.81 NASH (NONALCOHOLIC STEATOHEPATITIS): ICD-10-CM

## 2023-08-29 DIAGNOSIS — K74.60 CIRRHOSIS OF LIVER WITH ASCITES, UNSPECIFIED HEPATIC CIRRHOSIS TYPE: Primary | ICD-10-CM

## 2023-08-29 DIAGNOSIS — K70.30 ALCOHOLIC CIRRHOSIS OF LIVER WITHOUT ASCITES: Primary | ICD-10-CM

## 2023-08-29 DIAGNOSIS — I85.00 ESOPHAGEAL VARICES WITHOUT BLEEDING: Primary | ICD-10-CM

## 2023-08-29 RX ORDER — SPIRONOLACTONE 50 MG/1
50 TABLET, FILM COATED ORAL DAILY
Qty: 90 TABLET | Refills: 5 | Status: SHIPPED | OUTPATIENT
Start: 2023-08-29

## 2023-08-29 RX ORDER — POTASSIUM CHLORIDE 750 MG/1
10 TABLET, FILM COATED, EXTENDED RELEASE ORAL 2 TIMES DAILY
COMMUNITY

## 2023-08-29 RX ORDER — LANOLIN ALCOHOL/MO/W.PET/CERES
400 CREAM (GRAM) TOPICAL DAILY
COMMUNITY
Start: 2023-05-12 | End: 2023-11-09

## 2023-08-29 RX ORDER — FUROSEMIDE 20 MG/1
20 TABLET ORAL DAILY
COMMUNITY
Start: 2023-06-07 | End: 2024-06-07

## 2023-08-29 NOTE — H&P (VIEW-ONLY)
Chief Complaint    Tawny Lennon is a 58 y.o. female who presents to [facility] for a follow-up of cirrhosis likely secondary to MEDRANO.  When the patient was last seen she had had exploratory laparotomy with diverting colostomy secondary to rupture of diverticulitis.  This was surgically repaired per Dr. Kang.  She ultimately had takedown of her colostomy.  She had imaging in May of this year with a CT scan of the abdomen pelvis that showed evidence of portal hypertensive changes suggestive of gastric varices.  Liver was reported to be normal in appearance.  She notes over the past couple months she started having increased lower extremity swelling and 30 pound weight gain.  She did have some mild shortness of breath.  She was started on Lasix 20 mg daily by her PCP and has had some benefit.  She had EGD and colonoscopy performed earlier was reviewed from 2022 and prior to reanastomosis after her ileostomy takedown    Result Review :     The following data was reviewed by: Jesse Watkins MD on 08/29/2023:             Past Medical History:   Diagnosis Date    Cirrhosis     liver    Colon polyps     COPD (chronic obstructive pulmonary disease)     Depression     Diabetes     Diverticulitis     Hematuria     High blood pressure     High cholesterol     Interstitial cystitis     Migraine headache     Narcolepsy     PONV (postoperative nausea and vomiting)     Sleep apnea     Spinal headache     DURING PREGNANCY       Past Surgical History:   Procedure Laterality Date    BLADDER REPAIR  2009    CHOLECYSTECTOMY      COLONOSCOPY  2014 x2 2020    ENDOSCOPY  2014 2020    ENDOSCOPY N/A 07/12/2021    Procedure: ESOPHAGOGASTRODUODENOSCOPY with biopsies;  Surgeon: Jesse Watkins MD;  Location: [facility] ENDOSCOPY;  Service: Gastroenterology;  Laterality: N/A;  esophageal varices    ENDOSCOPY N/A 03/25/2022    Procedure: ESOPHAGOGASTRODUODENOSCOPY WITH BIOPSIES;  Surgeon: June  "Jesse Dillard MD;  Location: [facility] ENDOSCOPY;  Service: Gastroenterology;  Laterality: N/A;  ESOPHAGEAL VARICIES    HAND SURGERY      HERNIA REPAIR      HYSTERECTOMY  2009    ILEOSTOMY  2022    OTHER SURGICAL HISTORY      rectocele repair    UPPER GASTROINTESTINAL ENDOSCOPY         Social History     Social History Narrative    Not on file       Objective     Vital Signs:   /65 (BP Location: Left arm, Patient Position: Sitting, Cuff Size: Adult)   Pulse 83   Ht 157.5 cm (62\")   Wt 87.1 kg (192 lb)   SpO2 94%   BMI 35.12 kg/m²     Body mass index is 35.12 kg/m².    Physical Exam            Assessment and Plan    Diagnoses and all orders for this visit:    1. Alcoholic cirrhosis of liver without ascites (Primary)    2. MEDRANO (nonalcoholic steatohepatitis)    3. Gastroesophageal reflux disease without esophagitis      Patient appears to have new onset of ascites and therefore I will schedule her for ultrasound-guided paracentesis and send fluid for cell count, total protein, albumin, and culture and cytology.  Based on the analysis of that fluid if it appears to be representative of portal hypertension then it likely represents progression of her liver disease.  I suspect she has Medrano related cirrhosis.  She has finally recovered from her ruptured diverticulitis which required ex lap and temporary diverting colostomy.  I will begin Aldactone 50 mg daily and have the patient continue Lasix 20 mg daily.  We discussed daily weights and a low-salt diet.  I will schedule her for an upper endoscopy to ensure she does not have esophageal varices.  She also notes her sister  recently from cirrhosis            Follow Up   No follow-ups on file.  Patient was given instructions and counseling regarding her condition or for health maintenance advice. Please see specific information pulled into the AVS if appropriate.     "

## 2023-08-29 NOTE — PROGRESS NOTES
Chief Complaint    Tawny Lennon is a 58 y.o. female who presents to [facility] for a follow-up of cirrhosis likely secondary to MEDRANO.  When the patient was last seen she had had exploratory laparotomy with diverting colostomy secondary to rupture of diverticulitis.  This was surgically repaired per Dr. Kang.  She ultimately had takedown of her colostomy.  She had imaging in May of this year with a CT scan of the abdomen pelvis that showed evidence of portal hypertensive changes suggestive of gastric varices.  Liver was reported to be normal in appearance.  She notes over the past couple months she started having increased lower extremity swelling and 30 pound weight gain.  She did have some mild shortness of breath.  She was started on Lasix 20 mg daily by her PCP and has had some benefit.  She had EGD and colonoscopy performed earlier was reviewed from 2022 and prior to reanastomosis after her ileostomy takedown    Result Review :     The following data was reviewed by: Jesse Watkins MD on 08/29/2023:             Past Medical History:   Diagnosis Date    Cirrhosis     liver    Colon polyps     COPD (chronic obstructive pulmonary disease)     Depression     Diabetes     Diverticulitis     Hematuria     High blood pressure     High cholesterol     Interstitial cystitis     Migraine headache     Narcolepsy     PONV (postoperative nausea and vomiting)     Sleep apnea     Spinal headache     DURING PREGNANCY       Past Surgical History:   Procedure Laterality Date    BLADDER REPAIR  2009    CHOLECYSTECTOMY      COLONOSCOPY  2014 x2 2020    ENDOSCOPY  2014 2020    ENDOSCOPY N/A 07/12/2021    Procedure: ESOPHAGOGASTRODUODENOSCOPY with biopsies;  Surgeon: Jesse Watkins MD;  Location: [facility] ENDOSCOPY;  Service: Gastroenterology;  Laterality: N/A;  esophageal varices    ENDOSCOPY N/A 03/25/2022    Procedure: ESOPHAGOGASTRODUODENOSCOPY WITH BIOPSIES;  Surgeon: June  "Jesse Dillard MD;  Location: [facility] ENDOSCOPY;  Service: Gastroenterology;  Laterality: N/A;  ESOPHAGEAL VARICIES    HAND SURGERY      HERNIA REPAIR      HYSTERECTOMY  2009    ILEOSTOMY  2022    OTHER SURGICAL HISTORY      rectocele repair    UPPER GASTROINTESTINAL ENDOSCOPY         Social History     Social History Narrative    Not on file       Objective     Vital Signs:   /65 (BP Location: Left arm, Patient Position: Sitting, Cuff Size: Adult)   Pulse 83   Ht 157.5 cm (62\")   Wt 87.1 kg (192 lb)   SpO2 94%   BMI 35.12 kg/mý     Body mass index is 35.12 kg/mý.    Physical Exam            Assessment and Plan    Diagnoses and all orders for this visit:    1. Alcoholic cirrhosis of liver without ascites (Primary)    2. MEDRANO (nonalcoholic steatohepatitis)    3. Gastroesophageal reflux disease without esophagitis      Patient appears to have new onset of ascites and therefore I will schedule her for ultrasound-guided paracentesis and send fluid for cell count, total protein, albumin, and culture and cytology.  Based on the analysis of that fluid if it appears to be representative of portal hypertension then it likely represents progression of her liver disease.  I suspect she has Medrano related cirrhosis.  She has finally recovered from her ruptured diverticulitis which required ex lap and temporary diverting colostomy.  I will begin Aldactone 50 mg daily and have the patient continue Lasix 20 mg daily.  We discussed daily weights and a low-salt diet.  I will schedule her for an upper endoscopy to ensure she does not have esophageal varices.  She also notes her sister  recently from cirrhosis            Follow Up   No follow-ups on file.  Patient was given instructions and counseling regarding her condition or for health maintenance advice. Please see specific information pulled into the AVS if appropriate.     "

## 2023-08-31 RX ORDER — ALBUMIN (HUMAN) 12.5 G/50ML
50 SOLUTION INTRAVENOUS ONCE
OUTPATIENT
Start: 2023-08-31 | End: 2023-08-31

## 2023-09-14 ENCOUNTER — HOSPITAL ENCOUNTER (OUTPATIENT)
Dept: INTERVENTIONAL RADIOLOGY/VASCULAR | Facility: HOSPITAL | Age: 58
Discharge: HOME | End: 2023-09-14
Admitting: PHYSICIAN ASSISTANT
Payer: MEDICARE

## 2023-09-14 DIAGNOSIS — R18.8 CIRRHOSIS OF LIVER WITH ASCITES, UNSPECIFIED HEPATIC CIRRHOSIS TYPE: ICD-10-CM

## 2023-09-14 DIAGNOSIS — K74.60 CIRRHOSIS OF LIVER WITH ASCITES, UNSPECIFIED HEPATIC CIRRHOSIS TYPE: ICD-10-CM

## 2023-09-14 LAB
APTT PPP: 34.6 SECONDS (ref 24.2–34.2)
INR PPP: 1.26 (ref 0.86–1.15)
PLATELET # BLD AUTO: 66 10*3/MM3 (ref 140–450)
PROTHROMBIN TIME: 15.8 SECONDS (ref 11.8–14.9)

## 2023-09-14 PROCEDURE — 85730 THROMBOPLASTIN TIME PARTIAL: CPT | Performed by: INTERNAL MEDICINE

## 2023-09-14 PROCEDURE — 85049 AUTOMATED PLATELET COUNT: CPT | Performed by: INTERNAL MEDICINE

## 2023-09-14 PROCEDURE — 85610 PROTHROMBIN TIME: CPT | Performed by: INTERNAL MEDICINE

## 2023-09-14 PROCEDURE — 76705 ECHO EXAM OF ABDOMEN: CPT

## 2023-09-19 NOTE — PRE-PROCEDURE INSTRUCTIONS
"Instructed on date and arrival time of 1300. Come to entrance \"C\".  Must have  over age 18 to drive home.  May have two visitors; however, children under 12 must stay in waiting room.  Discussed diet/NPO.  May take medications as usual except for blood thinners, diabetic medications, or weight loss medications.  Bring list of medications to hospital.  Verbalized understanding of instructions given.  Instructed to call for questions or concerns.  "

## 2023-09-27 ENCOUNTER — ANESTHESIA EVENT (OUTPATIENT)
Dept: GASTROENTEROLOGY | Facility: HOSPITAL | Age: 58
End: 2023-09-27
Payer: MEDICARE

## 2023-09-27 NOTE — ANESTHESIA PREPROCEDURE EVALUATION
Anesthesia Evaluation     history of anesthetic complications:  PONV  NPO Solid Status: > 8 hours  NPO Liquid Status: > 2 hours           Airway   Mallampati: I  TM distance: >3 FB  Neck ROM: full  No difficulty expected  Dental          Pulmonary    (+) COPD,sleep apnea (doesnot wear CPAP), decreased breath sounds  Cardiovascular     ECG reviewed  Rhythm: regular  Rate: normal    (+) hypertension, hyperlipidemia    ROS comment: Patient stated that at times her heart races and she becomes short of breath    Neuro/Psych  (+) headaches, psychiatric history Anxiety and Depression  GI/Hepatic/Renal/Endo    (+) obesity, GERD, liver disease (MEDRANO) cirrhosis, diabetes mellitus (not currently taking medication)    Musculoskeletal     Abdominal   (+) obese   Substance History      OB/GYN          Other        ROS/Med Hx Other:  BORDERLINE ECG -  Sinus rhythm  Borderline T wave abnormalities  Electronically Signed By:   Date and Time of Study: 2023-09-28 06:58:56                  Anesthesia Plan    ASA 3     general   total IV anesthesia  intravenous induction     Anesthetic plan, risks, benefits, and alternatives have been provided, discussed and informed consent has been obtained with: patient.    Plan discussed with CRNA.    CODE STATUS:

## 2023-09-28 ENCOUNTER — ANESTHESIA (OUTPATIENT)
Dept: GASTROENTEROLOGY | Facility: HOSPITAL | Age: 58
End: 2023-09-28
Payer: MEDICARE

## 2023-09-28 ENCOUNTER — HOSPITAL ENCOUNTER (OUTPATIENT)
Facility: HOSPITAL | Age: 58
Setting detail: HOSPITAL OUTPATIENT SURGERY
Discharge: HOME | End: 2023-09-28
Attending: INTERNAL MEDICINE | Admitting: INTERNAL MEDICINE
Payer: MEDICARE

## 2023-09-28 VITALS
DIASTOLIC BLOOD PRESSURE: 62 MMHG | BODY MASS INDEX: 33.51 KG/M2 | OXYGEN SATURATION: 94 % | HEART RATE: 79 BPM | HEIGHT: 62 IN | SYSTOLIC BLOOD PRESSURE: 121 MMHG | WEIGHT: 182.1 LBS | TEMPERATURE: 98.1 F | RESPIRATION RATE: 20 BRPM

## 2023-09-28 DIAGNOSIS — I85.00 ESOPHAGEAL VARICES WITHOUT BLEEDING: ICD-10-CM

## 2023-09-28 LAB
GLUCOSE BLDC GLUCOMTR-MCNC: 168 MG/DL (ref 70–99)
QT INTERVAL: 414 MS
QTC INTERVAL: 480 MS

## 2023-09-28 PROCEDURE — 88305 TISSUE EXAM BY PATHOLOGIST: CPT | Performed by: INTERNAL MEDICINE

## 2023-09-28 PROCEDURE — 93005 ELECTROCARDIOGRAM TRACING: CPT

## 2023-09-28 PROCEDURE — 43239 EGD BIOPSY SINGLE/MULTIPLE: CPT | Performed by: INTERNAL MEDICINE

## 2023-09-28 PROCEDURE — 25010000002 PROPOFOL 10 MG/ML EMULSION: Performed by: NURSE ANESTHETIST, CERTIFIED REGISTERED

## 2023-09-28 PROCEDURE — 82948 REAGENT STRIP/BLOOD GLUCOSE: CPT

## 2023-09-28 PROCEDURE — 93010 ELECTROCARDIOGRAM REPORT: CPT | Performed by: INTERNAL MEDICINE

## 2023-09-28 RX ORDER — PROPOFOL 10 MG/ML
VIAL (ML) INTRAVENOUS AS NEEDED
Status: DISCONTINUED | OUTPATIENT
Start: 2023-09-28 | End: 2023-09-28 | Stop reason: SURG

## 2023-09-28 RX ORDER — SODIUM CHLORIDE, SODIUM LACTATE, POTASSIUM CHLORIDE, CALCIUM CHLORIDE 600; 310; 30; 20 MG/100ML; MG/100ML; MG/100ML; MG/100ML
INJECTION, SOLUTION INTRAVENOUS CONTINUOUS PRN
Status: DISCONTINUED | OUTPATIENT
Start: 2023-09-28 | End: 2023-09-28 | Stop reason: SURG

## 2023-09-28 RX ORDER — NADOLOL 20 MG/1
20 TABLET ORAL DAILY
Qty: 30 TABLET | Refills: 5 | Status: SHIPPED | OUTPATIENT
Start: 2023-09-28

## 2023-09-28 RX ORDER — SODIUM CHLORIDE, SODIUM LACTATE, POTASSIUM CHLORIDE, CALCIUM CHLORIDE 600; 310; 30; 20 MG/100ML; MG/100ML; MG/100ML; MG/100ML
30 INJECTION, SOLUTION INTRAVENOUS CONTINUOUS
Status: DISCONTINUED | OUTPATIENT
Start: 2023-09-28 | End: 2023-09-28 | Stop reason: HOSPADM

## 2023-09-28 RX ORDER — LIDOCAINE HYDROCHLORIDE 20 MG/ML
INJECTION, SOLUTION EPIDURAL; INFILTRATION; INTRACAUDAL; PERINEURAL AS NEEDED
Status: DISCONTINUED | OUTPATIENT
Start: 2023-09-28 | End: 2023-09-28 | Stop reason: SURG

## 2023-09-28 RX ADMIN — LIDOCAINE HYDROCHLORIDE 80 MG: 20 INJECTION, SOLUTION EPIDURAL; INFILTRATION; INTRACAUDAL; PERINEURAL at 07:30

## 2023-09-28 RX ADMIN — SODIUM CHLORIDE, POTASSIUM CHLORIDE, SODIUM LACTATE AND CALCIUM CHLORIDE 30 ML/HR: 600; 310; 30; 20 INJECTION, SOLUTION INTRAVENOUS at 06:34

## 2023-09-28 RX ADMIN — SODIUM CHLORIDE, POTASSIUM CHLORIDE, SODIUM LACTATE AND CALCIUM CHLORIDE: 600; 310; 30; 20 INJECTION, SOLUTION INTRAVENOUS at 07:27

## 2023-09-28 RX ADMIN — PROPOFOL 175 MCG/KG/MIN: 10 INJECTION, EMULSION INTRAVENOUS at 07:35

## 2023-09-28 RX ADMIN — PROPOFOL 100 MG: 10 INJECTION, EMULSION INTRAVENOUS at 07:30

## 2023-09-28 NOTE — ANESTHESIA POSTPROCEDURE EVALUATION
Patient: Tawny Lennon    Procedure Summary       Date: 09/28/23 Room / Location: [facility] ENDOSCOPY 2 / [facility] ENDOSCOPY    Anesthesia Start: 0727 Anesthesia Stop: 0755    Procedure: ESOPHAGOGASTRODUODENOSCOPY WITH COLD BIOPSIES Diagnosis:       Esophageal varices without bleeding      (Esophageal varices without bleeding [I85.00])    Surgeons: Jesse Watkins MD Provider: Marisol Hollis CRNA    Anesthesia Type: general ASA Status: 3            Anesthesia Type: general    Vitals  Vitals Value Taken Time   /71 09/28/23 0814   Temp 36.7 °C (98.1 °F) 09/28/23 0809   Pulse 81 09/28/23 0813   Resp 20 09/28/23 0809   SpO2 94 % 09/28/23 0813   Vitals shown include unvalidated device data.        Post Anesthesia Care and Evaluation    Patient location during evaluation: bedside  Patient participation: complete - patient participated  Level of consciousness: awake and alert  Pain score: 0    Airway patency: patent  Anesthetic complications: No anesthetic complications  PONV Status: none  Cardiovascular status: acceptable  Respiratory status: acceptable  Hydration status: acceptable  No anesthesia care post op

## 2023-10-04 ENCOUNTER — TELEPHONE (OUTPATIENT)
Dept: GASTROENTEROLOGY | Facility: CLINIC | Age: 58
End: 2023-10-04
Payer: MEDICARE

## 2023-10-04 NOTE — TELEPHONE ENCOUNTER
----- Message from ANDRE Alvarado sent at 9/29/2023  9:51 AM EDT -----  I have reviewed upper endoscopy.  Mild chronic inactive gastritis.  Grade 1 esophageal varices noted on EGD ensure that the patient begin nadolol 20 mg daily.  Negative results for H. Pylori, metaplasia, dysplasia and malignancy.

## 2023-10-04 NOTE — TELEPHONE ENCOUNTER
Spoke to patient and informed of ANDRE Alvarado result note and recommendations. Verified patient understanding.    Patient states she is currently taking nadolol 20 mg.      Confirmed follow up appointment with ANDRE Alvarado on 1/4/24 at 1054.     Patient does not report any GI issues or concerns at this time. Educated to contact the office if any arise.  Patient verbalize understanding.

## 2023-11-16 ENCOUNTER — APPOINTMENT (OUTPATIENT)
Dept: GENERAL RADIOLOGY | Facility: HOSPITAL | Age: 58
End: 2023-11-16
Payer: MEDICARE

## 2023-11-16 ENCOUNTER — HOSPITAL ENCOUNTER (INPATIENT)
Facility: HOSPITAL | Age: 58
LOS: 5 days | Discharge: HOME | End: 2023-11-21
Attending: FAMILY MEDICINE | Admitting: STUDENT IN AN ORGANIZED HEALTH CARE EDUCATION/TRAINING PROGRAM
Payer: MEDICARE

## 2023-11-16 DIAGNOSIS — K74.60 CIRRHOSIS OF LIVER WITH ASCITES, UNSPECIFIED HEPATIC CIRRHOSIS TYPE: ICD-10-CM

## 2023-11-16 DIAGNOSIS — K92.2 UPPER GI BLEED: Primary | ICD-10-CM

## 2023-11-16 DIAGNOSIS — R26.2 DIFFICULTY WALKING: ICD-10-CM

## 2023-11-16 DIAGNOSIS — R18.8 CIRRHOSIS OF LIVER WITH ASCITES, UNSPECIFIED HEPATIC CIRRHOSIS TYPE: ICD-10-CM

## 2023-11-16 LAB
ABO GROUP BLD: NORMAL
ALBUMIN SERPL-MCNC: 2.7 G/DL (ref 3.5–5.2)
ALBUMIN/GLOB SERPL: 0.7 G/DL
ALP SERPL-CCNC: 122 U/L (ref 39–117)
ALT SERPL W P-5'-P-CCNC: 15 U/L (ref 1–33)
ANION GAP SERPL CALCULATED.3IONS-SCNC: 8.8 MMOL/L (ref 5–15)
AST SERPL-CCNC: 42 U/L (ref 1–32)
BASOPHILS # BLD AUTO: 0.02 10*3/MM3 (ref 0–0.2)
BASOPHILS NFR BLD AUTO: 0.4 % (ref 0–1.5)
BILIRUB SERPL-MCNC: 3.4 MG/DL (ref 0–1.2)
BLD GP AB SCN SERPL QL: NEGATIVE
BUN SERPL-MCNC: 19 MG/DL (ref 8–23)
BUN/CREAT SERPL: 20.4 (ref 7–25)
CALCIUM SPEC-SCNC: 9 MG/DL (ref 8.6–10.5)
CHLORIDE SERPL-SCNC: 110 MMOL/L (ref 98–107)
CO2 SERPL-SCNC: 18.2 MMOL/L (ref 22–29)
CREAT SERPL-MCNC: 0.93 MG/DL (ref 0.57–1)
DEPRECATED RDW RBC AUTO: 49.2 FL (ref 37–54)
EGFRCR SERPLBLD CKD-EPI 2021: 70.5 ML/MIN/1.73
EOSINOPHIL # BLD AUTO: 0.25 10*3/MM3 (ref 0–0.4)
EOSINOPHIL NFR BLD AUTO: 5.2 % (ref 0.3–6.2)
ERYTHROCYTE [DISTWIDTH] IN BLOOD BY AUTOMATED COUNT: 14.2 % (ref 12.3–15.4)
GLOBULIN UR ELPH-MCNC: 4.1 GM/DL
GLUCOSE SERPL-MCNC: 101 MG/DL (ref 65–99)
HCT VFR BLD AUTO: 34.5 % (ref 34–46.6)
HGB BLD-MCNC: 11.8 G/DL (ref 12–15.9)
IMM GRANULOCYTES # BLD AUTO: 0.02 10*3/MM3 (ref 0–0.05)
IMM GRANULOCYTES NFR BLD AUTO: 0.4 % (ref 0–0.5)
LYMPHOCYTES # BLD AUTO: 1.35 10*3/MM3 (ref 0.7–3.1)
LYMPHOCYTES NFR BLD AUTO: 28.1 % (ref 19.6–45.3)
MAGNESIUM SERPL-MCNC: 1.7 MG/DL (ref 1.6–2.4)
MCH RBC QN AUTO: 32.3 PG (ref 26.6–33)
MCHC RBC AUTO-ENTMCNC: 34.2 G/DL (ref 31.5–35.7)
MCV RBC AUTO: 94.5 FL (ref 79–97)
MONOCYTES # BLD AUTO: 0.28 10*3/MM3 (ref 0.1–0.9)
MONOCYTES NFR BLD AUTO: 5.8 % (ref 5–12)
NEUTROPHILS NFR BLD AUTO: 2.89 10*3/MM3 (ref 1.7–7)
NEUTROPHILS NFR BLD AUTO: 60.1 % (ref 42.7–76)
NRBC BLD AUTO-RTO: 0 /100 WBC (ref 0–0.2)
PHOSPHATE SERPL-MCNC: 2.7 MG/DL (ref 2.5–4.5)
PLATELET # BLD AUTO: 61 10*3/MM3 (ref 140–450)
PMV BLD AUTO: 11.2 FL (ref 6–12)
POTASSIUM SERPL-SCNC: 3.9 MMOL/L (ref 3.5–5.2)
PROT SERPL-MCNC: 6.8 G/DL (ref 6–8.5)
RBC # BLD AUTO: 3.65 10*6/MM3 (ref 3.77–5.28)
RH BLD: POSITIVE
SODIUM SERPL-SCNC: 137 MMOL/L (ref 136–145)
T&S EXPIRATION DATE: NORMAL
WBC NRBC COR # BLD AUTO: 4.81 10*3/MM3 (ref 3.4–10.8)

## 2023-11-16 PROCEDURE — 99222 1ST HOSP IP/OBS MODERATE 55: CPT | Performed by: STUDENT IN AN ORGANIZED HEALTH CARE EDUCATION/TRAINING PROGRAM

## 2023-11-16 PROCEDURE — 80053 COMPREHEN METABOLIC PANEL: CPT | Performed by: STUDENT IN AN ORGANIZED HEALTH CARE EDUCATION/TRAINING PROGRAM

## 2023-11-16 PROCEDURE — 71045 X-RAY EXAM CHEST 1 VIEW: CPT

## 2023-11-16 PROCEDURE — 86901 BLOOD TYPING SEROLOGIC RH(D): CPT | Performed by: STUDENT IN AN ORGANIZED HEALTH CARE EDUCATION/TRAINING PROGRAM

## 2023-11-16 PROCEDURE — 86900 BLOOD TYPING SEROLOGIC ABO: CPT | Performed by: STUDENT IN AN ORGANIZED HEALTH CARE EDUCATION/TRAINING PROGRAM

## 2023-11-16 PROCEDURE — 81003 URINALYSIS AUTO W/O SCOPE: CPT | Performed by: STUDENT IN AN ORGANIZED HEALTH CARE EDUCATION/TRAINING PROGRAM

## 2023-11-16 PROCEDURE — 84100 ASSAY OF PHOSPHORUS: CPT | Performed by: STUDENT IN AN ORGANIZED HEALTH CARE EDUCATION/TRAINING PROGRAM

## 2023-11-16 PROCEDURE — 25010000002 OCTREOTIDE PER 25 MCG: Performed by: STUDENT IN AN ORGANIZED HEALTH CARE EDUCATION/TRAINING PROGRAM

## 2023-11-16 PROCEDURE — 86850 RBC ANTIBODY SCREEN: CPT | Performed by: STUDENT IN AN ORGANIZED HEALTH CARE EDUCATION/TRAINING PROGRAM

## 2023-11-16 PROCEDURE — 85025 COMPLETE CBC W/AUTO DIFF WBC: CPT | Performed by: STUDENT IN AN ORGANIZED HEALTH CARE EDUCATION/TRAINING PROGRAM

## 2023-11-16 PROCEDURE — 25010000002 CEFTRIAXONE PER 250 MG: Performed by: STUDENT IN AN ORGANIZED HEALTH CARE EDUCATION/TRAINING PROGRAM

## 2023-11-16 PROCEDURE — 83735 ASSAY OF MAGNESIUM: CPT | Performed by: STUDENT IN AN ORGANIZED HEALTH CARE EDUCATION/TRAINING PROGRAM

## 2023-11-16 PROCEDURE — 25810000003 LACTATED RINGERS PER 1000 ML: Performed by: STUDENT IN AN ORGANIZED HEALTH CARE EDUCATION/TRAINING PROGRAM

## 2023-11-16 RX ORDER — SODIUM CHLORIDE, SODIUM LACTATE, POTASSIUM CHLORIDE, CALCIUM CHLORIDE 600; 310; 30; 20 MG/100ML; MG/100ML; MG/100ML; MG/100ML
75 INJECTION, SOLUTION INTRAVENOUS CONTINUOUS
Status: DISCONTINUED | OUTPATIENT
Start: 2023-11-16 | End: 2023-11-17

## 2023-11-16 RX ORDER — SODIUM CHLORIDE 0.9 % (FLUSH) 0.9 %
10 SYRINGE (ML) INJECTION AS NEEDED
Status: DISCONTINUED | OUTPATIENT
Start: 2023-11-16 | End: 2023-11-21 | Stop reason: HOSPADM

## 2023-11-16 RX ORDER — GUAIFENESIN 600 MG/1
600 TABLET, EXTENDED RELEASE ORAL EVERY 12 HOURS PRN
Status: DISCONTINUED | OUTPATIENT
Start: 2023-11-16 | End: 2023-11-21 | Stop reason: HOSPADM

## 2023-11-16 RX ORDER — SODIUM CHLORIDE 0.9 % (FLUSH) 0.9 %
10 SYRINGE (ML) INJECTION EVERY 12 HOURS SCHEDULED
Status: DISCONTINUED | OUTPATIENT
Start: 2023-11-16 | End: 2023-11-21 | Stop reason: HOSPADM

## 2023-11-16 RX ORDER — NICOTINE POLACRILEX 4 MG
15 LOZENGE BUCCAL
Status: DISCONTINUED | OUTPATIENT
Start: 2023-11-16 | End: 2023-11-17

## 2023-11-16 RX ORDER — NADOLOL 20 MG/1
20 TABLET ORAL DAILY
Status: DISCONTINUED | OUTPATIENT
Start: 2023-11-17 | End: 2023-11-20

## 2023-11-16 RX ORDER — PANTOPRAZOLE SODIUM 40 MG/10ML
40 INJECTION, POWDER, LYOPHILIZED, FOR SOLUTION INTRAVENOUS 2 TIMES DAILY
Status: DISCONTINUED | OUTPATIENT
Start: 2023-11-16 | End: 2023-11-21

## 2023-11-16 RX ORDER — ACETAMINOPHEN 500 MG
1000 TABLET ORAL EVERY 8 HOURS PRN
Status: DISCONTINUED | OUTPATIENT
Start: 2023-11-16 | End: 2023-11-21 | Stop reason: HOSPADM

## 2023-11-16 RX ORDER — DEXTROSE MONOHYDRATE 25 G/50ML
25 INJECTION, SOLUTION INTRAVENOUS
Status: DISCONTINUED | OUTPATIENT
Start: 2023-11-16 | End: 2023-11-17

## 2023-11-16 RX ORDER — CEFTRIAXONE SODIUM 1 G/50ML
1000 INJECTION, SOLUTION INTRAVENOUS EVERY 24 HOURS
Status: DISCONTINUED | OUTPATIENT
Start: 2023-11-16 | End: 2023-11-20

## 2023-11-16 RX ORDER — IBUPROFEN 600 MG/1
1 TABLET ORAL
Status: DISCONTINUED | OUTPATIENT
Start: 2023-11-16 | End: 2023-11-17

## 2023-11-16 RX ORDER — SODIUM CHLORIDE 9 MG/ML
40 INJECTION, SOLUTION INTRAVENOUS AS NEEDED
Status: DISCONTINUED | OUTPATIENT
Start: 2023-11-16 | End: 2023-11-21 | Stop reason: HOSPADM

## 2023-11-16 RX ADMIN — PANTOPRAZOLE SODIUM 40 MG: 40 INJECTION, POWDER, FOR SOLUTION INTRAVENOUS at 23:23

## 2023-11-16 RX ADMIN — Medication 10 ML: at 22:52

## 2023-11-16 RX ADMIN — OCTREOTIDE ACETATE 50 MCG/HR: 500 INJECTION, SOLUTION INTRAVENOUS; SUBCUTANEOUS at 22:52

## 2023-11-16 RX ADMIN — CEFTRIAXONE SODIUM 1000 MG: 1 INJECTION, SOLUTION INTRAVENOUS at 22:51

## 2023-11-16 RX ADMIN — SODIUM CHLORIDE, POTASSIUM CHLORIDE, SODIUM LACTATE AND CALCIUM CHLORIDE 75 ML/HR: 600; 310; 30; 20 INJECTION, SOLUTION INTRAVENOUS at 22:53

## 2023-11-16 RX ADMIN — ACETAMINOPHEN 1000 MG: 500 TABLET ORAL at 22:51

## 2023-11-17 ENCOUNTER — ANESTHESIA EVENT (OUTPATIENT)
Dept: GASTROENTEROLOGY | Facility: HOSPITAL | Age: 58
End: 2023-11-17
Payer: MEDICARE

## 2023-11-17 ENCOUNTER — ANESTHESIA (OUTPATIENT)
Dept: GASTROENTEROLOGY | Facility: HOSPITAL | Age: 58
End: 2023-11-17
Payer: MEDICARE

## 2023-11-17 LAB
BILIRUB UR QL STRIP: ABNORMAL
CLARITY UR: CLEAR
COLOR UR: ABNORMAL
GLUCOSE BLDC GLUCOMTR-MCNC: 109 MG/DL (ref 70–99)
GLUCOSE BLDC GLUCOMTR-MCNC: 112 MG/DL (ref 70–99)
GLUCOSE BLDC GLUCOMTR-MCNC: 138 MG/DL (ref 70–99)
GLUCOSE BLDC GLUCOMTR-MCNC: 204 MG/DL (ref 70–99)
GLUCOSE BLDC GLUCOMTR-MCNC: 95 MG/DL (ref 70–99)
GLUCOSE UR STRIP-MCNC: NEGATIVE MG/DL
HCT VFR BLD AUTO: 30.4 % (ref 34–46.6)
HCT VFR BLD AUTO: 31.4 % (ref 34–46.6)
HCT VFR BLD AUTO: 35.3 % (ref 34–46.6)
HGB BLD-MCNC: 10.3 G/DL (ref 12–15.9)
HGB BLD-MCNC: 10.3 G/DL (ref 12–15.9)
HGB BLD-MCNC: 11.7 G/DL (ref 12–15.9)
HGB UR QL STRIP.AUTO: NEGATIVE
KETONES UR QL STRIP: NEGATIVE
LEUKOCYTE ESTERASE UR QL STRIP.AUTO: NEGATIVE
NITRITE UR QL STRIP: NEGATIVE
PH UR STRIP.AUTO: 5.5 [PH] (ref 5–8)
PROT UR QL STRIP: NEGATIVE
SP GR UR STRIP: >1.03 (ref 1–1.03)
UROBILINOGEN UR QL STRIP: ABNORMAL

## 2023-11-17 PROCEDURE — 85018 HEMOGLOBIN: CPT | Performed by: INTERNAL MEDICINE

## 2023-11-17 PROCEDURE — 43244 EGD VARICES LIGATION: CPT | Performed by: INTERNAL MEDICINE

## 2023-11-17 PROCEDURE — 82948 REAGENT STRIP/BLOOD GLUCOSE: CPT

## 2023-11-17 PROCEDURE — 25010000002 CEFTRIAXONE PER 250 MG: Performed by: INTERNAL MEDICINE

## 2023-11-17 PROCEDURE — 0DB68ZX EXCISION OF STOMACH, VIA NATURAL OR ARTIFICIAL OPENING ENDOSCOPIC, DIAGNOSTIC: ICD-10-PCS | Performed by: INTERNAL MEDICINE

## 2023-11-17 PROCEDURE — 25010000002 OCTREOTIDE PER 25 MCG: Performed by: INTERNAL MEDICINE

## 2023-11-17 PROCEDURE — 25010000002 OCTREOTIDE PER 25 MCG: Performed by: STUDENT IN AN ORGANIZED HEALTH CARE EDUCATION/TRAINING PROGRAM

## 2023-11-17 PROCEDURE — 43239 EGD BIOPSY SINGLE/MULTIPLE: CPT | Performed by: INTERNAL MEDICINE

## 2023-11-17 PROCEDURE — 25810000003 LACTATED RINGERS PER 1000 ML: Performed by: STUDENT IN AN ORGANIZED HEALTH CARE EDUCATION/TRAINING PROGRAM

## 2023-11-17 PROCEDURE — 63710000001 INSULIN LISPRO (HUMAN) PER 5 UNITS: Performed by: INTERNAL MEDICINE

## 2023-11-17 PROCEDURE — 25010000002 MORPHINE PER 10 MG: Performed by: FAMILY MEDICINE

## 2023-11-17 PROCEDURE — 25010000002 PROPOFOL 10 MG/ML EMULSION

## 2023-11-17 PROCEDURE — 99222 1ST HOSP IP/OBS MODERATE 55: CPT | Performed by: INTERNAL MEDICINE

## 2023-11-17 PROCEDURE — 85014 HEMATOCRIT: CPT | Performed by: STUDENT IN AN ORGANIZED HEALTH CARE EDUCATION/TRAINING PROGRAM

## 2023-11-17 PROCEDURE — 88305 TISSUE EXAM BY PATHOLOGIST: CPT | Performed by: INTERNAL MEDICINE

## 2023-11-17 PROCEDURE — 85018 HEMOGLOBIN: CPT | Performed by: STUDENT IN AN ORGANIZED HEALTH CARE EDUCATION/TRAINING PROGRAM

## 2023-11-17 PROCEDURE — 99232 SBSQ HOSP IP/OBS MODERATE 35: CPT | Performed by: INTERNAL MEDICINE

## 2023-11-17 PROCEDURE — 0W3P8ZZ CONTROL BLEEDING IN GASTROINTESTINAL TRACT, VIA NATURAL OR ARTIFICIAL OPENING ENDOSCOPIC: ICD-10-PCS | Performed by: INTERNAL MEDICINE

## 2023-11-17 PROCEDURE — 85014 HEMATOCRIT: CPT | Performed by: INTERNAL MEDICINE

## 2023-11-17 RX ORDER — PHENYLEPHRINE HCL IN 0.9% NACL 1 MG/10 ML
SYRINGE (ML) INTRAVENOUS AS NEEDED
Status: DISCONTINUED | OUTPATIENT
Start: 2023-11-17 | End: 2023-11-17 | Stop reason: SURG

## 2023-11-17 RX ORDER — LIDOCAINE HYDROCHLORIDE 20 MG/ML
INJECTION, SOLUTION EPIDURAL; INFILTRATION; INTRACAUDAL; PERINEURAL AS NEEDED
Status: DISCONTINUED | OUTPATIENT
Start: 2023-11-17 | End: 2023-11-17 | Stop reason: SURG

## 2023-11-17 RX ORDER — PROPOFOL 10 MG/ML
VIAL (ML) INTRAVENOUS AS NEEDED
Status: DISCONTINUED | OUTPATIENT
Start: 2023-11-17 | End: 2023-11-17 | Stop reason: SURG

## 2023-11-17 RX ORDER — DEXTROSE MONOHYDRATE 25 G/50ML
25 INJECTION, SOLUTION INTRAVENOUS
Status: DISCONTINUED | OUTPATIENT
Start: 2023-11-17 | End: 2023-11-21 | Stop reason: HOSPADM

## 2023-11-17 RX ORDER — INSULIN LISPRO 100 [IU]/ML
2-7 INJECTION, SOLUTION INTRAVENOUS; SUBCUTANEOUS
Status: DISCONTINUED | OUTPATIENT
Start: 2023-11-17 | End: 2023-11-21 | Stop reason: HOSPADM

## 2023-11-17 RX ORDER — HYDROXYZINE PAMOATE 25 MG/1
25 CAPSULE ORAL ONCE
Status: COMPLETED | OUTPATIENT
Start: 2023-11-17 | End: 2023-11-17

## 2023-11-17 RX ORDER — NICOTINE POLACRILEX 4 MG
15 LOZENGE BUCCAL
Status: DISCONTINUED | OUTPATIENT
Start: 2023-11-17 | End: 2023-11-21 | Stop reason: HOSPADM

## 2023-11-17 RX ORDER — MORPHINE SULFATE 2 MG/ML
1 INJECTION, SOLUTION INTRAMUSCULAR; INTRAVENOUS ONCE
Status: COMPLETED | OUTPATIENT
Start: 2023-11-17 | End: 2023-11-17

## 2023-11-17 RX ORDER — ALBUMIN (HUMAN) 12.5 G/50ML
25 SOLUTION INTRAVENOUS
Qty: 200 ML | Refills: 0 | Status: DISCONTINUED | OUTPATIENT
Start: 2023-11-17 | End: 2023-11-17

## 2023-11-17 RX ORDER — MORPHINE SULFATE 2 MG/ML
2 INJECTION, SOLUTION INTRAMUSCULAR; INTRAVENOUS EVERY 4 HOURS PRN
Status: DISCONTINUED | OUTPATIENT
Start: 2023-11-17 | End: 2023-11-17

## 2023-11-17 RX ORDER — SODIUM CHLORIDE, SODIUM LACTATE, POTASSIUM CHLORIDE, CALCIUM CHLORIDE 600; 310; 30; 20 MG/100ML; MG/100ML; MG/100ML; MG/100ML
30 INJECTION, SOLUTION INTRAVENOUS CONTINUOUS
Status: DISCONTINUED | OUTPATIENT
Start: 2023-11-17 | End: 2023-11-17

## 2023-11-17 RX ORDER — MORPHINE SULFATE 2 MG/ML
1 INJECTION, SOLUTION INTRAMUSCULAR; INTRAVENOUS EVERY 4 HOURS PRN
Status: DISCONTINUED | OUTPATIENT
Start: 2023-11-17 | End: 2023-11-17

## 2023-11-17 RX ORDER — IBUPROFEN 600 MG/1
1 TABLET ORAL
Status: DISCONTINUED | OUTPATIENT
Start: 2023-11-17 | End: 2023-11-21 | Stop reason: HOSPADM

## 2023-11-17 RX ADMIN — SODIUM CHLORIDE, POTASSIUM CHLORIDE, SODIUM LACTATE AND CALCIUM CHLORIDE: 600; 310; 30; 20 INJECTION, SOLUTION INTRAVENOUS at 12:32

## 2023-11-17 RX ADMIN — CEFTRIAXONE SODIUM 1000 MG: 1 INJECTION, SOLUTION INTRAVENOUS at 23:29

## 2023-11-17 RX ADMIN — PROPOFOL 200 MCG/KG/MIN: 10 INJECTION, EMULSION INTRAVENOUS at 12:37

## 2023-11-17 RX ADMIN — Medication 200 MCG: at 12:52

## 2023-11-17 RX ADMIN — MORPHINE SULFATE 1 MG: 2 INJECTION, SOLUTION INTRAMUSCULAR; INTRAVENOUS at 22:12

## 2023-11-17 RX ADMIN — LIDOCAINE HYDROCHLORIDE 80 MG: 20 INJECTION, SOLUTION EPIDURAL; INFILTRATION; INTRACAUDAL; PERINEURAL at 12:37

## 2023-11-17 RX ADMIN — HYDROXYZINE PAMOATE 25 MG: 25 CAPSULE ORAL at 02:59

## 2023-11-17 RX ADMIN — OCTREOTIDE ACETATE 50 MCG/HR: 500 INJECTION, SOLUTION INTRAVENOUS; SUBCUTANEOUS at 09:18

## 2023-11-17 RX ADMIN — Medication 200 MCG: at 12:44

## 2023-11-17 RX ADMIN — PANTOPRAZOLE SODIUM 40 MG: 40 INJECTION, POWDER, FOR SOLUTION INTRAVENOUS at 08:09

## 2023-11-17 RX ADMIN — INSULIN LISPRO 3 UNITS: 100 INJECTION, SOLUTION INTRAVENOUS; SUBCUTANEOUS at 18:12

## 2023-11-17 RX ADMIN — OCTREOTIDE ACETATE 50 MCG/HR: 500 INJECTION, SOLUTION INTRAVENOUS; SUBCUTANEOUS at 21:20

## 2023-11-17 RX ADMIN — PROPOFOL 50 MG: 10 INJECTION, EMULSION INTRAVENOUS at 12:37

## 2023-11-17 RX ADMIN — Medication 10 ML: at 22:14

## 2023-11-17 RX ADMIN — PANTOPRAZOLE SODIUM 40 MG: 40 INJECTION, POWDER, FOR SOLUTION INTRAVENOUS at 22:12

## 2023-11-17 NOTE — ANESTHESIA POSTPROCEDURE EVALUATION
Patient: Tawny Lennon    Procedure Summary       Date: 11/17/23 Room / Location: [facility] ENDOSCOPY 5 / [facility] ENDOSCOPY    Anesthesia Start: 1231 Anesthesia Stop: 1257    Procedure: ESOPHAGOGASTRODUODENOSCOPY WITH BIOPSIES AND  VARICEAL BANDING X 4 Diagnosis:       Upper GI bleed      (Upper GI bleed [K92.2])    Surgeons: Jesse Watkins MD Provider: Rachana Pisano CRNA    Anesthesia Type: general ASA Status: 4            Anesthesia Type: general    Vitals  Vitals Value Taken Time   /64 11/17/23 1311   Temp 36.6 °C (97.8 °F) 11/17/23 1310   Pulse 66 11/17/23 1314   Resp 20 11/17/23 1310   SpO2 96 % 11/17/23 1314   Vitals shown include unfiled device data.        Post Anesthesia Care and Evaluation    Post-procedure mental status: acceptable.  Pain management: satisfactory to patient    Airway patency: patent  Anesthetic complications: No anesthetic complications    Cardiovascular status: acceptable  Respiratory status: acceptable    Comments: Per chart review

## 2023-11-17 NOTE — H&P
[facility]  Date: 2023   Patient Name: Tawny Lennon  : 1965  MRN: [id number]  Primary Care Physician:  Zain Valentine MD  Date of admission: 2023    Subjective   Subjective     Chief Complaint: GI bleed    HPI:    Tawny Lennon is a 58 y.o. female with past medical history significant for Reich cirrhosis, type II DM, COPD, HTN, and HLD who presented for GI bleed.    Patient was transferred to [facility] from [place] ED.  She complained of abdominal pain, blood in vomit, and melena at around 1 PM on .  They request transfer for GI evaluation.  She endorses cough and nasal congestion.  She also endorses dysuria.  She denies fever, no chills, no chest pain, no constipation or diarrhea.      Personal History     Past Medical History:  Past Medical History:   Diagnosis Date    Cirrhosis     liver    Cirrhosis, nonalcoholic     Colon polyps     COPD (chronic obstructive pulmonary disease)     Depression     Diabetes     Diverticulitis     Hematuria     High blood pressure     High cholesterol     Interstitial cystitis     Migraine headache     Narcolepsy     PONV (postoperative nausea and vomiting)     Sleep apnea     Spinal headache     DURING PREGNANCY           Past Surgical History:  Past Surgical History:   Procedure Laterality Date    BLADDER REPAIR      CHOLECYSTECTOMY      COLONOSCOPY  2014 x2 2020    ENDOSCOPY  2014    ENDOSCOPY N/A 2021    Procedure: ESOPHAGOGASTRODUODENOSCOPY with biopsies;  Surgeon: Jesse Watkins MD;  Location: [facility] ENDOSCOPY;  Service: Gastroenterology;  Laterality: N/A;  esophageal varices    ENDOSCOPY N/A 2022    Procedure: ESOPHAGOGASTRODUODENOSCOPY WITH BIOPSIES;  Surgeon: Jesse Watkins MD;  Location: [facility] ENDOSCOPY;  Service: Gastroenterology;  Laterality: N/A;  ESOPHAGEAL VARICIES    ENDOSCOPY N/A 2023    Procedure: ESOPHAGOGASTRODUODENOSCOPY WITH COLD BIOPSIES;   Surgeon: Jesse Watkins MD;  Location: [facility] ENDOSCOPY;  Service: Gastroenterology;  Laterality: N/A;  GASTRITIS, ESOPHAGEAL VARICES    HAND SURGERY      HERNIA REPAIR      HYSTERECTOMY  2009    ILEOSTOMY  07/2022    OTHER SURGICAL HISTORY      rectocele repair    UPPER GASTROINTESTINAL ENDOSCOPY         Family History:   Family History   Problem Relation Age of Onset    Liver cancer Father     Dementia Mother     Malig Hyperthermia Neg Hx     Colon cancer Neg Hx        Social History:   Social History     Socioeconomic History    Marital status:    Tobacco Use    Smoking status: Never     Passive exposure: Yes    Smokeless tobacco: Never   Vaping Use    Vaping Use: Never used   Substance and Sexual Activity    Alcohol use: Never    Drug use: Never    Sexual activity: Defer       Home Medications:  SITagliptin, furosemide, loratadine, metFORMIN, nadolol, potassium chloride, and spironolactone    Allergies:  No Known Allergies    Review of Systems   All systems were reviewed and negative except for indicated in HPI    Objective   Objective     Vitals:   Temp:  [97.9 °F (36.6 °C)] 97.9 °F (36.6 °C)  Heart Rate:  [73] 73  Resp:  [20] 20  BP: (113)/(87) 113/87    Physical Exam    Constitutional: Awake, alert, no acute distress   Eyes: Pupils equal, sclerae anicteric, no conjunctival injection   HENT: NCAT, mucous membranes moist   Neck: Supple, no thyromegaly, no lymphadenopathy, trachea midline   Respiratory: Clear to auscultation bilaterally, nonlabored respirations    Cardiovascular: RRR, no murmurs, rubs, or gallops, palpable pedal pulses bilaterally   Gastrointestinal: Positive bowel sounds, soft, nontender, nondistended   Musculoskeletal: No bilateral ankle edema, no clubbing or cyanosis to extremities   Psychiatric: Appropriate affect, cooperative   Neurologic: Oriented x 3, strength symmetric in all extremities, Cranial Nerves grossly intact to confrontation, speech clear   Skin: No rashes      Result Review    Result Review:  I have personally reviewed the results from the time of this admission to 11/16/2023 22:47 EST and agree with these findings:  [x]  Laboratory  [x]  Microbiology  [x]  Radiology  [x]  EKG/Telemetry   [x]  Cardiology/Vascular   []  Pathology  []  Old records  []  Other:    Laboratory Studies:  Recent Results (from the past 24 hour(s))   TYPE AND SCREEN    Collection Time: 11/16/23  3:23 PM    Specimen type and source: EDTA Plasma, Plasma specimen with ethylenediamine tetraacetic acid (specimen)   Result Value Ref Range    Crossmatch Expiration 11/19/2023,0500     ABORh A POSITIVE     Antibody Screen NEGATIVE    CBC Auto Differential    Collection Time: 11/16/23 10:17 PM    Specimen: Blood   Result Value Ref Range    WBC 4.81 3.40 - 10.80 10*3/mm3    RBC 3.65 (L) 3.77 - 5.28 10*6/mm3    Hemoglobin 11.8 (L) 12.0 - 15.9 g/dL    Hematocrit 34.5 34.0 - 46.6 %    MCV 94.5 79.0 - 97.0 fL    MCH 32.3 26.6 - 33.0 pg    MCHC 34.2 31.5 - 35.7 g/dL    RDW 14.2 12.3 - 15.4 %    RDW-SD 49.2 37.0 - 54.0 fl    MPV 11.2 6.0 - 12.0 fL    Platelets 61 (L) 140 - 450 10*3/mm3    Neutrophil % 60.1 42.7 - 76.0 %    Lymphocyte % 28.1 19.6 - 45.3 %    Monocyte % 5.8 5.0 - 12.0 %    Eosinophil % 5.2 0.3 - 6.2 %    Basophil % 0.4 0.0 - 1.5 %    Immature Grans % 0.4 0.0 - 0.5 %    Neutrophils, Absolute 2.89 1.70 - 7.00 10*3/mm3    Lymphocytes, Absolute 1.35 0.70 - 3.10 10*3/mm3    Monocytes, Absolute 0.28 0.10 - 0.90 10*3/mm3    Eosinophils, Absolute 0.25 0.00 - 0.40 10*3/mm3    Basophils, Absolute 0.02 0.00 - 0.20 10*3/mm3    Immature Grans, Absolute 0.02 0.00 - 0.05 10*3/mm3    nRBC 0.0 0.0 - 0.2 /100 WBC       Imaging:  CT chest abdomen pelvis with contrast per contrast protocol    Result Date: 11/16/2023  Narrative: REPORT-ID:[id number] STUDY:  CT CHEST, ABDOMEN T PELVIS WITH CONTRAST REASON FOR EXAM:   Female, 58 years old.  GI bleed RADIATION DOSAGE (If Supplied By Facility):   CTDIvol = ( 11.9 ) mGy, DLP = ( 804.3 ) mGycm TECHNIQUE:   Transaxial imaging was performed following intravenous administration of IV Optiray 350 100. Individualized dose optimization techniques were used for this CT. COMPARISON:   5/17/2023 ___________________________________ FINDINGS: CHEST The lungs are normal.  There is no demonstrated pleural abnormality. Normal heart and pericardium. Normal mediastinum.  Normal hilar regions. Normal unenhanced pulmonary arteries.   Normal aorta arch and descending thoracic aorta. Normal osseous structures. There is no demonstrated abnormality of the visualized upper abdomen. ABDOMEN The visualized lung bases are unremarkable.  The visualized portions of the heart are within normal limits. There is a diffuse contour abnormality of the liver consistent with cirrhotic changes.  The gallbladder is contracted.  There is mild splenomegaly.  Normal pancreas. Normal bilateral adrenal glands. Normal right kidney.  Normal left kidney. Varices near the gastroesophageal junction secondary to portal hypertension.  Normal small intestine.  There are multiple colonic diverticula consistent with diverticulosis.  There is non-visualization of the appendix. Normal abdominal aorta.  Normal inferior vena cava.  Normal retroperitoneum. Postsurgical changes of the umbilicus.  Normal osseous structures. PELVIS Normal urinary bladder. Normal visualized small intestine.  Normal visualized colon. There is no pelvic fluid.  There is no pelvic lymphadenopathy or mass lesion. Normal visualized pelvic arteries. Normal abdominal wall.  Normal osseous structures. ___________________________________    Impression: 1.  No active pulmonary disease. 2.  Cirrhosis with the mild splenomegaly and gastroesophageal varices. 3.  Sigmoid diverticulosis without diverticulitis. Electronically Signed: Maikol Lion MD 2023/11/16 at 16:01 CST Reading Location ID and State: [id number] / [place] Tel [phone], Service support   [phone], Fax [phone]     EKG: (my review): Not indicated    Assessment & Plan   Assessment / Plan     -I have talked to the patient about her condition.  I have reviewed her labs, imaging, and record.    -Acute blood loss anemia from upper GI bleed in the settings of Reich cirrhosis: Patient endorses blood in the vomitus and melena.  She does not take any blood thinners.  I will start patient on IV PPI 40 mg twice daily, octreotide drip, and ceftriaxone for SBP ppx.  Trend H&H every 8 hours.  Type and screen.  Transfuse if Hb < 7.  -GI is aware.  Keep n.p.o. for EGD in the morning.    -Dysuria: She is on ceftriaxone as above.  Pending UA.    -Cough and nasal congestion: No wheezing.  Likely viral upper respiratory infection.  On room air.  I will check chest x-ray and giving prn Mucinex for cough.    Resume home medication as appropriate.  DVT prophylaxis with SCD.          CODE STATUS:    Level Of Support Discussed With: Patient  Code Status (Patient has no pulse and is not breathing): CPR (Attempt to Resuscitate)  Medical Interventions (Patient has pulse or is breathing): Full Support      Admission Status:  I believe this patient meets admission status.    Electronically signed by Jose G Mota MD, 11/16/23, 10:37 PM EST.

## 2023-11-17 NOTE — ANESTHESIA PREPROCEDURE EVALUATION
Anesthesia Evaluation     Patient summary reviewed and Nursing notes reviewed   history of anesthetic complications (with general anesthesia):  PONV  NPO Solid Status: > 8 hours  NPO Liquid Status: > 6 hours           Airway   Mallampati: II  TM distance: >3 FB  Neck ROM: full  No difficulty expected  Dental - normal exam     Comment: Cracked tooth right upper molar     Pulmonary - normal exam    breath sounds clear to auscultation  (+) COPD mild,sleep apnea  Cardiovascular - normal exam  Exercise tolerance: poor (<4 METS)    Rhythm: regular  Rate: normal    (+) hypertension well controlled, hyperlipidemia      Neuro/Psych  (+) headaches, psychiatric history Depression  GI/Hepatic/Renal/Endo    (+) obesity, GERD well controlled, GI bleeding , hepatitis, liver disease cirrhosis, diabetes mellitus type 2 well controlled    Musculoskeletal     Abdominal   (+) obese    Abdomen: soft.   Substance History      OB/GYN          Other        ROS/Med Hx Other: Labs :   11/17/23 00:18  Hemoglobin: 11.7 (L)  Hematocrit: 35.3    11/17/23 07:49  Hemoglobin: 10.3 (L)  Hematocrit: 31.4 (L)    Patient has octreotide drip currently infusing , has not received any blood products at this time     EKG 09/28/23: HR 81, SR                    Anesthesia Plan    ASA 4     general   total IV anesthesia  intravenous induction     Anesthetic plan, risks, benefits, and alternatives have been provided, discussed and informed consent has been obtained with: patient.  Pre-procedure education provided  Plan discussed with CRNA.      CODE STATUS:    Level Of Support Discussed With: Patient  Code Status (Patient has no pulse and is not breathing): CPR (Attempt to Resuscitate)  Medical Interventions (Patient has pulse or is breathing): Full Support

## 2023-11-17 NOTE — PROGRESS NOTES
[facility]   Hospitalist Progress Note  Date: 2023  Patient Name: Tawny Lennon  : 1965  MRN: [id number]  Date of admission: 2023      Subjective   Subjective     Chief Complaint:   GI bleed    Summary:   Tawny Lennon is a 58 y.o. female with past medical history significant for Reich cirrhosis, type II DM, COPD, HTN, and HLD who presented for GI bleed.     Patient was transferred to [facility] from [place] [facility].  She complained of abdominal pain, blood in vomit, and melena at around 1 PM on .  They request transfer for GI evaluation.  She endorses cough and nasal congestion.  She also endorses dysuria.  She denies fever, no chills, no chest pain, no constipation or diarrhea.    Interval Followup:   Patient underwent EGD with gastroenterology today, patient found to have LA grade a reflux esophagitis with bleeding.  Grade 3 esophageal varices, incompletely eradicated, banded.  Portal hypertensive gastropathy.  Nonerosive gastritis, biopsied.  Normal examined duodenum.  Small hiatal hernia.  Plan to continue patient octreotide for 2 days.  Patient transitioned over to oral Protonix and started on a soft diet for 2 days.  Plan for repeat EGD in 1 month as an outpatient.  Patient seen following procedure today, tired and feeling slightly better.      Objective   Objective     Vitals:   Temp:  [97.5 °F (36.4 °C)-98.2 °F (36.8 °C)] 97.8 °F (36.6 °C)  Heart Rate:  [61-73] 61  Resp:  [16-26] 16  BP: (105-127)/(46-87) 126/64  Physical Exam    Constitutional: Awake, alert, no acute distress   Eyes: Pupils equal, sclerae anicteric, no conjunctival injection   HENT: NCAT, mucous membranes moist   Neck: Supple, no thyromegaly, no lymphadenopathy, trachea midline   Respiratory: Clear to auscultation bilaterally, nonlabored respirations    Cardiovascular: RRR, no murmurs, rubs, or gallops, palpable pedal pulses bilaterally   Gastrointestinal: Positive bowel sounds, soft, nontender,  distended   Musculoskeletal: No bilateral ankle edema, no clubbing or cyanosis to extremities   Psychiatric: Appropriate affect, cooperative   Neurologic: Oriented x 3, strength symmetric in all extremities, Cranial Nerves grossly intact to confrontation, speech clear   Skin: No rashes     Result Review    Result Review:  I have personally reviewed the results from 11/17/2023 and agree with these findings:  []  Laboratory  []  Microbiology  []  Radiology  []  EKG/Telemetry   []  Cardiology/Vascular   []  Pathology  []  Old records  []  Other:    Assessment & Plan   Assessment / Plan     Assessment/Plan:  Acute upper GI bleeding, esophageal varices  Reich cirrhosis  Type 2 diabetes  COPD not in exacerbation  Hypertension  Hyperlipidemia    Plan:  Patient remains admitted to hospital for further care management  Gastroenterology consulted, appreciate assistance  EGD performed today showing grade 3 esophageal varices, incompletely eradicated, banded  Plan for octreotide x2 days  Protonix abdomen transition to twice daily dosing  We will continue on ceftriaxone for now  Currently holding patient's diuretics  Started back on home nadolol  Patient started with sliding scale insulin hypoglycemia protocol  Discontinuing every 8 hour H&H, daily CBC     Discussed plan with RN.    DVT prophylaxis:  Mechanical DVT prophylaxis orders are present.    CODE STATUS:   Level Of Support Discussed With: Patient  Code Status (Patient has no pulse and is not breathing): CPR (Attempt to Resuscitate)  Medical Interventions (Patient has pulse or is breathing): Full Support

## 2023-11-17 NOTE — PLAN OF CARE
Goal Outcome Evaluation:  Plan of Care Reviewed With: patient        Progress: improving          Patient underwent EGD this afternoon. Returned to the unit with no complaints of nausea. Mild chest discomfort r/t procedure. June HENNESSY aware. PRN medications ordered. Tolerating soft diet. Bed in lowest locked position with call light and tray table within reach.

## 2023-11-17 NOTE — CONSULTS
[facility]  Initial Inpatient Consult Note    Referring Provider: Hospitalist    Reason for Consultation: Hematemesis, Reich cirrhosis    Subjective     History of present illness:    58 y.o. female with a history of Reich related cirrhosis transferred from [facility] after she developed 2 days of hematemesis, nausea, dyspepsia and some black stools.  This happened a couple of times yesterday morning but nothing since then.  Her hemoglobin was only slightly decreased.  She had an upper endoscopy in September of this year that showed early grade 1 esophageal varices and she was started on nadolol at that time.  She does have a history of ruptured sigmoid diverticulosis which required temporary colostomy and ultimate reversal/reanastomosis in 2022.  Overall she been doing well up until the last 24 hours.  She has no history of alcohol.  She has not had any further nausea or hematemesis this morning.    Past Medical History:  Past Medical History:   Diagnosis Date    Cirrhosis     liver    Cirrhosis, nonalcoholic     Colon polyps     COPD (chronic obstructive pulmonary disease)     Depression     Diabetes     Diverticulitis     Hematuria     High blood pressure     High cholesterol     Interstitial cystitis     Migraine headache     Narcolepsy     PONV (postoperative nausea and vomiting)     Sleep apnea     Spinal headache     DURING PREGNANCY     Past Surgical History:  Past Surgical History:   Procedure Laterality Date    BLADDER REPAIR  2009    CHOLECYSTECTOMY      COLONOSCOPY  2014 x2 2020    ENDOSCOPY  2014 2020    ENDOSCOPY N/A 07/12/2021    Procedure: ESOPHAGOGASTRODUODENOSCOPY with biopsies;  Surgeon: Jesse Watkins MD;  Location: [facility] ENDOSCOPY;  Service: Gastroenterology;  Laterality: N/A;  esophageal varices    ENDOSCOPY N/A 03/25/2022    Procedure: ESOPHAGOGASTRODUODENOSCOPY WITH BIOPSIES;  Surgeon: Jesse Watkins MD;  Location: [facility] ENDOSCOPY;  Service:  Gastroenterology;  Laterality: N/A;  ESOPHAGEAL VARICIES    ENDOSCOPY N/A 9/28/2023    Procedure: ESOPHAGOGASTRODUODENOSCOPY WITH COLD BIOPSIES;  Surgeon: Jesse Watkins MD;  Location: [facility] ENDOSCOPY;  Service: Gastroenterology;  Laterality: N/A;  GASTRITIS, ESOPHAGEAL VARICES    HAND SURGERY      HERNIA REPAIR      HYSTERECTOMY  2009    ILEOSTOMY  07/2022    OTHER SURGICAL HISTORY      rectocele repair    UPPER GASTROINTESTINAL ENDOSCOPY        Social History:   Social History     Tobacco Use    Smoking status: Never     Passive exposure: Yes    Smokeless tobacco: Never   Substance Use Topics    Alcohol use: Never      Family History:  Family History   Problem Relation Age of Onset    Liver cancer Father     Dementia Mother     Malig Hyperthermia Neg Hx     Colon cancer Neg Hx        Home Meds:  Medications Prior to Admission   Medication Sig Dispense Refill Last Dose    furosemide (LASIX) 20 MG tablet Take 1 tablet by mouth Daily.   11/16/2023    metFORMIN (GLUCOPHAGE) 500 MG tablet Take 1 tablet by mouth 2 (Two) Times a Day With Meals. Only takes one a day   11/16/2023    nadolol (CORGARD) 20 MG tablet Take 1 tablet by mouth Daily. 30 tablet 5 Past Week    potassium chloride 10 MEQ CR tablet Take 1 tablet by mouth 2 (Two) Times a Day.   Past Week    spironolactone (ALDACTONE) 50 MG tablet Take 1 tablet by mouth Daily. 90 tablet 5 Past Week     Current Meds:   cefTRIAXone, 1,000 mg, Intravenous, Q24H  insulin regular, 2-9 Units, Subcutaneous, Q6H  nadolol, 20 mg, Oral, Daily  pantoprazole, 40 mg, Intravenous, BID  sodium chloride, 10 mL, Intravenous, Q12H      Allergies:  No Known Allergies  Review of Systems  Pertinent items are noted in HPI, all other systems reviewed and negative         Vital Signs  Temp:  [97.6 °F (36.4 °C)-98.2 °F (36.8 °C)] 98.2 °F (36.8 °C)  Heart Rate:  [62-73] 62  Resp:  [16-20] 16  BP: (105-120)/(47-87) 105/53  Physical Exam:  General Appearance:    Alert, cooperative, in no  "acute distress   Head:    Normocephalic, without obvious abnormality, atraumatic   Eyes:          conjunctivae and sclerae normal, no   icterus   Throat:   no thrush, oral mucosa moist   Neck:   Supple, no adenopathy   Lungs:     Clear to auscultation bilaterally    Heart:    Regular rhythm and normal rate    Chest Wall:    No abnormalities observed   Abdomen:     Soft, nondistended, nontender; normal bowel sounds   Extremities:   no edema, no redness   Skin:   No bruising or rash   Psychiatric:  normal mood and insight     Results Review:  [x]  Laboratory   [x]  Radiology  []  Pathology      I reviewed the patient's new clinical results.    Results from last 7 days   Lab Units 11/17/23  0749 11/17/23  0018 11/16/23  2217   WBC 10*3/mm3  --   --  4.81   HEMOGLOBIN g/dL 10.3* 11.7* 11.8*   HEMATOCRIT % 31.4* 35.3 34.5   PLATELETS 10*3/mm3  --   --  61*     Results from last 7 days   Lab Units 11/16/23  2217   SODIUM mmol/L 137   POTASSIUM mmol/L 3.9   CHLORIDE mmol/L 110*   CO2 mmol/L 18.2*   BUN mg/dL 19   CREATININE mg/dL 0.93   CALCIUM mg/dL 9.0   BILIRUBIN mg/dL 3.4*   ALK PHOS U/L 122*   ALT (SGPT) U/L 15   AST (SGOT) U/L 42*   GLUCOSE mg/dL 101*         No results found for: \"LIPASE\"    Radiology:  XR Chest 1 View   Final Result    No acute cardiopulmonary disease is seen radiographically.                   Please note that portions of this note were completed with a voice recognition program.       PATRICIA MCKEON JR, MD          Electronically Signed and Approved By: PATRICIA MCKEON JR, MD on 11/17/2023 at 3:35                                    Assessment & Plan       Upper GI bleed  Cirrhosis  Esophageal varices  History of ruptured sigmoid diverticulosis with colon resection and reanastomosis    Plan:  We will schedule for upper endoscopy later today.  Her hemoglobin is stable.  She been treated with octreotide and Protonix overnight.  I discussed risk and benefits of EGD with patient and she is willing to " proceed.      I discussed the patients findings and my recommendations with patient.    Jesse Watkins MD

## 2023-11-17 NOTE — H&P (VIEW-ONLY)
[facility]  Initial Inpatient Consult Note    Referring Provider: Hospitalist    Reason for Consultation: Hematemesis, Reich cirrhosis    Subjective     History of present illness:    58 y.o. female with a history of Reich related cirrhosis transferred from [facility] after she developed 2 days of hematemesis, nausea, dyspepsia and some black stools.  This happened a couple of times yesterday morning but nothing since then.  Her hemoglobin was only slightly decreased.  She had an upper endoscopy in September of this year that showed early grade 1 esophageal varices and she was started on nadolol at that time.  She does have a history of ruptured sigmoid diverticulosis which required temporary colostomy and ultimate reversal/reanastomosis in 2022.  Overall she been doing well up until the last 24 hours.  She has no history of alcohol.  She has not had any further nausea or hematemesis this morning.    Past Medical History:  Past Medical History:   Diagnosis Date    Cirrhosis     liver    Cirrhosis, nonalcoholic     Colon polyps     COPD (chronic obstructive pulmonary disease)     Depression     Diabetes     Diverticulitis     Hematuria     High blood pressure     High cholesterol     Interstitial cystitis     Migraine headache     Narcolepsy     PONV (postoperative nausea and vomiting)     Sleep apnea     Spinal headache     DURING PREGNANCY     Past Surgical History:  Past Surgical History:   Procedure Laterality Date    BLADDER REPAIR  2009    CHOLECYSTECTOMY      COLONOSCOPY  2014 x2 2020    ENDOSCOPY  2014 2020    ENDOSCOPY N/A 07/12/2021    Procedure: ESOPHAGOGASTRODUODENOSCOPY with biopsies;  Surgeon: Jesse Watkins MD;  Location: [facility] ENDOSCOPY;  Service: Gastroenterology;  Laterality: N/A;  esophageal varices    ENDOSCOPY N/A 03/25/2022    Procedure: ESOPHAGOGASTRODUODENOSCOPY WITH BIOPSIES;  Surgeon: Jesse Watkins MD;  Location: [facility] ENDOSCOPY;  Service:  Gastroenterology;  Laterality: N/A;  ESOPHAGEAL VARICIES    ENDOSCOPY N/A 9/28/2023    Procedure: ESOPHAGOGASTRODUODENOSCOPY WITH COLD BIOPSIES;  Surgeon: Jesse Watkins MD;  Location: [facility] ENDOSCOPY;  Service: Gastroenterology;  Laterality: N/A;  GASTRITIS, ESOPHAGEAL VARICES    HAND SURGERY      HERNIA REPAIR      HYSTERECTOMY  2009    ILEOSTOMY  07/2022    OTHER SURGICAL HISTORY      rectocele repair    UPPER GASTROINTESTINAL ENDOSCOPY        Social History:   Social History     Tobacco Use    Smoking status: Never     Passive exposure: Yes    Smokeless tobacco: Never   Substance Use Topics    Alcohol use: Never      Family History:  Family History   Problem Relation Age of Onset    Liver cancer Father     Dementia Mother     Malig Hyperthermia Neg Hx     Colon cancer Neg Hx        Home Meds:  Medications Prior to Admission   Medication Sig Dispense Refill Last Dose    furosemide (LASIX) 20 MG tablet Take 1 tablet by mouth Daily.   11/16/2023    metFORMIN (GLUCOPHAGE) 500 MG tablet Take 1 tablet by mouth 2 (Two) Times a Day With Meals. Only takes one a day   11/16/2023    nadolol (CORGARD) 20 MG tablet Take 1 tablet by mouth Daily. 30 tablet 5 Past Week    potassium chloride 10 MEQ CR tablet Take 1 tablet by mouth 2 (Two) Times a Day.   Past Week    spironolactone (ALDACTONE) 50 MG tablet Take 1 tablet by mouth Daily. 90 tablet 5 Past Week     Current Meds:   cefTRIAXone, 1,000 mg, Intravenous, Q24H  insulin regular, 2-9 Units, Subcutaneous, Q6H  nadolol, 20 mg, Oral, Daily  pantoprazole, 40 mg, Intravenous, BID  sodium chloride, 10 mL, Intravenous, Q12H      Allergies:  No Known Allergies  Review of Systems  Pertinent items are noted in HPI, all other systems reviewed and negative         Vital Signs  Temp:  [97.6 °F (36.4 °C)-98.2 °F (36.8 °C)] 98.2 °F (36.8 °C)  Heart Rate:  [62-73] 62  Resp:  [16-20] 16  BP: (105-120)/(47-87) 105/53  Physical Exam:  General Appearance:    Alert, cooperative, in no  "acute distress   Head:    Normocephalic, without obvious abnormality, atraumatic   Eyes:          conjunctivae and sclerae normal, no   icterus   Throat:   no thrush, oral mucosa moist   Neck:   Supple, no adenopathy   Lungs:     Clear to auscultation bilaterally    Heart:    Regular rhythm and normal rate    Chest Wall:    No abnormalities observed   Abdomen:     Soft, nondistended, nontender; normal bowel sounds   Extremities:   no edema, no redness   Skin:   No bruising or rash   Psychiatric:  normal mood and insight     Results Review:  [x]  Laboratory   [x]  Radiology  []  Pathology      I reviewed the patient's new clinical results.    Results from last 7 days   Lab Units 11/17/23  0749 11/17/23  0018 11/16/23  2217   WBC 10*3/mm3  --   --  4.81   HEMOGLOBIN g/dL 10.3* 11.7* 11.8*   HEMATOCRIT % 31.4* 35.3 34.5   PLATELETS 10*3/mm3  --   --  61*     Results from last 7 days   Lab Units 11/16/23  2217   SODIUM mmol/L 137   POTASSIUM mmol/L 3.9   CHLORIDE mmol/L 110*   CO2 mmol/L 18.2*   BUN mg/dL 19   CREATININE mg/dL 0.93   CALCIUM mg/dL 9.0   BILIRUBIN mg/dL 3.4*   ALK PHOS U/L 122*   ALT (SGPT) U/L 15   AST (SGOT) U/L 42*   GLUCOSE mg/dL 101*         No results found for: \"LIPASE\"    Radiology:  XR Chest 1 View   Final Result    No acute cardiopulmonary disease is seen radiographically.                   Please note that portions of this note were completed with a voice recognition program.       PATRICIA MCKEON JR, MD          Electronically Signed and Approved By: PTARICIA MCKEON JR, MD on 11/17/2023 at 3:35                                    Assessment & Plan       Upper GI bleed  Cirrhosis  Esophageal varices  History of ruptured sigmoid diverticulosis with colon resection and reanastomosis    Plan:  We will schedule for upper endoscopy later today.  Her hemoglobin is stable.  She been treated with octreotide and Protonix overnight.  I discussed risk and benefits of EGD with patient and she is willing to " proceed.      I discussed the patients findings and my recommendations with patient.    Jesse Watkins MD

## 2023-11-17 NOTE — SIGNIFICANT NOTE
11/17/23 0845   Plan   Plan GALDINO, RN met with patient at bedside.  Patient reports living in basement of home shared with sister living upstairs.  Reports financial strain at times with affording food.  CM provided Community Resource Guide to patient.  Patient is able to complete IADL's.  Facesheet verified.  Patient plans to return home with no needs at this time.  Will continue to monitor.

## 2023-11-18 LAB
ANION GAP SERPL CALCULATED.3IONS-SCNC: 7.2 MMOL/L (ref 5–15)
BUN SERPL-MCNC: 16 MG/DL (ref 8–23)
BUN/CREAT SERPL: 16.7 (ref 7–25)
CALCIUM SPEC-SCNC: 8.4 MG/DL (ref 8.6–10.5)
CHLORIDE SERPL-SCNC: 104 MMOL/L (ref 98–107)
CO2 SERPL-SCNC: 19.8 MMOL/L (ref 22–29)
CREAT SERPL-MCNC: 0.96 MG/DL (ref 0.57–1)
DEPRECATED RDW RBC AUTO: 48.6 FL (ref 37–54)
EGFRCR SERPLBLD CKD-EPI 2021: 67.9 ML/MIN/1.73
ERYTHROCYTE [DISTWIDTH] IN BLOOD BY AUTOMATED COUNT: 13.9 % (ref 12.3–15.4)
GLUCOSE BLDC GLUCOMTR-MCNC: 139 MG/DL (ref 70–99)
GLUCOSE BLDC GLUCOMTR-MCNC: 185 MG/DL (ref 70–99)
GLUCOSE SERPL-MCNC: 140 MG/DL (ref 65–99)
HCT VFR BLD AUTO: 30.2 % (ref 34–46.6)
HGB BLD-MCNC: 10.2 G/DL (ref 12–15.9)
MAGNESIUM SERPL-MCNC: 1.6 MG/DL (ref 1.6–2.4)
MCH RBC QN AUTO: 31.9 PG (ref 26.6–33)
MCHC RBC AUTO-ENTMCNC: 33.8 G/DL (ref 31.5–35.7)
MCV RBC AUTO: 94.4 FL (ref 79–97)
PLATELET # BLD AUTO: 49 10*3/MM3 (ref 140–450)
PMV BLD AUTO: 11.5 FL (ref 6–12)
POTASSIUM SERPL-SCNC: 3.9 MMOL/L (ref 3.5–5.2)
RBC # BLD AUTO: 3.2 10*6/MM3 (ref 3.77–5.28)
SODIUM SERPL-SCNC: 131 MMOL/L (ref 136–145)
WBC NRBC COR # BLD AUTO: 3.24 10*3/MM3 (ref 3.4–10.8)

## 2023-11-18 PROCEDURE — 82948 REAGENT STRIP/BLOOD GLUCOSE: CPT

## 2023-11-18 PROCEDURE — 25010000002 OCTREOTIDE PER 25 MCG: Performed by: INTERNAL MEDICINE

## 2023-11-18 PROCEDURE — 99232 SBSQ HOSP IP/OBS MODERATE 35: CPT | Performed by: INTERNAL MEDICINE

## 2023-11-18 PROCEDURE — 94799 UNLISTED PULMONARY SVC/PX: CPT

## 2023-11-18 PROCEDURE — 63710000001 INSULIN LISPRO (HUMAN) PER 5 UNITS: Performed by: INTERNAL MEDICINE

## 2023-11-18 PROCEDURE — 83735 ASSAY OF MAGNESIUM: CPT | Performed by: INTERNAL MEDICINE

## 2023-11-18 PROCEDURE — 80048 BASIC METABOLIC PNL TOTAL CA: CPT | Performed by: INTERNAL MEDICINE

## 2023-11-18 PROCEDURE — 85027 COMPLETE CBC AUTOMATED: CPT | Performed by: INTERNAL MEDICINE

## 2023-11-18 PROCEDURE — 25010000002 CEFTRIAXONE PER 250 MG: Performed by: INTERNAL MEDICINE

## 2023-11-18 RX ORDER — FUROSEMIDE 20 MG/1
20 TABLET ORAL DAILY
Status: DISCONTINUED | OUTPATIENT
Start: 2023-11-18 | End: 2023-11-19

## 2023-11-18 RX ORDER — ALUMINA, MAGNESIA, AND SIMETHICONE 2400; 2400; 240 MG/30ML; MG/30ML; MG/30ML
15 SUSPENSION ORAL EVERY 6 HOURS PRN
Status: DISCONTINUED | OUTPATIENT
Start: 2023-11-18 | End: 2023-11-21 | Stop reason: HOSPADM

## 2023-11-18 RX ORDER — SPIRONOLACTONE 25 MG/1
50 TABLET ORAL DAILY
Status: DISCONTINUED | OUTPATIENT
Start: 2023-11-18 | End: 2023-11-19

## 2023-11-18 RX ADMIN — GUAIFENESIN 600 MG: 600 TABLET ORAL at 21:35

## 2023-11-18 RX ADMIN — PANTOPRAZOLE SODIUM 40 MG: 40 INJECTION, POWDER, FOR SOLUTION INTRAVENOUS at 08:56

## 2023-11-18 RX ADMIN — CEFTRIAXONE SODIUM 1000 MG: 1 INJECTION, SOLUTION INTRAVENOUS at 23:34

## 2023-11-18 RX ADMIN — ALUMINUM HYDROXIDE, MAGNESIUM HYDROXIDE, AND DIMETHICONE 15 ML: 400; 400; 40 SUSPENSION ORAL at 09:02

## 2023-11-18 RX ADMIN — OCTREOTIDE ACETATE 50 MCG/HR: 500 INJECTION, SOLUTION INTRAVENOUS; SUBCUTANEOUS at 06:35

## 2023-11-18 RX ADMIN — NADOLOL 20 MG: 20 TABLET ORAL at 08:56

## 2023-11-18 RX ADMIN — INSULIN LISPRO 2 UNITS: 100 INJECTION, SOLUTION INTRAVENOUS; SUBCUTANEOUS at 21:22

## 2023-11-18 RX ADMIN — OCTREOTIDE ACETATE 50 MCG/HR: 500 INJECTION, SOLUTION INTRAVENOUS; SUBCUTANEOUS at 17:25

## 2023-11-18 RX ADMIN — PANTOPRAZOLE SODIUM 40 MG: 40 INJECTION, POWDER, FOR SOLUTION INTRAVENOUS at 21:22

## 2023-11-18 RX ADMIN — GUAIFENESIN 600 MG: 600 TABLET ORAL at 05:45

## 2023-11-18 RX ADMIN — SPIRONOLACTONE 50 MG: 25 TABLET ORAL at 15:22

## 2023-11-18 RX ADMIN — ALUMINUM HYDROXIDE, MAGNESIUM HYDROXIDE, AND DIMETHICONE 15 ML: 400; 400; 40 SUSPENSION ORAL at 21:21

## 2023-11-18 RX ADMIN — Medication 10 ML: at 21:22

## 2023-11-18 RX ADMIN — FUROSEMIDE 20 MG: 20 TABLET ORAL at 15:22

## 2023-11-18 RX ADMIN — ALUMINUM HYDROXIDE, MAGNESIUM HYDROXIDE, AND DIMETHICONE 15 ML: 400; 400; 40 SUSPENSION ORAL at 03:34

## 2023-11-18 RX ADMIN — Medication 10 ML: at 08:58

## 2023-11-18 NOTE — PLAN OF CARE
Goal Outcome Evaluation:  Plan of Care Reviewed With: patient        Progress: improving       VSS, room air. Frequent congested cough and heartburn, no c/o SOA. No s/s of active bleeding this shift. No pain. Continued continuous octreotide drip per MAR. Receiving IV Protonix BID. Restarted home lasix and spironolactone today. Tolerating diet. Up adlib. Will CTM and provide care.

## 2023-11-18 NOTE — PROGRESS NOTES
[facility]   Hospitalist Progress Note  Date: 2023  Patient Name: Tawny Lennon  : 1965  MRN: [id number]  Date of admission: 2023      Subjective   Subjective     Chief Complaint:   GI bleed    Summary:   Tawny Lennon is a 58 y.o. female with past medical history significant for Reich cirrhosis, type II DM, COPD, HTN, and HLD who presented for GI bleed.     Patient was transferred to [facility] from [place] [facility].  She complained of abdominal pain, blood in vomit, and melena at around 1 PM on .  They request transfer for GI evaluation.  She endorses cough and nasal congestion.  She also endorses dysuria.  She denies fever, no chills, no chest pain, no constipation or diarrhea. Patient underwent EGD with gastroenterology on 2023, patient found to have LA grade a reflux esophagitis with bleeding.  Grade 3 esophageal varices, incompletely eradicated, banded.  Portal hypertensive gastropathy.  Nonerosive gastritis, biopsied.  Normal examined duodenum.  Small hiatal hernia.  Plan to continue patient octreotide for 2 days.  Patient transitioned over to oral Protonix and started on a soft diet for 2 days.  Plan for repeat EGD in 1 month as an outpatient.     Interval Followup:   Patient with congestive cough today.  Slowly eating and feeling some better.  Complaining of edema to lower extremities, will resume home diuretics.  Adding incentive spirometer and flutter valve for congestive cough, on physical exam patient with no concerning breath sounds      Objective   Objective     Vitals:   Temp:  [97.8 °F (36.6 °C)-99 °F (37.2 °C)] 98.3 °F (36.8 °C)  Heart Rate:  [61] 61  Resp:  [16-20] 20  BP: (109-126)/(46-64) 122/59  Physical Exam    Constitutional: Awake, alert, no acute distress   Eyes: Pupils equal, sclerae anicteric, no conjunctival injection   HENT: NCAT, mucous membranes moist   Neck: Supple, no thyromegaly, no lymphadenopathy, trachea midline   Respiratory: Clear to  auscultation bilaterally, nonlabored respirations    Cardiovascular: RRR, no murmurs, rubs, or gallops, palpable pedal pulses bilaterally   Gastrointestinal: Positive bowel sounds, soft, nontender, distended   Musculoskeletal: No bilateral ankle edema, no clubbing or cyanosis to extremities   Psychiatric: Appropriate affect, cooperative   Neurologic: Oriented x 3, strength symmetric in all extremities, Cranial Nerves grossly intact to confrontation, speech clear   Skin: No rashes     Result Review    Result Review:  I have personally reviewed the results from 11/18/2023 and agree with these findings:  []  Laboratory  []  Microbiology  []  Radiology  []  EKG/Telemetry   []  Cardiology/Vascular   []  Pathology  []  Old records  []  Other:    Assessment & Plan   Assessment / Plan     Assessment/Plan:  Acute upper GI bleeding, esophageal varices  Reich cirrhosis  Thrombocytopenia  Type 2 diabetes  COPD not in exacerbation  Hypertension  Hyperlipidemia    Plan:  Patient remains admitted to hospital for further care and management  Gastroenterology consulted, appreciate assistance  EGD performed on the 17th, showing grade 3 esophageal varices, incompletely eradicated, banded  Plan for octreotide x2 days  Continue Protonix twice daily dosing  We will continue on ceftriaxone for now  Resuming home diuretics  Continue home nadolol  Patient started with sliding scale insulin hypoglycemia protocol  Discontinuing every 8 hour H&H, daily CBC  Continue to monitor platelet count closely, and SCDs for DVT prophylaxis  Adding incentive spirometer and flutter valve for patient's cough monitor for other concerning signs for infection     Discussed plan with RN.    DVT prophylaxis:  Mechanical DVT prophylaxis orders are present.    CODE STATUS:   Level Of Support Discussed With: Patient  Code Status (Patient has no pulse and is not breathing): CPR (Attempt to Resuscitate)  Medical Interventions (Patient has pulse or is breathing): Full  Support

## 2023-11-18 NOTE — PLAN OF CARE
Goal Outcome Evaluation:  Plan of Care Reviewed With: patient        Progress: no change  Outcome Evaluation: PRN pain medication given per MAR. VSS. octreotide drip currently infusing.

## 2023-11-19 LAB
ANION GAP SERPL CALCULATED.3IONS-SCNC: 9.3 MMOL/L (ref 5–15)
BUN SERPL-MCNC: 17 MG/DL (ref 8–23)
BUN/CREAT SERPL: 15.5 (ref 7–25)
CALCIUM SPEC-SCNC: 8.4 MG/DL (ref 8.6–10.5)
CHLORIDE SERPL-SCNC: 106 MMOL/L (ref 98–107)
CO2 SERPL-SCNC: 19.7 MMOL/L (ref 22–29)
CREAT SERPL-MCNC: 1.1 MG/DL (ref 0.57–1)
DEPRECATED RDW RBC AUTO: 49.2 FL (ref 37–54)
EGFRCR SERPLBLD CKD-EPI 2021: 57.6 ML/MIN/1.73
ERYTHROCYTE [DISTWIDTH] IN BLOOD BY AUTOMATED COUNT: 14.1 % (ref 12.3–15.4)
FLUAV SUBTYP SPEC NAA+PROBE: NOT DETECTED
FLUBV RNA ISLT QL NAA+PROBE: NOT DETECTED
GLUCOSE BLDC GLUCOMTR-MCNC: 137 MG/DL (ref 70–99)
GLUCOSE BLDC GLUCOMTR-MCNC: 164 MG/DL (ref 70–99)
GLUCOSE BLDC GLUCOMTR-MCNC: 170 MG/DL (ref 70–99)
GLUCOSE BLDC GLUCOMTR-MCNC: 192 MG/DL (ref 70–99)
GLUCOSE SERPL-MCNC: 134 MG/DL (ref 65–99)
HCT VFR BLD AUTO: 30.4 % (ref 34–46.6)
HCT VFR BLD AUTO: 31.8 % (ref 34–46.6)
HGB BLD-MCNC: 10.4 G/DL (ref 12–15.9)
HGB BLD-MCNC: 10.7 G/DL (ref 12–15.9)
MAGNESIUM SERPL-MCNC: 1.8 MG/DL (ref 1.6–2.4)
MCH RBC QN AUTO: 31.9 PG (ref 26.6–33)
MCHC RBC AUTO-ENTMCNC: 33.6 G/DL (ref 31.5–35.7)
MCV RBC AUTO: 94.9 FL (ref 79–97)
PLATELET # BLD AUTO: 55 10*3/MM3 (ref 140–450)
PMV BLD AUTO: 11.1 FL (ref 6–12)
POTASSIUM SERPL-SCNC: 4 MMOL/L (ref 3.5–5.2)
RBC # BLD AUTO: 3.35 10*6/MM3 (ref 3.77–5.28)
RSV RNA NPH QL NAA+NON-PROBE: NOT DETECTED
SARS-COV-2 RNA RESP QL NAA+PROBE: NOT DETECTED
SODIUM SERPL-SCNC: 135 MMOL/L (ref 136–145)
WBC NRBC COR # BLD AUTO: 4.33 10*3/MM3 (ref 3.4–10.8)

## 2023-11-19 PROCEDURE — 25010000002 OCTREOTIDE PER 25 MCG: Performed by: INTERNAL MEDICINE

## 2023-11-19 PROCEDURE — 99232 SBSQ HOSP IP/OBS MODERATE 35: CPT | Performed by: INTERNAL MEDICINE

## 2023-11-19 PROCEDURE — 25810000003 LACTATED RINGERS SOLUTION: Performed by: INTERNAL MEDICINE

## 2023-11-19 PROCEDURE — 25810000003 SODIUM CHLORIDE 0.9 % SOLUTION: Performed by: PHYSICIAN ASSISTANT

## 2023-11-19 PROCEDURE — 82948 REAGENT STRIP/BLOOD GLUCOSE: CPT

## 2023-11-19 PROCEDURE — 85018 HEMOGLOBIN: CPT | Performed by: PHYSICIAN ASSISTANT

## 2023-11-19 PROCEDURE — 85027 COMPLETE CBC AUTOMATED: CPT | Performed by: INTERNAL MEDICINE

## 2023-11-19 PROCEDURE — 25010000002 CEFTRIAXONE PER 250 MG: Performed by: INTERNAL MEDICINE

## 2023-11-19 PROCEDURE — 83735 ASSAY OF MAGNESIUM: CPT | Performed by: INTERNAL MEDICINE

## 2023-11-19 PROCEDURE — 80048 BASIC METABOLIC PNL TOTAL CA: CPT | Performed by: INTERNAL MEDICINE

## 2023-11-19 PROCEDURE — 63710000001 INSULIN LISPRO (HUMAN) PER 5 UNITS: Performed by: INTERNAL MEDICINE

## 2023-11-19 PROCEDURE — 85014 HEMATOCRIT: CPT | Performed by: PHYSICIAN ASSISTANT

## 2023-11-19 PROCEDURE — 87637 SARSCOV2&INF A&B&RSV AMP PRB: CPT | Performed by: INTERNAL MEDICINE

## 2023-11-19 RX ORDER — MIDODRINE HYDROCHLORIDE 5 MG/1
5 TABLET ORAL
Status: DISCONTINUED | OUTPATIENT
Start: 2023-11-19 | End: 2023-11-20

## 2023-11-19 RX ADMIN — Medication 10 ML: at 08:39

## 2023-11-19 RX ADMIN — SODIUM CHLORIDE, POTASSIUM CHLORIDE, SODIUM LACTATE AND CALCIUM CHLORIDE 500 ML: 600; 310; 30; 20 INJECTION, SOLUTION INTRAVENOUS at 08:38

## 2023-11-19 RX ADMIN — CEFTRIAXONE SODIUM 1000 MG: 1 INJECTION, SOLUTION INTRAVENOUS at 22:45

## 2023-11-19 RX ADMIN — MIDODRINE HYDROCHLORIDE 5 MG: 5 TABLET ORAL at 12:19

## 2023-11-19 RX ADMIN — Medication 10 ML: at 20:52

## 2023-11-19 RX ADMIN — PANTOPRAZOLE SODIUM 40 MG: 40 INJECTION, POWDER, FOR SOLUTION INTRAVENOUS at 20:52

## 2023-11-19 RX ADMIN — MIDODRINE HYDROCHLORIDE 5 MG: 5 TABLET ORAL at 08:38

## 2023-11-19 RX ADMIN — PANTOPRAZOLE SODIUM 40 MG: 40 INJECTION, POWDER, FOR SOLUTION INTRAVENOUS at 08:39

## 2023-11-19 RX ADMIN — INSULIN LISPRO 2 UNITS: 100 INJECTION, SOLUTION INTRAVENOUS; SUBCUTANEOUS at 20:52

## 2023-11-19 RX ADMIN — OCTREOTIDE ACETATE 50 MCG/HR: 500 INJECTION, SOLUTION INTRAVENOUS; SUBCUTANEOUS at 01:36

## 2023-11-19 RX ADMIN — INSULIN LISPRO 2 UNITS: 100 INJECTION, SOLUTION INTRAVENOUS; SUBCUTANEOUS at 17:46

## 2023-11-19 RX ADMIN — SODIUM CHLORIDE 500 ML: 9 INJECTION, SOLUTION INTRAVENOUS at 00:26

## 2023-11-19 RX ADMIN — MIDODRINE HYDROCHLORIDE 5 MG: 5 TABLET ORAL at 17:46

## 2023-11-19 RX ADMIN — INSULIN LISPRO 2 UNITS: 100 INJECTION, SOLUTION INTRAVENOUS; SUBCUTANEOUS at 12:19

## 2023-11-19 RX ADMIN — OCTREOTIDE ACETATE 50 MCG/HR: 500 INJECTION, SOLUTION INTRAVENOUS; SUBCUTANEOUS at 12:56

## 2023-11-19 RX ADMIN — NADOLOL 20 MG: 20 TABLET ORAL at 08:38

## 2023-11-19 NOTE — PROGRESS NOTES
[facility]   Hospitalist Progress Note  Date: 2023  Patient Name: Tawny Lennon  : 1965  MRN: [id number]  Date of admission: 2023      Subjective   Subjective     Chief Complaint:   GI bleed    Summary:   Tawny Lennon is a 58 y.o. female with past medical history significant for Reich cirrhosis, type II DM, COPD, HTN, and HLD who presented for GI bleed.     Patient was transferred to [facility] from [place] [facility].  She complained of abdominal pain, blood in vomit, and melena at around 1 PM on .  They request transfer for GI evaluation.  She endorses cough and nasal congestion.  She also endorses dysuria.  She denies fever, no chills, no chest pain, no constipation or diarrhea. Patient underwent EGD with gastroenterology on 2023, patient found to have LA grade a reflux esophagitis with bleeding.  Grade 3 esophageal varices, incompletely eradicated, banded.  Portal hypertensive gastropathy.  Nonerosive gastritis, biopsied.  Normal examined duodenum.  Small hiatal hernia.  Plan to continue patient octreotide for 2 days.  Patient transitioned over to oral Protonix and started on a soft diet for 2 days.  Plan for repeat EGD in 1 month as an outpatient.     Interval Followup:   Patient has completed 2 days of octreotide following banding therefore discontinuing today.  Patient's blood pressure low this morning therefore bolusing 500 cc IV fluid.  Holding patient's diuretics further, starting oral midodrine.  Patient with complaints of cough cold and congestion, will rule out for COVID influenza and RSV.    Objective   Objective     Vitals:   Temp:  [98 °F (36.7 °C)-99.3 °F (37.4 °C)] 98.3 °F (36.8 °C)  Heart Rate:  [46-68] 53  Resp:  [16-20] 16  BP: ()/(31-72) 103/47  Physical Exam    Constitutional: Awake, alert, no acute distress   Eyes: Pupils equal, sclerae anicteric, no conjunctival injection   HENT: NCAT, mucous membranes moist   Neck: Supple, no thyromegaly, no  lymphadenopathy, trachea midline   Respiratory: Clear to auscultation bilaterally, nonlabored respirations    Cardiovascular: RRR, no murmurs, rubs, or gallops, palpable pedal pulses bilaterally   Gastrointestinal: Positive bowel sounds, soft, nontender, distended   Musculoskeletal: No bilateral ankle edema, no clubbing or cyanosis to extremities   Neurologic: Oriented x 3, strength symmetric in all extremities, Cranial Nerves grossly intact to confrontation, speech clear    Result Review    Result Review:  I have personally reviewed the results from 11/19/2023 and agree with these findings:  []  Laboratory  []  Microbiology  []  Radiology  []  EKG/Telemetry   []  Cardiology/Vascular   []  Pathology  []  Old records  []  Other:    Assessment & Plan   Assessment / Plan     Assessment/Plan:  Acute upper GI bleeding, esophageal varices  Reich cirrhosis  Thrombocytopenia  Type 2 diabetes  COPD not in exacerbation  Hypertension  Hyperlipidemia    Plan:  Patient remains admitted to hospital for further care and management  Gastroenterology consulted, appreciate assistance  EGD performed on the 17th, showing grade 3 esophageal varices, incompletely eradicated, banded  Plan for octreotide x2 days, discontinuing today  This morning patient with hypotension, providing 500 cc IV fluid now.  Stopping diuretics.  We will start patient on low-dose midodrine given her history of cirrhosis  We will need to continue to monitor patient's renal function closely, at risk for hepatorenal syndrome  Continue Protonix twice daily dosing  We will continue on ceftriaxone for now  Continue home nadolol  Patient started with sliding scale insulin hypoglycemia protocol  Continue to monitor platelet count closely, and SCDs for DVT prophylaxis  Patient continues to cough complaining of cough and congestion, will rule out COVID-19 RSV and influenza.  Discontinue isolation if negative     Discussed plan with RN.    DVT prophylaxis:  Mechanical DVT  prophylaxis orders are present.    CODE STATUS:   Level Of Support Discussed With: Patient  Code Status (Patient has no pulse and is not breathing): CPR (Attempt to Resuscitate)  Medical Interventions (Patient has pulse or is breathing): Full Support

## 2023-11-20 LAB
ALBUMIN SERPL-MCNC: 2.5 G/DL (ref 3.5–5.2)
ALBUMIN/GLOB SERPL: 0.7 G/DL
ALP SERPL-CCNC: 107 U/L (ref 39–117)
ALT SERPL W P-5'-P-CCNC: 12 U/L (ref 1–33)
ANION GAP SERPL CALCULATED.3IONS-SCNC: 11.2 MMOL/L (ref 5–15)
AST SERPL-CCNC: 41 U/L (ref 1–32)
BILIRUB SERPL-MCNC: 1.9 MG/DL (ref 0–1.2)
BUN SERPL-MCNC: 16 MG/DL (ref 8–23)
BUN/CREAT SERPL: 15.2 (ref 7–25)
CALCIUM SPEC-SCNC: 8.2 MG/DL (ref 8.6–10.5)
CHLORIDE SERPL-SCNC: 107 MMOL/L (ref 98–107)
CO2 SERPL-SCNC: 16.8 MMOL/L (ref 22–29)
CREAT SERPL-MCNC: 1.05 MG/DL (ref 0.57–1)
CYTO UR: NORMAL
DEPRECATED RDW RBC AUTO: 50.4 FL (ref 37–54)
EGFRCR SERPLBLD CKD-EPI 2021: 61 ML/MIN/1.73
ERYTHROCYTE [DISTWIDTH] IN BLOOD BY AUTOMATED COUNT: 14.1 % (ref 12.3–15.4)
GLOBULIN UR ELPH-MCNC: 3.5 GM/DL
GLUCOSE BLDC GLUCOMTR-MCNC: 119 MG/DL (ref 70–99)
GLUCOSE BLDC GLUCOMTR-MCNC: 124 MG/DL (ref 70–99)
GLUCOSE BLDC GLUCOMTR-MCNC: 139 MG/DL (ref 70–99)
GLUCOSE BLDC GLUCOMTR-MCNC: 150 MG/DL (ref 70–99)
GLUCOSE BLDC GLUCOMTR-MCNC: 173 MG/DL (ref 70–99)
GLUCOSE SERPL-MCNC: 158 MG/DL (ref 65–99)
HCT VFR BLD AUTO: 31.5 % (ref 34–46.6)
HGB BLD-MCNC: 10.3 G/DL (ref 12–15.9)
LAB AP CASE REPORT: NORMAL
LAB AP CLINICAL INFORMATION: NORMAL
MAGNESIUM SERPL-MCNC: 2 MG/DL (ref 1.6–2.4)
MCH RBC QN AUTO: 31.9 PG (ref 26.6–33)
MCHC RBC AUTO-ENTMCNC: 32.7 G/DL (ref 31.5–35.7)
MCV RBC AUTO: 97.5 FL (ref 79–97)
PATH REPORT.FINAL DX SPEC: NORMAL
PATH REPORT.GROSS SPEC: NORMAL
PHOSPHATE SERPL-MCNC: 2.7 MG/DL (ref 2.5–4.5)
PLATELET # BLD AUTO: 61 10*3/MM3 (ref 140–450)
PMV BLD AUTO: 11.7 FL (ref 6–12)
POTASSIUM SERPL-SCNC: 4.2 MMOL/L (ref 3.5–5.2)
PROT SERPL-MCNC: 6 G/DL (ref 6–8.5)
RBC # BLD AUTO: 3.23 10*6/MM3 (ref 3.77–5.28)
SODIUM SERPL-SCNC: 135 MMOL/L (ref 136–145)
WBC NRBC COR # BLD AUTO: 4.37 10*3/MM3 (ref 3.4–10.8)

## 2023-11-20 PROCEDURE — P9047 ALBUMIN (HUMAN), 25%, 50ML: HCPCS | Performed by: PHYSICIAN ASSISTANT

## 2023-11-20 PROCEDURE — 82948 REAGENT STRIP/BLOOD GLUCOSE: CPT

## 2023-11-20 PROCEDURE — 80053 COMPREHEN METABOLIC PANEL: CPT | Performed by: INTERNAL MEDICINE

## 2023-11-20 PROCEDURE — 63710000001 INSULIN LISPRO (HUMAN) PER 5 UNITS: Performed by: INTERNAL MEDICINE

## 2023-11-20 PROCEDURE — 99232 SBSQ HOSP IP/OBS MODERATE 35: CPT | Performed by: INTERNAL MEDICINE

## 2023-11-20 PROCEDURE — 84100 ASSAY OF PHOSPHORUS: CPT | Performed by: INTERNAL MEDICINE

## 2023-11-20 PROCEDURE — 25810000003 LACTATED RINGERS SOLUTION: Performed by: PHYSICIAN ASSISTANT

## 2023-11-20 PROCEDURE — 83735 ASSAY OF MAGNESIUM: CPT | Performed by: INTERNAL MEDICINE

## 2023-11-20 PROCEDURE — 25010000002 ALBUMIN HUMAN 25% PER 50 ML: Performed by: PHYSICIAN ASSISTANT

## 2023-11-20 PROCEDURE — 85027 COMPLETE CBC AUTOMATED: CPT | Performed by: INTERNAL MEDICINE

## 2023-11-20 RX ORDER — MIDODRINE HYDROCHLORIDE 10 MG/1
10 TABLET ORAL
Status: DISCONTINUED | OUTPATIENT
Start: 2023-11-20 | End: 2023-11-21 | Stop reason: HOSPADM

## 2023-11-20 RX ORDER — ALBUMIN (HUMAN) 12.5 G/50ML
25 SOLUTION INTRAVENOUS ONCE
Status: COMPLETED | OUTPATIENT
Start: 2023-11-20 | End: 2023-11-20

## 2023-11-20 RX ADMIN — Medication 10 ML: at 21:33

## 2023-11-20 RX ADMIN — PANTOPRAZOLE SODIUM 40 MG: 40 INJECTION, POWDER, FOR SOLUTION INTRAVENOUS at 21:33

## 2023-11-20 RX ADMIN — MIDODRINE HYDROCHLORIDE 10 MG: 10 TABLET ORAL at 12:08

## 2023-11-20 RX ADMIN — SODIUM CHLORIDE, POTASSIUM CHLORIDE, SODIUM LACTATE AND CALCIUM CHLORIDE 1000 ML: 600; 310; 30; 20 INJECTION, SOLUTION INTRAVENOUS at 04:14

## 2023-11-20 RX ADMIN — INSULIN LISPRO 2 UNITS: 100 INJECTION, SOLUTION INTRAVENOUS; SUBCUTANEOUS at 17:09

## 2023-11-20 RX ADMIN — MIDODRINE HYDROCHLORIDE 10 MG: 10 TABLET ORAL at 17:09

## 2023-11-20 RX ADMIN — MIDODRINE HYDROCHLORIDE 5 MG: 5 TABLET ORAL at 04:51

## 2023-11-20 RX ADMIN — GUAIFENESIN 600 MG: 600 TABLET ORAL at 22:20

## 2023-11-20 RX ADMIN — PANTOPRAZOLE SODIUM 40 MG: 40 INJECTION, POWDER, FOR SOLUTION INTRAVENOUS at 08:46

## 2023-11-20 RX ADMIN — Medication 10 ML: at 08:46

## 2023-11-20 RX ADMIN — ALBUMIN (HUMAN) 25 G: 0.25 INJECTION, SOLUTION INTRAVENOUS at 03:26

## 2023-11-20 RX ADMIN — GUAIFENESIN 600 MG: 600 TABLET ORAL at 03:36

## 2023-11-20 NOTE — PROGRESS NOTES
[facility]   Hospitalist Progress Note  Date: 2023  Patient Name: Tawny Lennon  : 1965  MRN: [id number]  Date of admission: 2023      Subjective   Subjective     Chief Complaint:   GI bleed    Summary:   Tawny Lennon is a 58 y.o. female with past medical history significant for Reich cirrhosis, type II DM, COPD, HTN, and HLD who presented for GI bleed.     Patient was transferred to [facility] from [place] ED.  She complained of abdominal pain, blood in vomit, and melena at around 1 PM on .  They request transfer for GI evaluation.  She endorses cough and nasal congestion.  She also endorses dysuria.  She denies fever, no chills, no chest pain, no constipation or diarrhea. Patient underwent EGD with gastroenterology on 2023, patient found to have LA grade a reflux esophagitis with bleeding.  Grade 3 esophageal varices, incompletely eradicated, banded.  Portal hypertensive gastropathy.  Nonerosive gastritis, biopsied.  Normal examined duodenum.  Small hiatal hernia.  Plan to continue patient octreotide for 2 days.  Patient transitioned over to oral Protonix and started on a soft diet for 2 days.  Plan for repeat EGD in 1 month as an outpatient.  Octreotide has been completed    Interval Followup:   Patient again with hypotension overnight requiring IV fluids.  Patient was provided albumin.  Increasing patient's midodrine dose today and holding nadolol.  Patient has been up and ambulating without any presyncopal prodrome    Objective   Objective     Vitals:   Temp:  [97.9 °F (36.6 °C)-98.3 °F (36.8 °C)] 98 °F (36.7 °C)  Heart Rate:  [45-56] 56  Resp:  [16-18] 16  BP: ()/(27-68) 99/48  Flow (L/min):  [2] 2  Physical Exam    Constitutional: Awake, alert, no acute distress   Eyes: Pupils equal, sclerae anicteric, no conjunctival injection   HENT: NCAT, mucous membranes moist   Neck: Supple, no thyromegaly, no lymphadenopathy, trachea midline   Respiratory: Clear to  auscultation bilaterally, nonlabored respirations    Cardiovascular: RRR, no murmurs, rubs, or gallops, palpable pedal pulses bilaterally   Gastrointestinal: Positive bowel sounds, soft, nontender, distended   Musculoskeletal: No bilateral ankle edema, no clubbing or cyanosis to extremities   Neurologic: Oriented x 3, strength symmetric in all extremities, Cranial Nerves grossly intact to confrontation, speech clear    Result Review    Result Review:  I have personally reviewed the results from 11/20/2023 and agree with these findings:  []  Laboratory  []  Microbiology  []  Radiology  []  EKG/Telemetry   []  Cardiology/Vascular   []  Pathology  []  Old records  []  Other:    Assessment & Plan   Assessment / Plan     Assessment/Plan:  Acute upper GI bleeding, esophageal varices  Reich cirrhosis  Thrombocytopenia  Type 2 diabetes  COPD not in exacerbation  Hypertension  Hyperlipidemia    Plan:  Patient remains admitted to hospital for further care and management  Gastroenterology consulted, appreciate assistance  EGD performed on the 17th, showing grade 3 esophageal varices, incompletely eradicated, banded  Plan for octreotide x2 days, this has since been completed  With hypotension holding further diuretics, also discontinuing nadolol  Dose of midodrine has been increased  We will need to continue to monitor patient's renal function closely, at risk for hepatorenal syndrome, improved  Continue Protonix twice daily dosing  We will continue on ceftriaxone for now  Patient started with sliding scale insulin hypoglycemia protocol  Continue to monitor platelet count closely, and SCDs for DVT prophylaxis  For cough and congestion COVID-19 RSV and influenza ruled out     Discussed plan with RN.    DVT prophylaxis:  Mechanical DVT prophylaxis orders are present.    CODE STATUS:   Level Of Support Discussed With: Patient  Code Status (Patient has no pulse and is not breathing): CPR (Attempt to Resuscitate)  Medical  Interventions (Patient has pulse or is breathing): Full Support

## 2023-11-20 NOTE — PLAN OF CARE
Goal Outcome Evaluation:         Pt hypotensive throughout night, hospitalist contacted multiple times for blood pressure. 1 dose of 25% albumin ordered and given. 500 ml bolus of LR given. Midodrine was administered early per provider order. Pt lethargic and c/o congestion and medication was given per MAR. Pt vitals closely monitored throughout shift.     Reema Alas RN

## 2023-11-20 NOTE — SIGNIFICANT NOTE
11/20/23 1245   Coping/Psychosocial   Observed Emotional State calm;cooperative   Verbalized Emotional State anxiety;fear;other (see comments)  (She is a little scared because of her illness.)   Trust Relationship/Rapport empathic listening provided   Involvement in Care interacting with patient   Additional Documentation Spiritual Care (Group)   Spiritual Care   Use of Spiritual Resources non-Anglican use of spiritual care   Spiritual Care Source  initiative   Response to Spiritual Care receptive of support;engaged in conversation   Spiritual Care Interventions supportive conversation provided;prayer support provided   Spiritual Care Visit Type initial   Receptivity to Spiritual Care visit welcomed

## 2023-11-21 ENCOUNTER — TELEPHONE (OUTPATIENT)
Dept: GASTROENTEROLOGY | Facility: CLINIC | Age: 58
End: 2023-11-21
Payer: MEDICARE

## 2023-11-21 ENCOUNTER — READMISSION MANAGEMENT (OUTPATIENT)
Dept: CALL CENTER | Facility: HOSPITAL | Age: 58
End: 2023-11-21
Payer: MEDICARE

## 2023-11-21 ENCOUNTER — PREP FOR SURGERY (OUTPATIENT)
Dept: OTHER | Facility: HOSPITAL | Age: 58
End: 2023-11-21
Payer: MEDICARE

## 2023-11-21 VITALS
SYSTOLIC BLOOD PRESSURE: 113 MMHG | OXYGEN SATURATION: 93 % | TEMPERATURE: 98.8 F | RESPIRATION RATE: 17 BRPM | HEART RATE: 50 BPM | BODY MASS INDEX: 33.31 KG/M2 | WEIGHT: 181 LBS | DIASTOLIC BLOOD PRESSURE: 49 MMHG | HEIGHT: 62 IN

## 2023-11-21 DIAGNOSIS — K74.60 CIRRHOSIS OF LIVER WITH ASCITES, UNSPECIFIED HEPATIC CIRRHOSIS TYPE: ICD-10-CM

## 2023-11-21 DIAGNOSIS — K21.9 GASTROESOPHAGEAL REFLUX DISEASE WITHOUT ESOPHAGITIS: ICD-10-CM

## 2023-11-21 DIAGNOSIS — I85.00 ESOPHAGEAL VARICES WITHOUT BLEEDING, UNSPECIFIED ESOPHAGEAL VARICES TYPE: Primary | ICD-10-CM

## 2023-11-21 DIAGNOSIS — R18.8 CIRRHOSIS OF LIVER WITH ASCITES, UNSPECIFIED HEPATIC CIRRHOSIS TYPE: ICD-10-CM

## 2023-11-21 PROBLEM — K92.2 UPPER GI BLEED: Status: RESOLVED | Noted: 2023-11-16 | Resolved: 2023-11-21

## 2023-11-21 LAB
ALBUMIN SERPL-MCNC: 2.8 G/DL (ref 3.5–5.2)
ALBUMIN/GLOB SERPL: 0.9 G/DL
ALP SERPL-CCNC: 101 U/L (ref 39–117)
ALT SERPL W P-5'-P-CCNC: 12 U/L (ref 1–33)
ANION GAP SERPL CALCULATED.3IONS-SCNC: 10.6 MMOL/L (ref 5–15)
AST SERPL-CCNC: 40 U/L (ref 1–32)
BILIRUB SERPL-MCNC: 2 MG/DL (ref 0–1.2)
BUN SERPL-MCNC: 15 MG/DL (ref 8–23)
BUN/CREAT SERPL: 13.4 (ref 7–25)
CALCIUM SPEC-SCNC: 8.3 MG/DL (ref 8.6–10.5)
CHLORIDE SERPL-SCNC: 107 MMOL/L (ref 98–107)
CO2 SERPL-SCNC: 18.4 MMOL/L (ref 22–29)
CREAT SERPL-MCNC: 1.12 MG/DL (ref 0.57–1)
DEPRECATED RDW RBC AUTO: 50 FL (ref 37–54)
EGFRCR SERPLBLD CKD-EPI 2021: 56.4 ML/MIN/1.73
ERYTHROCYTE [DISTWIDTH] IN BLOOD BY AUTOMATED COUNT: 14.6 % (ref 12.3–15.4)
GLOBULIN UR ELPH-MCNC: 3.2 GM/DL
GLUCOSE BLDC GLUCOMTR-MCNC: 144 MG/DL (ref 70–99)
GLUCOSE BLDC GLUCOMTR-MCNC: 155 MG/DL (ref 70–99)
GLUCOSE SERPL-MCNC: 157 MG/DL (ref 65–99)
HCT VFR BLD AUTO: 29.7 % (ref 34–46.6)
HGB BLD-MCNC: 9.8 G/DL (ref 12–15.9)
MAGNESIUM SERPL-MCNC: 2.1 MG/DL (ref 1.6–2.4)
MCH RBC QN AUTO: 31.8 PG (ref 26.6–33)
MCHC RBC AUTO-ENTMCNC: 33 G/DL (ref 31.5–35.7)
MCV RBC AUTO: 96.4 FL (ref 79–97)
PHOSPHATE SERPL-MCNC: 2.2 MG/DL (ref 2.5–4.5)
PLATELET # BLD AUTO: 53 10*3/MM3 (ref 140–450)
PMV BLD AUTO: 11.6 FL (ref 6–12)
POTASSIUM SERPL-SCNC: 3.8 MMOL/L (ref 3.5–5.2)
PROT SERPL-MCNC: 6 G/DL (ref 6–8.5)
RBC # BLD AUTO: 3.08 10*6/MM3 (ref 3.77–5.28)
SODIUM SERPL-SCNC: 136 MMOL/L (ref 136–145)
WBC NRBC COR # BLD AUTO: 4.89 10*3/MM3 (ref 3.4–10.8)

## 2023-11-21 PROCEDURE — 82948 REAGENT STRIP/BLOOD GLUCOSE: CPT

## 2023-11-21 PROCEDURE — 99239 HOSP IP/OBS DSCHRG MGMT >30: CPT | Performed by: INTERNAL MEDICINE

## 2023-11-21 PROCEDURE — 84100 ASSAY OF PHOSPHORUS: CPT | Performed by: INTERNAL MEDICINE

## 2023-11-21 PROCEDURE — 80053 COMPREHEN METABOLIC PANEL: CPT | Performed by: INTERNAL MEDICINE

## 2023-11-21 PROCEDURE — 83735 ASSAY OF MAGNESIUM: CPT | Performed by: INTERNAL MEDICINE

## 2023-11-21 PROCEDURE — 97161 PT EVAL LOW COMPLEX 20 MIN: CPT

## 2023-11-21 PROCEDURE — 85027 COMPLETE CBC AUTOMATED: CPT | Performed by: INTERNAL MEDICINE

## 2023-11-21 RX ORDER — IPRATROPIUM BROMIDE AND ALBUTEROL SULFATE 2.5; .5 MG/3ML; MG/3ML
3 SOLUTION RESPIRATORY (INHALATION) EVERY 4 HOURS PRN
Status: DISCONTINUED | OUTPATIENT
Start: 2023-11-21 | End: 2023-11-21 | Stop reason: HOSPADM

## 2023-11-21 RX ORDER — MIDODRINE HYDROCHLORIDE 2.5 MG/1
2.5 TABLET ORAL
Qty: 90 TABLET | Refills: 0 | Status: SHIPPED | OUTPATIENT
Start: 2023-11-21 | End: 2023-12-21

## 2023-11-21 RX ORDER — PANTOPRAZOLE SODIUM 40 MG/1
40 TABLET, DELAYED RELEASE ORAL
Status: DISCONTINUED | OUTPATIENT
Start: 2023-11-21 | End: 2023-11-21 | Stop reason: HOSPADM

## 2023-11-21 RX ORDER — PANTOPRAZOLE SODIUM 40 MG/1
40 TABLET, DELAYED RELEASE ORAL
Qty: 60 TABLET | Refills: 0 | Status: SHIPPED | OUTPATIENT
Start: 2023-11-21 | End: 2023-12-21

## 2023-11-21 RX ADMIN — PANTOPRAZOLE SODIUM 40 MG: 40 TABLET, DELAYED RELEASE ORAL at 16:36

## 2023-11-21 RX ADMIN — Medication 10 ML: at 08:28

## 2023-11-21 RX ADMIN — MIDODRINE HYDROCHLORIDE 10 MG: 10 TABLET ORAL at 16:22

## 2023-11-21 RX ADMIN — MIDODRINE HYDROCHLORIDE 10 MG: 10 TABLET ORAL at 12:16

## 2023-11-21 RX ADMIN — MIDODRINE HYDROCHLORIDE 10 MG: 10 TABLET ORAL at 07:18

## 2023-11-21 RX ADMIN — PANTOPRAZOLE SODIUM 40 MG: 40 INJECTION, POWDER, FOR SOLUTION INTRAVENOUS at 08:28

## 2023-11-21 NOTE — TELEPHONE ENCOUNTER
----- Message from ANDRE Alvarado sent at 11/21/2023  8:21 AM EST -----  Recall EGD in 1 month, patient inpatient.     I have reviewed upper endoscopy. Mild reactive gastropathy. Negative results for H. Pylori, metaplasia, dysplasia and malignancy.

## 2023-11-21 NOTE — DISCHARGE SUMMARY
[facility]        HOSPITALIST  DISCHARGE SUMMARY    Patient Name: Tawny Lennon  : 1965  MRN: [id number]    Date of Admission: 2023  Date of Discharge:  2023  Primary Care Physician: Zain Valentine MD    Consults       Date and Time Order Name Status Description    2023 10:37 PM Inpatient Gastroenterology Consult              Active and Resolved Hospital Problems:  Active Hospital Problems   No active problems to display.      Resolved Hospital Problems    Diagnosis POA    **Upper GI bleed [K92.2] Yes     Acute upper GI bleeding, esophageal varices s/p attempted banding.  Large hiatal hernia with esophagitis.  S/p EGD   Reich cirrhosis  Thrombocytopenia due to above  Type 2 diabetes  COPD not in exacerbation  Hypertension  Hyperlipidemia  Acute blood loss anemia.  Hypoxemia.  Resolved.  Bradycardia and hypotension.  Home [facility].    Hospital Course     Hospital Course:  Tawny Lennon is a 58 y.o. female  with past medical history significant for Reich cirrhosis, type II DM, COPD, HTN, and HLD who presented for GI bleed.     Patient was transferred to [facility] from [place] ED.  She complained of abdominal pain, blood in vomit, and melena at around 1 PM on .  They request transfer for GI evaluation.  She endorses cough and nasal congestion.  She also endorses dysuria.  She denies fever, no chills, no chest pain, no constipation or diarrhea. Patient underwent EGD with gastroenterology on 2023, patient found to have LA grade a reflux esophagitis with bleeding.  Grade 3 esophageal varices, incompletely eradicated, banded.  Portal hypertensive gastropathy.  Nonerosive gastritis, biopsied.  Normal examined duodenum.  Small hiatal hernia.  Plan to continue patient octreotide for 2 days.  Patient transitioned over to oral Protonix and started on a soft diet for 2 days.  Plan for repeat EGD in 1 month as an outpatient.  Octreotide has been  completed     Interval Followup:   Blood pressure stable although MAP remains low.  On room air with 92% saturation.  Remains sinus bradycardic.  Home nadolol has been DC'd.  Remains on midodrine.  No more black-colored stools no nausea or vomiting.  No abdominal pain.  Tolerating regular diet well.  Patient cleared by GI to be released.  Patient wants to go home.      DISCHARGE Follow Up Recommendations for labs and diagnostics: PCP and GI.  Have PCP check CBC in a week time.  Stop home [place] due to low heart rate and blood pressure.  Repeat EGD in 1 month by GI.      Day of Discharge     Vital Signs:  Temp:  [97.8 °F (36.6 °C)-98.5 °F (36.9 °C)] 97.8 °F (36.6 °C)  Heart Rate:  [44-52] 47  Resp:  [15-18] 16  BP: ()/(38-50) 97/38  Flow (L/min):  [2] 2    Physical Exam:    Constitutional: Awake, alert, no acute distress              Eyes: Pupils equal, sclerae anicteric, no conjunctival injection              HENT: NCAT, mucous membranes moist              Neck: Supple, no thyromegaly, no lymphadenopathy, trachea midline              Respiratory: Clear to auscultation bilaterally, nonlabored respirations               Cardiovascular: RRR, no murmurs, rubs, or gallops, palpable pedal pulses bilaterally              Gastrointestinal: Positive bowel sounds, soft, nontender, distended              Musculoskeletal: No bilateral ankle edema, no clubbing or cyanosis to extremities              Neurologic: Oriented x 3, strength symmetric in all extremities, Cranial Nerves grossly intact to confrontation, speech clear    Discharge Details        Discharge Medications        New Medications        Instructions Start Date   midodrine 2.5 MG tablet  Commonly known as: PROAMATINE   2.5 mg, Oral, 3 Times Daily Before Meals      pantoprazole 40 MG EC tablet  Commonly known as: PROTONIX   40 mg, Oral, 2 Times Daily Before Meals             Continue These Medications        Instructions Start Date   furosemide 20 MG  tablet  Commonly known as: LASIX   20 mg, Oral, Daily      metFORMIN 500 MG tablet  Commonly known as: GLUCOPHAGE   500 mg, Oral, 2 Times Daily With Meals, Only takes one a day      potassium chloride 10 MEQ CR tablet   10 mEq, Oral, 2 Times Daily      spironolactone 50 MG tablet  Commonly known as: ALDACTONE   50 mg, Oral, Daily             Stop These Medications      nadolol 20 MG tablet  Commonly known as: CORGARD              No Known Allergies    Discharge Disposition:  Home or Self Care.  In private vehicle with family member    Diet:  Diet Instructions       Diet: Diabetic Diets; Consistent Carbohydrate; Regular Texture (IDDSI 7); Thin (IDDSI 0)      Discharge Diet: Diabetic Diets    Diabetic Diet: Consistent Carbohydrate    Texture: Regular Texture (IDDSI 7)    Fluid Consistency: Thin (IDDSI 0)            Discharge Activity:   Activity Instructions       Activity as Tolerated              CODE STATUS:  Code Status and Medical Interventions:   Ordered at: 11/16/23 2207     Level Of Support Discussed With:    Patient     Code Status (Patient has no pulse and is not breathing):    CPR (Attempt to Resuscitate)     Medical Interventions (Patient has pulse or is breathing):    Full Support         Future Appointments   Date Time Provider Department Center   1/4/2024 10:45 AM Sharri Mei APRN [facility]       Additional Instructions for the Follow-ups that You Need to Schedule       Discharge Follow-up with PCP   As directed       Currently Documented PCP:    Zain Valentine MD    PCP Phone Number:    [phone]     Follow Up Details: 1 week        Discharge Follow-up with Specialty: Dr. June MAS  As scheduled   As directed      Specialty: Dr. June MAS  As scheduled                Pertinent  and/or Most Recent Results     PROCEDURES:   Procedure:    ESOPHAGOGASTRODUODENOSCOPY WITH BIOPSIES AND  VARICEAL BANDING X 4  CPT(R) Code:  11972 - TN ESOPHAGOGASTRODUODENOSCOPY TRANSORAL DIAGNOSTIC       LAB  RESULTS:      Lab 11/21/23 0345 11/20/23 0320 11/19/23 0348 11/19/23  0034 11/18/23  0513 11/17/23  0018 11/16/23 2217   WBC 4.89 4.37 4.33  --  3.24*  --  4.81   HEMOGLOBIN 9.8* 10.3* 10.7* 10.4* 10.2*   < > 11.8*   HEMATOCRIT 29.7* 31.5* 31.8* 30.4* 30.2*   < > 34.5   PLATELETS 53* 61* 55*  --  49*  --  61*   NEUTROS ABS  --   --   --   --   --   --  2.89   IMMATURE GRANS (ABS)  --   --   --   --   --   --  0.02   LYMPHS ABS  --   --   --   --   --   --  1.35   MONOS ABS  --   --   --   --   --   --  0.28   EOS ABS  --   --   --   --   --   --  0.25   MCV 96.4 97.5* 94.9  --  94.4  --  94.5    < > = values in this interval not displayed.         Lab 11/21/23 0345 11/20/23 0320 11/19/23 0348 11/18/23  0513 11/16/23 2217   SODIUM 136 135* 135* 131* 137   POTASSIUM 3.8 4.2 4.0 3.9 3.9   CHLORIDE 107 107 106 104 110*   CO2 18.4* 16.8* 19.7* 19.8* 18.2*   ANION GAP 10.6 11.2 9.3 7.2 8.8   BUN 15 16 17 16 19   CREATININE 1.12* 1.05* 1.10* 0.96 0.93   EGFR 56.4* 61.0 57.6* 67.9 70.5   GLUCOSE 157* 158* 134* 140* 101*   CALCIUM 8.3* 8.2* 8.4* 8.4* 9.0   MAGNESIUM 2.1 2.0 1.8 1.6 1.7   PHOSPHORUS 2.2* 2.7  --   --  2.7         Lab 11/21/23 0345 11/20/23 0320 11/16/23 2217   TOTAL PROTEIN 6.0 6.0 6.8   ALBUMIN 2.8* 2.5* 2.7*   GLOBULIN 3.2 3.5 4.1   ALT (SGPT) 12 12 15   AST (SGOT) 40* 41* 42*   BILIRUBIN 2.0* 1.9* 3.4*   ALK PHOS 101 107 122*                 Lab 11/16/23  2217 11/16/23  1523   ABO TYPING A  --    RH TYPING Positive  --    ANTIBODY SCREEN Negative NEGATIVE         Brief Urine Lab Results  (Last result in the past 365 days)        Color   Clarity   Blood   Leuk Est   Nitrite   Protein   CREAT   Urine HCG        11/16/23 2357 Dark Yellow   Clear   Negative   Negative   Negative   Negative                 Microbiology Results (last 10 days)       Procedure Component Value - Date/Time    COVID-19, FLU A/B, RSV PCR 1 HR TAT - Swab, Nasopharynx [[id number]]  (Normal) Collected: 11/19/23 1130    Lab  Status: Final result Specimen: Swab from Nasopharynx Updated: 11/19/23 1212     COVID19 Not Detected     Influenza A PCR Not Detected     Influenza B PCR Not Detected     RSV, PCR Not Detected    Narrative:      Fact sheet for providers: https://www.fda.gov/media/354297/download    Fact sheet for patients: https://www.fda.gov/media/643561/download    Test performed by PCR.            XR Chest 1 View    Result Date: 11/17/2023  Impression:  No acute cardiopulmonary disease is seen radiographically.     Please note that portions of this note were completed with a voice recognition program.  PATRICIA MCKEON JR, MD       Electronically Signed and Approved By: PATRICIA MCKEON JR, MD on 11/17/2023 at 3:35              CT chest abdomen pelvis with contrast per contrast protocol    Result Date: 11/16/2023  Impression: 1.  No active pulmonary disease. 2.  Cirrhosis with the mild splenomegaly and gastroesophageal varices. 3.  Sigmoid diverticulosis without diverticulitis. Electronically Signed: Maikol Lion MD 2023/11/16 at 16:01 CST Reading Location ID and State: [address] / [place] Tel [phone], Service support  [phone], Fax [phone]                 Imaging Results (All)       Procedure Component Value Units Date/Time    XR Chest 1 View [[id number]] Collected: 11/17/23 0335     Updated: 11/17/23 0338    Narrative:      PROCEDURE: XR CHEST 1 VW     COMPARISON: None.     INDICATIONS: cough; chest congestion     FINDINGS: A single frontal (AP or PA upright portable) chest radiograph reveals no cardiac   enlargement and no acute infiltrate. No pneumothorax is seen.  Chronic calcified granulomatous   disease involves the chest.  External artifacts obscure detail.  The thoracic aorta is   atherosclerotic and mildly ectatic.       Impression:       No acute cardiopulmonary disease is seen radiographically.              Please note that portions of this note were completed with a voice recognition program.     PATRICIA HEDRICK  LINDA MCDONALD MD         Electronically Signed and Approved By: PATRICIA MCKEON JR, MD on 11/17/2023 at 3:35                                 Labs Pending at Discharge:          Time spent on Discharge including face to face service: 31 minutes  Part of this note may be an electronic transcription/translation of spoken language to printed text using the Dragon Dictation System.     TElectronically signed by Ander Bruner MD, 11/21/23, 2:54 PM EST.

## 2023-11-21 NOTE — PLAN OF CARE
Goal Outcome Evaluation:      Pt stable throughout night. Blood pressure improving. Pt NC removed and pt remained on room air throughout the night. Pt mildly anxious about blood pressure. No c/o at this time.    Reema Alas RN

## 2023-11-21 NOTE — THERAPY EVALUATION
Acute Care - Physical Therapy Initial Evaluation   [place]     Patient Name: Tawny [facility]on  : 1965  MRN: [id number]  Today's Date: 2023      Visit Dx:     ICD-10-CM ICD-9-CM   1. Upper GI bleed  K92.2 578.9   2. Cirrhosis of liver with ascites, unspecified hepatic cirrhosis type  K74.60 571.5    R18.8    3. Difficulty walking  R26.2 719.7     Patient Active Problem List   Diagnosis    Benign essential HTN    Colon polyps    Depression    Diabetes mellitus without complication    Diverticulitis    Hematuria    High blood pressure    High cholesterol    Interstitial cystitis    Migraine without aura    Narcolepsy    Obesity    Other specified chronic obstructive pulmonary disease    Panic attacks    Paroxysmal SVT (supraventricular tachycardia)    Sleep apnea    Cirrhosis of liver without ascites    Esophageal varices without bleeding    Preop cardiovascular exam    MEDRANO (nonalcoholic steatohepatitis)    Gastroesophageal reflux disease without esophagitis    Hyperkalemia    Severe malnutrition    Diverticulosis     Past Medical History:   Diagnosis Date    Cirrhosis     liver    Cirrhosis, nonalcoholic     Colon polyps     COPD (chronic obstructive pulmonary disease)     Depression     Diabetes     Diverticulitis     Hematuria     High blood pressure     High cholesterol     Interstitial cystitis     Migraine headache     Narcolepsy     PONV (postoperative nausea and vomiting)     Sleep apnea     Spinal headache     DURING PREGNANCY     Past Surgical History:   Procedure Laterality Date    BLADDER REPAIR      CHOLECYSTECTOMY      COLONOSCOPY  2014 x2 2020    ENDOSCOPY  2014    ENDOSCOPY N/A 2021    Procedure: ESOPHAGOGASTRODUODENOSCOPY with biopsies;  Surgeon: Jesse Watkins MD;  Location: [facility] ENDOSCOPY;  Service: Gastroenterology;  Laterality: N/A;  esophageal varices    ENDOSCOPY N/A 2022    Procedure: ESOPHAGOGASTRODUODENOSCOPY WITH BIOPSIES;  Surgeon: Jesse Watkins  MD Nishant;  Location: [facility] ENDOSCOPY;  Service: Gastroenterology;  Laterality: N/A;  ESOPHAGEAL VARICIES    ENDOSCOPY N/A 9/28/2023    Procedure: ESOPHAGOGASTRODUODENOSCOPY WITH COLD BIOPSIES;  Surgeon: Jesse Watkins MD;  Location: [facility] ENDOSCOPY;  Service: Gastroenterology;  Laterality: N/A;  GASTRITIS, ESOPHAGEAL VARICES    ENDOSCOPY N/A 11/17/2023    Procedure: ESOPHAGOGASTRODUODENOSCOPY WITH BIOPSIES AND  VARICEAL BANDING X 4;  Surgeon: Jesse Watkins MD;  Location: [facility] ENDOSCOPY;  Service: Gastroenterology;  Laterality: N/A;  GASTRITIS, PORTAL HYPERTENSIVE GASTROPATHY, ESOPHAGEAL VARICIES    HAND SURGERY      HERNIA REPAIR      HYSTERECTOMY  2009    ILEOSTOMY  07/2022    OTHER SURGICAL HISTORY      rectocele repair    UPPER GASTROINTESTINAL ENDOSCOPY       PT Assessment (last 12 hours)       PT Evaluation and Treatment       Row Name 11/21/23 1500          Physical Therapy Time and Intention    Subjective Information no complaints  -DP     Document Type evaluation  -DP     Mode of Treatment individual therapy;physical therapy  -DP     Patient Effort good  -DP       Row Name 11/21/23 1500          General Information    Patient Profile Reviewed yes  -DP     Patient Observations alert;cooperative;agree to therapy  -DP     Prior Level of Function independent:;gait;transfer;bed mobility;ADL's  -DP     Existing Precautions/Restrictions no known precautions/restrictions  -DP     Barriers to Rehab none identified  -DP       Row Name 11/21/23 1500          Living Environment    Current Living Arrangements home  -DP       Row Name 11/21/23 1500          Cognition    Orientation Status (Cognition) oriented x 3  -DP       Row Name 11/21/23 1500          Range of Motion Comprehensive    General Range of Motion bilateral lower extremity ROM WFL  -DP       Row Name 11/21/23 1500          Strength (Manual Muscle Testing)    Strength (Manual Muscle Testing) bilateral lower extremities;strength is WFL   -DP       Row Name 11/21/23 1500          Transfers    Transfers sit-stand transfer  -DP       Row Name 11/21/23 1500          Sit-Stand Transfer    Sit-Stand [place] (Transfers) independent  -DP       Row Name 11/21/23 1500          Gait/Stairs (Locomotion)    Gait/Stairs Locomotion gait/ambulation independence  -DP       Row Name 11/21/23 1500          Balance    Balance Assessment standing dynamic balance  -DP     Dynamic Standing Balance supervision  -DP       Row Name 11/21/23 1500          Plan of Care Review    Plan of Care Reviewed With patient  -DP     Outcome Evaluation Patient is able to complete all transfers and bed mobilities indepedently in the room. Pt is able to ambulate ad maureen in the room. Pt does not need inpatient PT services. Recommend DC home.  -DP       Row Name 11/21/23 1500          Therapy Assessment/Plan (PT)    Criteria for Skilled Interventions Met (PT) no problems identified which require skilled intervention  -DP     Therapy Frequency (PT) evaluation only  -DP       Row Name 11/21/23 1500          PT Evaluation Complexity    History, PT Evaluation Complexity no personal factors and/or comorbidities  -DP     Examination of Body Systems (PT Eval Complexity) total of 4 or more elements  -DP     Clinical Presentation (PT Evaluation Complexity) stable  -DP     Clinical Decision Making (PT Evaluation Complexity) low complexity  -DP     Overall Complexity (PT Evaluation Complexity) low complexity  -DP       Row Name 11/21/23 1500          Physical Therapy Goals    Problem Specific Goal Selection (PT) problem specific goal 1, PT  -DP       Row Name 11/21/23 1500          Problem Specific Goal 1 (PT)    Problem Specific Goal 1 (PT) Complete PT evaluation.  -DP     Time Frame (Problem Specific Goal 1, PT) 1 day  -DP     Progress/Outcome (Problem Specific Goal 1, PT) goal met  -DP               User Key  (r) = Recorded By, (t) = Taken By, (c) = Cosigned By      Initials Name Provider Type     Soren Reyes, PT Physical Therapist                      PT Recommendation and Plan  Anticipated Discharge Disposition (PT): home  Therapy Frequency (PT): evaluation only  Plan of Care Reviewed With: patient  Outcome Evaluation: Patient is able to complete all transfers and bed mobilities indepedently in the room. Pt is able to ambulate ad maureen in the room. Pt does not need inpatient PT services. Recommend DC home.   Outcome Measures       Row Name 11/21/23 1500             How much help from another person do you currently need...    Turning from your back to your side while in flat bed without using bedrails? 4  -DP      Moving from lying on back to sitting on the side of a flat bed without bedrails? 4  -DP      Moving to and from a bed to a chair (including a wheelchair)? 4  -DP      Standing up from a chair using your arms (e.g., wheelchair, bedside chair)? 4  -DP      Climbing 3-5 steps with a railing? 4  -DP      To walk in hospital room? 4  -DP      AM-PAC 6 Clicks Score (PT) 24  -DP      Highest Level of Mobility Goal 8 --> Walked 250 feet or more  -DP         Functional Assessment    Outcome Measure Options AM-PAC 6 Clicks Basic Mobility (PT)  -DP                User Key  (r) = Recorded By, (t) = Taken By, (c) = Cosigned By      Initials Name Provider Type    Soren Reyes, PT Physical Therapist                     Time Calculation:    PT Charges       Row Name 11/21/23 1526             Time Calculation    PT Received On 11/21/23  -DP         Untimed Charges    PT Eval/Re-eval Minutes 30  -DP         Total Minutes    Untimed Charges Total Minutes 30  -DP       Total Minutes 30  -DP                User Key  (r) = Recorded By, (t) = Taken By, (c) = Cosigned By      Initials Name Provider Type    Soren Reyes PT Physical Therapist                      PT G-Codes  Outcome Measure Options: AM-PAC 6 Clicks Basic Mobility (PT)  AM-PAC 6 Clicks Score (PT): 24    Soren Booth, PT  11/21/2023

## 2023-11-21 NOTE — PLAN OF CARE
Goal Outcome Evaluation:  Plan of Care Reviewed With: patient           Outcome Evaluation: Patient is able to complete all transfers and bed mobilities indepedently in the room. Pt is able to ambulate ad maureen in the room. Pt does not need inpatient PT services. Recommend DC home.      Anticipated Discharge Disposition (PT): home

## 2023-11-22 ENCOUNTER — TELEPHONE (OUTPATIENT)
Dept: GASTROENTEROLOGY | Facility: CLINIC | Age: 58
End: 2023-11-22
Payer: MEDICARE

## 2023-11-22 PROBLEM — R18.8 CIRRHOSIS OF LIVER WITH ASCITES: Status: ACTIVE | Noted: 2023-11-21

## 2023-11-22 PROBLEM — K74.60 CIRRHOSIS OF LIVER WITH ASCITES: Status: ACTIVE | Noted: 2023-11-21

## 2023-11-22 NOTE — TELEPHONE ENCOUNTER
Hub staff attempted to follow warm transfer process and was unsuccessful     Caller: Tawny Lennon    Relationship to patient: Self    Best call back number: [phone]     Patient is needing: PATIENT RETURNED MISSED CALLS FROM OFFICE. PLEASE RETURN HER CALL ANY TIME [phone]; OK TO [facility].

## 2023-11-22 NOTE — OUTREACH NOTE
Prep Survey      Flowsheet Row Responses   [facility]   Is LACE score < 7 ? No   Eligibility Readm Mgmt   Discharge diagnosis Upper GI bleed   Does the patient have one of the following disease processes/diagnoses(primary or secondary)? Other   Does the patient have Home health ordered? No   Is there a DME ordered? No   Medication alerts for this patient Midodrine, Pantoprazole, STOP Nadolol   General alerts for this patient ESOPHAGOGASTRODUODENOSCOPY WITH BIOPSIES AND VARICEAL BANDING X 4   Prep survey completed? Yes            Izzy PRINCE - Registered Nurse

## 2023-12-05 ENCOUNTER — READMISSION MANAGEMENT (OUTPATIENT)
Dept: CALL CENTER | Facility: HOSPITAL | Age: 58
End: 2023-12-05
Payer: MEDICARE

## 2023-12-05 NOTE — OUTREACH NOTE
Medical Week 2 Survey      Flowsheet Row Responses   [facility] patient discharged from? [facility]   Does the patient have one of the following disease processes/diagnoses(primary or secondary)? Other   Week 2 attempt successful? No   Unsuccessful attempts Attempt 1  [attempted pt and sister]            FRANCO COYNE - Registered Nurse

## 2023-12-12 ENCOUNTER — READMISSION MANAGEMENT (OUTPATIENT)
Dept: CALL CENTER | Facility: HOSPITAL | Age: 58
End: 2023-12-12
Payer: MEDICARE

## 2023-12-12 NOTE — OUTREACH NOTE
Medical Week 3 Survey      Flowsheet Row Responses   [facility] patient discharged from? [facility]   Does the patient have one of the following disease processes/diagnoses(primary or secondary)? Other   Week 3 attempt successful? Yes   Call start time 1650   Call end time 1654   General alerts for this patient ESOPHAGOGASTRODUODENOSCOPY WITH BIOPSIES AND VARICEAL BANDING X 4   Discharge diagnosis Upper GI bleed   Meds reviewed with patient/caregiver? Yes   Is the patient having any side effects they believe may be caused by any medication additions or changes? No   Does the patient have all medications ordered at discharge? Yes   Is the patient taking all medications as directed (includes completed medication regime)? Yes   Does the patient have a primary care provider?  Yes   Has the patient kept scheduled appointments due by today? Yes   Psychosocial issues? No   What is the patient's perception of their health status since discharge? New symptoms unrelated to diagnosis   Is the patient/caregiver able to teach back signs and symptoms related to disease process for when to call PCP? Yes   Is the patient/caregiver able to teach back signs and symptoms related to disease process for when to call 911? Yes   Is the patient/caregiver able to teach back the hierarchy of who to call/visit for symptoms/problems? PCP, Specialist, Home health nurse, Urgent Care, ED, 911 Yes   If the patient is a current smoker, are they able to teach back resources for cessation? Not a smoker   Additional teach back comments She is using oxygen at 2L.  Monitors her  oxygen levels advised to make sure they say at 90% and above at rest.  If drops below she is to go to ED.  States she currenlty has the flu and pink eye. Has had chills.   Week 3 Call Completed? Yes   Graduated/Revoked comments Pt to monitor oxygen levels. If flu symptoms worsen, she will contact PCP and if oxygen levels drop below 90%, she will go to ED.   Call end time 1654             Tawny GRIER - Licensed Nurse

## 2023-12-13 NOTE — PRE-PROCEDURE INSTRUCTIONS
"Instructed on date and arrival time of 1230. Come to entrance \"C\".  Must have  over age 18 to drive home.  May have two visitors; however, children under 12 must stay in waiting room.  Discussed diet/NPO.  May take medications as usual except for blood thinners, diabetic medications, or weight loss medications.  Bring list of medications to hospital.  Verbalized understanding of instructions given.  Instructed to call for questions or concerns.  "

## 2023-12-20 ENCOUNTER — ANESTHESIA EVENT (OUTPATIENT)
Dept: GASTROENTEROLOGY | Facility: HOSPITAL | Age: 58
End: 2023-12-20
Payer: MEDICARE

## 2023-12-20 NOTE — ANESTHESIA PREPROCEDURE EVALUATION
Anesthesia Evaluation     Patient summary reviewed and Nursing notes reviewed   history of anesthetic complications (with general anesthesia):  PONV  NPO Solid Status: > 8 hours  NPO Liquid Status: > 6 hours           Airway   Mallampati: II  TM distance: >3 FB  Neck ROM: full  No difficulty expected  Dental - normal exam     Comment: Cracked tooth right upper molar     Pulmonary - normal exam    breath sounds clear to auscultation  (+) COPD (breathing at baseline, easy, unlabored) mild,home oxygen (continuous home o2 NC), sleep apnea  Cardiovascular - normal exam  Exercise tolerance: poor (<4 METS)    Rhythm: regular  Rate: normal    (+) hypertension well controlled, hyperlipidemia      Neuro/Psych  (+) headaches, psychiatric history Depression  GI/Hepatic/Renal/Endo    (+) obesity, morbid obesity, GERD well controlled, GI bleeding , hepatitis, liver disease cirrhosis, diabetes mellitus type 2 well controlled    Musculoskeletal     Abdominal   (+) obese    Abdomen: soft.   Substance History      OB/GYN          Other        ROS/Med Hx Other: Prev ANS for EGD 11/23, presents today in usual state of health, states home O2 has been Rx since last procedure, but breathing currently at baseline      EKG 09/28/23: HR 81, SR                        Anesthesia Plan    ASA 4     general   total IV anesthesia    Anesthetic plan, risks, benefits, and alternatives have been provided, discussed and informed consent has been obtained with: patient.  Pre-procedure education provided  Plan discussed with CRNA.        CODE STATUS:

## 2023-12-21 ENCOUNTER — ANESTHESIA (OUTPATIENT)
Dept: GASTROENTEROLOGY | Facility: HOSPITAL | Age: 58
End: 2023-12-21
Payer: MEDICARE

## 2023-12-21 ENCOUNTER — HOSPITAL ENCOUNTER (OUTPATIENT)
Facility: HOSPITAL | Age: 58
Setting detail: HOSPITAL OUTPATIENT SURGERY
Discharge: HOME | End: 2023-12-21
Attending: INTERNAL MEDICINE | Admitting: INTERNAL MEDICINE
Payer: MEDICARE

## 2023-12-21 VITALS
HEART RATE: 64 BPM | WEIGHT: 176.37 LBS | TEMPERATURE: 98 F | SYSTOLIC BLOOD PRESSURE: 120 MMHG | OXYGEN SATURATION: 95 % | HEIGHT: 62 IN | RESPIRATION RATE: 20 BRPM | DIASTOLIC BLOOD PRESSURE: 74 MMHG | BODY MASS INDEX: 32.46 KG/M2

## 2023-12-21 DIAGNOSIS — R18.8 CIRRHOSIS OF LIVER WITH ASCITES, UNSPECIFIED HEPATIC CIRRHOSIS TYPE: ICD-10-CM

## 2023-12-21 DIAGNOSIS — K21.9 GASTROESOPHAGEAL REFLUX DISEASE WITHOUT ESOPHAGITIS: ICD-10-CM

## 2023-12-21 DIAGNOSIS — I85.00 ESOPHAGEAL VARICES WITHOUT BLEEDING, UNSPECIFIED ESOPHAGEAL VARICES TYPE: ICD-10-CM

## 2023-12-21 DIAGNOSIS — K74.60 CIRRHOSIS OF LIVER WITH ASCITES, UNSPECIFIED HEPATIC CIRRHOSIS TYPE: ICD-10-CM

## 2023-12-21 LAB — GLUCOSE BLDC GLUCOMTR-MCNC: 115 MG/DL (ref 70–99)

## 2023-12-21 PROCEDURE — 25010000002 PROPOFOL 10 MG/ML EMULSION

## 2023-12-21 PROCEDURE — 25810000003 LACTATED RINGERS PER 1000 ML: Performed by: ANESTHESIOLOGY

## 2023-12-21 PROCEDURE — 88305 TISSUE EXAM BY PATHOLOGIST: CPT | Performed by: INTERNAL MEDICINE

## 2023-12-21 PROCEDURE — 82948 REAGENT STRIP/BLOOD GLUCOSE: CPT

## 2023-12-21 RX ORDER — SODIUM CHLORIDE, SODIUM LACTATE, POTASSIUM CHLORIDE, CALCIUM CHLORIDE 600; 310; 30; 20 MG/100ML; MG/100ML; MG/100ML; MG/100ML
30 INJECTION, SOLUTION INTRAVENOUS CONTINUOUS
Status: DISCONTINUED | OUTPATIENT
Start: 2023-12-21 | End: 2023-12-21 | Stop reason: HOSPADM

## 2023-12-21 RX ORDER — NADOLOL 20 MG/1
20 TABLET ORAL DAILY
Qty: 30 TABLET | Refills: 5 | Status: SHIPPED | OUTPATIENT
Start: 2023-12-21

## 2023-12-21 RX ORDER — PROPOFOL 10 MG/ML
VIAL (ML) INTRAVENOUS AS NEEDED
Status: DISCONTINUED | OUTPATIENT
Start: 2023-12-21 | End: 2023-12-21 | Stop reason: SURG

## 2023-12-21 RX ORDER — LIDOCAINE HYDROCHLORIDE 20 MG/ML
INJECTION, SOLUTION EPIDURAL; INFILTRATION; INTRACAUDAL; PERINEURAL AS NEEDED
Status: DISCONTINUED | OUTPATIENT
Start: 2023-12-21 | End: 2023-12-21 | Stop reason: SURG

## 2023-12-21 RX ADMIN — PROPOFOL 20 MG: 10 INJECTION, EMULSION INTRAVENOUS at 12:51

## 2023-12-21 RX ADMIN — PROPOFOL 50 MG: 10 INJECTION, EMULSION INTRAVENOUS at 13:14

## 2023-12-21 RX ADMIN — LIDOCAINE HYDROCHLORIDE 50 MG: 20 INJECTION, SOLUTION EPIDURAL; INFILTRATION; INTRACAUDAL; PERINEURAL at 12:48

## 2023-12-21 RX ADMIN — PROPOFOL 100 MG: 10 INJECTION, EMULSION INTRAVENOUS at 13:16

## 2023-12-21 RX ADMIN — PROPOFOL 50 MG: 10 INJECTION, EMULSION INTRAVENOUS at 13:08

## 2023-12-21 RX ADMIN — PROPOFOL 50 MG: 10 INJECTION, EMULSION INTRAVENOUS at 12:55

## 2023-12-21 RX ADMIN — PROPOFOL 80 MG: 10 INJECTION, EMULSION INTRAVENOUS at 12:48

## 2023-12-21 RX ADMIN — PROPOFOL 50 MG: 10 INJECTION, EMULSION INTRAVENOUS at 13:04

## 2023-12-21 RX ADMIN — PROPOFOL 50 MG: 10 INJECTION, EMULSION INTRAVENOUS at 12:59

## 2023-12-21 RX ADMIN — PROPOFOL 100 MG: 10 INJECTION, EMULSION INTRAVENOUS at 13:19

## 2023-12-21 RX ADMIN — SODIUM CHLORIDE, POTASSIUM CHLORIDE, SODIUM LACTATE AND CALCIUM CHLORIDE 30 ML/HR: 600; 310; 30; 20 INJECTION, SOLUTION INTRAVENOUS at 11:59

## 2023-12-21 RX ADMIN — PROPOFOL 100 MG: 10 INJECTION, EMULSION INTRAVENOUS at 13:11

## 2023-12-21 NOTE — H&P
Pre Procedure History & Physical    Chief Complaint:   Cirrhosis  Esophageal varices    Subjective     HPI:   As above    Past Medical History:   Past Medical History:   Diagnosis Date    Cirrhosis     liver    Cirrhosis, nonalcoholic     Colon polyps     COPD (chronic obstructive pulmonary disease)     Depression     Diabetes     Diverticulitis     Hematuria     High blood pressure     High cholesterol     Interstitial cystitis     Migraine headache     Narcolepsy     PONV (postoperative nausea and vomiting)     Sleep apnea     Spinal headache     DURING PREGNANCY       Past Surgical History:  Past Surgical History:   Procedure Laterality Date    BLADDER REPAIR  2009    CHOLECYSTECTOMY      COLONOSCOPY  2014 x2 2020    ENDOSCOPY  2014 2020    ENDOSCOPY N/A 07/12/2021    Procedure: ESOPHAGOGASTRODUODENOSCOPY with biopsies;  Surgeon: Jesse Watkins MD;  Location: [facility] ENDOSCOPY;  Service: Gastroenterology;  Laterality: N/A;  esophageal varices    ENDOSCOPY N/A 03/25/2022    Procedure: ESOPHAGOGASTRODUODENOSCOPY WITH BIOPSIES;  Surgeon: Jesse Watkins MD;  Location: [facility] ENDOSCOPY;  Service: Gastroenterology;  Laterality: N/A;  ESOPHAGEAL VARICIES    ENDOSCOPY N/A 9/28/2023    Procedure: ESOPHAGOGASTRODUODENOSCOPY WITH COLD BIOPSIES;  Surgeon: Jesse Watkins MD;  Location: [facility] ENDOSCOPY;  Service: Gastroenterology;  Laterality: N/A;  GASTRITIS, ESOPHAGEAL VARICES    ENDOSCOPY N/A 11/17/2023    Procedure: ESOPHAGOGASTRODUODENOSCOPY WITH BIOPSIES AND  VARICEAL BANDING X 4;  Surgeon: Jesse Watkins MD;  Location: [facility] ENDOSCOPY;  Service: Gastroenterology;  Laterality: N/A;  GASTRITIS, PORTAL HYPERTENSIVE GASTROPATHY, ESOPHAGEAL VARICIES    HAND SURGERY      HERNIA REPAIR      HYSTERECTOMY  2009    ILEOSTOMY  07/2022    OTHER SURGICAL HISTORY      rectocele repair    UPPER GASTROINTESTINAL ENDOSCOPY         Family History:  Family History   Problem Relation Age of Onset    Liver  cancer Father     Dementia Mother     Kim Hyperthermia Neg Hx     Colon cancer Neg Hx        Social History:   reports that she has never smoked. She has been exposed to tobacco smoke. She has never used smokeless tobacco. She reports that she does not drink alcohol and does not use drugs.    Medications:   Medications Prior to Admission   Medication Sig Dispense Refill Last Dose    furosemide (LASIX) 20 MG tablet Take 1 tablet by mouth Daily.   12/20/2023    metFORMIN (GLUCOPHAGE) 500 MG tablet Take 1 tablet by mouth 2 (Two) Times a Day With Meals. Only takes one a day   Past Week    midodrine (PROAMATINE) 2.5 MG tablet Take 1 tablet by mouth 3 (Three) Times a Day Before Meals for 30 days. 90 tablet 0 12/20/2023    pantoprazole (PROTONIX) 40 MG EC tablet Take 1 tablet by mouth 2 (Two) Times a Day Before Meals for 30 days. 60 tablet 0 Past Month    spironolactone (ALDACTONE) 50 MG tablet Take 1 tablet by mouth Daily. 90 tablet 5 12/20/2023    potassium chloride 10 MEQ CR tablet Take 1 tablet by mouth 2 (Two) Times a Day.   Unknown       Allergies:  Patient has no known allergies.        Objective     Weight 80 kg (176 lb 5.9 oz).    Physical Exam   Constitutional: Pt is oriented to person, place, and time and well-developed, well-nourished, and in no distress.   Mouth/Throat: Oropharynx is clear and moist.   Neck: Normal range of motion.   Cardiovascular: Normal rate, regular rhythm and normal heart sounds.    Pulmonary/Chest: Effort normal and breath sounds normal.   Abdominal: Soft. Nontender  Skin: Skin is warm and dry.   Psychiatric: Mood, memory, affect and judgment normal.     Assessment & Plan     Diagnosis:  Cirrhosis  Esopahgeal varices    Anticipated Surgical Procedure:  egd    The risks, benefits, and alternatives of this procedure have been discussed with the patient or the responsible party- the patient understands and agrees to proceed.

## 2023-12-21 NOTE — ANESTHESIA POSTPROCEDURE EVALUATION
Patient: Tawny [facility]on    Procedure Summary       Date: 12/21/23 Room / Location: [facility] ENDOSCOPY 2 / [facility] ENDOSCOPY    Anesthesia Start: 1247 Anesthesia Stop: 1330    Procedure: ESOPHAGOGASTRODUODENOSCOPY WITH BIOPSIES AND ESOPHAGEAL BANDING Diagnosis:       Esophageal varices without bleeding, unspecified esophageal varices type      Gastroesophageal reflux disease without esophagitis      Cirrhosis of liver with ascites, unspecified hepatic cirrhosis type      (Esophageal varices without bleeding, unspecified esophageal varices type [I85.00])      (Gastroesophageal reflux disease without esophagitis [K21.9])      (Cirrhosis of liver with ascites, unspecified hepatic cirrhosis type [K74.60, R18.8])    Surgeons: Jesse Watkins MD Provider: Selvin Rose CRNA    Anesthesia Type: general ASA Status: 4            Anesthesia Type: general    Vitals  Vitals Value Taken Time   /74 12/21/23 1349   Temp 36.7 °C (98 °F) 12/21/23 1349   Pulse 70 12/21/23 1353   Resp 20 12/21/23 1349   SpO2 96 % 12/21/23 1353   Vitals shown include unfiled device data.        Post Anesthesia Care and Evaluation    Post-procedure mental status: acceptable.  Pain management: satisfactory to patient    Airway patency: patent  Anesthetic complications: No anesthetic complications    Cardiovascular status: acceptable  Respiratory status: acceptable    Comments: Per chart review

## 2023-12-22 NOTE — SIGNIFICANT NOTE
Pt states that she could hardly walk when she left.  Vomited when she left and when she got home.  She has been asleep since she got home.  No vomiting today.  States that she feels like something is in her throat.  When she tries to eat she feels like she gets choked and she vomits.

## 2024-01-02 ENCOUNTER — TELEPHONE (OUTPATIENT)
Dept: GASTROENTEROLOGY | Facility: CLINIC | Age: 59
End: 2024-01-02
Payer: MEDICARE

## 2024-01-02 NOTE — TELEPHONE ENCOUNTER
Spoke to patient and informed of ANDRE Alvarado result note and recommendations. Verified patient understanding.    Confirmed patient is taking nadolol 20 mg QD.  Confirmed patient does not routinely take NSAIDs.  However, she was recently given a 5 day dose of naproxen in response to a fall she took on 12.24.23.    Confirmed follow up appointment is scheduled with Sharri Mei on 01.04.24 at 1045.    1 year EGD recall in place.

## 2024-01-02 NOTE — TELEPHONE ENCOUNTER
----- Message from ANDRE Alvarado sent at 12/28/2023 12:48 PM EST -----  EGD with reactive gastropathy and intestinal metaplasia noted on stomach biopsies.  Grade 3 esophageal varices eradicated ensure that the patient began nadolol 20 mg daily.  Repeat EGD in 1 year please place in recall system

## 2024-01-03 NOTE — PROGRESS NOTES
Chief Complaint   2m follow up    History of Present Illness       Tawny Lennon is a 58 y.o. female who presents to [facility]STR[facility] for follow-up after recent EGD we have reviewed EGD and pathology at this time.  Patient has a history of cirrhosis likely secondary to MEDRANO, esophageal varices.  Patient in the past has also had an exploratory laparotomy with a diverting colostomy secondary to ruptured diverticulitis in 2023 which was surgically repaired by Dr. Kang and had takedown of her colostomy.  Patient continues on Aldactone 50 mg daily and Lasix 20 mg daily related to lower extremity edema with good control of her swelling.  She is on potassium 10 mEq twice daily due to previous electrolyte imbalance.  Patient takes Protonix on an as-needed basis.  Patient has been on nadolol 20 mg daily but reports low heart rate at home and a fall that occurred 12/26/2023 causing contusion to the right ribs.  Patient has been experiencing an acute cough since her fall and her family at office visit feels that the patient's cough is productive at times.  Patient is prescribed oxygen which she is supposed to be on at home.  Patient denies lower extremity edema, abdominal swelling, confusion, fever, nausea, vomiting, weight loss, night sweats, melena, hematochezia, hematemesis.    EGD: Review of the patient's most recent EGD performed by Dr. Watkins on 11.17.2023 nonsevere esophagitis no bleeding found in the lower third of the esophagus.  Grade 3 varices found in the lower third of the esophagus 4 bands were successfully placed with complete eradication.  Normal stomach.  Normal duodenum.  Continue Protonix 40 mg daily, begin nadolol 20 mg daily.    Colonoscopy: Review of the patient's most recent colonoscopy performed by Dr. Jasper Kang on 07.19.2022 with reversal of ileostomy.    CT chest abdomen and pelvis w/c on 11.16.2023  Cirrhosis of the liver.  Diverticula.  Lung bases are  unremarkable.    US abdomen limited on 09.14.2023  No ascites fluid amendable to drain.    PCP-Dr. Valentine  Last paracentesis-never.  Screening for HCC-  Diuretics-Lasix 20 mg daily, Aldactone 50 mg daily  Most recent lab work-11/28/2023  Diet-low-sodium diet  Diabetes-yes, currently on metformin.  Esophageal varices screening-performed 11/17/2023 with complete eradication.  Patient cannot tolerate nadolol due to falls and dizziness.  Other specialist-none.    Results       Result Review :   The following data was reviewed by: ANDRE Alvarado on 01/04/2024:    CMP          11/20/2023    03:20 11/21/2023    03:45 11/28/2023    12:07   CMP   Glucose 158  157     Glucose   151       BUN 16  15  13       Creatinine 1.05  1.12  1.0       EGFR 61.0  56.4     Sodium 135  136  139       Potassium 4.2  3.8  3.9       Chloride 107  107  106       Calcium 8.2  8.3  9.1       Total Protein 6.0  6.0     Albumin 2.5  2.8  3.2       Globulin 3.5  3.2     Total Bilirubin 1.9  2.0  3.50       Alkaline Phosphatase 107  101  118       AST (SGOT) 41  40  38       ALT (SGPT) 12  12  15       Albumin/Globulin Ratio 0.7  0.9     BUN/Creatinine Ratio 15.2  13.4     Anion Gap 11.2  10.6  12          Details          This result is from an external source.             CBC          11/19/2023    00:34 11/19/2023    03:48 11/20/2023    03:20 11/21/2023    03:45   CBC   WBC  4.33  4.37  4.89    RBC  3.35  3.23  3.08    Hemoglobin 10.4  10.7  10.3  9.8    Hematocrit 30.4  31.8  31.5  29.7    MCV  94.9  97.5  96.4    MCH  31.9  31.9  31.8    MCHC  33.6  32.7  33.0    RDW  14.1  14.1  14.6    Platelets  55  61  53        Liver Workup   Iron   Date Value Ref Range Status   11/28/2023 122 28 - 170 ug/dL Final     TIBC   Date Value Ref Range Status   11/28/2023 315 261 - 478 ug/dL Final     Iron Saturation   Date Value Ref Range Status   11/28/2023 39 20 - 55 % Final     Protime   Date Value Ref Range Status   09/14/2023 15.8 (H) 11.8 - 14.9  Seconds Final     INR   Date Value Ref Range Status   09/14/2023 1.26 (H) 0.86 - 1.15 Final               Past Medical History       Past Medical History:   Diagnosis Date    Cirrhosis     liver    Cirrhosis, nonalcoholic     Colon polyps     COPD (chronic obstructive pulmonary disease)     Depression     Diabetes     Diverticulitis     Hematuria     High blood pressure     High cholesterol     Interstitial cystitis     Migraine headache     Narcolepsy     PONV (postoperative nausea and vomiting)     Sleep apnea     Spinal headache     DURING PREGNANCY       Past Surgical History:   Procedure Laterality Date    BLADDER REPAIR  2009    CHOLECYSTECTOMY      COLONOSCOPY  2014 x2 2020    ENDOSCOPY  2014 2020    ENDOSCOPY N/A 07/12/2021    Procedure: ESOPHAGOGASTRODUODENOSCOPY with biopsies;  Surgeon: Jesse Watkins MD;  Location: [facility] ENDOSCOPY;  Service: Gastroenterology;  Laterality: N/A;  esophageal varices    ENDOSCOPY N/A 03/25/2022    Procedure: ESOPHAGOGASTRODUODENOSCOPY WITH BIOPSIES;  Surgeon: Jesse Watkins MD;  Location: [facility] ENDOSCOPY;  Service: Gastroenterology;  Laterality: N/A;  ESOPHAGEAL VARICIES    ENDOSCOPY N/A 9/28/2023    Procedure: ESOPHAGOGASTRODUODENOSCOPY WITH COLD BIOPSIES;  Surgeon: Jesse Watkins MD;  Location: [facility] ENDOSCOPY;  Service: Gastroenterology;  Laterality: N/A;  GASTRITIS, ESOPHAGEAL VARICES    ENDOSCOPY N/A 11/17/2023    Procedure: ESOPHAGOGASTRODUODENOSCOPY WITH BIOPSIES AND  VARICEAL BANDING X 4;  Surgeon: Jesse Watkins MD;  Location: [facility] ENDOSCOPY;  Service: Gastroenterology;  Laterality: N/A;  GASTRITIS, PORTAL HYPERTENSIVE GASTROPATHY, ESOPHAGEAL VARICIES    ENDOSCOPY N/A 12/21/2023    Procedure: ESOPHAGOGASTRODUODENOSCOPY WITH BIOPSIES AND ESOPHAGEAL BANDING;  Surgeon: Jesse Watkins MD;  Location: [facility] ENDOSCOPY;  Service: Gastroenterology;  Laterality: N/A;  ESOPHAGEAL VARICES, GASTRITIS    HAND SURGERY      HERNIA REPAIR  "     HYSTERECTOMY  2009    ILEOSTOMY  07/2022    OTHER SURGICAL HISTORY      rectocele repair    UPPER GASTROINTESTINAL ENDOSCOPY           Current Outpatient Medications:     albuterol sulfate  (90 Base) MCG/ACT inhaler, Inhale 2 puffs., Disp: , Rfl:     cyclobenzaprine (FLEXERIL) 10 MG tablet, , Disp: , Rfl:     Fluticasone-Umeclidin-Vilant (Trelegy Ellipta) 200-62.5-25 MCG/ACT aerosol powder , Inhale 1 inhaler Daily., Disp: , Rfl:     furosemide (LASIX) 20 MG tablet, Take 1 tablet by mouth Daily., Disp: 30 tablet, Rfl: 11    Magnesium Oxide -Mg Supplement (MagOx 400) 400 (240 Mg) MG tablet, Take 1 tablet by mouth., Disp: , Rfl:     metFORMIN (GLUCOPHAGE) 500 MG tablet, Take 1 tablet by mouth 2 (Two) Times a Day With Meals. Only takes one a day, Disp: , Rfl:     pantoprazole (PROTONIX) 40 MG EC tablet, Take 1 tablet by mouth 2 (Two) Times a Day Before Meals for 30 days., Disp: 60 tablet, Rfl: 0    potassium chloride 10 MEQ CR tablet, Take 1 tablet by mouth 2 (Two) Times a Day., Disp: 60 tablet, Rfl: 3    spironolactone (ALDACTONE) 50 MG tablet, Take 1 tablet by mouth Daily., Disp: 90 tablet, Rfl: 5     No Known Allergies    Family History   Problem Relation Age of Onset    Liver cancer Father     Dementia Mother     Malig Hyperthermia Neg Hx     Colon cancer Neg Hx         Social History     Social History Narrative    Not on file       Objective     Vital Signs:   /67 (BP Location: Right arm, Patient Position: Sitting, Cuff Size: Adult)   Pulse 56   Ht 157.5 cm (62\")   Wt 81.2 kg (179 lb)   SpO2 97%   BMI 32.74 kg/m²       Physical Exam  Constitutional:       General: She is not in acute distress.     Appearance: Normal appearance. She is well-developed. She is obese.   Eyes:      Conjunctiva/sclera: Conjunctivae normal.      Pupils: Pupils are equal, round, and reactive to light.      Visual Fields: Right eye visual fields normal and left eye visual fields normal.   Cardiovascular:      Rate " and Rhythm: Normal rate and regular rhythm.      Heart sounds: Normal heart sounds.   Pulmonary:      Effort: Pulmonary effort is normal. No retractions.      Breath sounds: Normal breath sounds and air entry.      Comments: Inspection of chest: normal appearance  Abdominal:      General: Bowel sounds are normal.      Palpations: Abdomen is soft.      Tenderness: There is no abdominal tenderness.      Comments: No appreciable hepatosplenomegaly   Musculoskeletal:      Cervical back: Neck supple.      Right lower leg: No edema.      Left lower leg: No edema.   Lymphadenopathy:      Cervical: No cervical adenopathy.   Skin:     Findings: No lesion.      Comments: Turgor normal   Neurological:      Mental Status: She is alert and oriented to person, place, and time.   Psychiatric:         Mood and Affect: Mood and affect normal.           Assessment & Plan          Assessment and Plan    Diagnoses and all orders for this visit:    1. Esophageal varices without bleeding, unspecified esophageal varices type (Primary)  -     Cancel: US Abdomen Limited; Future  -     CBC (No Diff); Future  -     Comprehensive Metabolic Panel; Future  -     Protime-INR  -     AFP Tumor Marker  -     XR Chest 2 View; Future  -     US Abdomen Limited; Future    2. Gastroesophageal reflux disease without esophagitis  -     Cancel: US Abdomen Limited; Future  -     CBC (No Diff); Future  -     Comprehensive Metabolic Panel; Future  -     Protime-INR  -     AFP Tumor Marker  -     XR Chest 2 View; Future  -     US Abdomen Limited; Future    3. Cirrhosis of liver with ascites, unspecified hepatic cirrhosis type  -     Cancel: US Abdomen Limited; Future  -     CBC (No Diff); Future  -     Comprehensive Metabolic Panel; Future  -     Protime-INR  -     AFP Tumor Marker  -     XR Chest 2 View; Future  -     US Abdomen Limited; Future    4. Secondary esophageal varices without bleeding  -     Cancel: US Abdomen Limited; Future  -     CBC (No Diff);  Future  -     Comprehensive Metabolic Panel; Future  -     Protime-INR  -     AFP Tumor Marker  -     XR Chest 2 View; Future  -     US Abdomen Limited; Future    5. Adenomatous polyp of colon, unspecified part of colon  -     Cancel: US Abdomen Limited; Future  -     CBC (No Diff); Future  -     Comprehensive Metabolic Panel; Future  -     Protime-INR  -     AFP Tumor Marker  -     XR Chest 2 View; Future  -     US Abdomen Limited; Future    6. Anemia, unspecified type  -     Cancel: US Abdomen Limited; Future  -     CBC (No Diff); Future  -     Comprehensive Metabolic Panel; Future  -     Protime-INR  -     AFP Tumor Marker  -     XR Chest 2 View; Future  -     US Abdomen Limited; Future    7. Acute cough  -     Cancel: US Abdomen Limited; Future  -     CBC (No Diff); Future  -     Comprehensive Metabolic Panel; Future  -     Protime-INR  -     AFP Tumor Marker  -     XR Chest 2 View; Future  -     US Abdomen Limited; Future    Other orders  -     spironolactone (ALDACTONE) 50 MG tablet; Take 1 tablet by mouth Daily.  Dispense: 90 tablet; Refill: 5  -     potassium chloride 10 MEQ CR tablet; Take 1 tablet by mouth 2 (Two) Times a Day.  Dispense: 60 tablet; Refill: 3  -     pantoprazole (PROTONIX) 40 MG EC tablet; Take 1 tablet by mouth 2 (Two) Times a Day Before Meals for 30 days.  Dispense: 60 tablet; Refill: 0  -     furosemide (LASIX) 20 MG tablet; Take 1 tablet by mouth Daily.  Dispense: 30 tablet; Refill: 11      58-year-old female presenting to the office today for follow-up after recent EGD we have reviewed EGD and pathology.  Patient has a history of decompensated cirrhosis likely secondary to Reich, lower extremity edema, and esophageal varices.  Patient will continue on Aldactone 50 mg daily and Lasix 20 mg daily, potassium 10 mEq twice daily, Protonix which is recommended to take on a daily basis.  Patient will discontinue nadolol at this time as she has had low heart rate and experienced a fall  12/26/2023.  With the patient's acute cough related to her chest contusion I have ordered chest x-ray for further evaluation.  I have ordered labs and ultrasound for monitoring of the patient's cirrhosis.  She will follow-up in the office in 4 to 6 weeks to ensure improvement after holding nadolol.  Patient agreeable to this plan will call with any questions or concerns.          Follow Up       Follow Up   Return in about 6 weeks (around 2/15/2024).  Patient was given instructions and counseling regarding her condition or for health maintenance advice. Please see specific information pulled into the AVS if appropriate.

## 2024-01-04 ENCOUNTER — TELEPHONE (OUTPATIENT)
Dept: GASTROENTEROLOGY | Facility: CLINIC | Age: 59
End: 2024-01-04

## 2024-01-04 ENCOUNTER — OFFICE VISIT (OUTPATIENT)
Dept: GASTROENTEROLOGY | Facility: CLINIC | Age: 59
End: 2024-01-04
Payer: MEDICARE

## 2024-01-04 ENCOUNTER — LAB (OUTPATIENT)
Dept: LAB | Facility: HOSPITAL | Age: 59
End: 2024-01-04
Payer: MEDICARE

## 2024-01-04 ENCOUNTER — HOSPITAL ENCOUNTER (OUTPATIENT)
Dept: GENERAL RADIOLOGY | Facility: HOSPITAL | Age: 59
Discharge: HOME | End: 2024-01-04
Payer: MEDICARE

## 2024-01-04 VITALS
SYSTOLIC BLOOD PRESSURE: 116 MMHG | HEART RATE: 56 BPM | HEIGHT: 62 IN | BODY MASS INDEX: 32.94 KG/M2 | DIASTOLIC BLOOD PRESSURE: 67 MMHG | WEIGHT: 179 LBS | OXYGEN SATURATION: 97 %

## 2024-01-04 DIAGNOSIS — K74.60 CIRRHOSIS OF LIVER WITH ASCITES, UNSPECIFIED HEPATIC CIRRHOSIS TYPE: ICD-10-CM

## 2024-01-04 DIAGNOSIS — I85.00 ESOPHAGEAL VARICES WITHOUT BLEEDING, UNSPECIFIED ESOPHAGEAL VARICES TYPE: ICD-10-CM

## 2024-01-04 DIAGNOSIS — K21.9 GASTROESOPHAGEAL REFLUX DISEASE WITHOUT ESOPHAGITIS: ICD-10-CM

## 2024-01-04 DIAGNOSIS — I85.10 SECONDARY ESOPHAGEAL VARICES WITHOUT BLEEDING: ICD-10-CM

## 2024-01-04 DIAGNOSIS — D12.6 ADENOMATOUS POLYP OF COLON, UNSPECIFIED PART OF COLON: ICD-10-CM

## 2024-01-04 DIAGNOSIS — R18.8 CIRRHOSIS OF LIVER WITH ASCITES, UNSPECIFIED HEPATIC CIRRHOSIS TYPE: ICD-10-CM

## 2024-01-04 DIAGNOSIS — D64.9 ANEMIA, UNSPECIFIED TYPE: ICD-10-CM

## 2024-01-04 DIAGNOSIS — R05.1 ACUTE COUGH: ICD-10-CM

## 2024-01-04 DIAGNOSIS — I85.00 ESOPHAGEAL VARICES WITHOUT BLEEDING, UNSPECIFIED ESOPHAGEAL VARICES TYPE: Primary | ICD-10-CM

## 2024-01-04 LAB
ALBUMIN SERPL-MCNC: 3.2 G/DL (ref 3.5–5.2)
ALBUMIN/GLOB SERPL: 0.7 G/DL
ALP SERPL-CCNC: 176 U/L (ref 39–117)
ALPHA-FETOPROTEIN: 4.71 NG/ML (ref 0–8.3)
ALT SERPL W P-5'-P-CCNC: 16 U/L (ref 1–33)
ANION GAP SERPL CALCULATED.3IONS-SCNC: 13.9 MMOL/L (ref 5–15)
AST SERPL-CCNC: 52 U/L (ref 1–32)
BILIRUB SERPL-MCNC: 4 MG/DL (ref 0–1.2)
BUN SERPL-MCNC: 18 MG/DL (ref 8–23)
BUN/CREAT SERPL: 13.7 (ref 7–25)
CALCIUM SPEC-SCNC: 9.6 MG/DL (ref 8.6–10.5)
CHLORIDE SERPL-SCNC: 106 MMOL/L (ref 98–107)
CO2 SERPL-SCNC: 17.1 MMOL/L (ref 22–29)
CREAT SERPL-MCNC: 1.31 MG/DL (ref 0.57–1)
DEPRECATED RDW RBC AUTO: 43.5 FL (ref 37–54)
EGFRCR SERPLBLD CKD-EPI 2021: 46.7 ML/MIN/1.73
ERYTHROCYTE [DISTWIDTH] IN BLOOD BY AUTOMATED COUNT: 12.7 % (ref 12.3–15.4)
GLOBULIN UR ELPH-MCNC: 4.6 GM/DL
GLUCOSE SERPL-MCNC: 149 MG/DL (ref 65–99)
HCT VFR BLD AUTO: 39.8 % (ref 34–46.6)
HGB BLD-MCNC: 13.1 G/DL (ref 12–15.9)
INR PPP: 1.27 (ref 0.86–1.15)
MCH RBC QN AUTO: 31 PG (ref 26.6–33)
MCHC RBC AUTO-ENTMCNC: 32.9 G/DL (ref 31.5–35.7)
MCV RBC AUTO: 94.1 FL (ref 79–97)
PLATELET # BLD AUTO: 84 10*3/MM3 (ref 140–450)
PMV BLD AUTO: 11.9 FL (ref 6–12)
POTASSIUM SERPL-SCNC: 3.9 MMOL/L (ref 3.5–5.2)
PROT SERPL-MCNC: 7.8 G/DL (ref 6–8.5)
PROTHROMBIN TIME: 16.2 SECONDS (ref 11.8–14.9)
RBC # BLD AUTO: 4.23 10*6/MM3 (ref 3.77–5.28)
SODIUM SERPL-SCNC: 137 MMOL/L (ref 136–145)
WBC NRBC COR # BLD AUTO: 5.96 10*3/MM3 (ref 3.4–10.8)

## 2024-01-04 PROCEDURE — 85027 COMPLETE CBC AUTOMATED: CPT

## 2024-01-04 PROCEDURE — 36415 COLL VENOUS BLD VENIPUNCTURE: CPT | Performed by: NURSE PRACTITIONER

## 2024-01-04 PROCEDURE — 82105 ALPHA-FETOPROTEIN SERUM: CPT | Performed by: NURSE PRACTITIONER

## 2024-01-04 PROCEDURE — 85610 PROTHROMBIN TIME: CPT | Performed by: NURSE PRACTITIONER

## 2024-01-04 PROCEDURE — 80053 COMPREHEN METABOLIC PANEL: CPT

## 2024-01-04 PROCEDURE — 71046 X-RAY EXAM CHEST 2 VIEWS: CPT

## 2024-01-04 RX ORDER — PANTOPRAZOLE SODIUM 40 MG/1
40 TABLET, DELAYED RELEASE ORAL
Qty: 60 TABLET | Refills: 0 | Status: SHIPPED | OUTPATIENT
Start: 2024-01-04 | End: 2024-02-03

## 2024-01-04 RX ORDER — SPIRONOLACTONE 50 MG/1
50 TABLET, FILM COATED ORAL DAILY
Qty: 90 TABLET | Refills: 5 | Status: SHIPPED | OUTPATIENT
Start: 2024-01-04

## 2024-01-04 RX ORDER — FUROSEMIDE 20 MG/1
20 TABLET ORAL DAILY
Qty: 30 TABLET | Refills: 11 | Status: SHIPPED | OUTPATIENT
Start: 2024-01-04 | End: 2025-01-04

## 2024-01-04 RX ORDER — LANOLIN ALCOHOL/MO/W.PET/CERES
400 CREAM (GRAM) TOPICAL
COMMUNITY
Start: 2023-12-05 | End: 2024-06-03

## 2024-01-04 RX ORDER — FLUTICASONE FUROATE, UMECLIDINIUM BROMIDE AND VILANTEROL TRIFENATATE 200; 62.5; 25 UG/1; UG/1; UG/1
1 POWDER RESPIRATORY (INHALATION) DAILY
COMMUNITY
Start: 2023-11-28

## 2024-01-04 RX ORDER — CYCLOBENZAPRINE HCL 10 MG
TABLET ORAL
COMMUNITY
Start: 2023-12-26

## 2024-01-04 RX ORDER — POTASSIUM CHLORIDE 750 MG/1
10 TABLET, FILM COATED, EXTENDED RELEASE ORAL 2 TIMES DAILY
Qty: 60 TABLET | Refills: 3 | Status: SHIPPED | OUTPATIENT
Start: 2024-01-04

## 2024-01-04 RX ORDER — ALBUTEROL SULFATE 90 UG/1
2 AEROSOL, METERED RESPIRATORY (INHALATION)
COMMUNITY
Start: 2023-12-05

## 2024-01-04 NOTE — TELEPHONE ENCOUNTER
CALLED PT TO INFORM HER OF APPT FOR ULTRASOUND AT [facility].    01.11.24 ARRIVAL TIME 740AM

## 2024-01-05 ENCOUNTER — TELEPHONE (OUTPATIENT)
Dept: GASTROENTEROLOGY | Facility: CLINIC | Age: 59
End: 2024-01-05
Payer: MEDICARE

## 2024-01-05 NOTE — TELEPHONE ENCOUNTER
----- Message from ANDRE Alvarado sent at 1/5/2024  8:09 AM EST -----  Hemoglobin tremendously improved at 13.1.  Platelets improved to 84.

## 2024-01-05 NOTE — TELEPHONE ENCOUNTER
Normal alpha-fetoprotein.           Left vm calling for results. Stated we also sent HYGIEIA message too. Juancarlos

## 2024-01-11 DIAGNOSIS — D12.6 ADENOMATOUS POLYP OF COLON, UNSPECIFIED PART OF COLON: ICD-10-CM

## 2024-01-11 DIAGNOSIS — I85.00 ESOPHAGEAL VARICES WITHOUT BLEEDING, UNSPECIFIED ESOPHAGEAL VARICES TYPE: ICD-10-CM

## 2024-01-11 DIAGNOSIS — R18.8 CIRRHOSIS OF LIVER WITH ASCITES, UNSPECIFIED HEPATIC CIRRHOSIS TYPE: Primary | ICD-10-CM

## 2024-01-11 DIAGNOSIS — K74.60 CIRRHOSIS OF LIVER WITH ASCITES, UNSPECIFIED HEPATIC CIRRHOSIS TYPE: ICD-10-CM

## 2024-01-11 DIAGNOSIS — K21.9 GASTROESOPHAGEAL REFLUX DISEASE WITHOUT ESOPHAGITIS: ICD-10-CM

## 2024-01-11 DIAGNOSIS — I85.10 SECONDARY ESOPHAGEAL VARICES WITHOUT BLEEDING: ICD-10-CM

## 2024-01-11 DIAGNOSIS — R18.8 CIRRHOSIS OF LIVER WITH ASCITES, UNSPECIFIED HEPATIC CIRRHOSIS TYPE: ICD-10-CM

## 2024-01-11 DIAGNOSIS — R05.1 ACUTE COUGH: ICD-10-CM

## 2024-01-11 DIAGNOSIS — D64.9 ANEMIA, UNSPECIFIED TYPE: ICD-10-CM

## 2024-01-11 DIAGNOSIS — K74.60 CIRRHOSIS OF LIVER WITH ASCITES, UNSPECIFIED HEPATIC CIRRHOSIS TYPE: Primary | ICD-10-CM

## 2024-02-01 ENCOUNTER — OFFICE VISIT (OUTPATIENT)
Dept: GASTROENTEROLOGY | Facility: CLINIC | Age: 59
End: 2024-02-01
Payer: MEDICARE

## 2024-02-01 VITALS
OXYGEN SATURATION: 98 % | SYSTOLIC BLOOD PRESSURE: 133 MMHG | BODY MASS INDEX: 33.34 KG/M2 | HEIGHT: 62 IN | HEART RATE: 81 BPM | DIASTOLIC BLOOD PRESSURE: 70 MMHG | WEIGHT: 181.2 LBS

## 2024-02-01 DIAGNOSIS — D12.6 ADENOMATOUS POLYP OF COLON, UNSPECIFIED PART OF COLON: ICD-10-CM

## 2024-02-01 DIAGNOSIS — I85.10 SECONDARY ESOPHAGEAL VARICES WITHOUT BLEEDING: ICD-10-CM

## 2024-02-01 DIAGNOSIS — R18.8 CIRRHOSIS OF LIVER WITH ASCITES, UNSPECIFIED HEPATIC CIRRHOSIS TYPE: Primary | ICD-10-CM

## 2024-02-01 DIAGNOSIS — D64.9 ANEMIA, UNSPECIFIED TYPE: ICD-10-CM

## 2024-02-01 DIAGNOSIS — I85.00 ESOPHAGEAL VARICES WITHOUT BLEEDING, UNSPECIFIED ESOPHAGEAL VARICES TYPE: ICD-10-CM

## 2024-02-01 DIAGNOSIS — K74.60 CIRRHOSIS OF LIVER WITHOUT ASCITES, UNSPECIFIED HEPATIC CIRRHOSIS TYPE: ICD-10-CM

## 2024-02-01 DIAGNOSIS — K74.60 CIRRHOSIS OF LIVER WITH ASCITES, UNSPECIFIED HEPATIC CIRRHOSIS TYPE: Primary | ICD-10-CM

## 2024-02-01 DIAGNOSIS — K75.81 NASH (NONALCOHOLIC STEATOHEPATITIS): ICD-10-CM

## 2024-02-01 DIAGNOSIS — K21.9 GASTROESOPHAGEAL REFLUX DISEASE WITHOUT ESOPHAGITIS: ICD-10-CM

## 2024-02-01 DIAGNOSIS — K57.90 DIVERTICULOSIS: ICD-10-CM

## 2024-02-01 NOTE — PROGRESS NOTES
Chief Complaint   4-6 wk follow up    History of Present Illness       Tawny Lennon is a 58 y.o. female who presents to [facility]OLOG[facility] for follow-up- Patient has a history of cirrhosis likely secondary to MEDRANO, esophageal varices.  Patient in the past has also had an exploratory laparotomy with a diverting colostomy secondary to ruptured diverticulitis in 2023 which was surgically repaired by Dr. Kang and had takedown of her colostomy.  Patient continues on Aldactone 50 mg daily and Lasix 20 mg daily related to lower extremity edema with good control of her swelling.  She is on potassium 10 mEq twice daily due to previous electrolyte imbalance.  Patient takes Protonix on an as-needed basis.  Patient was previously on nadolol 20 mg daily due to esophageal varices but this was causing falls and episodes of passing out so the medication was discontinued and patient reports that she feels much better.  At the last office visit patient was also experiencing a cough in which we performed a chest x-ray which displayed bronchitis patient is feeling much better currently. Patient denies lower extremity edema, abdominal swelling, confusion, fever, nausea, vomiting, weight loss, night sweats, melena, hematochezia, hematemesis.       EGD: Review of the patient's most recent EGD performed by Dr. Watkins on 11.17.2023 nonsevere esophagitis no bleeding found in the lower third of the esophagus.  Grade 3 varices found in the lower third of the esophagus 4 bands were successfully placed with complete eradication.  Normal stomach.  Normal duodenum.  Continue Protonix 40 mg daily, begin nadolol 20 mg daily.     Colonoscopy: Review of the patient's most recent colonoscopy performed by Dr. Jasper Kang on 07.19.2022 with reversal of ileostomy.     CT chest abdomen and pelvis w/c on 11.16.2023  Cirrhosis of the liver.  Diverticula.  Lung bases are unremarkable.     US abdomen limited on 1/11/2024  1 cm  "cyst in the right lobe of the liver     PCP-Dr. Valentine  Last paracentesis-never.  Screening for HCC-1/11/24  Diuretics-Lasix 20 mg daily, Aldactone 50 mg daily  Most recent lab work-11/28/2023  Diet-low-sodium diet  Diabetes-yes, currently on metformin.  Esophageal varices screening-performed 11/17/2023 with complete eradication.  Patient cannot tolerate nadolol due to falls and dizziness.  Other specialist-none.  Most recent labs on 11.28.2023    Results       Result Review :   The following data was reviewed by: ANDRE Alvarado on 02/01/2024:    CMP          11/21/2023    03:45 11/28/2023    12:07 1/4/2024    11:21   CMP   Glucose 157   149    Glucose  151        BUN 15  13     18    Creatinine 1.12  1.0     1.31    EGFR 56.4   46.7    Sodium 136  139     137    Potassium 3.8  3.9     3.9    Chloride 107  106     106    Calcium 8.3  9.1     9.6    Total Protein 6.0   7.8    Albumin 2.8  3.2     3.2    Globulin 3.2   4.6    Total Bilirubin 2.0  3.50     4.0    Alkaline Phosphatase 101  118     176    AST (SGOT) 40  38     52    ALT (SGPT) 12  15     16    Albumin/Globulin Ratio 0.9   0.7    BUN/Creatinine Ratio 13.4   13.7    Anion Gap 10.6  12     13.9       Details          This result is from an external source.             CBC          11/20/2023    03:20 11/21/2023    03:45 1/4/2024    11:21   CBC   WBC 4.37  4.89  5.96    RBC 3.23  3.08  4.23    Hemoglobin 10.3  9.8  13.1    Hematocrit 31.5  29.7  39.8    MCV 97.5  96.4  94.1    MCH 31.9  31.8  31.0    MCHC 32.7  33.0  32.9    RDW 14.1  14.6  12.7    Platelets 61  53  84        Iron Profile   Iron   Date Value Ref Range Status   11/28/2023 122 28 - 170 ug/dL Final     TIBC   Date Value Ref Range Status   11/28/2023 315 261 - 478 ug/dL Final     Iron Saturation   Date Value Ref Range Status   11/28/2023 39 20 - 55 % Final     Ferritin No results found for: \"FERRITIN\"            Past Medical History       Past Medical History:   Diagnosis Date    Anemia     " Cirrhosis     liver    Cirrhosis, nonalcoholic     Colon polyps     COPD (chronic obstructive pulmonary disease)     Depression     Diabetes     Diverticulitis     Esophageal varices     Fatty liver     GERD (gastroesophageal reflux disease)     Hematuria     High blood pressure     High cholesterol     Interstitial cystitis     Migraine headache     Narcolepsy     PONV (postoperative nausea and vomiting)     Sleep apnea     Spinal headache     DURING PREGNANCY       Past Surgical History:   Procedure Laterality Date    BLADDER REPAIR  2009    CHOLECYSTECTOMY      COLONOSCOPY  2014 x2 2020    ENDOSCOPY  2014 2020    ENDOSCOPY N/A 07/12/2021    Procedure: ESOPHAGOGASTRODUODENOSCOPY with biopsies;  Surgeon: Jesse Watkins MD;  Location: [facility] ENDOSCOPY;  Service: Gastroenterology;  Laterality: N/A;  esophageal varices    ENDOSCOPY N/A 03/25/2022    Procedure: ESOPHAGOGASTRODUODENOSCOPY WITH BIOPSIES;  Surgeon: Jesse Watkins MD;  Location: [facility] ENDOSCOPY;  Service: Gastroenterology;  Laterality: N/A;  ESOPHAGEAL VARICIES    ENDOSCOPY N/A 9/28/2023    Procedure: ESOPHAGOGASTRODUODENOSCOPY WITH COLD BIOPSIES;  Surgeon: Jesse Watkins MD;  Location: [facility] ENDOSCOPY;  Service: Gastroenterology;  Laterality: N/A;  GASTRITIS, ESOPHAGEAL VARICES    ENDOSCOPY N/A 11/17/2023    Procedure: ESOPHAGOGASTRODUODENOSCOPY WITH BIOPSIES AND  VARICEAL BANDING X 4;  Surgeon: Jesse Watkins MD;  Location: [facility] ENDOSCOPY;  Service: Gastroenterology;  Laterality: N/A;  GASTRITIS, PORTAL HYPERTENSIVE GASTROPATHY, ESOPHAGEAL VARICIES    ENDOSCOPY N/A 12/21/2023    Procedure: ESOPHAGOGASTRODUODENOSCOPY WITH BIOPSIES AND ESOPHAGEAL BANDING;  Surgeon: Jesse Watkins MD;  Location: [facility] ENDOSCOPY;  Service: Gastroenterology;  Laterality: N/A;  ESOPHAGEAL VARICES, GASTRITIS    HAND SURGERY      HERNIA REPAIR      HYSTERECTOMY  2009    ILEOSTOMY  07/2022    OTHER SURGICAL HISTORY      rectocele  "repair    UPPER GASTROINTESTINAL ENDOSCOPY           Current Outpatient Medications:     albuterol sulfate  (90 Base) MCG/ACT inhaler, Inhale 2 puffs., Disp: , Rfl:     Fluticasone-Umeclidin-Vilant (Trelegy Ellipta) 200-62.5-25 MCG/ACT aerosol powder , Inhale 1 inhaler Daily., Disp: , Rfl:     furosemide (LASIX) 20 MG tablet, Take 1 tablet by mouth Daily., Disp: 30 tablet, Rfl: 11    Magnesium Oxide -Mg Supplement (MagOx 400) 400 (240 Mg) MG tablet, Take 1 tablet by mouth., Disp: , Rfl:     metFORMIN (GLUCOPHAGE) 500 MG tablet, Take 1 tablet by mouth 2 (Two) Times a Day With Meals. Only takes one a day, Disp: , Rfl:     pantoprazole (PROTONIX) 40 MG EC tablet, Take 1 tablet by mouth 2 (Two) Times a Day Before Meals for 30 days., Disp: 60 tablet, Rfl: 0    potassium chloride 10 MEQ CR tablet, Take 1 tablet by mouth 2 (Two) Times a Day., Disp: 60 tablet, Rfl: 3    spironolactone (ALDACTONE) 50 MG tablet, Take 1 tablet by mouth Daily., Disp: 90 tablet, Rfl: 5    cyclobenzaprine (FLEXERIL) 10 MG tablet, , Disp: , Rfl:      No Known Allergies    Family History   Problem Relation Age of Onset    Dementia Mother     Liver cancer Father     Cirrhosis Sister         She dies June 27 2023    Malig Hyperthermia Neg Hx     Colon cancer Neg Hx         Social History     Social History Narrative    Not on file       Objective     Vital Signs:   /70 (BP Location: Right arm, Patient Position: Sitting, Cuff Size: Adult)   Pulse 81   Ht 157.5 cm (62\")   Wt 82.2 kg (181 lb 3.2 oz)   SpO2 98%   BMI 33.14 kg/m²       Physical Exam  Constitutional:       Appearance: Normal appearance.   Pulmonary:      Effort: Pulmonary effort is normal.   Musculoskeletal:      Right lower leg: No edema.      Left lower leg: No edema.   Neurological:      General: No focal deficit present.      Mental Status: She is alert and oriented to person, place, and time.   Psychiatric:         Mood and Affect: Mood normal.         Behavior: " Behavior normal.           Assessment & Plan          Assessment and Plan    Diagnoses and all orders for this visit:    1. Cirrhosis of liver with ascites, unspecified hepatic cirrhosis type (Primary)  -     CBC (No Diff); Future  -     Comprehensive Metabolic Panel; Future  -     Protime-INR; Future  -     AFP Tumor Marker; Future    2. Esophageal varices without bleeding, unspecified esophageal varices type  -     CBC (No Diff); Future  -     Comprehensive Metabolic Panel; Future  -     Protime-INR; Future  -     AFP Tumor Marker; Future    3. Secondary esophageal varices without bleeding  -     CBC (No Diff); Future  -     Comprehensive Metabolic Panel; Future  -     Protime-INR; Future  -     AFP Tumor Marker; Future    4. Gastroesophageal reflux disease without esophagitis    5. Adenomatous polyp of colon, unspecified part of colon    6. MEDRANO (nonalcoholic steatohepatitis)    7. Anemia, unspecified type    8. Cirrhosis of liver without ascites, unspecified hepatic cirrhosis type    9. Diverticulosis      58-year-old female presenting to the office today for follow-up-  Patient has a history of decompensated cirrhosis likely secondary to Medrano, lower extremity edema, and esophageal varices.  Patient will continue on Aldactone 50 mg daily and Lasix 20 mg daily, potassium 10 mEq twice daily, Protonix which is recommended to take on a daily basis.  Patient was previously on nadolol but due to passing out and falls this medication was discontinued.  I have ordered labs and ultrasound for monitoring of the patient's cirrhosis.  3 to 4-month follow-up.  Patient agreeable to this plan will call with any questions or concerns.           Follow Up       Follow Up   Return in about 3 months (around 5/1/2024).  Patient was given instructions and counseling regarding her condition or for health maintenance advice. Please see specific information pulled into the AVS if appropriate.

## 2024-02-26 DIAGNOSIS — R18.8 CIRRHOSIS OF LIVER WITH ASCITES, UNSPECIFIED HEPATIC CIRRHOSIS TYPE: ICD-10-CM

## 2024-02-26 DIAGNOSIS — K74.60 CIRRHOSIS OF LIVER WITH ASCITES, UNSPECIFIED HEPATIC CIRRHOSIS TYPE: ICD-10-CM

## 2024-02-26 DIAGNOSIS — I85.00 ESOPHAGEAL VARICES WITHOUT BLEEDING, UNSPECIFIED ESOPHAGEAL VARICES TYPE: ICD-10-CM

## 2024-02-26 DIAGNOSIS — I85.10 SECONDARY ESOPHAGEAL VARICES WITHOUT BLEEDING: ICD-10-CM

## 2024-02-27 ENCOUNTER — TELEPHONE (OUTPATIENT)
Dept: GASTROENTEROLOGY | Facility: CLINIC | Age: 59
End: 2024-02-27
Payer: MEDICARE

## 2024-02-27 NOTE — TELEPHONE ENCOUNTER
----- Message from ANDRE Barron sent at 2/27/2024  1:06 PM EST -----  I have reviewed the patient's most recent labs.  Glucose is elevated and liver enzymes remain elevated, bilirubin has improved compared to previous labs.  CBC shows decreased platelets which is a result of cirrhosis.  AFP tumor marker normal. Maintain follow up appointment with Dr. Watkins.

## 2024-05-07 ENCOUNTER — OFFICE VISIT (OUTPATIENT)
Dept: GASTROENTEROLOGY | Facility: CLINIC | Age: 59
End: 2024-05-07
Payer: MEDICARE

## 2024-05-07 VITALS
WEIGHT: 184 LBS | HEIGHT: 62 IN | BODY MASS INDEX: 33.86 KG/M2 | OXYGEN SATURATION: 95 % | HEART RATE: 82 BPM | DIASTOLIC BLOOD PRESSURE: 59 MMHG | SYSTOLIC BLOOD PRESSURE: 128 MMHG

## 2024-05-07 DIAGNOSIS — K74.60 CIRRHOSIS OF LIVER WITHOUT ASCITES, UNSPECIFIED HEPATIC CIRRHOSIS TYPE: Primary | ICD-10-CM

## 2024-05-07 DIAGNOSIS — K75.81 NASH (NONALCOHOLIC STEATOHEPATITIS): ICD-10-CM

## 2024-05-07 DIAGNOSIS — I85.00 ESOPHAGEAL VARICES WITHOUT BLEEDING, UNSPECIFIED ESOPHAGEAL VARICES TYPE: ICD-10-CM

## 2024-05-07 DIAGNOSIS — K21.9 GASTROESOPHAGEAL REFLUX DISEASE WITHOUT ESOPHAGITIS: ICD-10-CM

## 2024-05-07 PROBLEM — R18.8 CIRRHOSIS OF LIVER WITH ASCITES: Status: RESOLVED | Noted: 2023-11-21 | Resolved: 2024-05-07

## 2024-05-07 PROBLEM — K57.90 DIVERTICULOSIS: Status: RESOLVED | Noted: 2023-02-28 | Resolved: 2024-05-07

## 2024-05-07 RX ORDER — ONDANSETRON 4 MG/1
4 TABLET, FILM COATED ORAL
COMMUNITY
Start: 2024-03-05

## 2024-05-09 DIAGNOSIS — I85.00 ESOPHAGEAL VARICES WITHOUT BLEEDING, UNSPECIFIED ESOPHAGEAL VARICES TYPE: ICD-10-CM

## 2024-05-09 DIAGNOSIS — K74.60 CIRRHOSIS OF LIVER WITHOUT ASCITES, UNSPECIFIED HEPATIC CIRRHOSIS TYPE: ICD-10-CM

## 2024-05-09 DIAGNOSIS — K21.9 GASTROESOPHAGEAL REFLUX DISEASE WITHOUT ESOPHAGITIS: ICD-10-CM

## 2024-05-10 ENCOUNTER — TELEPHONE (OUTPATIENT)
Dept: GASTROENTEROLOGY | Facility: CLINIC | Age: 59
End: 2024-05-10

## 2024-05-10 ENCOUNTER — TELEPHONE (OUTPATIENT)
Dept: GASTROENTEROLOGY | Facility: CLINIC | Age: 59
End: 2024-05-10
Payer: MEDICARE

## 2024-05-10 DIAGNOSIS — I85.00 ESOPHAGEAL VARICES WITHOUT BLEEDING, UNSPECIFIED ESOPHAGEAL VARICES TYPE: ICD-10-CM

## 2024-05-10 DIAGNOSIS — K74.60 CIRRHOSIS OF LIVER WITHOUT ASCITES, UNSPECIFIED HEPATIC CIRRHOSIS TYPE: ICD-10-CM

## 2024-05-10 DIAGNOSIS — D64.9 ANEMIA, UNSPECIFIED TYPE: Primary | ICD-10-CM

## 2024-05-10 DIAGNOSIS — K21.9 GASTROESOPHAGEAL REFLUX DISEASE WITHOUT ESOPHAGITIS: ICD-10-CM

## 2024-05-10 NOTE — TELEPHONE ENCOUNTER
Hub staff attempted to follow warm transfer process and was unsuccessful     Caller: Tawny Lennon    Relationship to patient: Self    Best call back number: [phone]    Patient is needing: PATIENT WAS RETURNING JAGRUTI'S PHONE CALL

## 2024-05-17 ENCOUNTER — TELEPHONE (OUTPATIENT)
Dept: GASTROENTEROLOGY | Facility: CLINIC | Age: 59
End: 2024-05-17
Payer: MEDICARE

## 2024-05-17 DIAGNOSIS — K74.60 CIRRHOSIS OF LIVER WITHOUT ASCITES, UNSPECIFIED HEPATIC CIRRHOSIS TYPE: ICD-10-CM

## 2024-05-17 DIAGNOSIS — K21.9 GASTROESOPHAGEAL REFLUX DISEASE WITHOUT ESOPHAGITIS: ICD-10-CM

## 2024-05-17 DIAGNOSIS — I85.00 ESOPHAGEAL VARICES WITHOUT BLEEDING, UNSPECIFIED ESOPHAGEAL VARICES TYPE: ICD-10-CM

## 2024-05-17 NOTE — TELEPHONE ENCOUNTER
Fernanda Thompson, ANDRE GRIER [facility]  I have reviewed the patient's ultrasound and finds are consistent with cirrhosis with small amount of ascites. No concerning liver mass or lesion.        Spoke with pt. Notified of results. Encouraged to keep f/u appt. Voiced understanding. meaghan

## 2024-06-03 DIAGNOSIS — D64.9 ANEMIA, UNSPECIFIED TYPE: ICD-10-CM

## 2024-06-03 DIAGNOSIS — K74.60 CIRRHOSIS OF LIVER WITHOUT ASCITES, UNSPECIFIED HEPATIC CIRRHOSIS TYPE: ICD-10-CM

## 2024-07-02 ENCOUNTER — TELEPHONE (OUTPATIENT)
Dept: GASTROENTEROLOGY | Facility: CLINIC | Age: 59
End: 2024-07-02
Payer: MEDICARE

## 2024-07-02 NOTE — TELEPHONE ENCOUNTER
Hub staff attempted to follow warm transfer process and was unsuccessful     Caller: Tawny Lennon    Relationship to patient: Self    Best call back number: [phone]    Patient is needing: PATIENT CALLED IN AND STATED THAT SHE RECEIVED A CALL STATING THAT SHE HAS TO HAVE AN US OF HER LIVER BEFORE 09/01/2024, BUT PATIENT STATES THAT SHE HAD NO KNOWLEDGE OF THIS AND WOULD LIKE TO SPEAK WITH SOMEONE TO FIND OUT MORE INFORMATION. PLEASE CALL BACK ANYTIME, OKAY TO LEAVE VM.

## 2024-07-03 NOTE — TELEPHONE ENCOUNTER
Spoke with pt. Unsure who call pt.  Pt had abdominal ultrasound 05/2024.    Advised pt to maintain her f/u with ANDRE Alvarado on 9.3.24.  any orders will be placed at that appt.  Pt voiced understanding.  meaghan

## 2024-08-16 ENCOUNTER — TELEPHONE (OUTPATIENT)
Dept: GASTROENTEROLOGY | Facility: CLINIC | Age: 59
End: 2024-08-16
Payer: MEDICARE

## 2024-08-16 NOTE — TELEPHONE ENCOUNTER
Attempted to return patients voicemail she left. No answer, I left a VM for pt to return our call.

## 2024-08-16 NOTE — TELEPHONE ENCOUNTER
I spoke to patient today and she stated she has been having diarrhea multiple times a day for the past week or two. She stated she has even had accidents. I advised patient I would send a message to the provider for recommendations. I also advised her that if her symptoms worsen over the weekend or if she starts running a fever to seek evaluation in the ER. Patient has voiced understanding.

## 2024-08-26 ENCOUNTER — TELEPHONE (OUTPATIENT)
Dept: GASTROENTEROLOGY | Facility: CLINIC | Age: 59
End: 2024-08-26
Payer: MEDICARE

## 2024-08-26 NOTE — TELEPHONE ENCOUNTER
Orders faxed.  Patient advised   While on the phone with the patient, patient reports back pain and dark colored urine, denies fever  I advised patient to follow up with PCP incase of UTI  Patient voiced understanding  Will call with any questions or concerns

## 2024-08-26 NOTE — TELEPHONE ENCOUNTER
Hub staff attempted to follow warm transfer process and was unsuccessful     Caller: [facility]: HARPER BROWN    Relationship to patient: Provider    Best call back number: [phone]    Patient is needing:PROVIDER'S OFFICE WOULD LIKE ALL THE PT'S STOOL STUDY ORDERS SENT TO THEM VIA FAX ([phone])// PLEASE CALL PT ONCE SENT

## 2024-08-27 NOTE — PROGRESS NOTES
Chief Complaint   Follow-up (Was in patient at [facility] over the weekend. )    History of Present Illness       Tawny Lennon is a 59 y.o. female who presents to [facility] for follow-up after recent hospital admission at [facility] 8/28/2024 through 9/2/2024 for urinary tract infection, C. difficile, diarrhea, cirrhosis, CT scan displaying infectious versus inflammatory colitis.  Patient reports since being discharged from the hospital yesterday that her diarrhea has improved.  She is continuing on vancomycin orally but reports that the prescription was not sent to the pharmacy.  She continues on Lasix 20 mg and Aldactone 50 mg daily for fluid management related to her cirrhosis.  Patient continues potassium chloride 10 mEq daily.  She reports gaining 11 pounds while inpatient and feels that it is related to fluid.  She is having abdominal swelling and a cough that will not resolve with breathing treatments.  Patient does not noted any lower extremity edema.  Patient denies fever, nausea, vomiting, weight loss, night sweats, melena, hematochezia, hematemesis.    Most recent labs- 9/2/2024    CT Abdomen/Pelvis- 8/28/24 circumferential wall thickening of the duodenum, circumferential wall thickening of the right colon concerning for infectious versus inflammatory colitis.  Surgical sutures along the distal sigmoid colon consistent with prior resection.    US Abdomen Limited- 5/16/24 postcholecystectomy, small amount of ascites    Results       Result Review :   The following data was reviewed by: ANDRE Alvarado on 09/03/2024:    CMP          11/28/2023    12:07 1/4/2024    11:21 3/5/2024    11:26   CMP   Glucose  149     Glucose 151      156       BUN 13     18  19       Creatinine 1.0     1.31  1.2       EGFR  46.7     Sodium 139     137  136       Potassium 3.9     3.9  4.6       Chloride 106     106  104       Calcium 9.1     9.6  11.4       Total Protein  7.8     Albumin  "3.2     3.2  3.2       Globulin  4.6     Total Bilirubin 3.50     4.0  4.10       Alkaline Phosphatase 118     176  133       AST (SGOT) 38     52  43       ALT (SGPT) 15     16  20       Albumin/Globulin Ratio  0.7     BUN/Creatinine Ratio  13.7     Anion Gap 12     13.9  10          Details          This result is from an external source.             CBC          11/20/2023    03:20 11/21/2023    03:45 1/4/2024    11:21   CBC   WBC 4.37  4.89  5.96    RBC 3.23  3.08  4.23    Hemoglobin 10.3  9.8  13.1    Hematocrit 31.5  29.7  39.8    MCV 97.5  96.4  94.1    MCH 31.9  31.8  31.0    MCHC 32.7  33.0  32.9    RDW 14.1  14.6  12.7    Platelets 61  53  84        Iron Profile   Iron   Date Value Ref Range Status   11/28/2023 122 28 - 170 ug/dL Final     TIBC   Date Value Ref Range Status   11/28/2023 315 261 - 478 ug/dL Final     Iron Saturation   Date Value Ref Range Status   11/28/2023 39 20 - 55 % Final     Ferritin No results found for: \"FERRITIN\"            Past Medical History       Past Medical History:   Diagnosis Date    Anemia     Chronic kidney disease     Cirrhosis     liver    Cirrhosis, nonalcoholic     Colon polyps     COPD (chronic obstructive pulmonary disease)     Depression     Diabetes     Diverticulitis     Esophageal varices     Fatty liver     GERD (gastroesophageal reflux disease)     Hematuria     Hernia     High blood pressure     High cholesterol     Interstitial cystitis     Migraine headache     Narcolepsy     PONV (postoperative nausea and vomiting)     Sleep apnea     Spinal headache     DURING PREGNANCY       Past Surgical History:   Procedure Laterality Date    BLADDER REPAIR  2009    CHOLECYSTECTOMY      COLONOSCOPY  2014 x2 2020    ENDOSCOPY  2014 2020    ENDOSCOPY N/A 07/12/2021    Procedure: ESOPHAGOGASTRODUODENOSCOPY with biopsies;  Surgeon: Jesse Watkins MD;  Location: [facility];  Service: Gastroenterology;  Laterality: N/A;  esophageal varices    ENDOSCOPY N/A " 03/25/2022    Procedure: ESOPHAGOGASTRODUODENOSCOPY WITH BIOPSIES;  Surgeon: Jesse Watkins MD;  Location: [facility] ENDOSCOPY;  Service: Gastroenterology;  Laterality: N/A;  ESOPHAGEAL VARICIES    ENDOSCOPY N/A 9/28/2023    Procedure: ESOPHAGOGASTRODUODENOSCOPY WITH COLD BIOPSIES;  Surgeon: Jesse Watkins MD;  Location: [facility] ENDOSCOPY;  Service: Gastroenterology;  Laterality: N/A;  GASTRITIS, ESOPHAGEAL VARICES    ENDOSCOPY N/A 11/17/2023    Procedure: ESOPHAGOGASTRODUODENOSCOPY WITH BIOPSIES AND  VARICEAL BANDING X 4;  Surgeon: Jesse Watkins MD;  Location: [facility] ENDOSCOPY;  Service: Gastroenterology;  Laterality: N/A;  GASTRITIS, PORTAL HYPERTENSIVE GASTROPATHY, ESOPHAGEAL VARICIES    ENDOSCOPY N/A 12/21/2023    Procedure: ESOPHAGOGASTRODUODENOSCOPY WITH BIOPSIES AND ESOPHAGEAL BANDING;  Surgeon: Jesse Watkins MD;  Location: [facility] ENDOSCOPY;  Service: Gastroenterology;  Laterality: N/A;  ESOPHAGEAL VARICES, GASTRITIS    HAND SURGERY      HERNIA REPAIR      HYSTERECTOMY  2009    ILEOSTOMY  07/2022    OTHER SURGICAL HISTORY      rectocele repair    UPPER GASTROINTESTINAL ENDOSCOPY           Current Outpatient Medications:     albuterol sulfate  (90 Base) MCG/ACT inhaler, Inhale 2 puffs., Disp: , Rfl:     Fluticasone-Umeclidin-Vilant (Trelegy Ellipta) 200-62.5-25 MCG/ACT aerosol powder , Inhale 1 puff Daily., Disp: , Rfl:     folic acid (FOLVITE) 1 MG tablet, Take 1 tablet by mouth Daily., Disp: , Rfl:     furosemide (LASIX) 20 MG tablet, Take 1 tablet by mouth Daily., Disp: 30 tablet, Rfl: 11    pantoprazole (PROTONIX) 40 MG EC tablet, Take 1 tablet by mouth., Disp: , Rfl:     potassium chloride 10 MEQ CR tablet, Take 1 tablet by mouth 2 (Two) Times a Day., Disp: 60 tablet, Rfl: 3    sodium bicarbonate 650 MG tablet, Take 2 tablets by mouth., Disp: , Rfl:     spironolactone (ALDACTONE) 50 MG tablet, Take 1 tablet by mouth Daily., Disp: 90 tablet, Rfl: 5    vancomycin  "(VANCOCIN) 125 MG capsule, Take 1 capsule by mouth 4 (Four) Times a Day for 6 days., Disp: 24 capsule, Rfl: 0     No Known Allergies    Family History   Problem Relation Age of Onset    Dementia Mother     Liver cancer Father     Cirrhosis Sister         She  2023    Malig Hyperthermia Neg Hx     Colon cancer Neg Hx         Social History     Social History Narrative    Not on file       Objective     Vital Signs:   /51 (BP Location: Right arm, Patient Position: Sitting, Cuff Size: Adult)   Pulse 79   Temp 95 °F (35 °C)   Ht 157.5 cm (62\")   Wt 85.7 kg (188 lb 14.4 oz)   SpO2 97%   BMI 34.55 kg/m²       Physical Exam  Constitutional:       General: She is not in acute distress.     Appearance: Normal appearance. She is well-developed. She is obese.   Eyes:      Conjunctiva/sclera: Conjunctivae normal.      Pupils: Pupils are equal, round, and reactive to light.      Visual Fields: Right eye visual fields normal and left eye visual fields normal.   Cardiovascular:      Rate and Rhythm: Normal rate and regular rhythm.      Heart sounds: Normal heart sounds.   Pulmonary:      Effort: Pulmonary effort is normal. No retractions.      Breath sounds: Normal breath sounds and air entry.      Comments: Inspection of chest: normal appearance  Abdominal:      General: Abdomen is protuberant. Bowel sounds are normal.      Palpations: Abdomen is soft. There is fluid wave.      Tenderness: There is no abdominal tenderness.      Comments: No appreciable hepatosplenomegaly   Musculoskeletal:      Cervical back: Neck supple.      Right lower leg: No edema.      Left lower leg: No edema.   Lymphadenopathy:      Cervical: No cervical adenopathy.   Skin:     Findings: No lesion.      Comments: Turgor normal   Neurological:      General: No focal deficit present.      Mental Status: She is alert and oriented to person, place, and time.   Psychiatric:         Mood and Affect: Mood and affect normal. "           Assessment & Plan          Assessment and Plan    Diagnoses and all orders for this visit:    1. Diarrhea, unspecified type (Primary)  -     XR Chest 2 View; Future  -     CBC & Differential  -     Comprehensive Metabolic Panel; Future  -     Protime-INR  -     AFP Tumor Marker    2. Anemia, unspecified type  -     XR Chest 2 View; Future  -     CBC & Differential  -     Comprehensive Metabolic Panel; Future  -     Protime-INR  -     AFP Tumor Marker    3. Cirrhosis of liver without ascites, unspecified hepatic cirrhosis type  -     XR Chest 2 View; Future  -     CBC & Differential  -     Comprehensive Metabolic Panel; Future  -     Protime-INR  -     AFP Tumor Marker    4. Esophageal varices without bleeding, unspecified esophageal varices type  -     XR Chest 2 View; Future  -     CBC & Differential  -     Comprehensive Metabolic Panel; Future  -     Protime-INR  -     AFP Tumor Marker    5. Gastroesophageal reflux disease without esophagitis  -     XR Chest 2 View; Future  -     CBC & Differential  -     Comprehensive Metabolic Panel; Future  -     Protime-INR  -     AFP Tumor Marker    6. MEDRANO (nonalcoholic steatohepatitis)  -     XR Chest 2 View; Future  -     CBC & Differential  -     Comprehensive Metabolic Panel; Future  -     Protime-INR  -     AFP Tumor Marker    7. Secondary esophageal varices without bleeding  -     XR Chest 2 View; Future  -     CBC & Differential  -     Comprehensive Metabolic Panel; Future  -     Protime-INR  -     AFP Tumor Marker    8. Adenomatous polyp of colon, unspecified part of colon  -     XR Chest 2 View; Future  -     CBC & Differential  -     Comprehensive Metabolic Panel; Future  -     Protime-INR  -     AFP Tumor Marker    9. Diverticulosis  -     XR Chest 2 View; Future  -     CBC & Differential  -     Comprehensive Metabolic Panel; Future  -     Protime-INR  -     AFP Tumor Marker    10. Chronic cough  -     XR Chest 2 View; Future  -     CBC & Differential  -      Comprehensive Metabolic Panel; Future  -     Protime-INR  -     AFP Tumor Marker    11. Shortness of breath  -     XR Chest 2 View; Future  -     CBC & Differential  -     Comprehensive Metabolic Panel; Future  -     Protime-INR  -     AFP Tumor Marker    Other orders  -     vancomycin (VANCOCIN) 125 MG capsule; Take 1 capsule by mouth 4 (Four) Times a Day for 6 days.  Dispense: 24 capsule; Refill: 0      59-year-old male presenting to the office today  for follow-up after recent hospital admission at [place] 8/28/2024 through 9/2/2024 for urinary tract infection, C. difficile, diarrhea, cirrhosis, CT scan displaying infectious versus inflammatory colitis.  Patient reports since being discharged from the hospital yesterday that her diarrhea has improved.  She is continuing on vancomycin orally but reports that the prescription was not sent to the pharmacy, I have therefore sent vancomycin to her pharmacy to complete treatment for C. difficile.  She continues on Lasix 20 mg and Aldactone 50 mg daily for fluid management related to her cirrhosis.  Patient continues potassium chloride 10 mEq daily.  She reports gaining 11 pounds while inpatient and feels that it is related to fluid I have therefore set her up for paracentesis.  She is having abdominal swelling and a cough that will not resolve with breathing treatments, I have therefore set her up for chest x-ray to ensure there is not fluid on her lungs.  I have ordered labs as listed above for cirrhosis management.  Will follow-up in 4 to 6 weeks.  Patient agreeable to plan will call with any questions or concerns.          Follow Up       Follow Up   Return in about 1 month (around 10/3/2024).  Patient was given instructions and counseling regarding her condition or for health maintenance advice. Please see specific information pulled into the AVS if appropriate.

## 2024-08-28 NOTE — TELEPHONE ENCOUNTER
Patient called in requesting stool studies be reviewed. I have routed results to provider for review. Patient is aware if symptoms worsen to be evaluated at the ER. Patient started Amoxicillin for a UTI/ E.Coli.

## 2024-08-29 NOTE — TELEPHONE ENCOUNTER
I spoke to patient today to inform her that her C.Diff test needs to be retested. Patient stated she is inpatient at [facility]. I advised patient that she is where she needs to be and I have informed provider.

## 2024-08-29 NOTE — TELEPHONE ENCOUNTER
Attempted to contact patient to discuss C-diff stool study no answer, lvm for pt to contact the office.     Sharri Mei APRN Alexander, Danielle, MA  These call and check on the patient and see how she is feeling today.  Repeat C. difficile.

## 2024-09-03 ENCOUNTER — PREP FOR SURGERY (OUTPATIENT)
Dept: OTHER | Facility: HOSPITAL | Age: 59
End: 2024-09-03
Payer: MEDICARE

## 2024-09-03 ENCOUNTER — TELEPHONE (OUTPATIENT)
Dept: GASTROENTEROLOGY | Facility: CLINIC | Age: 59
End: 2024-09-03

## 2024-09-03 ENCOUNTER — OFFICE VISIT (OUTPATIENT)
Dept: GASTROENTEROLOGY | Facility: CLINIC | Age: 59
End: 2024-09-03
Payer: MEDICARE

## 2024-09-03 VITALS
OXYGEN SATURATION: 97 % | HEIGHT: 62 IN | TEMPERATURE: 95 F | BODY MASS INDEX: 34.76 KG/M2 | SYSTOLIC BLOOD PRESSURE: 118 MMHG | WEIGHT: 188.9 LBS | HEART RATE: 79 BPM | DIASTOLIC BLOOD PRESSURE: 51 MMHG

## 2024-09-03 DIAGNOSIS — K74.60 CIRRHOSIS OF LIVER WITH ASCITES, UNSPECIFIED HEPATIC CIRRHOSIS TYPE: ICD-10-CM

## 2024-09-03 DIAGNOSIS — K21.9 GASTROESOPHAGEAL REFLUX DISEASE WITHOUT ESOPHAGITIS: ICD-10-CM

## 2024-09-03 DIAGNOSIS — I85.00 ESOPHAGEAL VARICES WITHOUT BLEEDING, UNSPECIFIED ESOPHAGEAL VARICES TYPE: ICD-10-CM

## 2024-09-03 DIAGNOSIS — R18.8 CIRRHOSIS OF LIVER WITH ASCITES, UNSPECIFIED HEPATIC CIRRHOSIS TYPE: ICD-10-CM

## 2024-09-03 DIAGNOSIS — D64.9 ANEMIA, UNSPECIFIED TYPE: ICD-10-CM

## 2024-09-03 DIAGNOSIS — K74.60 CIRRHOSIS OF LIVER WITHOUT ASCITES, UNSPECIFIED HEPATIC CIRRHOSIS TYPE: ICD-10-CM

## 2024-09-03 DIAGNOSIS — K75.81 NASH (NONALCOHOLIC STEATOHEPATITIS): ICD-10-CM

## 2024-09-03 DIAGNOSIS — R19.7 DIARRHEA, UNSPECIFIED TYPE: Primary | ICD-10-CM

## 2024-09-03 DIAGNOSIS — D12.6 ADENOMATOUS POLYP OF COLON, UNSPECIFIED PART OF COLON: ICD-10-CM

## 2024-09-03 DIAGNOSIS — K74.60 CIRRHOSIS OF LIVER WITHOUT ASCITES, UNSPECIFIED HEPATIC CIRRHOSIS TYPE: Primary | ICD-10-CM

## 2024-09-03 DIAGNOSIS — R06.02 SHORTNESS OF BREATH: ICD-10-CM

## 2024-09-03 DIAGNOSIS — R05.3 CHRONIC COUGH: ICD-10-CM

## 2024-09-03 DIAGNOSIS — I85.10 SECONDARY ESOPHAGEAL VARICES WITHOUT BLEEDING: ICD-10-CM

## 2024-09-03 DIAGNOSIS — K57.90 DIVERTICULOSIS: ICD-10-CM

## 2024-09-03 PROCEDURE — 1159F MED LIST DOCD IN RCRD: CPT | Performed by: NURSE PRACTITIONER

## 2024-09-03 PROCEDURE — 1160F RVW MEDS BY RX/DR IN RCRD: CPT | Performed by: NURSE PRACTITIONER

## 2024-09-03 PROCEDURE — 3074F SYST BP LT 130 MM HG: CPT | Performed by: NURSE PRACTITIONER

## 2024-09-03 PROCEDURE — 3078F DIAST BP <80 MM HG: CPT | Performed by: NURSE PRACTITIONER

## 2024-09-03 PROCEDURE — 99214 OFFICE O/P EST MOD 30 MIN: CPT | Performed by: NURSE PRACTITIONER

## 2024-09-03 RX ORDER — PANTOPRAZOLE SODIUM 40 MG/1
40 TABLET, DELAYED RELEASE ORAL
COMMUNITY
Start: 2024-09-03

## 2024-09-03 RX ORDER — VANCOMYCIN HYDROCHLORIDE 125 MG/1
125 CAPSULE ORAL
COMMUNITY
Start: 2024-09-02 | End: 2024-09-03 | Stop reason: SDUPTHER

## 2024-09-03 RX ORDER — SODIUM BICARBONATE 650 MG/1
1300 TABLET ORAL
COMMUNITY
Start: 2024-09-02 | End: 2024-10-03

## 2024-09-03 RX ORDER — VANCOMYCIN HYDROCHLORIDE 125 MG/1
125 CAPSULE ORAL 4 TIMES DAILY
Qty: 24 CAPSULE | Refills: 0 | Status: SHIPPED | OUTPATIENT
Start: 2024-09-03 | End: 2024-09-04 | Stop reason: SDUPTHER

## 2024-09-03 RX ORDER — ALBUMIN (HUMAN) 12.5 G/50ML
25 SOLUTION INTRAVENOUS AS NEEDED
OUTPATIENT
Start: 2024-09-03

## 2024-09-03 RX ORDER — FOLIC ACID 1 MG/1
1 TABLET ORAL DAILY
COMMUNITY
Start: 2024-09-03

## 2024-09-04 DIAGNOSIS — R19.7 DIARRHEA, UNSPECIFIED TYPE: ICD-10-CM

## 2024-09-04 RX ORDER — VANCOMYCIN HYDROCHLORIDE 125 MG/1
125 CAPSULE ORAL 4 TIMES DAILY
Qty: 24 CAPSULE | Refills: 0 | Status: SHIPPED | OUTPATIENT
Start: 2024-09-04 | End: 2024-09-10

## 2024-09-04 NOTE — TELEPHONE ENCOUNTER
[facility] pharmacy has received prescription and I have applied the Good RX coupon. I have spoken to patient and she is aware and the cost for Vancomycin is 46.40 patient is agreeable.

## 2024-09-04 NOTE — TELEPHONE ENCOUNTER
Spoke to patient and pharmacy, [facility] does not accept GoodRX. I spoke with patient and she is agreeable to send prescription to [facility] in [place].

## 2024-09-04 NOTE — TELEPHONE ENCOUNTER
"Pt left a vm stating the Vanc Rx, for 5 pills will cost her $79.00..  Asking for a cheaper Rx..  pt also stated she is having \"bad diarrhea\" today..  [phone]    Thx Juancarlos  "

## 2024-09-06 ENCOUNTER — HOSPITAL ENCOUNTER (OUTPATIENT)
Dept: INTERVENTIONAL RADIOLOGY/VASCULAR | Facility: HOSPITAL | Age: 59
Discharge: HOME | End: 2024-09-06
Payer: MEDICARE

## 2024-09-06 ENCOUNTER — HOSPITAL ENCOUNTER (OUTPATIENT)
Dept: GENERAL RADIOLOGY | Facility: HOSPITAL | Age: 59
Discharge: HOME | End: 2024-09-06
Payer: MEDICARE

## 2024-09-06 DIAGNOSIS — K75.81 NASH (NONALCOHOLIC STEATOHEPATITIS): ICD-10-CM

## 2024-09-06 DIAGNOSIS — R06.02 SHORTNESS OF BREATH: ICD-10-CM

## 2024-09-06 DIAGNOSIS — I85.10 SECONDARY ESOPHAGEAL VARICES WITHOUT BLEEDING: ICD-10-CM

## 2024-09-06 DIAGNOSIS — R19.7 DIARRHEA, UNSPECIFIED TYPE: ICD-10-CM

## 2024-09-06 DIAGNOSIS — R05.3 CHRONIC COUGH: ICD-10-CM

## 2024-09-06 DIAGNOSIS — D64.9 ANEMIA, UNSPECIFIED TYPE: ICD-10-CM

## 2024-09-06 DIAGNOSIS — K57.90 DIVERTICULOSIS: ICD-10-CM

## 2024-09-06 DIAGNOSIS — D12.6 ADENOMATOUS POLYP OF COLON, UNSPECIFIED PART OF COLON: ICD-10-CM

## 2024-09-06 DIAGNOSIS — K21.9 GASTROESOPHAGEAL REFLUX DISEASE WITHOUT ESOPHAGITIS: ICD-10-CM

## 2024-09-06 DIAGNOSIS — I85.00 ESOPHAGEAL VARICES WITHOUT BLEEDING, UNSPECIFIED ESOPHAGEAL VARICES TYPE: ICD-10-CM

## 2024-09-06 DIAGNOSIS — K74.60 CIRRHOSIS OF LIVER WITHOUT ASCITES, UNSPECIFIED HEPATIC CIRRHOSIS TYPE: ICD-10-CM

## 2024-09-06 LAB
ALBUMIN SERPL-MCNC: 2.9 G/DL (ref 3.5–5.2)
ALBUMIN/GLOB SERPL: 1 G/DL
ALP SERPL-CCNC: 117 U/L (ref 39–117)
ALPHA-FETOPROTEIN: 4.61 NG/ML (ref 0–8.3)
ALT SERPL W P-5'-P-CCNC: 16 U/L (ref 1–33)
ANION GAP SERPL CALCULATED.3IONS-SCNC: 9 MMOL/L (ref 5–15)
APTT PPP: 32.4 SECONDS (ref 24.2–34.2)
AST SERPL-CCNC: 45 U/L (ref 1–32)
BASOPHILS # BLD AUTO: 0.02 10*3/MM3 (ref 0–0.2)
BASOPHILS NFR BLD AUTO: 0.6 % (ref 0–1.5)
BILIRUB SERPL-MCNC: 3.1 MG/DL (ref 0–1.2)
BUN SERPL-MCNC: 12 MG/DL (ref 8–23)
BUN/CREAT SERPL: 12.1 (ref 7–25)
CALCIUM SPEC-SCNC: 8.8 MG/DL (ref 8.6–10.5)
CHLORIDE SERPL-SCNC: 110 MMOL/L (ref 98–107)
CO2 SERPL-SCNC: 21 MMOL/L (ref 22–29)
CREAT SERPL-MCNC: 0.99 MG/DL (ref 0.57–1)
DEPRECATED RDW RBC AUTO: 46.4 FL (ref 37–54)
EGFRCR SERPLBLD CKD-EPI 2021: 65 ML/MIN/1.73
EOSINOPHIL # BLD AUTO: 0.12 10*3/MM3 (ref 0–0.4)
EOSINOPHIL NFR BLD AUTO: 3.6 % (ref 0.3–6.2)
ERYTHROCYTE [DISTWIDTH] IN BLOOD BY AUTOMATED COUNT: 12.9 % (ref 12.3–15.4)
GLOBULIN UR ELPH-MCNC: 2.8 GM/DL
GLUCOSE SERPL-MCNC: 107 MG/DL (ref 65–99)
HCT VFR BLD AUTO: 32.5 % (ref 34–46.6)
HGB BLD-MCNC: 10.6 G/DL (ref 12–15.9)
IMM GRANULOCYTES # BLD AUTO: 0.01 10*3/MM3 (ref 0–0.05)
IMM GRANULOCYTES NFR BLD AUTO: 0.3 % (ref 0–0.5)
INR PPP: 1.43 (ref 0.86–1.15)
LYMPHOCYTES # BLD AUTO: 0.68 10*3/MM3 (ref 0.7–3.1)
LYMPHOCYTES NFR BLD AUTO: 20.5 % (ref 19.6–45.3)
MCH RBC QN AUTO: 32.4 PG (ref 26.6–33)
MCHC RBC AUTO-ENTMCNC: 32.6 G/DL (ref 31.5–35.7)
MCV RBC AUTO: 99.4 FL (ref 79–97)
MONOCYTES # BLD AUTO: 0.21 10*3/MM3 (ref 0.1–0.9)
MONOCYTES NFR BLD AUTO: 6.3 % (ref 5–12)
NEUTROPHILS NFR BLD AUTO: 2.28 10*3/MM3 (ref 1.7–7)
NEUTROPHILS NFR BLD AUTO: 68.7 % (ref 42.7–76)
PLATELET # BLD AUTO: 60 10*3/MM3 (ref 140–450)
PMV BLD AUTO: 10.7 FL (ref 6–12)
POTASSIUM SERPL-SCNC: 3.8 MMOL/L (ref 3.5–5.2)
PROT SERPL-MCNC: 5.7 G/DL (ref 6–8.5)
PROTHROMBIN TIME: 17.7 SECONDS (ref 11.8–14.9)
RBC # BLD AUTO: 3.27 10*6/MM3 (ref 3.77–5.28)
SODIUM SERPL-SCNC: 140 MMOL/L (ref 136–145)
WBC NRBC COR # BLD AUTO: 3.32 10*3/MM3 (ref 3.4–10.8)

## 2024-09-06 PROCEDURE — 85730 THROMBOPLASTIN TIME PARTIAL: CPT | Performed by: NURSE PRACTITIONER

## 2024-09-06 PROCEDURE — 36415 COLL VENOUS BLD VENIPUNCTURE: CPT | Performed by: NURSE PRACTITIONER

## 2024-09-06 PROCEDURE — 80053 COMPREHEN METABOLIC PANEL: CPT | Performed by: NURSE PRACTITIONER

## 2024-09-06 PROCEDURE — 82105 ALPHA-FETOPROTEIN SERUM: CPT | Performed by: NURSE PRACTITIONER

## 2024-09-06 PROCEDURE — 85610 PROTHROMBIN TIME: CPT | Performed by: NURSE PRACTITIONER

## 2024-09-06 PROCEDURE — 76942 ECHO GUIDE FOR BIOPSY: CPT

## 2024-09-06 PROCEDURE — 85025 COMPLETE CBC W/AUTO DIFF WBC: CPT | Performed by: NURSE PRACTITIONER

## 2024-09-06 PROCEDURE — 71046 X-RAY EXAM CHEST 2 VIEWS: CPT

## 2024-09-06 RX ORDER — LIDOCAINE HYDROCHLORIDE 20 MG/ML
20 INJECTION, SOLUTION INFILTRATION; PERINEURAL ONCE
Status: DISCONTINUED | OUTPATIENT
Start: 2024-09-06 | End: 2024-09-06

## 2024-09-09 DIAGNOSIS — D64.9 ANEMIA, UNSPECIFIED TYPE: Primary | ICD-10-CM

## 2024-09-09 DIAGNOSIS — K74.60 CIRRHOSIS OF LIVER WITHOUT ASCITES, UNSPECIFIED HEPATIC CIRRHOSIS TYPE: ICD-10-CM

## 2024-09-10 ENCOUNTER — TELEPHONE (OUTPATIENT)
Dept: GASTROENTEROLOGY | Facility: CLINIC | Age: 59
End: 2024-09-10
Payer: MEDICARE

## 2024-09-10 DIAGNOSIS — R19.7 DIARRHEA, UNSPECIFIED TYPE: Primary | ICD-10-CM

## 2024-09-10 DIAGNOSIS — K57.90 DIVERTICULOSIS: ICD-10-CM

## 2024-09-10 NOTE — TELEPHONE ENCOUNTER
I spoke to patient today regarding results. Patient stated her diarrhea symptoms are better, she is having 1-2 BM daily and they are more formed. Patient did stay she was having some abdominal pain that has not resolved.

## 2024-09-10 NOTE — TELEPHONE ENCOUNTER
----- Message from Sharri Mei sent at 9/10/2024  7:42 AM EDT -----  Please call and check on the patient to see if the vancomycin provided relief for her diarrhea.  She did have a bacteria on her stool studies called enteroaggregative that can be treated with a different antibiotic if her bowel movements are not back to normal. The treatment for this would be azithromycin 1 gram.

## 2024-09-13 RX ORDER — AZITHROMYCIN 500 MG/1
TABLET, FILM COATED ORAL
Qty: 2 TABLET | Refills: 0 | Status: ON HOLD | OUTPATIENT
Start: 2024-09-13

## 2024-09-13 NOTE — TELEPHONE ENCOUNTER
I spoke to patient today and she stated her symptoms have returned- diarrhea is increasing. Patient is agreeable to complete the CT scan which she would like a [place]. Order has been placed, she also requested the alternative antibiotic be sent to the pharmacy for the bacteria infection.

## 2024-09-16 ENCOUNTER — HOSPITAL ENCOUNTER (INPATIENT)
Dept: OTHER | Facility: HOSPITAL | Age: 59
Discharge: HOME | End: 2024-09-16
Payer: MEDICARE

## 2024-09-16 ENCOUNTER — HOSPITAL ENCOUNTER (INPATIENT)
Facility: HOSPITAL | Age: 59
LOS: 4 days | Discharge: HOME | End: 2024-09-20
Attending: STUDENT IN AN ORGANIZED HEALTH CARE EDUCATION/TRAINING PROGRAM | Admitting: STUDENT IN AN ORGANIZED HEALTH CARE EDUCATION/TRAINING PROGRAM
Payer: MEDICARE

## 2024-09-16 PROBLEM — K56.609 SBO (SMALL BOWEL OBSTRUCTION): Status: ACTIVE | Noted: 2024-09-16

## 2024-09-16 PROBLEM — K85.90 PANCREATITIS: Status: ACTIVE | Noted: 2024-09-16

## 2024-09-16 PROCEDURE — 99222 1ST HOSP IP/OBS MODERATE 55: CPT | Performed by: STUDENT IN AN ORGANIZED HEALTH CARE EDUCATION/TRAINING PROGRAM

## 2024-09-16 RX ORDER — SODIUM CHLORIDE 9 MG/ML
100 INJECTION, SOLUTION INTRAVENOUS CONTINUOUS
Status: DISCONTINUED | OUTPATIENT
Start: 2024-09-17 | End: 2024-09-20 | Stop reason: HOSPADM

## 2024-09-16 RX ORDER — ONDANSETRON 4 MG/1
4 TABLET, ORALLY DISINTEGRATING ORAL EVERY 6 HOURS PRN
Status: DISCONTINUED | OUTPATIENT
Start: 2024-09-16 | End: 2024-09-20 | Stop reason: HOSPADM

## 2024-09-16 RX ORDER — ONDANSETRON 2 MG/ML
4 INJECTION INTRAMUSCULAR; INTRAVENOUS EVERY 6 HOURS PRN
Status: DISCONTINUED | OUTPATIENT
Start: 2024-09-16 | End: 2024-09-20 | Stop reason: HOSPADM

## 2024-09-16 RX ORDER — NALOXONE HCL 0.4 MG/ML
0.4 VIAL (ML) INJECTION
Status: DISCONTINUED | OUTPATIENT
Start: 2024-09-16 | End: 2024-09-20 | Stop reason: HOSPADM

## 2024-09-16 RX ORDER — SODIUM CHLORIDE 0.9 % (FLUSH) 0.9 %
10 SYRINGE (ML) INJECTION EVERY 12 HOURS SCHEDULED
Status: DISCONTINUED | OUTPATIENT
Start: 2024-09-17 | End: 2024-09-20 | Stop reason: HOSPADM

## 2024-09-16 RX ORDER — MORPHINE SULFATE 2 MG/ML
2 INJECTION, SOLUTION INTRAMUSCULAR; INTRAVENOUS EVERY 4 HOURS PRN
Status: DISCONTINUED | OUTPATIENT
Start: 2024-09-16 | End: 2024-09-20 | Stop reason: HOSPADM

## 2024-09-16 RX ORDER — SODIUM CHLORIDE 9 MG/ML
40 INJECTION, SOLUTION INTRAVENOUS AS NEEDED
Status: DISCONTINUED | OUTPATIENT
Start: 2024-09-16 | End: 2024-09-20 | Stop reason: HOSPADM

## 2024-09-16 RX ORDER — SODIUM CHLORIDE 0.9 % (FLUSH) 0.9 %
10 SYRINGE (ML) INJECTION AS NEEDED
Status: DISCONTINUED | OUTPATIENT
Start: 2024-09-16 | End: 2024-09-20 | Stop reason: HOSPADM

## 2024-09-16 NOTE — TELEPHONE ENCOUNTER
I spoke to patient today she is currently experiencing nausea vomiting and diarrhea since yesterday. I have advised patient to seek evaluation at the ER. Patient has voiced understanding and is proceeding to ER.

## 2024-09-17 LAB
ALBUMIN SERPL-MCNC: 2.3 G/DL (ref 3.5–5.2)
ALBUMIN/GLOB SERPL: 0.9 G/DL
ALP SERPL-CCNC: 97 U/L (ref 39–117)
ALT SERPL W P-5'-P-CCNC: 13 U/L (ref 1–33)
ANION GAP SERPL CALCULATED.3IONS-SCNC: 8.7 MMOL/L (ref 5–15)
ANISOCYTOSIS BLD QL: NORMAL
AST SERPL-CCNC: 40 U/L (ref 1–32)
BASOPHILS # BLD AUTO: 0.01 10*3/MM3 (ref 0–0.2)
BASOPHILS NFR BLD AUTO: 0.3 % (ref 0–1.5)
BILIRUB SERPL-MCNC: 3.1 MG/DL (ref 0–1.2)
BUN SERPL-MCNC: 14 MG/DL (ref 8–23)
BUN/CREAT SERPL: 13 (ref 7–25)
BURR CELLS BLD QL SMEAR: NORMAL
CALCIUM SPEC-SCNC: 7.9 MG/DL (ref 8.6–10.5)
CHLORIDE SERPL-SCNC: 114 MMOL/L (ref 98–107)
CO2 SERPL-SCNC: 18.3 MMOL/L (ref 22–29)
CREAT SERPL-MCNC: 1.08 MG/DL (ref 0.57–1)
DEPRECATED RDW RBC AUTO: 56.4 FL (ref 37–54)
EGFRCR SERPLBLD CKD-EPI 2021: 58.6 ML/MIN/1.73
EOSINOPHIL # BLD AUTO: 0.14 10*3/MM3 (ref 0–0.4)
EOSINOPHIL NFR BLD AUTO: 4.5 % (ref 0.3–6.2)
ERYTHROCYTE [DISTWIDTH] IN BLOOD BY AUTOMATED COUNT: 15.1 % (ref 12.3–15.4)
GLOBULIN UR ELPH-MCNC: 2.5 GM/DL
GLUCOSE SERPL-MCNC: 109 MG/DL (ref 65–99)
HCT VFR BLD AUTO: 33 % (ref 34–46.6)
HGB BLD-MCNC: 10.6 G/DL (ref 12–15.9)
HOLD SPECIMEN: NORMAL
IMM GRANULOCYTES # BLD AUTO: 0.01 10*3/MM3 (ref 0–0.05)
IMM GRANULOCYTES NFR BLD AUTO: 0.3 % (ref 0–0.5)
LYMPHOCYTES # BLD AUTO: 0.42 10*3/MM3 (ref 0.7–3.1)
LYMPHOCYTES NFR BLD AUTO: 13.6 % (ref 19.6–45.3)
MACROCYTES BLD QL SMEAR: NORMAL
MCH RBC QN AUTO: 32.8 PG (ref 26.6–33)
MCHC RBC AUTO-ENTMCNC: 32.1 G/DL (ref 31.5–35.7)
MCV RBC AUTO: 102.2 FL (ref 79–97)
MONOCYTES # BLD AUTO: 0.28 10*3/MM3 (ref 0.1–0.9)
MONOCYTES NFR BLD AUTO: 9.1 % (ref 5–12)
NEUTROPHILS NFR BLD AUTO: 2.23 10*3/MM3 (ref 1.7–7)
NEUTROPHILS NFR BLD AUTO: 72.2 % (ref 42.7–76)
NRBC BLD AUTO-RTO: 0 /100 WBC (ref 0–0.2)
PLATELET # BLD AUTO: 42 10*3/MM3 (ref 140–450)
PMV BLD AUTO: 12 FL (ref 6–12)
POTASSIUM SERPL-SCNC: 3.9 MMOL/L (ref 3.5–5.2)
PROT SERPL-MCNC: 4.8 G/DL (ref 6–8.5)
RBC # BLD AUTO: 3.23 10*6/MM3 (ref 3.77–5.28)
SMALL PLATELETS BLD QL SMEAR: NORMAL
SODIUM SERPL-SCNC: 141 MMOL/L (ref 136–145)
WBC MORPH BLD: NORMAL
WBC NRBC COR # BLD AUTO: 3.09 10*3/MM3 (ref 3.4–10.8)

## 2024-09-17 PROCEDURE — 25810000003 SODIUM CHLORIDE 0.9 % SOLUTION: Performed by: STUDENT IN AN ORGANIZED HEALTH CARE EDUCATION/TRAINING PROGRAM

## 2024-09-17 PROCEDURE — 85007 BL SMEAR W/DIFF WBC COUNT: CPT | Performed by: NURSE PRACTITIONER

## 2024-09-17 PROCEDURE — 99233 SBSQ HOSP IP/OBS HIGH 50: CPT | Performed by: INTERNAL MEDICINE

## 2024-09-17 PROCEDURE — 94799 UNLISTED PULMONARY SVC/PX: CPT

## 2024-09-17 PROCEDURE — 80053 COMPREHEN METABOLIC PANEL: CPT | Performed by: NURSE PRACTITIONER

## 2024-09-17 PROCEDURE — 85025 COMPLETE CBC W/AUTO DIFF WBC: CPT | Performed by: NURSE PRACTITIONER

## 2024-09-17 PROCEDURE — 94640 AIRWAY INHALATION TREATMENT: CPT

## 2024-09-17 PROCEDURE — 99222 1ST HOSP IP/OBS MODERATE 55: CPT | Performed by: SURGERY

## 2024-09-17 PROCEDURE — 25010000002 ONDANSETRON PER 1 MG: Performed by: STUDENT IN AN ORGANIZED HEALTH CARE EDUCATION/TRAINING PROGRAM

## 2024-09-17 PROCEDURE — 25010000002 MORPHINE PER 10 MG: Performed by: STUDENT IN AN ORGANIZED HEALTH CARE EDUCATION/TRAINING PROGRAM

## 2024-09-17 RX ORDER — SODIUM BICARBONATE 650 MG/1
1300 TABLET ORAL 2 TIMES DAILY
Status: DISCONTINUED | OUTPATIENT
Start: 2024-09-17 | End: 2024-09-20 | Stop reason: HOSPADM

## 2024-09-17 RX ORDER — SPIRONOLACTONE 25 MG/1
50 TABLET ORAL DAILY
Status: DISCONTINUED | OUTPATIENT
Start: 2024-09-17 | End: 2024-09-20 | Stop reason: HOSPADM

## 2024-09-17 RX ORDER — BUDESONIDE AND FORMOTEROL FUMARATE DIHYDRATE 160; 4.5 UG/1; UG/1
2 AEROSOL RESPIRATORY (INHALATION)
Status: DISCONTINUED | OUTPATIENT
Start: 2024-09-17 | End: 2024-09-20 | Stop reason: HOSPADM

## 2024-09-17 RX ORDER — FOLIC ACID 1 MG/1
1 TABLET ORAL DAILY
Status: DISCONTINUED | OUTPATIENT
Start: 2024-09-17 | End: 2024-09-20 | Stop reason: HOSPADM

## 2024-09-17 RX ORDER — PANTOPRAZOLE SODIUM 40 MG/1
40 TABLET, DELAYED RELEASE ORAL DAILY
Status: DISCONTINUED | OUTPATIENT
Start: 2024-09-17 | End: 2024-09-20 | Stop reason: HOSPADM

## 2024-09-17 RX ORDER — FUROSEMIDE 20 MG
20 TABLET ORAL DAILY
Status: DISCONTINUED | OUTPATIENT
Start: 2024-09-17 | End: 2024-09-20 | Stop reason: HOSPADM

## 2024-09-17 RX ADMIN — MORPHINE SULFATE 2 MG: 2 INJECTION, SOLUTION INTRAMUSCULAR; INTRAVENOUS at 00:00

## 2024-09-17 RX ADMIN — BUDESONIDE AND FORMOTEROL FUMARATE DIHYDRATE 2 PUFF: 160; 4.5 AEROSOL RESPIRATORY (INHALATION) at 18:39

## 2024-09-17 RX ADMIN — SODIUM CHLORIDE 100 ML/HR: 9 INJECTION, SOLUTION INTRAVENOUS at 00:00

## 2024-09-17 RX ADMIN — FOLIC ACID 1 MG: 1 TABLET ORAL at 18:24

## 2024-09-17 RX ADMIN — ONDANSETRON 4 MG: 2 INJECTION INTRAMUSCULAR; INTRAVENOUS at 21:00

## 2024-09-17 RX ADMIN — Medication 10 ML: at 00:00

## 2024-09-17 RX ADMIN — SODIUM CHLORIDE 100 ML/HR: 9 INJECTION, SOLUTION INTRAVENOUS at 09:18

## 2024-09-17 RX ADMIN — SODIUM BICARBONATE 650 MG TABLET 1300 MG: at 20:52

## 2024-09-17 RX ADMIN — TIOTROPIUM BROMIDE INHALATION SPRAY 2 PUFF: 3.12 SPRAY, METERED RESPIRATORY (INHALATION) at 18:40

## 2024-09-17 RX ADMIN — Medication 10 ML: at 20:53

## 2024-09-17 RX ADMIN — MORPHINE SULFATE 4 MG: 4 INJECTION, SOLUTION INTRAMUSCULAR; INTRAVENOUS at 20:52

## 2024-09-17 RX ADMIN — Medication 10 ML: at 09:20

## 2024-09-17 RX ADMIN — PANTOPRAZOLE SODIUM 40 MG: 40 TABLET, DELAYED RELEASE ORAL at 18:24

## 2024-09-18 LAB
ALBUMIN SERPL-MCNC: 2.2 G/DL (ref 3.5–5.2)
ALBUMIN/GLOB SERPL: 1 G/DL
ALP SERPL-CCNC: 89 U/L (ref 39–117)
ALT SERPL W P-5'-P-CCNC: 12 U/L (ref 1–33)
ANION GAP SERPL CALCULATED.3IONS-SCNC: 6.9 MMOL/L (ref 5–15)
AST SERPL-CCNC: 40 U/L (ref 1–32)
BASOPHILS # BLD AUTO: 0.02 10*3/MM3 (ref 0–0.2)
BASOPHILS NFR BLD AUTO: 0.8 % (ref 0–1.5)
BILIRUB SERPL-MCNC: 2.3 MG/DL (ref 0–1.2)
BUN SERPL-MCNC: 12 MG/DL (ref 8–23)
BUN/CREAT SERPL: 12.5 (ref 7–25)
CALCIUM SPEC-SCNC: 7.7 MG/DL (ref 8.6–10.5)
CHLORIDE SERPL-SCNC: 111 MMOL/L (ref 98–107)
CO2 SERPL-SCNC: 18.1 MMOL/L (ref 22–29)
CREAT SERPL-MCNC: 0.96 MG/DL (ref 0.57–1)
DEPRECATED RDW RBC AUTO: 54.4 FL (ref 37–54)
EGFRCR SERPLBLD CKD-EPI 2021: 67.5 ML/MIN/1.73
EOSINOPHIL # BLD AUTO: 0.2 10*3/MM3 (ref 0–0.4)
EOSINOPHIL NFR BLD AUTO: 7.6 % (ref 0.3–6.2)
ERYTHROCYTE [DISTWIDTH] IN BLOOD BY AUTOMATED COUNT: 14.7 % (ref 12.3–15.4)
GLOBULIN UR ELPH-MCNC: 2.2 GM/DL
GLUCOSE SERPL-MCNC: 137 MG/DL (ref 65–99)
HCT VFR BLD AUTO: 27.4 % (ref 34–46.6)
HGB BLD-MCNC: 9 G/DL (ref 12–15.9)
IMM GRANULOCYTES # BLD AUTO: 0.01 10*3/MM3 (ref 0–0.05)
IMM GRANULOCYTES NFR BLD AUTO: 0.4 % (ref 0–0.5)
INR PPP: 1.62 (ref 0.86–1.15)
LIPASE SERPL-CCNC: 185 U/L (ref 13–60)
LYMPHOCYTES # BLD AUTO: 0.58 10*3/MM3 (ref 0.7–3.1)
LYMPHOCYTES NFR BLD AUTO: 22.1 % (ref 19.6–45.3)
MAGNESIUM SERPL-MCNC: 1.6 MG/DL (ref 1.6–2.4)
MCH RBC QN AUTO: 32.3 PG (ref 26.6–33)
MCHC RBC AUTO-ENTMCNC: 32.8 G/DL (ref 31.5–35.7)
MCV RBC AUTO: 98.2 FL (ref 79–97)
MONOCYTES # BLD AUTO: 0.23 10*3/MM3 (ref 0.1–0.9)
MONOCYTES NFR BLD AUTO: 8.7 % (ref 5–12)
NEUTROPHILS NFR BLD AUTO: 1.59 10*3/MM3 (ref 1.7–7)
NEUTROPHILS NFR BLD AUTO: 60.4 % (ref 42.7–76)
NRBC BLD AUTO-RTO: 0 /100 WBC (ref 0–0.2)
PHOSPHATE SERPL-MCNC: 2.8 MG/DL (ref 2.5–4.5)
PLATELET # BLD AUTO: 37 10*3/MM3 (ref 140–450)
PMV BLD AUTO: 12.9 FL (ref 6–12)
POTASSIUM SERPL-SCNC: 3.6 MMOL/L (ref 3.5–5.2)
PROT SERPL-MCNC: 4.4 G/DL (ref 6–8.5)
PROTHROMBIN TIME: 19.5 SECONDS (ref 11.8–14.9)
RBC # BLD AUTO: 2.79 10*6/MM3 (ref 3.77–5.28)
SODIUM SERPL-SCNC: 136 MMOL/L (ref 136–145)
WBC NRBC COR # BLD AUTO: 2.63 10*3/MM3 (ref 3.4–10.8)

## 2024-09-18 PROCEDURE — 99231 SBSQ HOSP IP/OBS SF/LOW 25: CPT | Performed by: SURGERY

## 2024-09-18 PROCEDURE — 85025 COMPLETE CBC W/AUTO DIFF WBC: CPT | Performed by: SURGERY

## 2024-09-18 PROCEDURE — 94799 UNLISTED PULMONARY SVC/PX: CPT

## 2024-09-18 PROCEDURE — 85610 PROTHROMBIN TIME: CPT | Performed by: INTERNAL MEDICINE

## 2024-09-18 PROCEDURE — 25010000002 MAGNESIUM SULFATE 4 GM/100ML SOLUTION: Performed by: INTERNAL MEDICINE

## 2024-09-18 PROCEDURE — 80053 COMPREHEN METABOLIC PANEL: CPT | Performed by: SURGERY

## 2024-09-18 PROCEDURE — 99222 1ST HOSP IP/OBS MODERATE 55: CPT | Performed by: INTERNAL MEDICINE

## 2024-09-18 PROCEDURE — 83690 ASSAY OF LIPASE: CPT | Performed by: SURGERY

## 2024-09-18 PROCEDURE — 84100 ASSAY OF PHOSPHORUS: CPT | Performed by: INTERNAL MEDICINE

## 2024-09-18 PROCEDURE — 83735 ASSAY OF MAGNESIUM: CPT | Performed by: INTERNAL MEDICINE

## 2024-09-18 PROCEDURE — 25810000003 SODIUM CHLORIDE 0.9 % SOLUTION: Performed by: STUDENT IN AN ORGANIZED HEALTH CARE EDUCATION/TRAINING PROGRAM

## 2024-09-18 PROCEDURE — 99233 SBSQ HOSP IP/OBS HIGH 50: CPT | Performed by: INTERNAL MEDICINE

## 2024-09-18 PROCEDURE — 97161 PT EVAL LOW COMPLEX 20 MIN: CPT

## 2024-09-18 RX ORDER — OXYCODONE HYDROCHLORIDE 5 MG/1
5 TABLET ORAL EVERY 8 HOURS PRN
Status: COMPLETED | OUTPATIENT
Start: 2024-09-18 | End: 2024-09-18

## 2024-09-18 RX ORDER — MAGNESIUM SULFATE HEPTAHYDRATE 40 MG/ML
4 INJECTION, SOLUTION INTRAVENOUS ONCE
Status: COMPLETED | OUTPATIENT
Start: 2024-09-18 | End: 2024-09-18

## 2024-09-18 RX ADMIN — Medication 10 MG: at 21:28

## 2024-09-18 RX ADMIN — OXYCODONE HYDROCHLORIDE 5 MG: 5 TABLET ORAL at 21:28

## 2024-09-18 RX ADMIN — PANTOPRAZOLE SODIUM 40 MG: 40 TABLET, DELAYED RELEASE ORAL at 08:33

## 2024-09-18 RX ADMIN — SPIRONOLACTONE 50 MG: 25 TABLET ORAL at 08:33

## 2024-09-18 RX ADMIN — BUDESONIDE AND FORMOTEROL FUMARATE DIHYDRATE 2 PUFF: 160; 4.5 AEROSOL RESPIRATORY (INHALATION) at 18:46

## 2024-09-18 RX ADMIN — FOLIC ACID 1 MG: 1 TABLET ORAL at 08:34

## 2024-09-18 RX ADMIN — SODIUM CHLORIDE 100 ML/HR: 9 INJECTION, SOLUTION INTRAVENOUS at 14:26

## 2024-09-18 RX ADMIN — TIOTROPIUM BROMIDE INHALATION SPRAY 2 PUFF: 3.12 SPRAY, METERED RESPIRATORY (INHALATION) at 09:48

## 2024-09-18 RX ADMIN — Medication 10 ML: at 20:30

## 2024-09-18 RX ADMIN — BUDESONIDE AND FORMOTEROL FUMARATE DIHYDRATE 2 PUFF: 160; 4.5 AEROSOL RESPIRATORY (INHALATION) at 09:48

## 2024-09-18 RX ADMIN — SODIUM BICARBONATE 650 MG TABLET 1300 MG: at 08:32

## 2024-09-18 RX ADMIN — MAGNESIUM SULFATE HEPTAHYDRATE 4 G: 4 INJECTION, SOLUTION INTRAVENOUS at 14:25

## 2024-09-18 RX ADMIN — SODIUM BICARBONATE 650 MG TABLET 1300 MG: at 20:30

## 2024-09-18 RX ADMIN — FUROSEMIDE 20 MG: 20 TABLET ORAL at 08:33

## 2024-09-19 ENCOUNTER — APPOINTMENT (OUTPATIENT)
Dept: MRI IMAGING | Facility: HOSPITAL | Age: 59
End: 2024-09-19
Payer: MEDICARE

## 2024-09-19 ENCOUNTER — TELEPHONE (OUTPATIENT)
Dept: GASTROENTEROLOGY | Facility: CLINIC | Age: 59
End: 2024-09-19
Payer: MEDICARE

## 2024-09-19 LAB
027 TOXIN: NORMAL
ALBUMIN SERPL-MCNC: 2.2 G/DL (ref 3.5–5.2)
ALBUMIN/GLOB SERPL: 0.9 G/DL
ALP SERPL-CCNC: 87 U/L (ref 39–117)
ALT SERPL W P-5'-P-CCNC: 12 U/L (ref 1–33)
ANION GAP SERPL CALCULATED.3IONS-SCNC: 6.8 MMOL/L (ref 5–15)
AST SERPL-CCNC: 39 U/L (ref 1–32)
BILIRUB SERPL-MCNC: 2.3 MG/DL (ref 0–1.2)
BUN SERPL-MCNC: 8 MG/DL (ref 8–23)
BUN/CREAT SERPL: 9.2 (ref 7–25)
C DIFF TOX GENS STL QL NAA+PROBE: NEGATIVE
CALCIUM SPEC-SCNC: 7.5 MG/DL (ref 8.6–10.5)
CHLORIDE SERPL-SCNC: 108 MMOL/L (ref 98–107)
CO2 SERPL-SCNC: 20.2 MMOL/L (ref 22–29)
CREAT SERPL-MCNC: 0.87 MG/DL (ref 0.57–1)
DEPRECATED RDW RBC AUTO: 50.6 FL (ref 37–54)
EGFRCR SERPLBLD CKD-EPI 2021: 75.9 ML/MIN/1.73
ERYTHROCYTE [DISTWIDTH] IN BLOOD BY AUTOMATED COUNT: 14.5 % (ref 12.3–15.4)
GLOBULIN UR ELPH-MCNC: 2.5 GM/DL
GLUCOSE SERPL-MCNC: 103 MG/DL (ref 65–99)
HCT VFR BLD AUTO: 27.8 % (ref 34–46.6)
HGB BLD-MCNC: 9.4 G/DL (ref 12–15.9)
MAGNESIUM SERPL-MCNC: 2.3 MG/DL (ref 1.6–2.4)
MCH RBC QN AUTO: 32.2 PG (ref 26.6–33)
MCHC RBC AUTO-ENTMCNC: 33.8 G/DL (ref 31.5–35.7)
MCV RBC AUTO: 95.2 FL (ref 79–97)
PHOSPHATE SERPL-MCNC: 2.7 MG/DL (ref 2.5–4.5)
PLATELET # BLD AUTO: 39 10*3/MM3 (ref 140–450)
PMV BLD AUTO: 13.1 FL (ref 6–12)
POTASSIUM SERPL-SCNC: 3.6 MMOL/L (ref 3.5–5.2)
PROT SERPL-MCNC: 4.7 G/DL (ref 6–8.5)
RBC # BLD AUTO: 2.92 10*6/MM3 (ref 3.77–5.28)
SODIUM SERPL-SCNC: 135 MMOL/L (ref 136–145)
WBC NRBC COR # BLD AUTO: 2.74 10*3/MM3 (ref 3.4–10.8)

## 2024-09-19 PROCEDURE — 85027 COMPLETE CBC AUTOMATED: CPT | Performed by: INTERNAL MEDICINE

## 2024-09-19 PROCEDURE — 83735 ASSAY OF MAGNESIUM: CPT | Performed by: INTERNAL MEDICINE

## 2024-09-19 PROCEDURE — 80053 COMPREHEN METABOLIC PANEL: CPT | Performed by: INTERNAL MEDICINE

## 2024-09-19 PROCEDURE — 94799 UNLISTED PULMONARY SVC/PX: CPT

## 2024-09-19 PROCEDURE — A9577 INJ MULTIHANCE: HCPCS | Performed by: INTERNAL MEDICINE

## 2024-09-19 PROCEDURE — 0 GADOBENATE DIMEGLUMINE 529 MG/ML SOLUTION: Performed by: INTERNAL MEDICINE

## 2024-09-19 PROCEDURE — 87493 C DIFF AMPLIFIED PROBE: CPT | Performed by: INTERNAL MEDICINE

## 2024-09-19 PROCEDURE — 99233 SBSQ HOSP IP/OBS HIGH 50: CPT | Performed by: INTERNAL MEDICINE

## 2024-09-19 PROCEDURE — 74183 MRI ABD W/O CNTR FLWD CNTR: CPT

## 2024-09-19 PROCEDURE — 25810000003 SODIUM CHLORIDE 0.9 % SOLUTION: Performed by: STUDENT IN AN ORGANIZED HEALTH CARE EDUCATION/TRAINING PROGRAM

## 2024-09-19 PROCEDURE — 84100 ASSAY OF PHOSPHORUS: CPT | Performed by: INTERNAL MEDICINE

## 2024-09-19 RX ADMIN — Medication 10 ML: at 21:09

## 2024-09-19 RX ADMIN — SODIUM BICARBONATE 650 MG TABLET 1300 MG: at 08:13

## 2024-09-19 RX ADMIN — Medication 10 ML: at 08:14

## 2024-09-19 RX ADMIN — FUROSEMIDE 20 MG: 20 TABLET ORAL at 08:14

## 2024-09-19 RX ADMIN — SODIUM BICARBONATE 650 MG TABLET 1300 MG: at 21:09

## 2024-09-19 RX ADMIN — Medication 10 MG: at 21:09

## 2024-09-19 RX ADMIN — PANTOPRAZOLE SODIUM 40 MG: 40 TABLET, DELAYED RELEASE ORAL at 08:14

## 2024-09-19 RX ADMIN — GADOBENATE DIMEGLUMINE 15 ML: 529 INJECTION, SOLUTION INTRAVENOUS at 12:39

## 2024-09-19 RX ADMIN — SODIUM CHLORIDE 100 ML/HR: 9 INJECTION, SOLUTION INTRAVENOUS at 16:56

## 2024-09-19 RX ADMIN — TIOTROPIUM BROMIDE INHALATION SPRAY 2 PUFF: 3.12 SPRAY, METERED RESPIRATORY (INHALATION) at 08:47

## 2024-09-19 RX ADMIN — SODIUM CHLORIDE 100 ML/HR: 9 INJECTION, SOLUTION INTRAVENOUS at 10:39

## 2024-09-19 RX ADMIN — FOLIC ACID 1 MG: 1 TABLET ORAL at 08:14

## 2024-09-19 RX ADMIN — BUDESONIDE AND FORMOTEROL FUMARATE DIHYDRATE 2 PUFF: 160; 4.5 AEROSOL RESPIRATORY (INHALATION) at 08:46

## 2024-09-19 RX ADMIN — SPIRONOLACTONE 50 MG: 25 TABLET ORAL at 08:14

## 2024-09-20 ENCOUNTER — READMISSION MANAGEMENT (OUTPATIENT)
Dept: CALL CENTER | Facility: HOSPITAL | Age: 59
End: 2024-09-20
Payer: MEDICARE

## 2024-09-20 VITALS
BODY MASS INDEX: 34 KG/M2 | TEMPERATURE: 97.9 F | SYSTOLIC BLOOD PRESSURE: 109 MMHG | HEIGHT: 62 IN | RESPIRATION RATE: 18 BRPM | WEIGHT: 184.75 LBS | HEART RATE: 73 BPM | OXYGEN SATURATION: 90 % | DIASTOLIC BLOOD PRESSURE: 58 MMHG

## 2024-09-20 LAB
ALBUMIN SERPL-MCNC: 2.2 G/DL (ref 3.5–5.2)
ALBUMIN/GLOB SERPL: 0.9 G/DL
ALP SERPL-CCNC: 100 U/L (ref 39–117)
ALT SERPL W P-5'-P-CCNC: 12 U/L (ref 1–33)
ANION GAP SERPL CALCULATED.3IONS-SCNC: 7.1 MMOL/L (ref 5–15)
AST SERPL-CCNC: 37 U/L (ref 1–32)
BILIRUB SERPL-MCNC: 1.9 MG/DL (ref 0–1.2)
BUN SERPL-MCNC: 8 MG/DL (ref 8–23)
BUN/CREAT SERPL: 9.3 (ref 7–25)
CALCIUM SPEC-SCNC: 7.8 MG/DL (ref 8.6–10.5)
CHLORIDE SERPL-SCNC: 108 MMOL/L (ref 98–107)
CO2 SERPL-SCNC: 21.9 MMOL/L (ref 22–29)
CREAT SERPL-MCNC: 0.86 MG/DL (ref 0.57–1)
DEPRECATED RDW RBC AUTO: 50.4 FL (ref 37–54)
EGFRCR SERPLBLD CKD-EPI 2021: 77 ML/MIN/1.73
ERYTHROCYTE [DISTWIDTH] IN BLOOD BY AUTOMATED COUNT: 14.4 % (ref 12.3–15.4)
GLOBULIN UR ELPH-MCNC: 2.5 GM/DL
GLUCOSE SERPL-MCNC: 123 MG/DL (ref 65–99)
HCT VFR BLD AUTO: 26.4 % (ref 34–46.6)
HGB BLD-MCNC: 8.8 G/DL (ref 12–15.9)
MAGNESIUM SERPL-MCNC: 1.9 MG/DL (ref 1.6–2.4)
MCH RBC QN AUTO: 31.7 PG (ref 26.6–33)
MCHC RBC AUTO-ENTMCNC: 33.3 G/DL (ref 31.5–35.7)
MCV RBC AUTO: 95 FL (ref 79–97)
PHOSPHATE SERPL-MCNC: 2.5 MG/DL (ref 2.5–4.5)
PLATELET # BLD AUTO: 37 10*3/MM3 (ref 140–450)
PMV BLD AUTO: 12 FL (ref 6–12)
POTASSIUM SERPL-SCNC: 3.5 MMOL/L (ref 3.5–5.2)
PROT SERPL-MCNC: 4.7 G/DL (ref 6–8.5)
RBC # BLD AUTO: 2.78 10*6/MM3 (ref 3.77–5.28)
SODIUM SERPL-SCNC: 137 MMOL/L (ref 136–145)
WBC NRBC COR # BLD AUTO: 2.68 10*3/MM3 (ref 3.4–10.8)

## 2024-09-20 PROCEDURE — 80053 COMPREHEN METABOLIC PANEL: CPT | Performed by: INTERNAL MEDICINE

## 2024-09-20 PROCEDURE — 99239 HOSP IP/OBS DSCHRG MGMT >30: CPT | Performed by: INTERNAL MEDICINE

## 2024-09-20 PROCEDURE — 94799 UNLISTED PULMONARY SVC/PX: CPT

## 2024-09-20 PROCEDURE — 83735 ASSAY OF MAGNESIUM: CPT | Performed by: INTERNAL MEDICINE

## 2024-09-20 PROCEDURE — 85027 COMPLETE CBC AUTOMATED: CPT | Performed by: INTERNAL MEDICINE

## 2024-09-20 PROCEDURE — 84100 ASSAY OF PHOSPHORUS: CPT | Performed by: INTERNAL MEDICINE

## 2024-09-20 PROCEDURE — 94664 DEMO&/EVAL PT USE INHALER: CPT

## 2024-09-20 RX ORDER — COLESTIPOL HYDROCHLORIDE 5 G/5G
5 GRANULE, FOR SUSPENSION ORAL DAILY
Qty: 30 EACH | Refills: 0 | Status: SHIPPED | OUTPATIENT
Start: 2024-09-20 | End: 2024-10-20

## 2024-09-20 RX ADMIN — BUDESONIDE AND FORMOTEROL FUMARATE DIHYDRATE 2 PUFF: 160; 4.5 AEROSOL RESPIRATORY (INHALATION) at 09:56

## 2024-09-20 RX ADMIN — TIOTROPIUM BROMIDE INHALATION SPRAY 2 PUFF: 3.12 SPRAY, METERED RESPIRATORY (INHALATION) at 09:57

## 2024-09-20 RX ADMIN — SPIRONOLACTONE 50 MG: 25 TABLET ORAL at 08:53

## 2024-09-20 RX ADMIN — PANTOPRAZOLE SODIUM 40 MG: 40 TABLET, DELAYED RELEASE ORAL at 08:53

## 2024-09-20 RX ADMIN — Medication 10 ML: at 08:51

## 2024-09-20 RX ADMIN — FOLIC ACID 1 MG: 1 TABLET ORAL at 08:53

## 2024-09-20 RX ADMIN — SODIUM BICARBONATE 650 MG TABLET 1300 MG: at 08:53

## 2024-09-20 RX ADMIN — FUROSEMIDE 20 MG: 20 TABLET ORAL at 08:54

## 2024-09-25 NOTE — PROGRESS NOTES
Chief Complaint   Abdominal Pain (Was prescribed colestipol but issues with pharmacy and hasn't started. Feet swelling. ) and Nausea    History of Present Illness       Tawny Lennon is a 59 y.o. female who presents to [facility] for follow-up after recent inpatient stay 9/16/2024 through 9/20/2024 with a diagnosis of acute pancreatitis, nonalcoholic cirrhosis with esophageal varices, partial small bowel obstruction, hypomagnesia, depression, COPD, hypertension and thrombocytopenia.  Patient was transferred to our hospital from [facility] with complaints of nausea vomiting and diarrhea for management of pancreatitis versus ileus versus small bowel obstruction.  General surgery was consulted with no surgical intervention.  Gastroenterology was consulted with the management of the patient's cirrhosis and acute pancreatitis.  Patient was monitored and released home.  Patient presents to the office today with worsening abdominal pain that is diffuse throughout the abdomen.  Patient is noted to have worsening anemia with low platelets.  Patient reports overall that she does not feel well and that her abdominal pain is concerning to her.  Patient did pack a bag today in case she needed to go to the hospital.  Patient denies fever, nausea, vomiting, weight loss, night sweats, melena, hematochezia, hematemesis.    Most recent labs- 9/20/24    MRI Abdomen With & Without Contrast- 9/19/24  1. Interstitial and peripancreatic edema suspicious for acute pancreatitis in the setting of elevated lipase. No suspicious mass, pancreatic divisum or drainable collection.  2. Cirrhotic liver morphology with sequelae of portal hypertension including distal paraesophageal/upper abdominal varices, splenomegaly and small volume ascites.  3. No evidence of hepatocellular carcinoma within limitations of motion. Recommend future HCC screening.  4. Wall thickening and edema involving the colon  and stomach suspicious for sequelae of portal colopathy and portal gastropathy respectively. Correlate for clinical signs of colitis and gastritis.  5. Body wall edema.       CT Abdomen/Pelvis- 8/28/24 circumferential wall thickening of the duodenum, circumferential wall thickening of the right colon concerning for infectious versus inflammatory colitis.  Surgical sutures along the distal sigmoid colon consistent with prior resection.     US Abdomen Limited- 5/16/24 postcholecystectomy, small amount of ascites    EGD: Review of the patient's most recent EGD performed by Dr. Watkins on 12/21/2023 nonsevere esophagitis, grade 3 esophageal varices in the lower third of the esophagus 4 bands placed normal stomach and duodenum.    Colonoscopy: Review of the patient's most recent colonoscopy performed by Dr. Kang at [facility] 7/21/22 diverting loop ileostomy status post rectosigmoid colon resection of perforated diverticulitis in the setting of cirrhosis and MEDRANO.    Colonoscopy: Review of the patient's most recent colonoscopy performed by Dr. Kang at [facility] unable to read report I have requested new records    Results       Result Review :   The following data was reviewed by: ANDRE Alvarado on 10/01/2024:    CMP          9/19/2024    04:29 9/20/2024    04:56 10/1/2024    11:43   CMP   Glucose 103  123  126    BUN 8  8  17    Creatinine 0.87  0.86  1.00    EGFR 75.9  77.0  64.2    Sodium 135  137  139    Potassium 3.6  3.5  3.9    Chloride 108  108  109    Calcium 7.5  7.8  9.2    Total Protein 4.7  4.7  6.0    Albumin 2.2  2.2  2.6    Globulin 2.5  2.5  3.4    Total Bilirubin 2.3  1.9  3.4    Alkaline Phosphatase 87  100  114    AST (SGOT) 39  37  48    ALT (SGPT) 12  12  21    Albumin/Globulin Ratio 0.9  0.9  0.8    BUN/Creatinine Ratio 9.2  9.3  17.0    Anion Gap 6.8  7.1  8.0      CBC          9/19/2024    04:29 9/20/2024    04:56 10/1/2024    11:43   CBC   WBC 2.74  2.68  4.14    RBC 2.92  2.78  3.21   "  Hemoglobin 9.4  8.8  10.4    Hematocrit 27.8  26.4  31.5    MCV 95.2  95.0  98.1    MCH 32.2  31.7  32.4    MCHC 33.8  33.3  33.0    RDW 14.5  14.4  15.5    Platelets 39  37  70        Lipase   Lipase   Date Value Ref Range Status   10/01/2024 56 13 - 60 U/L Final     Amylase No results found for: \"AMYLASE\"  Iron Profile   Iron   Date Value Ref Range Status   11/28/2023 122 28 - 170 ug/dL Final     TIBC   Date Value Ref Range Status   11/28/2023 315 261 - 478 ug/dL Final     Iron Saturation   Date Value Ref Range Status   11/28/2023 39 20 - 55 % Final     ESR (Sed Rate) No results found for: \"SEDRATE\"  CRP (C-Reactive) No results found for: \"CRP\"  Liver Workup   Iron   Date Value Ref Range Status   11/28/2023 122 28 - 170 ug/dL Final     TIBC   Date Value Ref Range Status   11/28/2023 315 261 - 478 ug/dL Final     Iron Saturation   Date Value Ref Range Status   11/28/2023 39 20 - 55 % Final     Protime   Date Value Ref Range Status   09/18/2024 19.5 (H) 11.8 - 14.9 Seconds Final     INR   Date Value Ref Range Status   09/18/2024 1.62 (H) 0.86 - 1.15 Final     AFP   Date Value Ref Range Status   05/08/2024 4 0 - 9 ng/mL Final     Comment:     (NOTE)  INTERPRETIVE INFORMATION: Alpha Fetoprotein Tumor Marker    The [employer] Access DxI AFP method is used. Results   obtained with different assay methods or kits cannot be used   interchangeably. AFP is a valuable aid in the management of   nonseminomatous testicular cancer patients when used in   conjunction with information available from the clinical   evaluation and other diagnostic procedures. Increased AFP   concentrations have also been observed in ataxia telangiectasia,   hereditary tyrosinemia, primary hepatocellular carcinoma,   teratocarcinoma, gastrointestinal tract cancers with and without   liver metastases, and in benign hepatic conditions such as acute   viral hepatitis, chronic active hepatitis, and cirrhosis. The   result cannot be interpreted as " absolute evidence of the presence   or absence of malignant disease. The result is not interpretable   as a tumor marker in pregnant females.     Access complete set of age- and/or gender-specific reference   intervals for this test in the [employer] [facility] Test Directory   ([email or web address].com).  Performed By: [facility]  [address] [place], [place] [id number]  : Arnaldo Decker MD, PhD  CLIA Number: [id number]     ALPHA-FETOPROTEIN   Date Value Ref Range Status   09/06/2024 4.61 0 - 8.3 ng/mL Final     AFP   AFP   Date Value Ref Range Status   05/08/2024 4 0 - 9 ng/mL Final     Comment:     (NOTE)  INTERPRETIVE INFORMATION: Alpha Fetoprotein Tumor Marker    The [employer] Access DxI AFP method is used. Results   obtained with different assay methods or kits cannot be used   interchangeably. AFP is a valuable aid in the management of   nonseminomatous testicular cancer patients when used in   conjunction with information available from the clinical   evaluation and other diagnostic procedures. Increased AFP   concentrations have also been observed in ataxia telangiectasia,   hereditary tyrosinemia, primary hepatocellular carcinoma,   teratocarcinoma, gastrointestinal tract cancers with and without   liver metastases, and in benign hepatic conditions such as acute   viral hepatitis, chronic active hepatitis, and cirrhosis. The   result cannot be interpreted as absolute evidence of the presence   or absence of malignant disease. The result is not interpretable   as a tumor marker in pregnant females.     Access complete set of age- and/or gender-specific reference   intervals for this test in the [employer] Test Directory   ([facility]com).  Performed By: [facility]  [address] [place], [place] [id number]  : Arnaldo Decker MD, PhD  CLIA Number: [id number]     ALPHA-FETOPROTEIN   Date Value Ref Range Status   09/06/2024 4.61 0 - 8.3 ng/mL Final       PT/INR   Protime   Date Value Ref Range Status   09/18/2024 19.5 (H) 11.8 - 14.9 Seconds Final     INR   Date Value Ref Range Status   09/18/2024 1.62 (H) 0.86 - 1.15 Final               Past Medical History       Past Medical History:   Diagnosis Date    Anemia     Chronic kidney disease     Cirrhosis     liver    Cirrhosis, nonalcoholic     Colon polyps     COPD (chronic obstructive pulmonary disease)     Depression     Diabetes     Diverticulitis     Esophageal varices     Fatty liver     GERD (gastroesophageal reflux disease)     Hematuria     Hernia     High blood pressure     High cholesterol     Interstitial cystitis     Migraine headache     Narcolepsy     Pancreatitis     PONV (postoperative nausea and vomiting)     Sleep apnea     Spinal headache     DURING PREGNANCY       Past Surgical History:   Procedure Laterality Date    ABDOMINAL SURGERY      BLADDER REPAIR  2009    CHOLECYSTECTOMY      COLONOSCOPY  2014 x2 2020    ENDOSCOPY  2014 2020    ENDOSCOPY N/A 07/12/2021    Procedure: ESOPHAGOGASTRODUODENOSCOPY with biopsies;  Surgeon: Jesse Watkins MD;  Location: [facility] ENDOSCOPY;  Service: Gastroenterology;  Laterality: N/A;  esophageal varices    ENDOSCOPY N/A 03/25/2022    Procedure: ESOPHAGOGASTRODUODENOSCOPY WITH BIOPSIES;  Surgeon: Jesse Watkins MD;  Location: [facility] ENDOSCOPY;  Service: Gastroenterology;  Laterality: N/A;  ESOPHAGEAL VARICIES    ENDOSCOPY N/A 09/28/2023    Procedure: ESOPHAGOGASTRODUODENOSCOPY WITH COLD BIOPSIES;  Surgeon: Jesse Watkins MD;  Location: [facility] ENDOSCOPY;  Service: Gastroenterology;  Laterality: N/A;  GASTRITIS, ESOPHAGEAL VARICES    ENDOSCOPY N/A 11/17/2023    Procedure: ESOPHAGOGASTRODUODENOSCOPY WITH BIOPSIES AND  VARICEAL BANDING X 4;  Surgeon: Jesse aWtkins MD;  Location: [facility] ENDOSCOPY;  Service: Gastroenterology;  Laterality: N/A;  GASTRITIS, PORTAL HYPERTENSIVE GASTROPATHY, ESOPHAGEAL VARICIES    ENDOSCOPY N/A 12/21/2023     Procedure: ESOPHAGOGASTRODUODENOSCOPY WITH BIOPSIES AND ESOPHAGEAL BANDING;  Surgeon: Jesse Watkins MD;  Location: [facility] ENDOSCOPY;  Service: Gastroenterology;  Laterality: N/A;  ESOPHAGEAL VARICES, GASTRITIS    HAND SURGERY      HERNIA REPAIR      HYSTERECTOMY  2009    ILEOSTOMY  2022    OTHER SURGICAL HISTORY      rectocele repair    UPPER GASTROINTESTINAL ENDOSCOPY         No current facility-administered medications for this visit.    Current Outpatient Medications:     albuterol sulfate  (90 Base) MCG/ACT inhaler, Inhale 2 puffs Every 4 (Four) Hours As Needed., Disp: , Rfl:     colestipol (COLESTID) 5 g granules, Take 5 g by mouth Daily for 30 days., Disp: 30 each, Rfl: 0    Fluticasone-Umeclidin-Vilant (Trelegy Ellipta) 200-62.5-25 MCG/ACT aerosol powder , Inhale 1 puff Daily., Disp: , Rfl:     folic acid (FOLVITE) 1 MG tablet, Take 1 tablet by mouth Daily., Disp: , Rfl:     furosemide (LASIX) 20 MG tablet, Take 1 tablet by mouth Daily., Disp: 30 tablet, Rfl: 11    pantoprazole (PROTONIX) 40 MG EC tablet, Take 1 tablet by mouth Daily., Disp: , Rfl:     sodium bicarbonate 650 MG tablet, Take 2 tablets by mouth 2 (Two) Times a Day., Disp: , Rfl:     spironolactone (ALDACTONE) 50 MG tablet, Take 1 tablet by mouth Daily., Disp: 90 tablet, Rfl: 5    Facility-Administered Medications Ordered in Other Visits:     sodium chloride 0.9 % flush 10 mL, 10 mL, Intravenous, PRN, Everette Ibrahim MD     No Known Allergies    Family History   Problem Relation Age of Onset    Dementia Mother     Liver cancer Father     Cirrhosis Sister         She  2023    Malig Hyperthermia Neg Hx     Colon cancer Neg Hx         Social History     Social History Narrative    Not on file       Objective     Vital Signs:   /57 (BP Location: Right arm, Patient Position: Sitting, Cuff Size: Adult)   Pulse 77   Wt 82 kg (180 lb 12.8 oz)   SpO2 94%   BMI 33.07 kg/m²       Physical Exam  Constitutional:        General: She is not in acute distress.     Appearance: Normal appearance. She is well-developed. She is ill-appearing.   Eyes:      Conjunctiva/sclera: Conjunctivae normal.      Pupils: Pupils are equal, round, and reactive to light.      Visual Fields: Right eye visual fields normal and left eye visual fields normal.   Cardiovascular:      Rate and Rhythm: Normal rate and regular rhythm.      Heart sounds: Normal heart sounds.   Pulmonary:      Effort: Pulmonary effort is normal. No retractions.      Breath sounds: Normal breath sounds and air entry.      Comments: Inspection of chest: normal appearance  Abdominal:      General: Bowel sounds are normal.      Palpations: Abdomen is soft.      Tenderness: There is generalized no abdominal tenderness and tenderness in the right upper quadrant, right lower quadrant, left upper quadrant and left lower quadrant. There is guarding.      Comments: No appreciable hepatosplenomegaly   Musculoskeletal:      Cervical back: Neck supple.      Right lower leg: Edema present.      Left lower leg: Edema present.   Lymphadenopathy:      Cervical: No cervical adenopathy.   Skin:     Findings: No lesion.      Comments: Turgor normal   Neurological:      Mental Status: She is alert and oriented to person, place, and time.   Psychiatric:         Mood and Affect: Mood and affect normal.           Assessment & Plan          Assessment and Plan    Diagnoses and all orders for this visit:    1. Diverticulosis (Primary)    2. Anemia, unspecified type    3. Cirrhosis of liver without ascites, unspecified hepatic cirrhosis type    4. Esophageal varices without bleeding, unspecified esophageal varices type    5. MEDRANO (nonalcoholic steatohepatitis)    6. Secondary esophageal varices without bleeding    7. Adenomatous polyp of colon, unspecified part of colon    8. Shortness of breath    9. Lower extremity edema    10. Generalized abdominal pain    11. History of ileostomy    12. History of  diverticulitis      59-year-old female presenting the office today for follow-up after recent inpatient stay 9/16/2024 through 9/20/2024 with a diagnosis of acute pancreatitis, nonalcoholic cirrhosis with esophageal varices, partial small bowel obstruction, hypomagnesia, depression, COPD, hypertension and thrombocytopenia.  Patient was transferred to our hospital from [facility] with complaints of nausea vomiting and diarrhea for management of pancreatitis versus ileus versus small bowel obstruction.  General surgery was consulted with no surgical intervention.  Gastroenterology was consulted with the management of the patient's cirrhosis and acute pancreatitis.  Patient was monitored and released home.  Patient presents to the office today with worsening abdominal pain that is diffuse throughout the abdomen.  Patient is noted to have worsening anemia with low platelets.  Patient reports overall that she does not feel well and that her abdominal pain is concerning to her.  Patient did pack a bag today in case she needed to go to the hospital.  With the patient's acute abdominal pain, worsening anemia and recent history of pancreatitis we have discussed having the patient being seen in the emergency department.  Patient agrees that this would be the best route.  I have called report to GOLDY Alva charge of the emergency department.  Patient will call the office to get set up for follow-up after discharge.          Follow Up       Follow Up   Return in about 1 month (around 11/1/2024).  Patient was given instructions and counseling regarding her condition or for health maintenance advice. Please see specific information pulled into the AVS if appropriate.

## 2024-09-26 ENCOUNTER — READMISSION MANAGEMENT (OUTPATIENT)
Dept: CALL CENTER | Facility: HOSPITAL | Age: 59
End: 2024-09-26
Payer: MEDICARE

## 2024-10-01 ENCOUNTER — OFFICE VISIT (OUTPATIENT)
Dept: GASTROENTEROLOGY | Facility: CLINIC | Age: 59
End: 2024-10-01
Payer: MEDICARE

## 2024-10-01 ENCOUNTER — HOSPITAL ENCOUNTER (EMERGENCY)
Facility: HOSPITAL | Age: 59
Discharge: HOME | End: 2024-10-01
Attending: EMERGENCY MEDICINE
Payer: MEDICARE

## 2024-10-01 ENCOUNTER — APPOINTMENT (OUTPATIENT)
Dept: CT IMAGING | Facility: HOSPITAL | Age: 59
End: 2024-10-01
Payer: MEDICARE

## 2024-10-01 VITALS
HEART RATE: 77 BPM | SYSTOLIC BLOOD PRESSURE: 124 MMHG | DIASTOLIC BLOOD PRESSURE: 57 MMHG | BODY MASS INDEX: 33.07 KG/M2 | WEIGHT: 180.8 LBS | OXYGEN SATURATION: 94 %

## 2024-10-01 VITALS
HEART RATE: 75 BPM | OXYGEN SATURATION: 95 % | RESPIRATION RATE: 18 BRPM | DIASTOLIC BLOOD PRESSURE: 60 MMHG | HEIGHT: 62 IN | TEMPERATURE: 97.9 F | WEIGHT: 182.1 LBS | SYSTOLIC BLOOD PRESSURE: 116 MMHG | BODY MASS INDEX: 33.51 KG/M2

## 2024-10-01 DIAGNOSIS — I85.10 SECONDARY ESOPHAGEAL VARICES WITHOUT BLEEDING: ICD-10-CM

## 2024-10-01 DIAGNOSIS — R60.0 LOWER EXTREMITY EDEMA: ICD-10-CM

## 2024-10-01 DIAGNOSIS — K57.90 DIVERTICULOSIS: Primary | ICD-10-CM

## 2024-10-01 DIAGNOSIS — I85.00 ESOPHAGEAL VARICES WITHOUT BLEEDING, UNSPECIFIED ESOPHAGEAL VARICES TYPE: ICD-10-CM

## 2024-10-01 DIAGNOSIS — R10.84 GENERALIZED ABDOMINAL PAIN: ICD-10-CM

## 2024-10-01 DIAGNOSIS — D12.6 ADENOMATOUS POLYP OF COLON, UNSPECIFIED PART OF COLON: ICD-10-CM

## 2024-10-01 DIAGNOSIS — D64.9 ANEMIA, UNSPECIFIED TYPE: ICD-10-CM

## 2024-10-01 DIAGNOSIS — R06.02 SHORTNESS OF BREATH: ICD-10-CM

## 2024-10-01 DIAGNOSIS — K74.60 CIRRHOSIS OF LIVER WITHOUT ASCITES, UNSPECIFIED HEPATIC CIRRHOSIS TYPE: ICD-10-CM

## 2024-10-01 DIAGNOSIS — K74.60 HEPATIC CIRRHOSIS, UNSPECIFIED HEPATIC CIRRHOSIS TYPE, UNSPECIFIED WHETHER ASCITES PRESENT: Primary | ICD-10-CM

## 2024-10-01 DIAGNOSIS — Z98.890 HISTORY OF ILEOSTOMY: ICD-10-CM

## 2024-10-01 DIAGNOSIS — Z87.19 HISTORY OF DIVERTICULITIS: ICD-10-CM

## 2024-10-01 DIAGNOSIS — K75.81 NASH (NONALCOHOLIC STEATOHEPATITIS): ICD-10-CM

## 2024-10-01 LAB
ALBUMIN SERPL-MCNC: 2.6 G/DL (ref 3.5–5.2)
ALBUMIN/GLOB SERPL: 0.8 G/DL
ALP SERPL-CCNC: 114 U/L (ref 39–117)
ALT SERPL W P-5'-P-CCNC: 21 U/L (ref 1–33)
ANION GAP SERPL CALCULATED.3IONS-SCNC: 8 MMOL/L (ref 5–15)
AST SERPL-CCNC: 48 U/L (ref 1–32)
BASOPHILS # BLD AUTO: 0.02 10*3/MM3 (ref 0–0.2)
BASOPHILS NFR BLD AUTO: 0.5 % (ref 0–1.5)
BILIRUB SERPL-MCNC: 3.4 MG/DL (ref 0–1.2)
BUN SERPL-MCNC: 17 MG/DL (ref 8–23)
BUN/CREAT SERPL: 17 (ref 7–25)
CALCIUM SPEC-SCNC: 9.2 MG/DL (ref 8.6–10.5)
CHLORIDE SERPL-SCNC: 109 MMOL/L (ref 98–107)
CO2 SERPL-SCNC: 22 MMOL/L (ref 22–29)
CREAT SERPL-MCNC: 1 MG/DL (ref 0.57–1)
D-LACTATE SERPL-SCNC: 1.6 MMOL/L (ref 0.5–2)
DEPRECATED RDW RBC AUTO: 55.8 FL (ref 37–54)
EGFRCR SERPLBLD CKD-EPI 2021: 64.2 ML/MIN/1.73
EOSINOPHIL # BLD AUTO: 0.13 10*3/MM3 (ref 0–0.4)
EOSINOPHIL NFR BLD AUTO: 3.1 % (ref 0.3–6.2)
ERYTHROCYTE [DISTWIDTH] IN BLOOD BY AUTOMATED COUNT: 15.5 % (ref 12.3–15.4)
GLOBULIN UR ELPH-MCNC: 3.4 GM/DL
GLUCOSE SERPL-MCNC: 126 MG/DL (ref 65–99)
HCT VFR BLD AUTO: 31.5 % (ref 34–46.6)
HGB BLD-MCNC: 10.4 G/DL (ref 12–15.9)
HOLD SPECIMEN: NORMAL
HOLD SPECIMEN: NORMAL
IMM GRANULOCYTES # BLD AUTO: 0.01 10*3/MM3 (ref 0–0.05)
IMM GRANULOCYTES NFR BLD AUTO: 0.2 % (ref 0–0.5)
LIPASE SERPL-CCNC: 56 U/L (ref 13–60)
LYMPHOCYTES # BLD AUTO: 0.8 10*3/MM3 (ref 0.7–3.1)
LYMPHOCYTES NFR BLD AUTO: 19.3 % (ref 19.6–45.3)
MCH RBC QN AUTO: 32.4 PG (ref 26.6–33)
MCHC RBC AUTO-ENTMCNC: 33 G/DL (ref 31.5–35.7)
MCV RBC AUTO: 98.1 FL (ref 79–97)
MONOCYTES # BLD AUTO: 0.32 10*3/MM3 (ref 0.1–0.9)
MONOCYTES NFR BLD AUTO: 7.7 % (ref 5–12)
NEUTROPHILS NFR BLD AUTO: 2.86 10*3/MM3 (ref 1.7–7)
NEUTROPHILS NFR BLD AUTO: 69.2 % (ref 42.7–76)
NRBC BLD AUTO-RTO: 0 /100 WBC (ref 0–0.2)
PLATELET # BLD AUTO: 70 10*3/MM3 (ref 140–450)
PMV BLD AUTO: 12.8 FL (ref 6–12)
POTASSIUM SERPL-SCNC: 3.9 MMOL/L (ref 3.5–5.2)
PROT SERPL-MCNC: 6 G/DL (ref 6–8.5)
RBC # BLD AUTO: 3.21 10*6/MM3 (ref 3.77–5.28)
SODIUM SERPL-SCNC: 139 MMOL/L (ref 136–145)
WBC NRBC COR # BLD AUTO: 4.14 10*3/MM3 (ref 3.4–10.8)
WHOLE BLOOD HOLD COAG: NORMAL
WHOLE BLOOD HOLD SPECIMEN: NORMAL

## 2024-10-01 PROCEDURE — 99285 EMERGENCY DEPT VISIT HI MDM: CPT

## 2024-10-01 PROCEDURE — 85025 COMPLETE CBC W/AUTO DIFF WBC: CPT | Performed by: EMERGENCY MEDICINE

## 2024-10-01 PROCEDURE — 83605 ASSAY OF LACTIC ACID: CPT | Performed by: EMERGENCY MEDICINE

## 2024-10-01 PROCEDURE — 74177 CT ABD & PELVIS W/CONTRAST: CPT

## 2024-10-01 PROCEDURE — 96374 THER/PROPH/DIAG INJ IV PUSH: CPT

## 2024-10-01 PROCEDURE — 80053 COMPREHEN METABOLIC PANEL: CPT | Performed by: EMERGENCY MEDICINE

## 2024-10-01 PROCEDURE — 25810000003 SODIUM CHLORIDE 0.9 % SOLUTION

## 2024-10-01 PROCEDURE — 25010000002 ONDANSETRON PER 1 MG

## 2024-10-01 PROCEDURE — 83690 ASSAY OF LIPASE: CPT | Performed by: EMERGENCY MEDICINE

## 2024-10-01 PROCEDURE — 25510000001 IOPAMIDOL PER 1 ML: Performed by: EMERGENCY MEDICINE

## 2024-10-01 PROCEDURE — 25010000002 KETOROLAC TROMETHAMINE PER 15 MG

## 2024-10-01 PROCEDURE — 96375 TX/PRO/DX INJ NEW DRUG ADDON: CPT

## 2024-10-01 RX ORDER — SODIUM CHLORIDE 0.9 % (FLUSH) 0.9 %
10 SYRINGE (ML) INJECTION AS NEEDED
Status: DISCONTINUED | OUTPATIENT
Start: 2024-10-01 | End: 2024-10-01 | Stop reason: HOSPADM

## 2024-10-01 RX ORDER — IOPAMIDOL 755 MG/ML
100 INJECTION, SOLUTION INTRAVASCULAR
Status: COMPLETED | OUTPATIENT
Start: 2024-10-01 | End: 2024-10-01

## 2024-10-01 RX ORDER — ONDANSETRON 2 MG/ML
4 INJECTION INTRAMUSCULAR; INTRAVENOUS ONCE
Status: COMPLETED | OUTPATIENT
Start: 2024-10-01 | End: 2024-10-01

## 2024-10-01 RX ORDER — KETOROLAC TROMETHAMINE 15 MG/ML
15 INJECTION, SOLUTION INTRAMUSCULAR; INTRAVENOUS ONCE
Status: COMPLETED | OUTPATIENT
Start: 2024-10-01 | End: 2024-10-01

## 2024-10-01 RX ADMIN — KETOROLAC TROMETHAMINE 15 MG: 15 INJECTION, SOLUTION INTRAMUSCULAR; INTRAVENOUS at 11:48

## 2024-10-01 RX ADMIN — ONDANSETRON 4 MG: 2 INJECTION INTRAMUSCULAR; INTRAVENOUS at 11:48

## 2024-10-01 RX ADMIN — IOPAMIDOL 80 ML: 755 INJECTION, SOLUTION INTRAVENOUS at 12:03

## 2024-10-01 RX ADMIN — SODIUM CHLORIDE 1000 ML: 9 INJECTION, SOLUTION INTRAVENOUS at 11:48

## 2024-10-01 NOTE — ED PROVIDER NOTES
Time: 2:04 PM EDT  Date of encounter:  10/1/2024  Independent Historian/Clinical History and Information was obtained by:   Patient    History is limited by: N/A    Chief Complaint: abdominal pain       History of Present Illness:  Patient is a 59 y.o. year old female who presents to the emergency department for evaluation of periumbilical abdominal pain that has been present for the past several weeks.  Patient was recently admitted for cirrhosis.  Patient has a history of cirrhosis and pancreatitis.  Patient states she does not drink alcohol.  Patient was seen at gastroenterology office this morning by Dr. Watkins's midlevel who wanted patient to come to ED for further evaluation due to abdominal pain.  Patient denies diarrhea.  Patient denies fever.  Patient admits to nausea.  Patient denies vomiting.      Patient Care Team  Primary Care Provider: Zain Valentine MD    Past Medical History:     No Known Allergies  Past Medical History:   Diagnosis Date    Anemia     Chronic kidney disease     Cirrhosis     liver    Cirrhosis, nonalcoholic     Colon polyps     COPD (chronic obstructive pulmonary disease)     Depression     Diabetes     Diverticulitis     Esophageal varices     Fatty liver     GERD (gastroesophageal reflux disease)     Hematuria     Hernia     High blood pressure     High cholesterol     Interstitial cystitis     Migraine headache     Narcolepsy     Pancreatitis     PONV (postoperative nausea and vomiting)     Sleep apnea     Spinal headache     DURING PREGNANCY     Past Surgical History:   Procedure Laterality Date    ABDOMINAL SURGERY      BLADDER REPAIR  2009    CHOLECYSTECTOMY      COLONOSCOPY  2014 x2 2020    ENDOSCOPY  2014 2020    ENDOSCOPY N/A 07/12/2021    Procedure: ESOPHAGOGASTRODUODENOSCOPY with biopsies;  Surgeon: Jesse Watkins MD;  Location: [facility] ENDOSCOPY;  Service: Gastroenterology;  Laterality: N/A;  esophageal varices    ENDOSCOPY N/A 03/25/2022    Procedure:  ESOPHAGOGASTRODUODENOSCOPY WITH BIOPSIES;  Surgeon: Jesse Watkins MD;  Location: [facility] ENDOSCOPY;  Service: Gastroenterology;  Laterality: N/A;  ESOPHAGEAL VARICIES    ENDOSCOPY N/A 2023    Procedure: ESOPHAGOGASTRODUODENOSCOPY WITH COLD BIOPSIES;  Surgeon: Jesse Watkins MD;  Location: [facility] ENDOSCOPY;  Service: Gastroenterology;  Laterality: N/A;  GASTRITIS, ESOPHAGEAL VARICES    ENDOSCOPY N/A 2023    Procedure: ESOPHAGOGASTRODUODENOSCOPY WITH BIOPSIES AND  VARICEAL BANDING X 4;  Surgeon: Jesse Watkins MD;  Location: [facility] ENDOSCOPY;  Service: Gastroenterology;  Laterality: N/A;  GASTRITIS, PORTAL HYPERTENSIVE GASTROPATHY, ESOPHAGEAL VARICIES    ENDOSCOPY N/A 2023    Procedure: ESOPHAGOGASTRODUODENOSCOPY WITH BIOPSIES AND ESOPHAGEAL BANDING;  Surgeon: Jesse Watkins MD;  Location: [facility] ENDOSCOPY;  Service: Gastroenterology;  Laterality: N/A;  ESOPHAGEAL VARICES, GASTRITIS    HAND SURGERY      HERNIA REPAIR      HYSTERECTOMY  2009    ILEOSTOMY  2022    OTHER SURGICAL HISTORY      rectocele repair    UPPER GASTROINTESTINAL ENDOSCOPY       Family History   Problem Relation Age of Onset    Dementia Mother     Liver cancer Father     Cirrhosis Sister         She  2023    Malig Hyperthermia Neg Hx     Colon cancer Neg Hx        Home Medications:  Prior to Admission medications    Medication Sig Start Date End Date Taking? Authorizing Provider   albuterol sulfate  (90 Base) MCG/ACT inhaler Inhale 2 puffs Every 4 (Four) Hours As Needed. 23   Corrine Degroot MD   colestipol (COLESTID) 5 g granules Take 5 g by mouth Daily for 30 days. 9/20/24 10/20/24  Richard Zamora MD   Fluticasone-Umeclidin-Vilant (Trelegy Ellipta) 200-62.5-25 MCG/ACT aerosol powder  Inhale 1 puff Daily. 23   Corrine Degroot MD   folic acid (FOLVITE) 1 MG tablet Take 1 tablet by mouth Daily. 9/3/24   Corrine Degroot MD   furosemide (LASIX) 20 MG  "tablet Take 1 tablet by mouth Daily. 1/4/24 1/4/25  Sharri Mei APRN   pantoprazole (PROTONIX) 40 MG EC tablet Take 1 tablet by mouth Daily. 9/3/24   ProviderCorrine MD   sodium bicarbonate 650 MG tablet Take 2 tablets by mouth 2 (Two) Times a Day. 9/2/24 10/3/24  Corrine Degroot MD   spironolactone (ALDACTONE) 50 MG tablet Take 1 tablet by mouth Daily. 1/4/24   Sharri Mei APRN        Social History:   Social History     Tobacco Use    Smoking status: Never     Passive exposure: Past    Smokeless tobacco: Never   Vaping Use    Vaping status: Never Used   Substance Use Topics    Alcohol use: Never    Drug use: Never         Review of Systems:  Review of Systems   Constitutional:  Negative for chills and fever.   HENT:  Negative for congestion, rhinorrhea and sore throat.    Eyes:  Negative for pain and visual disturbance.   Respiratory:  Negative for apnea, cough, chest tightness and shortness of breath.    Cardiovascular:  Negative for chest pain and palpitations.   Gastrointestinal:  Positive for abdominal pain and nausea. Negative for diarrhea and vomiting.   Genitourinary:  Negative for difficulty urinating and dysuria.   Musculoskeletal:  Negative for joint swelling and myalgias.   Skin:  Negative for color change.   Neurological:  Negative for seizures and headaches.   Psychiatric/Behavioral: Negative.     All other systems reviewed and are negative.       Physical Exam:  /57 (BP Location: Left arm, Patient Position: Sitting)   Pulse 76   Temp 97.8 °F (36.6 °C) (Oral)   Resp 18   Ht 157.5 cm (62\")   Wt 82.6 kg (182 lb 1.6 oz)   SpO2 92%   BMI 33.31 kg/m²     Physical Exam  Vitals and nursing note reviewed.   Constitutional:       General: She is not in acute distress.     Appearance: Normal appearance. She is not toxic-appearing.   HENT:      Head: Normocephalic and atraumatic.      Jaw: There is normal jaw occlusion.   Eyes:      General: Lids are normal.      Extraocular " Movements: Extraocular movements intact.      Conjunctiva/sclera: Conjunctivae normal.      Pupils: Pupils are equal, round, and reactive to light.   Cardiovascular:      Rate and Rhythm: Normal rate and regular rhythm.      Pulses: Normal pulses.      Heart sounds: Normal heart sounds.   Pulmonary:      Effort: Pulmonary effort is normal. No respiratory distress.      Breath sounds: Normal breath sounds. No wheezing or rhonchi.   Abdominal:      General: Abdomen is flat.      Palpations: Abdomen is soft.      Tenderness: There is abdominal tenderness in the periumbilical area. There is no guarding or rebound.   Musculoskeletal:         General: Normal range of motion.      Cervical back: Normal range of motion and neck supple.      Right lower leg: No edema.      Left lower leg: No edema.   Skin:     General: Skin is warm and dry.   Neurological:      Mental Status: She is alert and oriented to person, place, and time. Mental status is at baseline.   Psychiatric:         Mood and Affect: Mood normal.                  Procedures:  Procedures      Medical Decision Making:      Comorbidities that affect care:    COPD, diabetes, chronic kidney disease,    External Notes reviewed:          The following orders were placed and all results were independently analyzed by me:  Orders Placed This Encounter   Procedures    CT Abdomen Pelvis With Contrast    [place] Draw    Comprehensive Metabolic Panel    Lipase    Urinalysis With Microscopic If Indicated (No Culture) - Urine, Clean Catch    Lactic Acid, Plasma    CBC Auto Differential    Ambulatory Referral to Gastroenterology    NPO Diet NPO Type: Strict NPO    Undress & Gown    IP Consult to Gastroenterology    Insert Peripheral IV    CBC & Differential    Green Top (Gel)    Lavender Top    Gold Top - SST    Light Blue Top       Medications Given in the Emergency Department:  Medications   sodium chloride 0.9 % flush 10 mL (has no administration in time range)   ketorolac  (TORADOL) injection 15 mg (15 mg Intravenous Given 10/1/24 1148)   ondansetron (ZOFRAN) injection 4 mg (4 mg Intravenous Given 10/1/24 1148)   sodium chloride 0.9 % bolus 1,000 mL (1,000 mL Intravenous New Bag 10/1/24 1148)   iopamidol (ISOVUE-370) 76 % injection 100 mL (80 mL Intravenous Given 10/1/24 1203)        ED Course:         Labs:    Lab Results (last 24 hours)       Procedure Component Value Units Date/Time    CBC & Differential [[id number]]  (Abnormal) Collected: 10/01/24 1143    Specimen: Blood Updated: 10/01/24 1150    Narrative:      The following orders were created for panel order CBC & Differential.  Procedure                               Abnormality         Status                     ---------                               -----------         ------                     CBC Auto Differential[[id number]]        Abnormal            Final result                 Please view results for these tests on the individual orders.    Comprehensive Metabolic Panel [[id number]]  (Abnormal) Collected: 10/01/24 1143    Specimen: Blood Updated: 10/01/24 1213     Glucose 126 mg/dL      BUN 17 mg/dL      Creatinine 1.00 mg/dL      Sodium 139 mmol/L      Potassium 3.9 mmol/L      Chloride 109 mmol/L      CO2 22.0 mmol/L      Calcium 9.2 mg/dL      Total Protein 6.0 g/dL      Albumin 2.6 g/dL      ALT (SGPT) 21 U/L      AST (SGOT) 48 U/L      Alkaline Phosphatase 114 U/L      Total Bilirubin 3.4 mg/dL      Globulin 3.4 gm/dL      A/G Ratio 0.8 g/dL      BUN/Creatinine Ratio 17.0     Anion Gap 8.0 mmol/L      eGFR 64.2 mL/min/1.73     Narrative:      GFR Normal >60  Chronic Kidney Disease <60  Kidney Failure <15      Lipase [[id number]]  (Normal) Collected: 10/01/24 1143    Specimen: Blood Updated: 10/01/24 1213     Lipase 56 U/L     Lactic Acid, Plasma [[id number]]  (Normal) Collected: 10/01/24 1143    Specimen: Blood Updated: 10/01/24 1207     Lactate 1.6 mmol/L     CBC Auto Differential [[id number]]  (Abnormal) Collected:  10/01/24 1143    Specimen: Blood Updated: 10/01/24 1150     WBC 4.14 10*3/mm3      RBC 3.21 10*6/mm3      Hemoglobin 10.4 g/dL      Hematocrit 31.5 %      MCV 98.1 fL      MCH 32.4 pg      MCHC 33.0 g/dL      RDW 15.5 %      RDW-SD 55.8 fl      MPV 12.8 fL      Platelets 70 10*3/mm3      Neutrophil % 69.2 %      Lymphocyte % 19.3 %      Monocyte % 7.7 %      Eosinophil % 3.1 %      Basophil % 0.5 %      Immature Grans % 0.2 %      Neutrophils, Absolute 2.86 10*3/mm3      Lymphocytes, Absolute 0.80 10*3/mm3      Monocytes, Absolute 0.32 10*3/mm3      Eosinophils, Absolute 0.13 10*3/mm3      Basophils, Absolute 0.02 10*3/mm3      Immature Grans, Absolute 0.01 10*3/mm3      nRBC 0.0 /100 WBC              Imaging:    CT Abdomen Pelvis With Contrast    Result Date: 10/1/2024  CT ABDOMEN PELVIS W CONTRAST Date of Exam: 10/1/2024 11:59 AM EDT Indication: suprapubic abd pain with bilateral flank pain. Comparison: CT of the abdomen and pelvis dated 11/16/2023 Technique: Axial CT images were obtained of the abdomen and pelvis after the uneventful intravenous administration of iodinated contrast. Reconstructed coronal and sagittal images were also obtained. Automated exposure control and iterative construction methods were used. Findings: Liver: The liver is cirrhotic in morphology. No focal liver lesion is seen. No biliary dilation is seen. Gallbladder: The gallbladder is decompressed which limits its evaluation. Pancreas: Unremarkable. Spleen: The spleen is enlarged, measuring approximately 16 cm in length. Adrenal glands: Unremarkable. Genitourinary tract: Kidneys are unremarkable. No hydronephrosis is seen. The visualized portions of the ureters and urinary bladder appear unremarkable. Status post hysterectomy. Gastrointestinal tract: Gastroesophageal varices are present. Surgical changes of the sigmoid colon and distal ileum. Colonic diverticulosis. Mild wall thickening of the right colon, nonspecific in the setting of  cirrhosis/portal hypertension. No findings to suggest bowel obstruction. Appendix: The appendix is not identified. Other findings: Trace perihepatic ascites. Nonspecific, nonfocal mesenteric fat stranding. No free air. No pathologically enlarged lymph nodes are seen. Vascular calcifications are present. There is recanalization of the paraumbilical vein. Retroaortic left renal vein is noted. Spontaneous splenorenal shunt is seen. IVC is grossly unremarkable. Bones and soft tissues: No acute osseous lesion is identified. Superficial soft tissues demonstrate no acute abnormality. Surgical changes of the ventral abdominal wall with periumbilical/infraumbilical broad-based fascial defects seen. Nonspecific superficial soft tissue stranding within the low anterior abdominal wall. Lung bases: The visualized lung bases are clear.     Impression: 1.No acute abnormality identified within the abdomen or pelvis. 2.Cirrhosis with sequela of portal hypertension, including gastroesophageal varices, splenomegaly, spontaneous splenorenal shunt, trace. Paddock ascites, and nonspecific, nonfocal mesenteric fat stranding. 3.Mild right-sided colonic wall thickening is somewhat nonspecific and may be related to cirrhosis/portal hypertension. Right-sided colitis may be considered in the appropriate clinical setting. 4.Colonic diverticulosis. 5.Additional findings as detailed above. Electronically Signed: Jason Edward MD  10/1/2024 12:22 PM EDT  Workstation ID: [id number]       Differential Diagnosis and Discussion:    Abdominal Pain: Based on the patient's signs and symptoms, I considered abdominal aortic aneurysm, small bowel obstruction, pancreatitis, acute cholecystitis, acute appendecitis, peptic ulcer disease, gastritis, colitis, endocrine disorders, irritable bowel syndrome and other differential diagnosis an etiology of the patient's abdominal pain.    All labs were reviewed and interpreted by me.  CT scan radiology impression was  interpreted by me.    MDM     Amount and/or Complexity of Data Reviewed  Clinical lab tests: reviewed  Tests in the radiology section of CPT®: reviewed  Decide to obtain previous medical records or to obtain history from someone other than the patient: yes       CBC shows no evidence of leukocytosis with white blood cell count being 4.14.  Hemoglobin improved today compared to previous lab.  Bilirubin slightly elevated today at 3.4.  CT abdomen pelvis with contrast shows no acute abnormality however it still shows cirrhosis and portal hypertension.  I spoke with gastroenterologist  states patient is appropriate to follow-up outpatient and be discharged.  I instructed patient to return to ED if she develops any new or worsening symptoms.  Otherwise follow-up with GI.  Patient states she understands and agrees with plan of care.                Patient Care Considerations:          Consultants/Shared Management Plan:    I spoke with gastroenterologist  states patient is appropriate to follow-up outpatient and be discharged.    Social Determinants of Health:          Disposition and Care Coordination:    Discharged: I considered escalation of care by admitting this patient to the hospital, however gastroenterologist Dr. Watkins states patient is appropriate for discharge.    I have explained the patient´s condition, diagnoses and treatment plan based on the information available to me at this time. I have answered questions and addressed any concerns. The patient has a good  understanding of the patient´s diagnosis, condition, and treatment plan as can be expected at this point. The vital signs have been stable. The patient´s condition is stable and appropriate for discharge from the emergency department.      The patient will pursue further outpatient evaluation with the primary care physician or other designated or consulting physician as outlined in the discharge instructions. They are agreeable  to this plan of care and follow-up instructions have been explained in detail. The patient has received these instructions in written format and has expressed an understanding of the discharge instructions. The patient is aware that any significant change in condition or worsening of symptoms should prompt an immediate return to this or the closest emergency department or call to 911.  I have explained discharge medications and the need for follow up with the patient/caretakers. This was also printed in the discharge instructions. Patient was discharged with the following medications and follow up:      Medication List      No changes were made to your prescriptions during this visit.      Jesse Watkins MD  [facility] [place] [address]  [phone]    Call in 1 day  To schedule follow-up       Final diagnoses:   Hepatic cirrhosis, unspecified hepatic cirrhosis type, unspecified whether ascites present        ED Disposition       ED Disposition   Discharge    Condition   Stable    Comment   --               This medical record created using voice recognition software.             Beck Fernandez PA-C  10/01/24 [id number]

## 2024-10-02 ENCOUNTER — TELEPHONE (OUTPATIENT)
Dept: GASTROENTEROLOGY | Facility: CLINIC | Age: 59
End: 2024-10-02
Payer: MEDICARE

## 2024-10-02 DIAGNOSIS — K74.60 CIRRHOSIS OF LIVER WITH ASCITES, UNSPECIFIED HEPATIC CIRRHOSIS TYPE: ICD-10-CM

## 2024-10-02 DIAGNOSIS — K76.6 PORTAL HYPERTENSION WITH ESOPHAGEAL VARICES: Primary | ICD-10-CM

## 2024-10-02 DIAGNOSIS — R18.8 CIRRHOSIS OF LIVER WITH ASCITES, UNSPECIFIED HEPATIC CIRRHOSIS TYPE: ICD-10-CM

## 2024-10-02 DIAGNOSIS — I85.00 PORTAL HYPERTENSION WITH ESOPHAGEAL VARICES: Primary | ICD-10-CM

## 2024-10-02 DIAGNOSIS — K75.81 NASH (NONALCOHOLIC STEATOHEPATITIS): ICD-10-CM

## 2024-10-02 NOTE — TELEPHONE ENCOUNTER
Patient LVM requesting recommendations on what she can take for her pain. She was given a Torodol shot in the ER and it did help.

## 2024-10-03 NOTE — TELEPHONE ENCOUNTER
I spoke to patient today and informed her she can take tylenol 1000mg. I also advised her Sharri would like to schedule a CTA. Patient is agreeable. Per Sharri, patient will follow up with Dr. Watkins

## 2024-10-04 NOTE — TELEPHONE ENCOUNTER
Spoke to patient and informed of appointment and order for CTA. Scheduling will reach out to schedule. Patient voiced understanding.

## 2024-10-08 ENCOUNTER — READMISSION MANAGEMENT (OUTPATIENT)
Dept: CALL CENTER | Facility: HOSPITAL | Age: 59
End: 2024-10-08
Payer: MEDICARE

## 2024-10-08 NOTE — OUTREACH NOTE
Medical Week 3 Survey      Flowsheet Row Responses   [facility] facility patient discharged from? [facility]   Does the patient have one of the following disease processes/diagnoses(primary or secondary)? Other  [ED x 1]   Week 3 attempt successful? No   Unsuccessful attempts Attempt 1   Discharge diagnosis Pancreatitis            BENJAMIN BATES - Registered Nurse

## 2024-10-17 ENCOUNTER — HOSPITAL ENCOUNTER (OUTPATIENT)
Dept: CT IMAGING | Facility: HOSPITAL | Age: 59
Discharge: HOME | End: 2024-10-17
Admitting: NURSE PRACTITIONER
Payer: MEDICARE

## 2024-10-17 DIAGNOSIS — K74.60 CIRRHOSIS OF LIVER WITH ASCITES, UNSPECIFIED HEPATIC CIRRHOSIS TYPE: ICD-10-CM

## 2024-10-17 DIAGNOSIS — I85.00 PORTAL HYPERTENSION WITH ESOPHAGEAL VARICES: ICD-10-CM

## 2024-10-17 DIAGNOSIS — R18.8 CIRRHOSIS OF LIVER WITH ASCITES, UNSPECIFIED HEPATIC CIRRHOSIS TYPE: ICD-10-CM

## 2024-10-17 DIAGNOSIS — K76.6 PORTAL HYPERTENSION WITH ESOPHAGEAL VARICES: ICD-10-CM

## 2024-10-17 DIAGNOSIS — K75.81 NASH (NONALCOHOLIC STEATOHEPATITIS): ICD-10-CM

## 2024-10-17 PROCEDURE — 74175 CTA ABDOMEN W/CONTRAST: CPT

## 2024-10-17 PROCEDURE — 25510000001 IOPAMIDOL PER 1 ML: Performed by: NURSE PRACTITIONER

## 2024-10-17 RX ORDER — IOPAMIDOL 755 MG/ML
100 INJECTION, SOLUTION INTRAVASCULAR
Status: COMPLETED | OUTPATIENT
Start: 2024-10-17 | End: 2024-10-17

## 2024-10-17 RX ADMIN — IOPAMIDOL 100 ML: 755 INJECTION, SOLUTION INTRAVENOUS at 15:19

## 2024-11-08 ENCOUNTER — TELEPHONE (OUTPATIENT)
Dept: GASTROENTEROLOGY | Facility: CLINIC | Age: 59
End: 2024-11-08
Payer: MEDICARE

## 2024-11-08 DIAGNOSIS — K74.60 CIRRHOSIS OF LIVER WITH ASCITES, UNSPECIFIED HEPATIC CIRRHOSIS TYPE: Primary | ICD-10-CM

## 2024-11-08 DIAGNOSIS — R18.8 CIRRHOSIS OF LIVER WITH ASCITES, UNSPECIFIED HEPATIC CIRRHOSIS TYPE: Primary | ICD-10-CM

## 2024-11-08 NOTE — TELEPHONE ENCOUNTER
I called and spoke with patient today and informed her of results.. Patient voiced understanding and will repeat labs prior to follow up. She is doing labs at her PCP office.. I will fax labs to that office.

## 2024-11-08 NOTE — TELEPHONE ENCOUNTER
----- Message from Sharri Mei sent at 11/7/2024 12:52 PM EST -----  Hemoglobin improved currently at 11.5.  Platelets stable.  Maintain follow-up.  Repeat labs including a CBC, CMP, PT/INR prior to follow-up appointment.  ----- Message -----  From: Qing Melgoza MA  Sent: 11/6/2024   3:52 PM EST  To: ANDRE Alvarado

## 2024-11-13 ENCOUNTER — TRANSCRIBE ORDERS (OUTPATIENT)
Dept: ADMINISTRATIVE | Facility: HOSPITAL | Age: 59
End: 2024-11-13
Payer: MEDICARE

## 2024-11-13 DIAGNOSIS — R06.09 DYSPNEA ON EXERTION: ICD-10-CM

## 2024-11-13 DIAGNOSIS — K76.81 HEPATOPULMONARY SYNDROME: Primary | ICD-10-CM

## 2024-11-19 ENCOUNTER — HOSPITAL ENCOUNTER (OUTPATIENT)
Dept: CARDIOLOGY | Facility: HOSPITAL | Age: 59
Discharge: HOME | End: 2024-11-19
Admitting: FAMILY MEDICINE
Payer: MEDICARE

## 2024-11-19 DIAGNOSIS — K76.81 HEPATOPULMONARY SYNDROME: ICD-10-CM

## 2024-11-19 DIAGNOSIS — R06.09 DYSPNEA ON EXERTION: ICD-10-CM

## 2024-11-19 LAB
AORTIC DIMENSIONLESS INDEX: 0.69 (DI)
ASCENDING AORTA: 3.1 CM
BH CV ECHO MEAS - AO MAX PG: 14.5 MMHG
BH CV ECHO MEAS - AO MEAN PG: 7.6 MMHG
BH CV ECHO MEAS - AO ROOT DIAM: 3.3 CM
BH CV ECHO MEAS - AO V2 MAX: 190.5 CM/SEC
BH CV ECHO MEAS - AO V2 VTI: 37.9 CM
BH CV ECHO MEAS - AVA(I,D): 2.09 CM2
BH CV ECHO MEAS - EDV(CUBED): 179.4 ML
BH CV ECHO MEAS - EDV(MOD-SP2): 87.4 ML
BH CV ECHO MEAS - EDV(MOD-SP4): 112 ML
BH CV ECHO MEAS - EF(MOD-BP): 61.2 %
BH CV ECHO MEAS - EF(MOD-SP2): 55.8 %
BH CV ECHO MEAS - EF(MOD-SP4): 63.5 %
BH CV ECHO MEAS - ESV(CUBED): 49.5 ML
BH CV ECHO MEAS - ESV(MOD-SP2): 38.6 ML
BH CV ECHO MEAS - ESV(MOD-SP4): 40.9 ML
BH CV ECHO MEAS - FS: 34.9 %
BH CV ECHO MEAS - IVS/LVPW: 0.94 CM
BH CV ECHO MEAS - IVSD: 0.84 CM
BH CV ECHO MEAS - LA DIMENSION: 3.7 CM
BH CV ECHO MEAS - LAT PEAK E' VEL: 12.1 CM/SEC
BH CV ECHO MEAS - LV DIASTOLIC VOL/BSA (35-75): 61 CM2
BH CV ECHO MEAS - LV MASS(C)D: 185.7 GRAMS
BH CV ECHO MEAS - LV MAX PG: 7.4 MMHG
BH CV ECHO MEAS - LV MEAN PG: 3.2 MMHG
BH CV ECHO MEAS - LV SYSTOLIC VOL/BSA (12-30): 22.3 CM2
BH CV ECHO MEAS - LV V1 MAX: 135.9 CM/SEC
BH CV ECHO MEAS - LV V1 VTI: 26.4 CM
BH CV ECHO MEAS - LVIDD: 5.6 CM
BH CV ECHO MEAS - LVIDS: 3.7 CM
BH CV ECHO MEAS - LVOT AREA: 3 CM2
BH CV ECHO MEAS - LVOT DIAM: 1.96 CM
BH CV ECHO MEAS - LVPWD: 0.9 CM
BH CV ECHO MEAS - MED PEAK E' VEL: 7.8 CM/SEC
BH CV ECHO MEAS - MV A MAX VEL: 79.6 CM/SEC
BH CV ECHO MEAS - MV DEC SLOPE: 415.6 CM/SEC2
BH CV ECHO MEAS - MV DEC TIME: 0.23 SEC
BH CV ECHO MEAS - MV E MAX VEL: 95.5 CM/SEC
BH CV ECHO MEAS - MV E/A: 1.2
BH CV ECHO MEAS - RVDD: 2.9 CM
BH CV ECHO MEAS - SV(LVOT): 79.4 ML
BH CV ECHO MEAS - SV(MOD-SP2): 48.8 ML
BH CV ECHO MEAS - SV(MOD-SP4): 71.1 ML
BH CV ECHO MEAS - SVI(LVOT): 43.2 ML/M2
BH CV ECHO MEAS - SVI(MOD-SP2): 26.6 ML/M2
BH CV ECHO MEAS - SVI(MOD-SP4): 38.7 ML/M2
BH CV ECHO MEAS - TAPSE (>1.6): 2.7 CM
BH CV ECHO MEASUREMENTS AVERAGE E/E' RATIO: 9.6
IVRT: 48 MS
LEFT ATRIUM VOLUME INDEX: 28.4 ML/M2

## 2024-11-19 PROCEDURE — 93306 TTE W/DOPPLER COMPLETE: CPT

## 2024-11-19 PROCEDURE — 93306 TTE W/DOPPLER COMPLETE: CPT | Performed by: INTERNAL MEDICINE

## 2024-12-10 ENCOUNTER — OFFICE VISIT (OUTPATIENT)
Dept: GASTROENTEROLOGY | Facility: CLINIC | Age: 59
End: 2024-12-10
Payer: MEDICARE

## 2024-12-10 VITALS
HEART RATE: 77 BPM | BODY MASS INDEX: 33.86 KG/M2 | OXYGEN SATURATION: 93 % | SYSTOLIC BLOOD PRESSURE: 124 MMHG | HEIGHT: 62 IN | DIASTOLIC BLOOD PRESSURE: 58 MMHG | WEIGHT: 184 LBS

## 2024-12-10 DIAGNOSIS — K21.9 GASTROESOPHAGEAL REFLUX DISEASE WITHOUT ESOPHAGITIS: ICD-10-CM

## 2024-12-10 DIAGNOSIS — K74.60 CIRRHOSIS OF LIVER WITHOUT ASCITES, UNSPECIFIED HEPATIC CIRRHOSIS TYPE: Primary | ICD-10-CM

## 2024-12-10 DIAGNOSIS — K85.00 IDIOPATHIC ACUTE PANCREATITIS WITHOUT INFECTION OR NECROSIS: ICD-10-CM

## 2024-12-10 DIAGNOSIS — I85.00 ESOPHAGEAL VARICES WITHOUT BLEEDING, UNSPECIFIED ESOPHAGEAL VARICES TYPE: ICD-10-CM

## 2024-12-10 DIAGNOSIS — K75.81 NASH (NONALCOHOLIC STEATOHEPATITIS): ICD-10-CM

## 2024-12-10 RX ORDER — POTASSIUM CHLORIDE 750 MG/1
10 TABLET, EXTENDED RELEASE ORAL
COMMUNITY
Start: 2024-10-23

## 2024-12-10 NOTE — PROGRESS NOTES
Chief Complaint    Tawny Lennon is a 59 y.o. female who presents to [facility] for follow-up of Reich related cirrhosis relatively stable over the last few years.  She currently takes 20 mg of Lasix, 50 mg of Aldactone daily with good control of her lower extremity edema.  She has had a slight increase over last couple days that she had to miss some of her medications as she was helping family and did not take her medication with her.  She did have a recent episode of pancreatitis and has now recovered and not having any further episodes of abdominal pain.  She had sigmoid perforated diverticulitis in 2022 requiring temporary colostomy subsequent reversal in July 2022 at [facility].  Last upper endoscopy in December 2023 showed esophageal varices and 4 bands were placed.  She previously had been on nadolol but was intolerant due to dizziness.  She also takes Protonix currently with good control of her reflux symptoms.    Result Review :     The following data was reviewed by: Jesse Watkins MD on 12/10/2024:    CMP          9/20/2024    04:56 10/1/2024    11:43 11/19/2024    11:00   CMP   Glucose 123  126     Glucose   120       BUN 8  17  19       Creatinine 0.86  1.00  1.1       EGFR 77.0  64.2     Sodium 137  139  139       Potassium 3.5  3.9  4.2       Chloride 108  109  107       Calcium 7.8  9.2  8.9       Total Protein 4.7  6.0     Albumin 2.2  2.6  3       Globulin 2.5  3.4     Total Bilirubin 1.9  3.4  4.18       Alkaline Phosphatase 100  114  136       AST (SGOT) 37  48  39       ALT (SGPT) 12  21  19       Albumin/Globulin Ratio 0.9  0.8     BUN/Creatinine Ratio 9.3  17.0     Anion Gap 7.1  8.0  10          Details          This result is from an external source.             CBC          9/19/2024    04:29 9/20/2024    04:56 10/1/2024    11:43   CBC   WBC 2.74  2.68  4.14    RBC 2.92  2.78  3.21    Hemoglobin 9.4  8.8  10.4    Hematocrit 27.8  26.4  31.5     MCV 95.2  95.0  98.1    MCH 32.2  31.7  32.4    MCHC 33.8  33.3  33.0    RDW 14.5  14.4  15.5    Platelets 39  37  70             Past Medical History:   Diagnosis Date    Anemia     Chronic kidney disease     Cirrhosis     liver    Cirrhosis, nonalcoholic     Colon polyps     COPD (chronic obstructive pulmonary disease)     Depression     Diabetes     Diverticulitis     Esophageal varices     Fatty liver     GERD (gastroesophageal reflux disease)     Hematuria     Hernia     High blood pressure     High cholesterol     Interstitial cystitis     Migraine headache     Narcolepsy     Pancreatitis     PONV (postoperative nausea and vomiting)     Sleep apnea     Spinal headache     DURING PREGNANCY       Past Surgical History:   Procedure Laterality Date    ABDOMINAL SURGERY      BLADDER REPAIR  2009    CHOLECYSTECTOMY      COLONOSCOPY  2014 x2 2020    ENDOSCOPY  2014 2020    ENDOSCOPY N/A 07/12/2021    Procedure: ESOPHAGOGASTRODUODENOSCOPY with biopsies;  Surgeon: Jesse Watkins MD;  Location: [facility] ENDOSCOPY;  Service: Gastroenterology;  Laterality: N/A;  esophageal varices    ENDOSCOPY N/A 03/25/2022    Procedure: ESOPHAGOGASTRODUODENOSCOPY WITH BIOPSIES;  Surgeon: Jesse Watkins MD;  Location: [facility] ENDOSCOPY;  Service: Gastroenterology;  Laterality: N/A;  ESOPHAGEAL VARICIES    ENDOSCOPY N/A 09/28/2023    Procedure: ESOPHAGOGASTRODUODENOSCOPY WITH COLD BIOPSIES;  Surgeon: Jesse Watkins MD;  Location: [facility] ENDOSCOPY;  Service: Gastroenterology;  Laterality: N/A;  GASTRITIS, ESOPHAGEAL VARICES    ENDOSCOPY N/A 11/17/2023    Procedure: ESOPHAGOGASTRODUODENOSCOPY WITH BIOPSIES AND  VARICEAL BANDING X 4;  Surgeon: Jesse Watkins MD;  Location: [facility] ENDOSCOPY;  Service: Gastroenterology;  Laterality: N/A;  GASTRITIS, PORTAL HYPERTENSIVE GASTROPATHY, ESOPHAGEAL VARICIES    ENDOSCOPY N/A 12/21/2023    Procedure: ESOPHAGOGASTRODUODENOSCOPY WITH BIOPSIES AND ESOPHAGEAL BANDING;   "Surgeon: Jesse Watkins MD;  Location: [facility];  Service: Gastroenterology;  Laterality: N/A;  ESOPHAGEAL VARICES, GASTRITIS    HAND SURGERY      HERNIA REPAIR      HYSTERECTOMY  2009    ILEOSTOMY  07/2022    OTHER SURGICAL HISTORY      rectocele repair    UPPER GASTROINTESTINAL ENDOSCOPY         Social History     Social History Narrative    Not on file       Objective     Vital Signs:   /58 (BP Location: Left arm, Patient Position: Sitting, Cuff Size: Adult)   Pulse 77   Ht 157.5 cm (62\")   Wt 83.5 kg (184 lb)   SpO2 93%   BMI 33.65 kg/m²     Body mass index is 33.65 kg/m².    Physical Exam            Assessment and Plan    Diagnoses and all orders for this visit:    1. Cirrhosis of liver without ascites, unspecified hepatic cirrhosis type (Primary)  -     Protime-INR; Future    2. Gastroesophageal reflux disease without esophagitis  -     CBC (No Diff); Future  -     Protime-INR; Future  -     Comprehensive Metabolic Panel; Future    3. Esophageal varices without bleeding, unspecified esophageal varices type    4. MEDRANO (nonalcoholic steatohepatitis)    5. Idiopathic acute pancreatitis without infection or necrosis    Patient with Medrano related cirrhosis but without significant ascites.  She has a mild amount of lower extremity edema and therefore she will double her Aldactone and spironolactone for 3 days and then resume her normal 50/20 regimen.  He had a recent CTA without acute or chronic findings.  She also had recent imaging showed no evidence of pancreatic mass despite a recent episode of pancreatitis.  She has had prior cholecystectomy and does not drink any alcohol.  She seemingly has recovered and feels well and therefore we will plan repeat labs including CBC, CMP, PT/INR in 1 month and assess her MELD score then.  She has had a slightly increasing total bilirubin over the past few months.  Sodium and renal function remains unchanged and stable.  If she has not a significant " elevation of her MELD score in 1 month then we would can consider referral to [facility] transplant clinic.  Which she and I have discussed.              Follow Up   No follow-ups on file.  Patient was given instructions and counseling regarding her condition or for health maintenance advice. Please see specific information pulled into the AVS if appropriate.

## 2025-02-10 ENCOUNTER — TELEPHONE (OUTPATIENT)
Dept: GASTROENTEROLOGY | Facility: CLINIC | Age: 60
End: 2025-02-10
Payer: MEDICARE

## 2025-02-10 NOTE — TELEPHONE ENCOUNTER
Spoke with the patient in regards to her over due lab orders, patient stated she is going next week to have them drawn. Will have a copy of the results sent to our office.

## 2025-03-11 ENCOUNTER — TELEPHONE (OUTPATIENT)
Dept: GASTROENTEROLOGY | Facility: CLINIC | Age: 60
End: 2025-03-11
Payer: MEDICARE

## 2025-03-11 NOTE — TELEPHONE ENCOUNTER
Patient called and LVM yesterday afternoon requesting a call back as she had forgotten to mention something when she spoke to MA regarding overdue results. Awaiting patients return call.

## 2025-03-25 ENCOUNTER — RESULTS FOLLOW-UP (OUTPATIENT)
Dept: GASTROENTEROLOGY | Facility: CLINIC | Age: 60
End: 2025-03-25
Payer: MEDICARE

## 2025-03-25 DIAGNOSIS — K74.60 CIRRHOSIS OF LIVER WITH ASCITES, UNSPECIFIED HEPATIC CIRRHOSIS TYPE: ICD-10-CM

## 2025-03-25 DIAGNOSIS — K74.60 CIRRHOSIS OF LIVER WITHOUT ASCITES, UNSPECIFIED HEPATIC CIRRHOSIS TYPE: Primary | ICD-10-CM

## 2025-03-25 DIAGNOSIS — I85.00 ESOPHAGEAL VARICES WITHOUT BLEEDING, UNSPECIFIED ESOPHAGEAL VARICES TYPE: ICD-10-CM

## 2025-03-25 DIAGNOSIS — R18.8 CIRRHOSIS OF LIVER WITH ASCITES, UNSPECIFIED HEPATIC CIRRHOSIS TYPE: ICD-10-CM

## 2025-03-25 DIAGNOSIS — K21.9 GASTROESOPHAGEAL REFLUX DISEASE WITHOUT ESOPHAGITIS: ICD-10-CM

## 2025-03-25 DIAGNOSIS — K74.60 CIRRHOSIS OF LIVER WITHOUT ASCITES, UNSPECIFIED HEPATIC CIRRHOSIS TYPE: ICD-10-CM

## 2025-03-25 DIAGNOSIS — K75.81 NASH (NONALCOHOLIC STEATOHEPATITIS): ICD-10-CM

## 2025-03-28 NOTE — TELEPHONE ENCOUNTER
I spoke to patient today and she is agreeable to complete an Ultrasound. Orders have been placed. Patient would like to complete at [facility] in [place].

## 2025-03-28 NOTE — TELEPHONE ENCOUNTER
Patient is scheduled for an ultrasound 3/31/25 at [facility]. VM left for patient with appointment information.

## 2025-04-01 ENCOUNTER — RESULTS FOLLOW-UP (OUTPATIENT)
Dept: GASTROENTEROLOGY | Facility: CLINIC | Age: 60
End: 2025-04-01
Payer: MEDICARE

## 2025-04-01 DIAGNOSIS — K74.60 CIRRHOSIS OF LIVER WITHOUT ASCITES, UNSPECIFIED HEPATIC CIRRHOSIS TYPE: ICD-10-CM

## 2025-04-01 DIAGNOSIS — K74.60 CIRRHOSIS OF LIVER WITH ASCITES, UNSPECIFIED HEPATIC CIRRHOSIS TYPE: ICD-10-CM

## 2025-04-01 DIAGNOSIS — I85.00 ESOPHAGEAL VARICES WITHOUT BLEEDING, UNSPECIFIED ESOPHAGEAL VARICES TYPE: ICD-10-CM

## 2025-04-01 DIAGNOSIS — R18.8 CIRRHOSIS OF LIVER WITH ASCITES, UNSPECIFIED HEPATIC CIRRHOSIS TYPE: ICD-10-CM

## 2025-04-01 DIAGNOSIS — K75.81 NASH (NONALCOHOLIC STEATOHEPATITIS): ICD-10-CM

## 2025-04-02 ENCOUNTER — PREP FOR SURGERY (OUTPATIENT)
Dept: OTHER | Facility: HOSPITAL | Age: 60
End: 2025-04-02
Payer: MEDICARE

## 2025-04-02 DIAGNOSIS — R18.8 CIRRHOSIS OF LIVER WITH ASCITES, UNSPECIFIED HEPATIC CIRRHOSIS TYPE: Primary | ICD-10-CM

## 2025-04-02 DIAGNOSIS — K74.60 CIRRHOSIS OF LIVER WITH ASCITES, UNSPECIFIED HEPATIC CIRRHOSIS TYPE: Primary | ICD-10-CM

## 2025-04-02 RX ORDER — ALBUMIN (HUMAN) 12.5 G/50ML
25 SOLUTION INTRAVENOUS AS NEEDED
OUTPATIENT
Start: 2025-04-02

## 2025-04-02 NOTE — TELEPHONE ENCOUNTER
Paracentesis is scheduled for 4/14/25 at 10:30am [facility]. Main Entrance A. Spoke to patient and she is aware. Patient advised to go to the ER if her symptoms worsen as she was unable to schedule paracentesis earlier due to keeping her grand kids next week.

## 2025-04-14 ENCOUNTER — HOSPITAL ENCOUNTER (OUTPATIENT)
Dept: INTERVENTIONAL RADIOLOGY/VASCULAR | Facility: HOSPITAL | Age: 60
Discharge: HOME | End: 2025-04-14
Payer: MEDICARE

## 2025-04-14 ENCOUNTER — LAB (OUTPATIENT)
Dept: LAB | Facility: HOSPITAL | Age: 60
End: 2025-04-14
Payer: MEDICARE

## 2025-04-14 VITALS
DIASTOLIC BLOOD PRESSURE: 62 MMHG | OXYGEN SATURATION: 94 % | RESPIRATION RATE: 16 BRPM | SYSTOLIC BLOOD PRESSURE: 133 MMHG | HEART RATE: 75 BPM

## 2025-04-14 DIAGNOSIS — K74.60 CIRRHOSIS OF LIVER WITH ASCITES, UNSPECIFIED HEPATIC CIRRHOSIS TYPE: ICD-10-CM

## 2025-04-14 DIAGNOSIS — R18.8 CIRRHOSIS OF LIVER WITH ASCITES, UNSPECIFIED HEPATIC CIRRHOSIS TYPE: ICD-10-CM

## 2025-04-14 LAB
ALBUMIN FLD-MCNC: 0.5 G/DL
APPEARANCE FLD: CLEAR
APTT PPP: 34.6 SECONDS (ref 24.2–34.2)
COLOR FLD: NORMAL
INR PPP: 1.44 (ref 0.86–1.15)
LYMPHOCYTES NFR FLD MANUAL: 32 %
MACROPHAGE FLUID %: 11 %
MESOTHL CELL NFR FLD MANUAL: 6 %
MONOCYTES NFR FLD: 10 %
NEUTROPHILS NFR FLD MANUAL: 41 %
NUC CELL # FLD: 339 /MM3
PLATELET # BLD AUTO: 52 10*3/MM3 (ref 140–450)
PROT FLD-MCNC: <1 G/DL
PROTHROMBIN TIME: 18.2 SECONDS (ref 11.8–14.9)
RBC # FLD AUTO: <2000 /MM3

## 2025-04-14 PROCEDURE — 85610 PROTHROMBIN TIME: CPT

## 2025-04-14 PROCEDURE — 82042 OTHER SOURCE ALBUMIN QUAN EA: CPT

## 2025-04-14 PROCEDURE — C1729 CATH, DRAINAGE: HCPCS

## 2025-04-14 PROCEDURE — 85049 AUTOMATED PLATELET COUNT: CPT

## 2025-04-14 PROCEDURE — 85730 THROMBOPLASTIN TIME PARTIAL: CPT

## 2025-04-14 PROCEDURE — 84157 ASSAY OF PROTEIN OTHER: CPT

## 2025-04-14 PROCEDURE — 76942 ECHO GUIDE FOR BIOPSY: CPT

## 2025-04-14 PROCEDURE — 25010000002 LIDOCAINE 2% SOLUTION

## 2025-04-14 PROCEDURE — 87205 SMEAR GRAM STAIN: CPT

## 2025-04-14 PROCEDURE — 87070 CULTURE OTHR SPECIMN AEROBIC: CPT

## 2025-04-14 PROCEDURE — 88108 CYTOPATH CONCENTRATE TECH: CPT

## 2025-04-14 PROCEDURE — 89051 BODY FLUID CELL COUNT: CPT

## 2025-04-14 RX ORDER — LIDOCAINE HYDROCHLORIDE 20 MG/ML
20 INJECTION, SOLUTION INFILTRATION; PERINEURAL ONCE
Status: COMPLETED | OUTPATIENT
Start: 2025-04-14 | End: 2025-04-14

## 2025-04-14 RX ORDER — INDOMETHACIN 25 MG/1
10 CAPSULE ORAL ONCE
Status: COMPLETED | OUTPATIENT
Start: 2025-04-14 | End: 2025-04-14

## 2025-04-14 RX ADMIN — LIDOCAINE HYDROCHLORIDE 9 ML: 20 INJECTION, SOLUTION INFILTRATION; PERINEURAL at 11:00

## 2025-04-14 RX ADMIN — SODIUM BICARBONATE 1 ML: 84 INJECTION, SOLUTION INTRAVENOUS at 11:00

## 2025-04-17 LAB
BACTERIA FLD CULT: NORMAL
GRAM STN SPEC: NORMAL
GRAM STN SPEC: NORMAL

## 2025-05-13 ENCOUNTER — HOSPITAL ENCOUNTER (INPATIENT)
Facility: HOSPITAL | Age: 60
LOS: 7 days | Discharge: HOME | End: 2025-05-20
Attending: EMERGENCY MEDICINE | Admitting: STUDENT IN AN ORGANIZED HEALTH CARE EDUCATION/TRAINING PROGRAM
Payer: MEDICARE

## 2025-05-13 ENCOUNTER — APPOINTMENT (OUTPATIENT)
Dept: CT IMAGING | Facility: HOSPITAL | Age: 60
End: 2025-05-13
Payer: MEDICARE

## 2025-05-13 ENCOUNTER — TELEPHONE (OUTPATIENT)
Dept: GASTROENTEROLOGY | Facility: CLINIC | Age: 60
End: 2025-05-13
Payer: MEDICARE

## 2025-05-13 ENCOUNTER — APPOINTMENT (OUTPATIENT)
Dept: GENERAL RADIOLOGY | Facility: HOSPITAL | Age: 60
End: 2025-05-13
Payer: MEDICARE

## 2025-05-13 DIAGNOSIS — E87.70 HYPERVOLEMIA, UNSPECIFIED HYPERVOLEMIA TYPE: Primary | ICD-10-CM

## 2025-05-13 DIAGNOSIS — R26.2 DIFFICULTY WALKING: ICD-10-CM

## 2025-05-13 DIAGNOSIS — K74.60 CIRRHOSIS OF LIVER WITH ASCITES, UNSPECIFIED HEPATIC CIRRHOSIS TYPE: ICD-10-CM

## 2025-05-13 DIAGNOSIS — R18.8 CIRRHOSIS OF LIVER WITH ASCITES, UNSPECIFIED HEPATIC CIRRHOSIS TYPE: ICD-10-CM

## 2025-05-13 DIAGNOSIS — R10.9 ABDOMINAL PAIN, UNSPECIFIED ABDOMINAL LOCATION: ICD-10-CM

## 2025-05-13 LAB
ALBUMIN SERPL-MCNC: 2.9 G/DL (ref 3.5–5.2)
ALBUMIN/GLOB SERPL: 1.1 G/DL
ALP SERPL-CCNC: 154 U/L (ref 39–117)
ALT SERPL W P-5'-P-CCNC: 17 U/L (ref 1–33)
AMMONIA BLD-SCNC: 50 UMOL/L (ref 11–51)
ANION GAP SERPL CALCULATED.3IONS-SCNC: 10.3 MMOL/L (ref 5–15)
AST SERPL-CCNC: 39 U/L (ref 1–32)
BASOPHILS # BLD AUTO: 0.02 10*3/MM3 (ref 0–0.2)
BASOPHILS NFR BLD AUTO: 0.5 % (ref 0–1.5)
BILIRUB SERPL-MCNC: 5.1 MG/DL (ref 0–1.2)
BILIRUB UR QL STRIP: NEGATIVE
BUN SERPL-MCNC: 17 MG/DL (ref 8–23)
BUN/CREAT SERPL: 15.6 (ref 7–25)
CALCIUM SPEC-SCNC: 9.1 MG/DL (ref 8.6–10.5)
CHLORIDE SERPL-SCNC: 109 MMOL/L (ref 98–107)
CLARITY UR: CLEAR
CO2 SERPL-SCNC: 16.7 MMOL/L (ref 22–29)
COLOR UR: YELLOW
CREAT SERPL-MCNC: 1.09 MG/DL (ref 0.57–1)
D-LACTATE SERPL-SCNC: 1.8 MMOL/L (ref 0.5–2)
DEPRECATED RDW RBC AUTO: 53.9 FL (ref 37–54)
EGFRCR SERPLBLD CKD-EPI 2021: 57.9 ML/MIN/1.73
EOSINOPHIL # BLD AUTO: 0.11 10*3/MM3 (ref 0–0.4)
EOSINOPHIL NFR BLD AUTO: 2.6 % (ref 0.3–6.2)
ERYTHROCYTE [DISTWIDTH] IN BLOOD BY AUTOMATED COUNT: 14.7 % (ref 12.3–15.4)
GLOBULIN UR ELPH-MCNC: 2.7 GM/DL
GLUCOSE SERPL-MCNC: 128 MG/DL (ref 65–99)
GLUCOSE UR STRIP-MCNC: NEGATIVE MG/DL
HCT VFR BLD AUTO: 33.3 % (ref 34–46.6)
HGB BLD-MCNC: 11.1 G/DL (ref 12–15.9)
HGB UR QL STRIP.AUTO: NEGATIVE
HOLD SPECIMEN: NORMAL
HOLD SPECIMEN: NORMAL
IMM GRANULOCYTES # BLD AUTO: 0.01 10*3/MM3 (ref 0–0.05)
IMM GRANULOCYTES NFR BLD AUTO: 0.2 % (ref 0–0.5)
KETONES UR QL STRIP: NEGATIVE
LEUKOCYTE ESTERASE UR QL STRIP.AUTO: NEGATIVE
LIPASE SERPL-CCNC: 54 U/L (ref 13–60)
LYMPHOCYTES # BLD AUTO: 0.67 10*3/MM3 (ref 0.7–3.1)
LYMPHOCYTES NFR BLD AUTO: 15.8 % (ref 19.6–45.3)
MCH RBC QN AUTO: 33 PG (ref 26.6–33)
MCHC RBC AUTO-ENTMCNC: 33.3 G/DL (ref 31.5–35.7)
MCV RBC AUTO: 99.1 FL (ref 79–97)
MONOCYTES # BLD AUTO: 0.25 10*3/MM3 (ref 0.1–0.9)
MONOCYTES NFR BLD AUTO: 5.9 % (ref 5–12)
NEUTROPHILS NFR BLD AUTO: 3.17 10*3/MM3 (ref 1.7–7)
NEUTROPHILS NFR BLD AUTO: 75 % (ref 42.7–76)
NITRITE UR QL STRIP: NEGATIVE
NRBC BLD AUTO-RTO: 0 /100 WBC (ref 0–0.2)
NT-PROBNP SERPL-MCNC: 243 PG/ML (ref 0–900)
PH UR STRIP.AUTO: 5.5 [PH] (ref 5–8)
PLATELET # BLD AUTO: 57 10*3/MM3 (ref 140–450)
PMV BLD AUTO: 12.5 FL (ref 6–12)
POTASSIUM SERPL-SCNC: 4.2 MMOL/L (ref 3.5–5.2)
PROT SERPL-MCNC: 5.6 G/DL (ref 6–8.5)
PROT UR QL STRIP: NEGATIVE
RBC # BLD AUTO: 3.36 10*6/MM3 (ref 3.77–5.28)
SODIUM SERPL-SCNC: 136 MMOL/L (ref 136–145)
SP GR UR STRIP: 1.01 (ref 1–1.03)
UROBILINOGEN UR QL STRIP: NORMAL
WBC NRBC COR # BLD AUTO: 4.23 10*3/MM3 (ref 3.4–10.8)
WHOLE BLOOD HOLD COAG: NORMAL
WHOLE BLOOD HOLD SPECIMEN: NORMAL

## 2025-05-13 PROCEDURE — 25810000003 SODIUM CHLORIDE 0.9 % SOLUTION

## 2025-05-13 PROCEDURE — 83690 ASSAY OF LIPASE: CPT | Performed by: EMERGENCY MEDICINE

## 2025-05-13 PROCEDURE — 99285 EMERGENCY DEPT VISIT HI MDM: CPT

## 2025-05-13 PROCEDURE — 83880 ASSAY OF NATRIURETIC PEPTIDE: CPT

## 2025-05-13 PROCEDURE — 82140 ASSAY OF AMMONIA: CPT

## 2025-05-13 PROCEDURE — 25010000002 CEFTRIAXONE PER 250 MG

## 2025-05-13 PROCEDURE — 71045 X-RAY EXAM CHEST 1 VIEW: CPT

## 2025-05-13 PROCEDURE — 36415 COLL VENOUS BLD VENIPUNCTURE: CPT

## 2025-05-13 PROCEDURE — 25510000001 IOPAMIDOL PER 1 ML: Performed by: EMERGENCY MEDICINE

## 2025-05-13 PROCEDURE — 81003 URINALYSIS AUTO W/O SCOPE: CPT | Performed by: EMERGENCY MEDICINE

## 2025-05-13 PROCEDURE — 99223 1ST HOSP IP/OBS HIGH 75: CPT | Performed by: STUDENT IN AN ORGANIZED HEALTH CARE EDUCATION/TRAINING PROGRAM

## 2025-05-13 PROCEDURE — 25010000002 MORPHINE PER 10 MG: Performed by: EMERGENCY MEDICINE

## 2025-05-13 PROCEDURE — 83605 ASSAY OF LACTIC ACID: CPT | Performed by: EMERGENCY MEDICINE

## 2025-05-13 PROCEDURE — 80053 COMPREHEN METABOLIC PANEL: CPT | Performed by: EMERGENCY MEDICINE

## 2025-05-13 PROCEDURE — 85025 COMPLETE CBC W/AUTO DIFF WBC: CPT | Performed by: EMERGENCY MEDICINE

## 2025-05-13 PROCEDURE — 74177 CT ABD & PELVIS W/CONTRAST: CPT

## 2025-05-13 RX ORDER — PANTOPRAZOLE SODIUM 40 MG/1
40 TABLET, DELAYED RELEASE ORAL DAILY
Status: DISCONTINUED | OUTPATIENT
Start: 2025-05-14 | End: 2025-05-17

## 2025-05-13 RX ORDER — SODIUM CHLORIDE 0.9 % (FLUSH) 0.9 %
10 SYRINGE (ML) INJECTION AS NEEDED
Status: DISCONTINUED | OUTPATIENT
Start: 2025-05-13 | End: 2025-05-20 | Stop reason: HOSPADM

## 2025-05-13 RX ORDER — IOPAMIDOL 755 MG/ML
100 INJECTION, SOLUTION INTRAVASCULAR
Status: COMPLETED | OUTPATIENT
Start: 2025-05-13 | End: 2025-05-13

## 2025-05-13 RX ORDER — SODIUM CHLORIDE 0.9 % (FLUSH) 0.9 %
10 SYRINGE (ML) INJECTION EVERY 12 HOURS SCHEDULED
Status: DISCONTINUED | OUTPATIENT
Start: 2025-05-14 | End: 2025-05-20 | Stop reason: HOSPADM

## 2025-05-13 RX ORDER — FUROSEMIDE 20 MG/1
20 TABLET ORAL DAILY
Status: DISCONTINUED | OUTPATIENT
Start: 2025-05-14 | End: 2025-05-13

## 2025-05-13 RX ORDER — SPIRONOLACTONE 25 MG/1
50 TABLET ORAL DAILY
Status: DISCONTINUED | OUTPATIENT
Start: 2025-05-14 | End: 2025-05-13

## 2025-05-13 RX ORDER — MORPHINE SULFATE 2 MG/ML
2 INJECTION, SOLUTION INTRAMUSCULAR; INTRAVENOUS ONCE
Status: COMPLETED | OUTPATIENT
Start: 2025-05-13 | End: 2025-05-13

## 2025-05-13 RX ORDER — SODIUM CHLORIDE 9 MG/ML
40 INJECTION, SOLUTION INTRAVENOUS AS NEEDED
Status: DISCONTINUED | OUTPATIENT
Start: 2025-05-13 | End: 2025-05-20 | Stop reason: HOSPADM

## 2025-05-13 RX ADMIN — CEFTRIAXONE SODIUM 2000 MG: 2 INJECTION, POWDER, FOR SOLUTION INTRAMUSCULAR; INTRAVENOUS at 21:51

## 2025-05-13 RX ADMIN — MORPHINE SULFATE 2 MG: 2 INJECTION, SOLUTION INTRAMUSCULAR; INTRAVENOUS at 20:09

## 2025-05-13 RX ADMIN — IOPAMIDOL 95 ML: 755 INJECTION, SOLUTION INTRAVENOUS at 18:21

## 2025-05-13 RX ADMIN — SODIUM CHLORIDE 1000 ML: 9 INJECTION, SOLUTION INTRAVENOUS at 18:32

## 2025-05-13 RX ADMIN — Medication 10 ML: at 23:54

## 2025-05-13 NOTE — TELEPHONE ENCOUNTER
Caller:  NABOR WISEMAN    Relationship: SELF    Best call back number: [phone]    What is your medical concern? SINCE FRIDAY STOMACH HAS BEEN HURTING REALLY BAD, HAS GAINED 7LBS SINCE FRIDAY, BLOATED, TOOK FLUID OFF OF STOMACH NOT TOO LONG AGO     How long has this issue been going on? SINCE FRIDAY    Is your provider already aware of this issue?   NO     Have you been treated for this issue? YES

## 2025-05-13 NOTE — ED PROVIDER NOTES
Time: 5:30 PM EDT  Date of encounter:  5/13/2025  Independent Historian/Clinical History and Information was obtained by:   Patient    History is limited by: N/A    Chief Complaint: Abdominal pain, nausea      History of Present Illness:  Patient is a 59 y.o. year old female who presents to the emergency department for evaluation of abdominal pain, nausea, diarrhea. States that symptoms started on Friday. States that pain has gotten worse. Reports that she cannot pinpoint a specific area in her abdomen that hurts. Reports hx of cirrhosis. States that she had a paracentesis done last month and she is established with Dr. Watkins. States that she's gained about 7lbs since Friday. She called Dr. Watkins's office who recommended for her to come to the ED for further evaluation. Reports dizziness but no confusion. She has not been taking her lactulose. Other medical hx includes COPD, CKD, GERD, Diverticulitis, Esophageal varices. She also reports SOA and she does wear supplemental O2.    Patient Care Team  Primary Care Provider: Zain Valentine MD    Past Medical History:     No Known Allergies  Past Medical History:   Diagnosis Date    Anemia     Chronic kidney disease     Cirrhosis     liver    Cirrhosis, nonalcoholic     Colon polyps     COPD (chronic obstructive pulmonary disease)     Depression     Diabetes     Diverticulitis     Esophageal varices     Fatty liver     GERD (gastroesophageal reflux disease)     Hematuria     Hernia     High blood pressure     High cholesterol     Interstitial cystitis     Migraine headache     Narcolepsy     Pancreatitis     PONV (postoperative nausea and vomiting)     Sleep apnea     Spinal headache     DURING PREGNANCY     Past Surgical History:   Procedure Laterality Date    ABDOMINAL SURGERY      BLADDER REPAIR  2009    CHOLECYSTECTOMY      COLONOSCOPY  2014 x2 2020    ENDOSCOPY  2014 2020    ENDOSCOPY N/A 07/12/2021    Procedure: ESOPHAGOGASTRODUODENOSCOPY with biopsies;   Surgeon: Jesse Watkins MD;  Location: [facility] ENDOSCOPY;  Service: Gastroenterology;  Laterality: N/A;  esophageal varices    ENDOSCOPY N/A 2022    Procedure: ESOPHAGOGASTRODUODENOSCOPY WITH BIOPSIES;  Surgeon: Jesse Watkins MD;  Location: [facility] ENDOSCOPY;  Service: Gastroenterology;  Laterality: N/A;  ESOPHAGEAL VARICIES    ENDOSCOPY N/A 2023    Procedure: ESOPHAGOGASTRODUODENOSCOPY WITH COLD BIOPSIES;  Surgeon: Jesse Watkins MD;  Location: [facility] ENDOSCOPY;  Service: Gastroenterology;  Laterality: N/A;  GASTRITIS, ESOPHAGEAL VARICES    ENDOSCOPY N/A 2023    Procedure: ESOPHAGOGASTRODUODENOSCOPY WITH BIOPSIES AND  VARICEAL BANDING X 4;  Surgeon: Jesse Watkins MD;  Location: [facility] ENDOSCOPY;  Service: Gastroenterology;  Laterality: N/A;  GASTRITIS, PORTAL HYPERTENSIVE GASTROPATHY, ESOPHAGEAL VARICIES    ENDOSCOPY N/A 2023    Procedure: ESOPHAGOGASTRODUODENOSCOPY WITH BIOPSIES AND ESOPHAGEAL BANDING;  Surgeon: Jesse Watkins MD;  Location: [facility] ENDOSCOPY;  Service: Gastroenterology;  Laterality: N/A;  ESOPHAGEAL VARICES, GASTRITIS    HAND SURGERY      HERNIA REPAIR      HYSTERECTOMY  2009    ILEOSTOMY  2022    OTHER SURGICAL HISTORY      rectocele repair    UPPER GASTROINTESTINAL ENDOSCOPY       Family History   Problem Relation Age of Onset    Dementia Mother     Liver cancer Father     Cirrhosis Sister         She  2023    Malig Hyperthermia Neg Hx     Colon cancer Neg Hx     Esophageal cancer Neg Hx        Home Medications:  Prior to Admission medications    Medication Sig Start Date End Date Taking? Authorizing Provider   albuterol sulfate  (90 Base) MCG/ACT inhaler Inhale 2 puffs Every 4 (Four) Hours As Needed. 23  Yes ProviderCorrine MD   folic acid (FOLVITE) 1 MG tablet Take 1 tablet by mouth Daily. 9/3/24  Yes ProviderCorrine MD   furosemide (LASIX) 20 MG tablet Take 1 tablet by mouth Daily. 24  "5/13/25 Yes Sharri Mei APRN   pantoprazole (PROTONIX) 40 MG EC tablet Take 1 tablet by mouth Daily. 9/3/24  Yes ProviderCorrine MD   spironolactone (ALDACTONE) 50 MG tablet Take 1 tablet by mouth Daily. 1/4/24  Yes Sharri Mei APRN   potassium chloride 10 MEQ CR tablet Take 1 tablet by mouth. 10/23/24   ProviderCorrine MD        Social History:   Social History     Tobacco Use    Smoking status: Never     Passive exposure: Past    Smokeless tobacco: Never   Vaping Use    Vaping status: Never Used   Substance Use Topics    Alcohol use: Never    Drug use: Never         Review of Systems:  Review of Systems   Constitutional:  Negative for chills and fever.   HENT:  Negative for ear pain.    Eyes:  Negative for pain.   Respiratory:  Positive for cough and wheezing. Negative for shortness of breath.    Cardiovascular:  Negative for chest pain.   Gastrointestinal:  Positive for abdominal pain, diarrhea and nausea. Negative for vomiting.   Genitourinary:  Negative for dysuria.   Musculoskeletal:  Negative for arthralgias.   Skin:  Negative for rash.   Neurological:  Negative for headaches.        Physical Exam:  /52 (BP Location: Right arm)   Pulse 78   Temp 97.5 °F (36.4 °C) (Oral)   Resp 16   Ht 157.5 cm (62\")   Wt 83.9 kg (184 lb 15.5 oz)   SpO2 96%   BMI 33.83 kg/m²     Physical Exam  Vitals and nursing note reviewed.   Constitutional:       Appearance: Normal appearance.   HENT:      Head: Normocephalic and atraumatic.      Nose: Nose normal.   Eyes:      Extraocular Movements: Extraocular movements intact.      Conjunctiva/sclera: Conjunctivae normal.      Pupils: Pupils are equal, round, and reactive to light.   Cardiovascular:      Rate and Rhythm: Normal rate and regular rhythm.      Pulses: Normal pulses.      Heart sounds: Normal heart sounds.   Pulmonary:      Effort: Pulmonary effort is normal.      Breath sounds: Wheezing present.   Abdominal:      General: Abdomen is flat.      " Palpations: Abdomen is soft.      Tenderness: There is generalized abdominal tenderness.   Musculoskeletal:         General: Normal range of motion.      Cervical back: Normal range of motion and neck supple.      Right lower leg: No edema.      Left lower leg: No edema.   Skin:     General: Skin is warm and dry.   Neurological:      General: No focal deficit present.      Mental Status: She is alert and oriented to person, place, and time.   Psychiatric:         Mood and Affect: Mood normal.         Behavior: Behavior normal.                    Medical Decision Making:      Comorbidities that affect care:    Cirrhosis, COPD, hypertension, hyperlipidemia, esophageal varices, diverticulitis, peritonitis    External Notes reviewed:    Telephone Encounter: Reviewed telephone encounter with GI on 5/13/25.      The following orders were placed and all results were independently analyzed by me:  Orders Placed This Encounter   Procedures    CT Abdomen Pelvis With Contrast    XR Chest 1 View    [place] Draw    Comprehensive Metabolic Panel    Lipase    Urinalysis With Microscopic If Indicated (No Culture) - Urine, Clean Catch    Lactic Acid, Plasma    CBC Auto Differential    Ammonia    BNP    NPO Diet NPO Type: Strict NPO    Undress & Gown    Inpatient Hospitalist Consult    Insert Peripheral IV    CBC & Differential    Green Top (Gel)    Lavender Top    Gold Top - SST    Light Blue Top       Medications Given in the Emergency Department:  Medications   sodium chloride 0.9 % flush 10 mL (has no administration in time range)   cefTRIAXone (ROCEPHIN) in NS 2 GRAMS/20ml IV PUSH syringe (has no administration in time range)   sodium chloride 0.9 % bolus 1,000 mL (0 mL Intravenous Stopped 5/13/25 2009)   iopamidol (ISOVUE-370) 76 % injection 100 mL (95 mL Intravenous Given 5/13/25 1821)   morphine injection 2 mg (2 mg Intravenous Given 5/13/25 2009)        ED Course:         Labs:    Lab Results (last 24 hours)       Procedure  Component Value Units Date/Time    CBC & Differential [[id number]]  (Abnormal) Collected: 05/13/25 1633    Specimen: Blood from Arm, Right Updated: 05/13/25 1653    Narrative:      The following orders were created for panel order CBC & Differential.  Procedure                               Abnormality         Status                     ---------                               -----------         ------                     CBC Auto Differential[[id number]]        Abnormal            Final result                 Please view results for these tests on the individual orders.    Comprehensive Metabolic Panel [[id number]]  (Abnormal) Collected: 05/13/25 1633    Specimen: Blood from Arm, Right Updated: 05/13/25 1703     Glucose 128 mg/dL      BUN 17 mg/dL      Creatinine 1.09 mg/dL      Sodium 136 mmol/L      Potassium 4.2 mmol/L      Chloride 109 mmol/L      CO2 16.7 mmol/L      Calcium 9.1 mg/dL      Total Protein 5.6 g/dL      Albumin 2.9 g/dL      ALT (SGPT) 17 U/L      AST (SGOT) 39 U/L      Alkaline Phosphatase 154 U/L      Total Bilirubin 5.1 mg/dL      Globulin 2.7 gm/dL      A/G Ratio 1.1 g/dL      BUN/Creatinine Ratio 15.6     Anion Gap 10.3 mmol/L      eGFR 57.9 mL/min/1.73     Narrative:      GFR Categories in Chronic Kidney Disease (CKD)              GFR Category          GFR (mL/min/1.73)    Interpretation  G1                    90 or greater        Normal or high (1)  G2                    60-89                Mild decrease (1)  G3a                   45-59                Mild to moderate decrease  G3b                   30-44                Moderate to severe decrease  G4                    15-29                Severe decrease  G5                    14 or less           Kidney failure    (1)In the absence of evidence of kidney disease, neither GFR category G1 or G2 fulfill the criteria for CKD.    eGFR calculation 2021 CKD-EPI creatinine equation, which does not include race as a factor    Lipase [[id number]]   (Normal) Collected: 05/13/25 1633    Specimen: Blood from Arm, Right Updated: 05/13/25 1659     Lipase 54 U/L     Lactic Acid, Plasma [[id number]]  (Normal) Collected: 05/13/25 1633    Specimen: Blood from Arm, Right Updated: 05/13/25 1656     Lactate 1.8 mmol/L     CBC Auto Differential [[id number]]  (Abnormal) Collected: 05/13/25 1633    Specimen: Blood from Arm, Right Updated: 05/13/25 1653     WBC 4.23 10*3/mm3      RBC 3.36 10*6/mm3      Hemoglobin 11.1 g/dL      Hematocrit 33.3 %      MCV 99.1 fL      MCH 33.0 pg      MCHC 33.3 g/dL      RDW 14.7 %      RDW-SD 53.9 fl      MPV 12.5 fL      Platelets 57 10*3/mm3      Neutrophil % 75.0 %      Lymphocyte % 15.8 %      Monocyte % 5.9 %      Eosinophil % 2.6 %      Basophil % 0.5 %      Immature Grans % 0.2 %      Neutrophils, Absolute 3.17 10*3/mm3      Lymphocytes, Absolute 0.67 10*3/mm3      Monocytes, Absolute 0.25 10*3/mm3      Eosinophils, Absolute 0.11 10*3/mm3      Basophils, Absolute 0.02 10*3/mm3      Immature Grans, Absolute 0.01 10*3/mm3      nRBC 0.0 /100 WBC     BNP [[id number]]  (Normal) Collected: 05/13/25 1633    Specimen: Blood from Arm, Right Updated: 05/13/25 1935     proBNP 243.0 pg/mL     Narrative:      This assay is used as an aid in the diagnosis of individuals suspected of having heart failure. It can be used as an aid in the diagnosis of acute decompensated heart failure (ADHF) in patients presenting with signs and symptoms of ADHF to the emergency department (ED). In addition, NT-proBNP of <300 pg/mL indicates ADHF is not likely.    Age Range Result Interpretation  NT-proBNP Concentration (pg/mL:      <50             Positive            >450                   Gray                 300-450                    Negative             <300    50-75           Positive            >900                  Gray                300-900                  Negative            <300      >75             Positive            >1800                  [place]                 300-1800                  Negative            <300    Ammonia [[id number]]  (Normal) Collected: 05/13/25 1742    Specimen: Blood from Arm, Left Updated: 05/13/25 1804     Ammonia 50 umol/L     Urinalysis With Microscopic If Indicated (No Culture) - Urine, Clean Catch [[id number]]  (Normal) Collected: 05/13/25 1818    Specimen: Urine, Clean Catch Updated: 05/13/25 1836     Color, UA Yellow     Appearance, UA Clear     pH, UA 5.5     Specific Gravity, UA 1.007     Glucose, UA Negative     Ketones, UA Negative     Bilirubin, UA Negative     Blood, UA Negative     Protein, UA Negative     Leuk Esterase, UA Negative     Nitrite, UA Negative     Urobilinogen, UA 1.0 E.U./dL    Narrative:      Urine microscopic not indicated.             Imaging:    XR Chest 1 View  Result Date: 5/13/2025  XR CHEST 1 VW Date of Exam: 5/13/2025 7:45 PM EDT Indication: SOA, hx copd Comparison: 9/6/2024 Findings: No focal consolidation. No pneumothorax or pleural effusion. Cardiac size is normal. The visualized clavicles appear intact. No displaced rib fractures. The visualized upper abdomen is normal.     Impression: No acute cardiopulmonary disease. Electronically Signed: Louie Allen MD  5/13/2025 8:04 PM EDT  Workstation ID: [id number]    CT Abdomen Pelvis With Contrast  Result Date: 5/13/2025  CT ABDOMEN PELVIS W CONTRAST Date of Exam: 5/13/2025 5:55 PM EDT Indication: abd pain, diarrhea. Comparison: CT abdomen 10/17/2024. CT abdomen pelvis 10/1/2024. Technique: Axial CT images were obtained of the abdomen and pelvis after the uneventful intravenous administration of iodinated contrast. Reconstructed coronal and sagittal images were also obtained. Automated exposure control and iterative construction methods were used. Findings: Included Chest: Bibasal atelectasis Liver: Cirrhotic morphology.  Gallbladder and biliary tree: No significant abnormality.  Spleen: Cholecystectomy splenomegaly measuring 15 cm in craniocaudal dimension.   Pancreas: No significant abnormality.  Adrenal glands: No significant abnormality.  Kidneys and ureters: No significant abnormality.  Stomach and duodenum:No significant abnormality. Small and large bowel: Partial colectomies.. Colonic diverticulosis. No evidence of bowel obstruction. No significant pericolonic inflammatory changes seen. Peritoneal cavity: Small volume ascites. No free air. Bladder: Under distended and incompletely evaluated.  Pelvic organs: Hysterectomy  Vasculature: Atherosclerotic calcifications. Upper abdominal varices including prominent periesophageal varices.  Lymph nodes: No pathologic appearing lymph nodes by imaging criteria.  Bones and soft tissues: Degenerative changes of the imaged spine. No acute osseous abnormality. Nonspecific anterior abdominal wall fat stranding. Postsurgical changes along the anterior abdominal wall with similar-appearing broad-based fascial defects near the umbilicus.     Impression: No acute findings in the abdomen or pelvis. Cirrhosis with sequela of portal hypertension including splenomegaly, varices, and small volume ascites. Electronically Signed: Kalpesh Betancourt MD  5/13/2025 6:39 PM EDT  Workstation ID: [id number]        Differential Diagnosis and Discussion:    Abdominal Pain: Based on the patient's signs and symptoms, I considered abdominal aortic aneurysm, small bowel obstruction, pancreatitis, acute cholecystitis, acute appendecitis, peptic ulcer disease, gastritis, colitis, endocrine disorders, irritable bowel syndrome and other differential diagnosis an etiology of the patient's abdominal pain.    PROCEDURES:    Labs were collected in the emergency department and all labs were reviewed and interpreted by me.  CT scan was performed in the emergency department and the CT scan radiology impression was interpreted by me.    No orders to display       Procedures    MDM     Amount and/or Complexity of Data Reviewed  Clinical lab tests: reviewed and  ordered  Tests in the radiology section of CPT®: ordered and reviewed  Decide to obtain previous medical records or to obtain history from someone other than the patient: yes    Risk of Complications, Morbidity, and/or Mortality  Presenting problems: moderate  Diagnostic procedures: moderate  Management options: moderate    Patient Progress  Patient progress: stable    Patient presents to the emergency department for evaluation of abdominal pain, nausea, diarrhea. States that symptoms started on Friday. States that pain has gotten worse. Reports that she cannot pinpoint a specific area in her abdomen that hurts. Reports hx of cirrhosis. States that she had a paracentesis done last month and she is established with Dr. Watkins. She called Dr. Watkins's office who recommended for her to come to the ED for further evaluation. Reports dizziness but no confusion. She has not been taking her lactulose. Other medical hx includes COPD, CKD, GERD, Diverticulitis, Esophageal varices.    The patient´s CBC that was reviewed and interpreted by me shows no abnormalities of critical concern. Of note, there is no anemia requiring a blood transfusion and the platelet count is acceptable.     CMP is reviewed by me.  Creatinine is mildly elevated 1.09. eGFR 57.9.  Liver enzymes are mildly elevated. ALT 17, AST 39, . Total bili 5.1.  Bicarb is low at 16.7 with a closed anion gap. Most likely from diarrhea.    Lipase 54. Lactate 1.8.  .  UA is negative for any leukocytes, nitrites, and bacteria.  Ammonia 50.    XR Chest 1 View   Final Result   Impression: No acute cardiopulmonary disease.         Electronically Signed: Louie Allen MD     5/13/2025 8:04 PM EDT     Workstation ID: [id number]      CT Abdomen Pelvis With Contrast   Final Result   Impression:   No acute findings in the abdomen or pelvis.      Cirrhosis with sequela of portal hypertension including splenomegaly, varices, and small volume ascites.          Electronically Signed: Kalpesh Betancourt MD     5/13/2025 6:39 PM EDT     Workstation ID: [id number]        Discussed the imaging findings with the patient.    Discussed this patient with Dr. Santana, hospitalist, who accepts the patient for admission. He also recommends starting the patient on rocephin for possible peritonitis.              Patient Care Considerations:    SEPSIS was considered but is NOT present in the emergency department as SIRS criteria is not present.      Consultants/Shared Management Plan:    Hospitalist: I have discussed the case with Dr. Santana who agrees to accept the patient for admission.  SHARED VISIT: I have discussed the case with my supervising physician, Dr. Rivera who statesthat patient may need admission. Consult hospitalist. The substantive portion of the medical decision was made by the attesting physician who made or approve the management plan and will take responsibility for the patient.  Clinical findings were discussed and ultimate disposition was made in consult with supervising physician.    Social Determinants of Health:    Patient is independent, reliable, and has access to care.       Disposition and Care Coordination:    Admit:   Through independent evaluation of the patient's history, physical, and imperical data, the patient meets criteria for inpatient admission to the hospital.        Final diagnoses:   Hypervolemia, unspecified hypervolemia type   Cirrhosis of liver with ascites, unspecified hepatic cirrhosis type        ED Disposition       ED Disposition   Decision to Admit    Condition   --    Comment   --               This medical record created using voice recognition software.             Riley Smith PA  05/13/25 [id number]

## 2025-05-13 NOTE — TELEPHONE ENCOUNTER
I called and spoke to patient today, she sounded short of breath and agreed she is having more SOB than normal even with her at home O2. Patient has been encouraged to be evaluated in the ER. She is agreeable. Patients abdomen is swollen and I informed her that the ER can perform a paracentesis.

## 2025-05-14 ENCOUNTER — APPOINTMENT (OUTPATIENT)
Dept: INTERVENTIONAL RADIOLOGY/VASCULAR | Facility: HOSPITAL | Age: 60
End: 2025-05-14
Payer: MEDICARE

## 2025-05-14 LAB
ALBUMIN SERPL-MCNC: 2.4 G/DL (ref 3.5–5.2)
ALBUMIN/GLOB SERPL: 1 G/DL
ALP SERPL-CCNC: 121 U/L (ref 39–117)
ALT SERPL W P-5'-P-CCNC: 14 U/L (ref 1–33)
ANION GAP SERPL CALCULATED.3IONS-SCNC: 8 MMOL/L (ref 5–15)
ANISOCYTOSIS BLD QL: NORMAL
APTT PPP: 36.5 SECONDS (ref 24.2–34.2)
AST SERPL-CCNC: 32 U/L (ref 1–32)
BACTERIA UR QL AUTO: ABNORMAL /HPF
BASOPHILS # BLD AUTO: 0.03 10*3/MM3 (ref 0–0.2)
BASOPHILS NFR BLD AUTO: 0.7 % (ref 0–1.5)
BILIRUB SERPL-MCNC: 3.7 MG/DL (ref 0–1.2)
BILIRUB UR QL STRIP: ABNORMAL
BUN SERPL-MCNC: 18 MG/DL (ref 8–23)
BUN/CREAT SERPL: 16.8 (ref 7–25)
CALCIUM SPEC-SCNC: 8.1 MG/DL (ref 8.6–10.5)
CHLORIDE SERPL-SCNC: 111 MMOL/L (ref 98–107)
CLARITY UR: CLEAR
CO2 SERPL-SCNC: 18 MMOL/L (ref 22–29)
COLOR UR: ABNORMAL
CREAT SERPL-MCNC: 1.07 MG/DL (ref 0.57–1)
DEPRECATED RDW RBC AUTO: 52.7 FL (ref 37–54)
EGFRCR SERPLBLD CKD-EPI 2021: 59.2 ML/MIN/1.73
EOSINOPHIL # BLD AUTO: 0.12 10*3/MM3 (ref 0–0.4)
EOSINOPHIL NFR BLD AUTO: 2.8 % (ref 0.3–6.2)
ERYTHROCYTE [DISTWIDTH] IN BLOOD BY AUTOMATED COUNT: 14.7 % (ref 12.3–15.4)
GLOBULIN UR ELPH-MCNC: 2.4 GM/DL
GLUCOSE SERPL-MCNC: 126 MG/DL (ref 65–99)
GLUCOSE UR STRIP-MCNC: NEGATIVE MG/DL
HCT VFR BLD AUTO: 28 % (ref 34–46.6)
HGB BLD-MCNC: 9.4 G/DL (ref 12–15.9)
HGB UR QL STRIP.AUTO: NEGATIVE
HYALINE CASTS UR QL AUTO: ABNORMAL /LPF
IMM GRANULOCYTES # BLD AUTO: 0.01 10*3/MM3 (ref 0–0.05)
IMM GRANULOCYTES NFR BLD AUTO: 0.2 % (ref 0–0.5)
INR PPP: 1.5 (ref 0.86–1.15)
KETONES UR QL STRIP: ABNORMAL
LEUKOCYTE ESTERASE UR QL STRIP.AUTO: ABNORMAL
LYMPHOCYTES # BLD AUTO: 0.68 10*3/MM3 (ref 0.7–3.1)
LYMPHOCYTES NFR BLD AUTO: 15.7 % (ref 19.6–45.3)
MACROCYTES BLD QL SMEAR: NORMAL
MAGNESIUM SERPL-MCNC: 1.7 MG/DL (ref 1.6–2.4)
MCH RBC QN AUTO: 32.8 PG (ref 26.6–33)
MCHC RBC AUTO-ENTMCNC: 33.6 G/DL (ref 31.5–35.7)
MCV RBC AUTO: 97.6 FL (ref 79–97)
MONOCYTES # BLD AUTO: 0.41 10*3/MM3 (ref 0.1–0.9)
MONOCYTES NFR BLD AUTO: 9.5 % (ref 5–12)
NEUTROPHILS NFR BLD AUTO: 3.08 10*3/MM3 (ref 1.7–7)
NEUTROPHILS NFR BLD AUTO: 71.1 % (ref 42.7–76)
NITRITE UR QL STRIP: NEGATIVE
NRBC BLD AUTO-RTO: 0 /100 WBC (ref 0–0.2)
OVALOCYTES BLD QL SMEAR: NORMAL
PH UR STRIP.AUTO: 5.5 [PH] (ref 5–8)
PHOSPHATE SERPL-MCNC: 3 MG/DL (ref 2.5–4.5)
PLATELET # BLD AUTO: 39 10*3/MM3 (ref 140–450)
PMV BLD AUTO: 12.1 FL (ref 6–12)
POLYCHROMASIA BLD QL SMEAR: NORMAL
POTASSIUM SERPL-SCNC: 3.6 MMOL/L (ref 3.5–5.2)
PROT SERPL-MCNC: 4.8 G/DL (ref 6–8.5)
PROT UR QL STRIP: NEGATIVE
PROTHROMBIN TIME: 18.8 SECONDS (ref 11.8–14.9)
RBC # BLD AUTO: 2.87 10*6/MM3 (ref 3.77–5.28)
RBC # UR STRIP: ABNORMAL /HPF
REF LAB TEST METHOD: ABNORMAL
SMALL PLATELETS BLD QL SMEAR: NORMAL
SODIUM SERPL-SCNC: 137 MMOL/L (ref 136–145)
SP GR UR STRIP: >1.03 (ref 1–1.03)
SQUAMOUS #/AREA URNS HPF: ABNORMAL /HPF
UROBILINOGEN UR QL STRIP: ABNORMAL
WBC # UR STRIP: ABNORMAL /HPF
WBC MORPH BLD: NORMAL
WBC NRBC COR # BLD AUTO: 4.33 10*3/MM3 (ref 3.4–10.8)

## 2025-05-14 PROCEDURE — 83735 ASSAY OF MAGNESIUM: CPT | Performed by: STUDENT IN AN ORGANIZED HEALTH CARE EDUCATION/TRAINING PROGRAM

## 2025-05-14 PROCEDURE — 84100 ASSAY OF PHOSPHORUS: CPT | Performed by: STUDENT IN AN ORGANIZED HEALTH CARE EDUCATION/TRAINING PROGRAM

## 2025-05-14 PROCEDURE — 85007 BL SMEAR W/DIFF WBC COUNT: CPT | Performed by: STUDENT IN AN ORGANIZED HEALTH CARE EDUCATION/TRAINING PROGRAM

## 2025-05-14 PROCEDURE — 99221 1ST HOSP IP/OBS SF/LOW 40: CPT | Performed by: INTERNAL MEDICINE

## 2025-05-14 PROCEDURE — 99232 SBSQ HOSP IP/OBS MODERATE 35: CPT | Performed by: FAMILY MEDICINE

## 2025-05-14 PROCEDURE — 81001 URINALYSIS AUTO W/SCOPE: CPT | Performed by: PHYSICIAN ASSISTANT

## 2025-05-14 PROCEDURE — 85610 PROTHROMBIN TIME: CPT | Performed by: FAMILY MEDICINE

## 2025-05-14 PROCEDURE — 80053 COMPREHEN METABOLIC PANEL: CPT | Performed by: STUDENT IN AN ORGANIZED HEALTH CARE EDUCATION/TRAINING PROGRAM

## 2025-05-14 PROCEDURE — 25010000002 CEFTRIAXONE PER 250 MG: Performed by: STUDENT IN AN ORGANIZED HEALTH CARE EDUCATION/TRAINING PROGRAM

## 2025-05-14 PROCEDURE — 85025 COMPLETE CBC W/AUTO DIFF WBC: CPT | Performed by: STUDENT IN AN ORGANIZED HEALTH CARE EDUCATION/TRAINING PROGRAM

## 2025-05-14 PROCEDURE — 85730 THROMBOPLASTIN TIME PARTIAL: CPT | Performed by: FAMILY MEDICINE

## 2025-05-14 PROCEDURE — 76942 ECHO GUIDE FOR BIOPSY: CPT

## 2025-05-14 PROCEDURE — 94799 UNLISTED PULMONARY SVC/PX: CPT

## 2025-05-14 PROCEDURE — 94761 N-INVAS EAR/PLS OXIMETRY MLT: CPT

## 2025-05-14 RX ORDER — LIDOCAINE HYDROCHLORIDE 20 MG/ML
20 INJECTION, SOLUTION INFILTRATION; PERINEURAL ONCE
Status: DISCONTINUED | OUTPATIENT
Start: 2025-05-14 | End: 2025-05-14

## 2025-05-14 RX ORDER — FUROSEMIDE 20 MG/1
20 TABLET ORAL DAILY
Status: DISCONTINUED | OUTPATIENT
Start: 2025-05-14 | End: 2025-05-20 | Stop reason: HOSPADM

## 2025-05-14 RX ORDER — OXYCODONE HYDROCHLORIDE 5 MG/1
5 TABLET ORAL ONCE AS NEEDED
Refills: 0 | Status: COMPLETED | OUTPATIENT
Start: 2025-05-14 | End: 2025-05-14

## 2025-05-14 RX ORDER — SPIRONOLACTONE 25 MG/1
50 TABLET ORAL DAILY
Status: DISCONTINUED | OUTPATIENT
Start: 2025-05-14 | End: 2025-05-20 | Stop reason: HOSPADM

## 2025-05-14 RX ORDER — INDOMETHACIN 25 MG/1
10 CAPSULE ORAL ONCE
Status: DISCONTINUED | OUTPATIENT
Start: 2025-05-14 | End: 2025-05-14

## 2025-05-14 RX ORDER — FOLIC ACID 1 MG/1
1 TABLET ORAL DAILY
Status: DISCONTINUED | OUTPATIENT
Start: 2025-05-14 | End: 2025-05-20 | Stop reason: HOSPADM

## 2025-05-14 RX ADMIN — Medication 10 ML: at 09:34

## 2025-05-14 RX ADMIN — Medication 10 ML: at 20:11

## 2025-05-14 RX ADMIN — CEFTRIAXONE SODIUM 2000 MG: 2 INJECTION, POWDER, FOR SOLUTION INTRAMUSCULAR; INTRAVENOUS at 12:01

## 2025-05-14 RX ADMIN — OXYCODONE 5 MG: 5 TABLET ORAL at 20:11

## 2025-05-14 NOTE — CONSULTS
[facility]  Initial Inpatient Consult Note    Referring Provider: Hospitalist    Reason for Consultation: Abdominal pain    Subjective     History of present illness:    59 y.o. female with a past medical history of COPD, CKD, Reich liver cirrhosis, GERD, esophageal varices presented to the ED for evaluation of abdominal pain.  Patient states her abdominal pain began on Friday and has progressively worsened.  She states her pain is constant and was initially generalized abdominal pain but since yesterday she has been experiencing more in the left lower to mid quadrant region.  She describes her discomfort as a sharp stabbing pain that does not radiate.  She states she has also had diarrhea for the last few days-going about 3 times per day but has not had any bowel movement since yesterday.  Patient also admits to having nausea without vomiting.  She is also experiencing dark urine.  Patient denies vomiting, signs of bleeding, NSAID/blood thinner use, and fevers.    05/13/2025 CT abdomen/pelvis  No acute findings in the abdomen or pelvis.     Cirrhosis with sequela of portal hypertension including splenomegaly, varices, and small volume ascites.    Hospitalist has ordered paracentesis to rule out SBP    05/14/2025  PT-18.8  INR-1.50  PTT-36.5  AST/ALT-normal  Alk phos-121  Total bili-3.7  Albumin-2.4  Hgb-9.4  HCT-28.0  Platelets-39    Patient's last EGD was performed on 12/21/2023 by Dr. Watkins  Findings included-  Nonsevere esophagitis with no bleeding.  Grade 3 esophageal varices.  Completely eradicated.  Banded.  Normal stomach.  Normal duodenum.  Patient was started on nadolol but has been unable to tolerate.    Past Medical History:  Past Medical History:   Diagnosis Date    Anemia     Chronic kidney disease     Cirrhosis     liver    Cirrhosis, nonalcoholic     Colon polyps     COPD (chronic obstructive pulmonary disease)     Depression     Diabetes     Diverticulitis     Esophageal  varices     Fatty liver     GERD (gastroesophageal reflux disease)     Hematuria     Hernia     High blood pressure     High cholesterol     Interstitial cystitis     Migraine headache     Narcolepsy     Pancreatitis     PONV (postoperative nausea and vomiting)     Sleep apnea     Spinal headache     DURING PREGNANCY     Past Surgical History:  Past Surgical History:   Procedure Laterality Date    ABDOMINAL SURGERY      BLADDER REPAIR  2009    CHOLECYSTECTOMY      COLONOSCOPY  2014 x2 2020    ENDOSCOPY  2014 2020    ENDOSCOPY N/A 07/12/2021    Procedure: ESOPHAGOGASTRODUODENOSCOPY with biopsies;  Surgeon: Jesse Watkins MD;  Location: [facility] ENDOSCOPY;  Service: Gastroenterology;  Laterality: N/A;  esophageal varices    ENDOSCOPY N/A 03/25/2022    Procedure: ESOPHAGOGASTRODUODENOSCOPY WITH BIOPSIES;  Surgeon: Jesse Watkins MD;  Location: [facility] ENDOSCOPY;  Service: Gastroenterology;  Laterality: N/A;  ESOPHAGEAL VARICIES    ENDOSCOPY N/A 09/28/2023    Procedure: ESOPHAGOGASTRODUODENOSCOPY WITH COLD BIOPSIES;  Surgeon: Jesse Watkins MD;  Location: [facility] ENDOSCOPY;  Service: Gastroenterology;  Laterality: N/A;  GASTRITIS, ESOPHAGEAL VARICES    ENDOSCOPY N/A 11/17/2023    Procedure: ESOPHAGOGASTRODUODENOSCOPY WITH BIOPSIES AND  VARICEAL BANDING X 4;  Surgeon: Jesse Watkins MD;  Location: [facility] ENDOSCOPY;  Service: Gastroenterology;  Laterality: N/A;  GASTRITIS, PORTAL HYPERTENSIVE GASTROPATHY, ESOPHAGEAL VARICIES    ENDOSCOPY N/A 12/21/2023    Procedure: ESOPHAGOGASTRODUODENOSCOPY WITH BIOPSIES AND ESOPHAGEAL BANDING;  Surgeon: Jesse Watkins MD;  Location: [facility] ENDOSCOPY;  Service: Gastroenterology;  Laterality: N/A;  ESOPHAGEAL VARICES, GASTRITIS    HAND SURGERY      HERNIA REPAIR      HYSTERECTOMY  2009    ILEOSTOMY  07/2022    OTHER SURGICAL HISTORY      rectocele repair    UPPER GASTROINTESTINAL ENDOSCOPY        Social History:   Social History     Tobacco Use     Smoking status: Never     Passive exposure: Past    Smokeless tobacco: Never   Substance Use Topics    Alcohol use: Never      Family History:  Family History   Problem Relation Age of Onset    Dementia Mother     Liver cancer Father     Cirrhosis Sister         She  2023    Malig Hyperthermia Neg Hx     Colon cancer Neg Hx     Esophageal cancer Neg Hx        Home Meds:  Medications Prior to Admission   Medication Sig Dispense Refill Last Dose/Taking    albuterol sulfate  (90 Base) MCG/ACT inhaler Inhale 2 puffs Every 4 (Four) Hours As Needed.   Past Week    folic acid (FOLVITE) 1 MG tablet Take 1 tablet by mouth Daily.   2025    furosemide (LASIX) 20 MG tablet Take 1 tablet by mouth Daily. 30 tablet 11 2025    pantoprazole (PROTONIX) 40 MG EC tablet Take 1 tablet by mouth Daily.   2025    spironolactone (ALDACTONE) 50 MG tablet Take 1 tablet by mouth Daily. 90 tablet 5 2025     Current Meds:   cefTRIAXone, 2,000 mg, Intravenous, Q24H  pantoprazole, 40 mg, Oral, Daily  sodium chloride, 10 mL, Intravenous, Q12H      Allergies:  No Known Allergies  Review of Systems  Pertinent items are noted in HPI     Objective     Vital Signs  Temp:  [97.5 °F (36.4 °C)-98.1 °F (36.7 °C)] 98.1 °F (36.7 °C)  Heart Rate:  [74-83] 76  Resp:  [16-20] 18  BP: ()/(43-59) 99/43  Physical Exam:  General Appearance:    Alert, cooperative, in no acute distress   Head:    Normocephalic, without obvious abnormality, atraumatic   Eyes:          conjunctivae and sclerae normal, no icterus   Throat:   no thrush, oral mucosa moist   Neck:   Supple, no adenopathy   Lungs:     Unlabored breathing    Heart:    Regular rhythm and normal rate    Chest Wall:    No abnormalities observed   Abdomen:     Soft, non distended, tender in LUQ/LLQ   Extremities:   no edema, no redness   Skin:   No bruising or rash   Psychiatric:  normal mood and insight     Results Review:   I reviewed the patient's new clinical  results.    Results from last 7 days   Lab Units 05/14/25  0530 05/13/25  1633   WBC 10*3/mm3 4.33 4.23   HEMOGLOBIN g/dL 9.4* 11.1*   HEMATOCRIT % 28.0* 33.3*   PLATELETS 10*3/mm3 39* 57*     Results from last 7 days   Lab Units 05/14/25  0530 05/13/25  1633   SODIUM mmol/L 137 136   POTASSIUM mmol/L 3.6 4.2   CHLORIDE mmol/L 111* 109*   CO2 mmol/L 18.0* 16.7*   BUN mg/dL 18 17   CREATININE mg/dL 1.07* 1.09*   CALCIUM mg/dL 8.1* 9.1   BILIRUBIN mg/dL 3.7* 5.1*   ALK PHOS U/L 121* 154*   ALT (SGPT) U/L 14 17   AST (SGOT) U/L 32 39*   GLUCOSE mg/dL 126* 128*     Results from last 7 days   Lab Units 05/14/25  0747   INR  1.50*     Lab Results   Lab Value Date/Time    LIPASE 54 05/13/2025 1633    LIPASE 56 10/01/2024 1143    LIPASE 185 (H) 09/18/2024 0512       Radiology:  XR Chest 1 View  Result Date: 5/13/2025  Impression: No acute cardiopulmonary disease. Electronically Signed: Louie Allen MD  5/13/2025 8:04 PM EDT  Workstation ID: [id number]    CT Abdomen Pelvis With Contrast  Result Date: 5/13/2025  Impression: No acute findings in the abdomen or pelvis. Cirrhosis with sequela of portal hypertension including splenomegaly, varices, and small volume ascites. Electronically Signed: Kalpesh Betancourt MD  5/13/2025 6:39 PM EDT  Workstation ID: [id number]       Assessment & Plan     Abdominal pain       Assessment:  Abdominal pain  Reich cirrhosis  Nausea    Plan:  Patient is scheduled for paracentesis to rule out SBP  Patient continues to take diuretics  Patient had CT abdomen/pelvis with no acute findings  Patient's MELD score 17  Will await paracentesis results  Patient's stool studies haven't been collected due to patient not having bowel movement since yesterday  Dr. Watkins discussed with her in office about sending her to [facility] transplant clinic  Continue current treatment  Patient understands and agrees to the plan      I discussed the patients findings and my recommendations with patient.    Electronically signed  by ANDRE Villalba, 05/14/25, 11:22 AM EDT.    Patient seen and agree with above.  Will wait till paracentesis is done.  If patient continue to have abdominal per abdominal pain then we will consider doing upper endoscopy.  Risk and benefits have been discussed with the patient.

## 2025-05-14 NOTE — H&P (VIEW-ONLY)
[facility]  Initial Inpatient Consult Note    Referring Provider: Hospitalist    Reason for Consultation: Abdominal pain    Subjective     History of present illness:    59 y.o. female with a past medical history of COPD, CKD, Reich liver cirrhosis, GERD, esophageal varices presented to the ED for evaluation of abdominal pain.  Patient states her abdominal pain began on Friday and has progressively worsened.  She states her pain is constant and was initially generalized abdominal pain but since yesterday she has been experiencing more in the left lower to mid quadrant region.  She describes her discomfort as a sharp stabbing pain that does not radiate.  She states she has also had diarrhea for the last few days-going about 3 times per day but has not had any bowel movement since yesterday.  Patient also admits to having nausea without vomiting.  She is also experiencing dark urine.  Patient denies vomiting, signs of bleeding, NSAID/blood thinner use, and fevers.    05/13/2025 CT abdomen/pelvis  No acute findings in the abdomen or pelvis.     Cirrhosis with sequela of portal hypertension including splenomegaly, varices, and small volume ascites.    Hospitalist has ordered paracentesis to rule out SBP    05/14/2025  PT-18.8  INR-1.50  PTT-36.5  AST/ALT-normal  Alk phos-121  Total bili-3.7  Albumin-2.4  Hgb-9.4  HCT-28.0  Platelets-39    Patient's last EGD was performed on 12/21/2023 by Dr. Watkins  Findings included-  Nonsevere esophagitis with no bleeding.  Grade 3 esophageal varices.  Completely eradicated.  Banded.  Normal stomach.  Normal duodenum.  Patient was started on nadolol but has been unable to tolerate.    Past Medical History:  Past Medical History:   Diagnosis Date    Anemia     Chronic kidney disease     Cirrhosis     liver    Cirrhosis, nonalcoholic     Colon polyps     COPD (chronic obstructive pulmonary disease)     Depression     Diabetes     Diverticulitis     Esophageal  varices     Fatty liver     GERD (gastroesophageal reflux disease)     Hematuria     Hernia     High blood pressure     High cholesterol     Interstitial cystitis     Migraine headache     Narcolepsy     Pancreatitis     PONV (postoperative nausea and vomiting)     Sleep apnea     Spinal headache     DURING PREGNANCY     Past Surgical History:  Past Surgical History:   Procedure Laterality Date    ABDOMINAL SURGERY      BLADDER REPAIR  2009    CHOLECYSTECTOMY      COLONOSCOPY  2014 x2 2020    ENDOSCOPY  2014 2020    ENDOSCOPY N/A 07/12/2021    Procedure: ESOPHAGOGASTRODUODENOSCOPY with biopsies;  Surgeon: Jesse Watkins MD;  Location: [facility] ENDOSCOPY;  Service: Gastroenterology;  Laterality: N/A;  esophageal varices    ENDOSCOPY N/A 03/25/2022    Procedure: ESOPHAGOGASTRODUODENOSCOPY WITH BIOPSIES;  Surgeon: Jesse Watkins MD;  Location: [facility] ENDOSCOPY;  Service: Gastroenterology;  Laterality: N/A;  ESOPHAGEAL VARICIES    ENDOSCOPY N/A 09/28/2023    Procedure: ESOPHAGOGASTRODUODENOSCOPY WITH COLD BIOPSIES;  Surgeon: Jesse Watkins MD;  Location: [facility] ENDOSCOPY;  Service: Gastroenterology;  Laterality: N/A;  GASTRITIS, ESOPHAGEAL VARICES    ENDOSCOPY N/A 11/17/2023    Procedure: ESOPHAGOGASTRODUODENOSCOPY WITH BIOPSIES AND  VARICEAL BANDING X 4;  Surgeon: Jesse Watkins MD;  Location: [facility] ENDOSCOPY;  Service: Gastroenterology;  Laterality: N/A;  GASTRITIS, PORTAL HYPERTENSIVE GASTROPATHY, ESOPHAGEAL VARICIES    ENDOSCOPY N/A 12/21/2023    Procedure: ESOPHAGOGASTRODUODENOSCOPY WITH BIOPSIES AND ESOPHAGEAL BANDING;  Surgeon: Jesse Watkins MD;  Location: [facility] ENDOSCOPY;  Service: Gastroenterology;  Laterality: N/A;  ESOPHAGEAL VARICES, GASTRITIS    HAND SURGERY      HERNIA REPAIR      HYSTERECTOMY  2009    ILEOSTOMY  07/2022    OTHER SURGICAL HISTORY      rectocele repair    UPPER GASTROINTESTINAL ENDOSCOPY        Social History:   Social History     Tobacco Use     Smoking status: Never     Passive exposure: Past    Smokeless tobacco: Never   Substance Use Topics    Alcohol use: Never      Family History:  Family History   Problem Relation Age of Onset    Dementia Mother     Liver cancer Father     Cirrhosis Sister         She  2023    Malig Hyperthermia Neg Hx     Colon cancer Neg Hx     Esophageal cancer Neg Hx        Home Meds:  Medications Prior to Admission   Medication Sig Dispense Refill Last Dose/Taking    albuterol sulfate  (90 Base) MCG/ACT inhaler Inhale 2 puffs Every 4 (Four) Hours As Needed.   Past Week    folic acid (FOLVITE) 1 MG tablet Take 1 tablet by mouth Daily.   2025    furosemide (LASIX) 20 MG tablet Take 1 tablet by mouth Daily. 30 tablet 11 2025    pantoprazole (PROTONIX) 40 MG EC tablet Take 1 tablet by mouth Daily.   2025    spironolactone (ALDACTONE) 50 MG tablet Take 1 tablet by mouth Daily. 90 tablet 5 2025     Current Meds:   cefTRIAXone, 2,000 mg, Intravenous, Q24H  pantoprazole, 40 mg, Oral, Daily  sodium chloride, 10 mL, Intravenous, Q12H      Allergies:  No Known Allergies  Review of Systems  Pertinent items are noted in HPI     Objective     Vital Signs  Temp:  [97.5 °F (36.4 °C)-98.1 °F (36.7 °C)] 98.1 °F (36.7 °C)  Heart Rate:  [74-83] 76  Resp:  [16-20] 18  BP: ()/(43-59) 99/43  Physical Exam:  General Appearance:    Alert, cooperative, in no acute distress   Head:    Normocephalic, without obvious abnormality, atraumatic   Eyes:          conjunctivae and sclerae normal, no icterus   Throat:   no thrush, oral mucosa moist   Neck:   Supple, no adenopathy   Lungs:     Unlabored breathing    Heart:    Regular rhythm and normal rate    Chest Wall:    No abnormalities observed   Abdomen:     Soft, non distended, tender in LUQ/LLQ   Extremities:   no edema, no redness   Skin:   No bruising or rash   Psychiatric:  normal mood and insight     Results Review:   I reviewed the patient's new clinical  results.    Results from last 7 days   Lab Units 05/14/25  0530 05/13/25  1633   WBC 10*3/mm3 4.33 4.23   HEMOGLOBIN g/dL 9.4* 11.1*   HEMATOCRIT % 28.0* 33.3*   PLATELETS 10*3/mm3 39* 57*     Results from last 7 days   Lab Units 05/14/25  0530 05/13/25  1633   SODIUM mmol/L 137 136   POTASSIUM mmol/L 3.6 4.2   CHLORIDE mmol/L 111* 109*   CO2 mmol/L 18.0* 16.7*   BUN mg/dL 18 17   CREATININE mg/dL 1.07* 1.09*   CALCIUM mg/dL 8.1* 9.1   BILIRUBIN mg/dL 3.7* 5.1*   ALK PHOS U/L 121* 154*   ALT (SGPT) U/L 14 17   AST (SGOT) U/L 32 39*   GLUCOSE mg/dL 126* 128*     Results from last 7 days   Lab Units 05/14/25  0747   INR  1.50*     Lab Results   Lab Value Date/Time    LIPASE 54 05/13/2025 1633    LIPASE 56 10/01/2024 1143    LIPASE 185 (H) 09/18/2024 0512       Radiology:  XR Chest 1 View  Result Date: 5/13/2025  Impression: No acute cardiopulmonary disease. Electronically Signed: Louie Allen MD  5/13/2025 8:04 PM EDT  Workstation ID: [id number]    CT Abdomen Pelvis With Contrast  Result Date: 5/13/2025  Impression: No acute findings in the abdomen or pelvis. Cirrhosis with sequela of portal hypertension including splenomegaly, varices, and small volume ascites. Electronically Signed: Kalpesh Betancourt MD  5/13/2025 6:39 PM EDT  Workstation ID: [id number]       Assessment & Plan     Abdominal pain       Assessment:  Abdominal pain  Reich cirrhosis  Nausea    Plan:  Patient is scheduled for paracentesis to rule out SBP  Patient continues to take diuretics  Patient had CT abdomen/pelvis with no acute findings  Patient's MELD score 17  Will await paracentesis results  Patient's stool studies haven't been collected due to patient not having bowel movement since yesterday  Dr. Watkins discussed with her in office about sending her to [facility]  Continue current treatment  Patient understands and agrees to the plan      I discussed the patients findings and my recommendations with patient.    Electronically signed  by ANDRE Villalba, 05/14/25, 11:22 AM EDT.    Patient seen and agree with above.  Will wait till paracentesis is done.  If patient continue to have abdominal per abdominal pain then we will consider doing upper endoscopy.  Risk and benefits have been discussed with the patient.

## 2025-05-14 NOTE — PROGRESS NOTES
[facility]   Hospitalist Progress Note  Date: 2025  Patient Name: Tawny Lennon  : 1965  MRN: [id number]  Date of admission: 2025      Subjective   Subjective     Chief complaint: Abdominal pain    Summary:  59-year-old female history of COPD, CKD stage IIIa, MEDRANO liver cirrhosis, GERD without esophagitis, esophageal varices, hospitalized on 2025 chief complaint of worsening abdominal pain for several days, with intermittent chills at home with increased shortness of breath and diarrhea.  Hospitalized for further monitoring and management of concern for SBP, in the setting of possible gastroenteritis, GI consulted, IR guided paracentesis ordered, placed on empiric antibiotics.  Pancytopenia likely due to underlying liver disease.    Interval follow-up: Seen and examined, no distress, no events overnight, still has abdominal pain, less diarrhea, still cramping in her abdomen diffuse.  Notes dark urine, thought there was blood but repeat urinalysis was negative for blood.  Seems to be hide urobilinogen and bilirubin.  Plans for IR guided source paracentesis.  Platelets 39,000, hemoglobin 9.4, white blood cell count 4000, creatinine 1.07, bicarb 18, potassium 3.6, sodium 137.    Review of systems:  All systems reviewed and negative except for weakness, fatigue, abdominal pain    Objective   Objective     Vitals:   Temp:  [97.5 °F (36.4 °C)-98.1 °F (36.7 °C)] 98.1 °F (36.7 °C)  Heart Rate:  [74-83] 76  Resp:  [16-20] 18  BP: ()/(43-59) 99/43  Physical Exam               Constitutional: Awake, alert, no acute distress laying in bed              Eyes: Pupils equal, sclerae anicteric, no conjunctival injection              HENT: NCAT, mucous membranes moist              Respiratory: Clear to auscultation bilaterally, nonlabored respirations               Cardiovascular: RRR, no murmurs              Gastrointestinal: soft, mildly distended, diffuse abdominal tenderness               Musculoskeletal: No bilateral ankle edema, no clubbing or cyanosis to extremities              Neurologic: Oriented x 3, strength symmetric in all extremities, Cranial Nerves grossly intact to confrontation, speech clear    Result Review    Result Review:  I have personally reviewed the pertinent results from the past 24 hours to 5/14/2025 12:53 EDT and agree with these findings:  [x]  Laboratory   CBC          4/14/2025    10:36 5/13/2025    16:33 5/14/2025    05:30   CBC   WBC  4.23  4.33    RBC  3.36  2.87    Hemoglobin  11.1  9.4    Hematocrit  33.3  28.0    MCV  99.1  97.6    MCH  33.0  32.8    MCHC  33.3  33.6    RDW  14.7  14.7    Platelets 52  57  39      BMP          11/19/2024    11:00 5/13/2025    16:33 5/14/2025    05:30   BMP   Glucose 120         BUN 19     17  18    Creatinine 1.1     1.09  1.07    Sodium 139     136  137    Potassium 4.2     4.2  3.6    Chloride 107     109  111    CO2 22     16.7  18.0    Calcium 8.9     9.1  8.1       Details          This result is from an external source.             LIVER FUNCTION TESTS:      Lab 05/14/25  0530 05/13/25  1633   TOTAL PROTEIN 4.8* 5.6*   ALBUMIN 2.4* 2.9*   GLOBULIN 2.4 2.7   ALT (SGPT) 14 17   AST (SGOT) 32 39*   BILIRUBIN 3.7* 5.1*   ALK PHOS 121* 154*   LIPASE  --  54       [x]  Microbiology   Microbiology Results (last 10 days)       ** No results found for the last 240 hours. **              [x]  Radiology XR Chest 1 View  Result Date: 5/13/2025  Impression: No acute cardiopulmonary disease. Electronically Signed: Louie Allen MD  5/13/2025 8:04 PM EDT  Workstation ID: [id number]    CT Abdomen Pelvis With Contrast  Result Date: 5/13/2025  Impression: No acute findings in the abdomen or pelvis. Cirrhosis with sequela of portal hypertension including splenomegaly, varices, and small volume ascites. Electronically Signed: Kalpesh Betancourt MD  5/13/2025 6:39 PM EDT  Workstation ID: [id number]        []  EKG/Telemetry   No orders to display       []   Cardiology/Vascular   []  Pathology  [x]  Old records  []  Other:    Assessment & Plan   Assessment / Plan     Assessment/Plan:  Assessment:  Diffuse abdominal pain  Concern for bacterial peritonitis  Small ascites  Diarrhea-possible gastroenteritis  Chronic thrombocytopenia  MEDRANO liver cirrhosis  Hyperbilirubinemia due to above  CKD stage IIIa  COPD  GERD  History of esophageal varices    Plan:  Labs and imaging reviewed  Continue ceftriaxone  IR guided paracentesis  GI consulted, follow-up recommendations  Continue Protonix 40 mg daily  Continue ceftriaxone 2 g IV daily  One-time dose of sodium bicarb  Continue monitoring potassium  N.p.o. for possible intervention, resume diet if no interventions are being pursued  Check stool for enteric panel C. difficile  PT/OT  Following blood counts  A.m. labs  Full code  DVT prophylaxis with SCDs  Clinical course to dictate further management  Discussed with nurse at the bedside    VTE Prophylaxis:  Mechanical VTE prophylaxis orders are present.        CODE STATUS:   Code Status (Patient has no pulse and is not breathing): CPR (Attempt to Resuscitate)  Medical Interventions (Patient has pulse or is breathing): Full Support  Level Of Support Discussed With: Patient        Electronically signed by Nicole Conklin MD, 5/14/2025, 12:53 EDT.    Portions of this documentation were transcribed electronically from a voice recognition software.  I confirm all data accurately represents the service(s) I performed at today's visit.

## 2025-05-14 NOTE — PLAN OF CARE
Goal Outcome Evaluation:  Plan of Care Reviewed With: patient   Pt A&Ox4. Pt c/o abd tenderness. Pt has been NPO all shift for possible paracentesis, however did not get one done this shift. Will message MD for updated diet orders. Still awaiting on stool sample from pt. Urine culture collected. Continue plan of care.

## 2025-05-14 NOTE — PLAN OF CARE
Goal Outcome Evaluation:  Plan of Care Reviewed With: patient        Progress: no change     Pt admitted from ER last night. Pt has been NPO since midnight as ordered. Pt complaining of intermittent abdominal pain in the left lower quadrant. Pt placed in Isolation for rule out C-Diff but has not had a bowel movement since arriving to the floor. Pt's urine is orange in color and last bilirubin was 5.1. Pt reports 7 pound weight gain since Friday May 9. Last BP 96/43 with remainder of VS stable with no s/s of distress noted.

## 2025-05-14 NOTE — H&P
[facility] HISTORY AND PHYSICAL  Date: 2025   Patient Name: Tawny Lennon  : 1965  MRN: [id number]  Primary Care Physician:  Zain Valentine MD  Date of admission: 2025    Subjective   Subjective     Chief Complaint: Abdominal pain    HPI:    Tawny Lennon is a 59 y.o. female with a past medical history of COPD, CKD, Medrano liver cirrhosis, GERD, esophageal varices presented to the ED for evaluation of abdominal pain that started on Friday and has been progressively worsening.  Continuous pain.  Exacerbates when she sits up.  Initially all over the abdomen, today it is more in the left lower quadrant region.  Noted to have diarrhea since last few days.  Associated with nausea, chills.  Denies any fevers, chest pain. Mild intermittent shortness of breath.  Denies any cough. Patient had a paracentesis on 2025 and fluid analysis showed 339 nucleated cells with a neutrophil count of 41.  Negative cultures.    Upon arrival, vital signs temperature 97.5, pulse 83, respiratory 20, blood pressure 124/54 on room air saturating at 91%.  Labs showed chloride 109 bicarb 16.7, creatinine 1.09, , AST/ALT 39/17, total bili 5.1, ammonia 50, normal lactic acid, normal lipase, WBC 4.23, hemoglobin 7.1, platelets 57, urinalysis is noninfectious.  Chest x-ray showed no acute abnormality.  CT abdomen pelvis with contrast showed no acute findings in the abdominal pelvis.  Cirrhosis with sequela of portal hypertension including splenomegaly varices and small volume ascites.  Patient admitted for further evaluation and management of abdominal pain, MEDRANO liver cirrhosis.    Personal History     Past Medical History:   Diagnosis Date    Anemia     Chronic kidney disease     Cirrhosis     liver    Cirrhosis, nonalcoholic     Colon polyps     COPD (chronic obstructive pulmonary disease)     Depression     Diabetes     Diverticulitis     Esophageal varices     Fatty liver     GERD  (gastroesophageal reflux disease)     Hematuria     Hernia     High blood pressure     High cholesterol     Interstitial cystitis     Migraine headache     Narcolepsy     Pancreatitis     PONV (postoperative nausea and vomiting)     Sleep apnea     Spinal headache     DURING PREGNANCY         Past Surgical History:   Procedure Laterality Date    ABDOMINAL SURGERY      BLADDER REPAIR  2009    CHOLECYSTECTOMY      COLONOSCOPY  2014 x2 2020    ENDOSCOPY  2014 2020    ENDOSCOPY N/A 07/12/2021    Procedure: ESOPHAGOGASTRODUODENOSCOPY with biopsies;  Surgeon: Jesse Watkins MD;  Location: [facility] ENDOSCOPY;  Service: Gastroenterology;  Laterality: N/A;  esophageal varices    ENDOSCOPY N/A 03/25/2022    Procedure: ESOPHAGOGASTRODUODENOSCOPY WITH BIOPSIES;  Surgeon: Jesse Watkins MD;  Location: [facility] ENDOSCOPY;  Service: Gastroenterology;  Laterality: N/A;  ESOPHAGEAL VARICIES    ENDOSCOPY N/A 09/28/2023    Procedure: ESOPHAGOGASTRODUODENOSCOPY WITH COLD BIOPSIES;  Surgeon: Jesse Watkins MD;  Location: [facility] ENDOSCOPY;  Service: Gastroenterology;  Laterality: N/A;  GASTRITIS, ESOPHAGEAL VARICES    ENDOSCOPY N/A 11/17/2023    Procedure: ESOPHAGOGASTRODUODENOSCOPY WITH BIOPSIES AND  VARICEAL BANDING X 4;  Surgeon: Jesse Watkins MD;  Location: [facility] ENDOSCOPY;  Service: Gastroenterology;  Laterality: N/A;  GASTRITIS, PORTAL HYPERTENSIVE GASTROPATHY, ESOPHAGEAL VARICIES    ENDOSCOPY N/A 12/21/2023    Procedure: ESOPHAGOGASTRODUODENOSCOPY WITH BIOPSIES AND ESOPHAGEAL BANDING;  Surgeon: Jesse Watkins MD;  Location: [facility] ENDOSCOPY;  Service: Gastroenterology;  Laterality: N/A;  ESOPHAGEAL VARICES, GASTRITIS    HAND SURGERY      HERNIA REPAIR      HYSTERECTOMY  2009    ILEOSTOMY  07/2022    OTHER SURGICAL HISTORY      rectocele repair    UPPER GASTROINTESTINAL ENDOSCOPY           Family History   Problem Relation Age of Onset    Dementia Mother     Liver cancer Father     Cirrhosis  Sister         She  2023    Malig Hyperthermia Neg Hx     Colon cancer Neg Hx     Esophageal cancer Neg Hx          Social History     Socioeconomic History    Marital status:    Tobacco Use    Smoking status: Never     Passive exposure: Past    Smokeless tobacco: Never   Vaping Use    Vaping status: Never Used   Substance and Sexual Activity    Alcohol use: Never    Drug use: Never    Sexual activity: Not Currently     Partners: Male     Birth control/protection: None, Hysterectomy         Home Medications:  albuterol sulfate HFA, folic acid, furosemide, pantoprazole, and spironolactone    Allergies:  No Known Allergies    Objective   Objective     Vitals:   Temp:  [97.5 °F (36.4 °C)] 97.5 °F (36.4 °C)  Heart Rate:  [74-83] 75  Resp:  [16-20] 18  BP: (102-124)/(50-59) 102/52    Physical Exam    Constitutional: Awake, alert, no acute distress   Eyes: Pupils equal, sclerae anicteric, no conjunctival injection   HENT: NCAT, mucous membranes moist   Respiratory: Clear to auscultation bilaterally, nonlabored respirations    Cardiovascular: RRR, no murmurs   Gastrointestinal: soft, mildly distended, diffuse abdominal tenderness   Musculoskeletal: No bilateral ankle edema, no clubbing or cyanosis to extremities   Neurologic: Oriented x 3, strength symmetric in all extremities, Cranial Nerves grossly intact to confrontation, speech clear    Result Review    Result Review:  I have personally reviewed the results from the time of this admission to 2025 23:28 EDT and agree with these findings:  [x]  Laboratory  []  Microbiology  [x]  Radiology  []  EKG/Telemetry   []  Cardiology/Vascular   []  Pathology  []  Old records  []  Other:        Imaging Results (Last 24 Hours)       Procedure Component Value Units Date/Time    XR Chest 1 View [[id number]] Collected: 25     Updated: 25    Narrative:      XR CHEST 1 VW    Date of Exam: 2025 7:45 PM EDT    Indication: SOA, hx  copd    Comparison: 9/6/2024    Findings: No focal consolidation. No pneumothorax or pleural effusion. Cardiac size is normal. The visualized clavicles appear intact. No displaced rib fractures. The visualized upper abdomen is normal.      Impression:      Impression: No acute cardiopulmonary disease.      Electronically Signed: Louie Allen MD    5/13/2025 8:04 PM EDT    Workstation ID: [id number]    CT Abdomen Pelvis With Contrast [[id number]] Collected: 05/13/25 1833     Updated: 05/13/25 1841    Narrative:      CT ABDOMEN PELVIS W CONTRAST    Date of Exam: 5/13/2025 5:55 PM EDT    Indication: abd pain, diarrhea.    Comparison: CT abdomen 10/17/2024. CT abdomen pelvis 10/1/2024.    Technique: Axial CT images were obtained of the abdomen and pelvis after the uneventful intravenous administration of iodinated contrast. Reconstructed coronal and sagittal images were also obtained. Automated exposure control and iterative construction   methods were used.      Findings:  Included Chest: Bibasal atelectasis    Liver: Cirrhotic morphology.     Gallbladder and biliary tree: No significant abnormality.     Spleen: Cholecystectomy splenomegaly measuring 15 cm in craniocaudal dimension.     Pancreas: No significant abnormality.     Adrenal glands: No significant abnormality.     Kidneys and ureters: No significant abnormality.     Stomach and duodenum:No significant abnormality.    Small and large bowel: Partial colectomies.. Colonic diverticulosis. No evidence of bowel obstruction. No significant pericolonic inflammatory changes seen.    Peritoneal cavity: Small volume ascites. No free air.    Bladder: Under distended and incompletely evaluated.     Pelvic organs: Hysterectomy     Vasculature: Atherosclerotic calcifications. Upper abdominal varices including prominent periesophageal varices.     Lymph nodes: No pathologic appearing lymph nodes by imaging criteria.     Bones and soft tissues: Degenerative changes of the  imaged spine. No acute osseous abnormality. Nonspecific anterior abdominal wall fat stranding. Postsurgical changes along the anterior abdominal wall with similar-appearing broad-based fascial defects   near the umbilicus.      Impression:      Impression:  No acute findings in the abdomen or pelvis.    Cirrhosis with sequela of portal hypertension including splenomegaly, varices, and small volume ascites.      Electronically Signed: Kalpesh Betancourt MD    5/13/2025 6:39 PM EDT    Workstation ID: [id number]             [START ON 5/14/2025] cefTRIAXone, 2,000 mg, Intravenous, Q24H  [START ON 5/14/2025] furosemide, 20 mg, Oral, Daily  [START ON 5/14/2025] pantoprazole, 40 mg, Oral, Daily  [START ON 5/14/2025] sodium chloride, 10 mL, Intravenous, Q12H  [START ON 5/14/2025] spironolactone, 50 mg, Oral, Daily         Assessment & Plan   Assessment / Plan     Assessment/Plan:   Diffuse abdominal pain  Concern for bacterial peritonitis  Small ascites  Diarrhea-possible gastroenteritis  Chronic thrombocytopenia  MEDRANO liver cirrhosis  Hyperbilirubinemia due to above  CKD stage IIIa  COPD  GERD  History of esophageal varices    Plan  Admit to inpatient, remote telemetry  Continue ceftriaxone empirically for possible bacterial peritonitis  Consult IR in a.m. for diagnostic/therapeutic paracentesis and ascitic fluid analysis  Consider GI consult based on the clinical response  Follow-up on enteric bacterial panel, stool C. difficile  Monitor platelets with a.m. labs  Monitor LFTs, renal function with a.m. labs  Heart healthy diet  N.p.o. after midnight  Continue pantoprazole  Resume home lasix and spironolactone based on the volume status when appropriate   Fall precaution  PT OT consult when appropriate      VTE Prophylaxis:  Mechanical VTE prophylaxis orders are present.    CODE STATUS:    Code Status (Patient has no pulse and is not breathing): CPR (Attempt to Resuscitate)  Medical Interventions (Patient has pulse or is  breathing): Full Support  Level Of Support Discussed With: Patient      Admission Status:  I believe this patient meets inpatient status.    Part of this note may be an electronic transcription/translation of spoken language to printed text using the Dragon Dictation System    Alecia Santana MD

## 2025-05-15 ENCOUNTER — ANESTHESIA EVENT (OUTPATIENT)
Dept: GASTROENTEROLOGY | Facility: HOSPITAL | Age: 60
End: 2025-05-15
Payer: MEDICARE

## 2025-05-15 LAB
ALBUMIN SERPL-MCNC: 2.4 G/DL (ref 3.5–5.2)
ALP SERPL-CCNC: 123 U/L (ref 39–117)
ALT SERPL W P-5'-P-CCNC: 14 U/L (ref 1–33)
ANION GAP SERPL CALCULATED.3IONS-SCNC: 8.8 MMOL/L (ref 5–15)
AST SERPL-CCNC: 38 U/L (ref 1–32)
BASOPHILS # BLD AUTO: 0.02 10*3/MM3 (ref 0–0.2)
BASOPHILS NFR BLD AUTO: 0.6 % (ref 0–1.5)
BILIRUB CONJ SERPL-MCNC: 1.8 MG/DL (ref 0–0.3)
BILIRUB INDIRECT SERPL-MCNC: 1.1 MG/DL
BILIRUB SERPL-MCNC: 2.9 MG/DL (ref 0–1.2)
BUN SERPL-MCNC: 19 MG/DL (ref 8–23)
BUN/CREAT SERPL: 17.1 (ref 7–25)
CALCIUM SPEC-SCNC: 8.3 MG/DL (ref 8.6–10.5)
CHLORIDE SERPL-SCNC: 110 MMOL/L (ref 98–107)
CO2 SERPL-SCNC: 17.2 MMOL/L (ref 22–29)
CREAT SERPL-MCNC: 1.11 MG/DL (ref 0.57–1)
DEPRECATED RDW RBC AUTO: 53.4 FL (ref 37–54)
EGFRCR SERPLBLD CKD-EPI 2021: 56.7 ML/MIN/1.73
EOSINOPHIL # BLD AUTO: 0.12 10*3/MM3 (ref 0–0.4)
EOSINOPHIL NFR BLD AUTO: 3.3 % (ref 0.3–6.2)
ERYTHROCYTE [DISTWIDTH] IN BLOOD BY AUTOMATED COUNT: 14.7 % (ref 12.3–15.4)
FLUAV RNA RESP QL NAA+PROBE: NOT DETECTED
FLUBV RNA RESP QL NAA+PROBE: NOT DETECTED
GLUCOSE SERPL-MCNC: 165 MG/DL (ref 65–99)
HCT VFR BLD AUTO: 29.1 % (ref 34–46.6)
HGB BLD-MCNC: 9.6 G/DL (ref 12–15.9)
IMM GRANULOCYTES # BLD AUTO: 0.01 10*3/MM3 (ref 0–0.05)
IMM GRANULOCYTES NFR BLD AUTO: 0.3 % (ref 0–0.5)
LYMPHOCYTES # BLD AUTO: 0.54 10*3/MM3 (ref 0.7–3.1)
LYMPHOCYTES NFR BLD AUTO: 14.9 % (ref 19.6–45.3)
MAGNESIUM SERPL-MCNC: 1.8 MG/DL (ref 1.6–2.4)
MCH RBC QN AUTO: 32.8 PG (ref 26.6–33)
MCHC RBC AUTO-ENTMCNC: 33 G/DL (ref 31.5–35.7)
MCV RBC AUTO: 99.3 FL (ref 79–97)
MONOCYTES # BLD AUTO: 0.31 10*3/MM3 (ref 0.1–0.9)
MONOCYTES NFR BLD AUTO: 8.5 % (ref 5–12)
NEUTROPHILS NFR BLD AUTO: 2.63 10*3/MM3 (ref 1.7–7)
NEUTROPHILS NFR BLD AUTO: 72.4 % (ref 42.7–76)
NRBC BLD AUTO-RTO: 0 /100 WBC (ref 0–0.2)
PHOSPHATE SERPL-MCNC: 2.4 MG/DL (ref 2.5–4.5)
PLATELET # BLD AUTO: 38 10*3/MM3 (ref 140–450)
PMV BLD AUTO: 12.2 FL (ref 6–12)
POTASSIUM SERPL-SCNC: 3.7 MMOL/L (ref 3.5–5.2)
PROT SERPL-MCNC: 4.9 G/DL (ref 6–8.5)
RBC # BLD AUTO: 2.93 10*6/MM3 (ref 3.77–5.28)
RSV RNA RESP QL NAA+PROBE: NOT DETECTED
SARS-COV-2 RNA RESP QL NAA+PROBE: NOT DETECTED
SODIUM SERPL-SCNC: 136 MMOL/L (ref 136–145)
WBC NRBC COR # BLD AUTO: 3.63 10*3/MM3 (ref 3.4–10.8)

## 2025-05-15 PROCEDURE — 25010000002 CEFTRIAXONE PER 250 MG: Performed by: STUDENT IN AN ORGANIZED HEALTH CARE EDUCATION/TRAINING PROGRAM

## 2025-05-15 PROCEDURE — 80076 HEPATIC FUNCTION PANEL: CPT | Performed by: FAMILY MEDICINE

## 2025-05-15 PROCEDURE — 87637 SARSCOV2&INF A&B&RSV AMP PRB: CPT | Performed by: FAMILY MEDICINE

## 2025-05-15 PROCEDURE — 80048 BASIC METABOLIC PNL TOTAL CA: CPT | Performed by: FAMILY MEDICINE

## 2025-05-15 PROCEDURE — 83735 ASSAY OF MAGNESIUM: CPT | Performed by: FAMILY MEDICINE

## 2025-05-15 PROCEDURE — 85025 COMPLETE CBC W/AUTO DIFF WBC: CPT | Performed by: FAMILY MEDICINE

## 2025-05-15 PROCEDURE — 84100 ASSAY OF PHOSPHORUS: CPT | Performed by: FAMILY MEDICINE

## 2025-05-15 PROCEDURE — 97165 OT EVAL LOW COMPLEX 30 MIN: CPT

## 2025-05-15 PROCEDURE — 99232 SBSQ HOSP IP/OBS MODERATE 35: CPT | Performed by: FAMILY MEDICINE

## 2025-05-15 RX ORDER — OXYCODONE HYDROCHLORIDE 5 MG/1
5 TABLET ORAL EVERY 8 HOURS PRN
Refills: 0 | Status: DISCONTINUED | OUTPATIENT
Start: 2025-05-15 | End: 2025-05-20 | Stop reason: HOSPADM

## 2025-05-15 RX ORDER — BENZONATATE 100 MG/1
100 CAPSULE ORAL 3 TIMES DAILY PRN
Status: DISCONTINUED | OUTPATIENT
Start: 2025-05-15 | End: 2025-05-20 | Stop reason: HOSPADM

## 2025-05-15 RX ADMIN — SPIRONOLACTONE 50 MG: 25 TABLET ORAL at 08:37

## 2025-05-15 RX ADMIN — OXYCODONE 5 MG: 5 TABLET ORAL at 20:55

## 2025-05-15 RX ADMIN — PANTOPRAZOLE SODIUM 40 MG: 40 TABLET, DELAYED RELEASE ORAL at 08:37

## 2025-05-15 RX ADMIN — Medication 10 ML: at 20:55

## 2025-05-15 RX ADMIN — BENZONATATE 100 MG: 100 CAPSULE ORAL at 20:55

## 2025-05-15 RX ADMIN — CEFTRIAXONE SODIUM 2000 MG: 2 INJECTION, POWDER, FOR SOLUTION INTRAMUSCULAR; INTRAVENOUS at 12:34

## 2025-05-15 RX ADMIN — FOLIC ACID 1 MG: 1 TABLET ORAL at 08:37

## 2025-05-15 RX ADMIN — Medication 10 ML: at 08:39

## 2025-05-15 RX ADMIN — FUROSEMIDE 20 MG: 20 TABLET ORAL at 08:37

## 2025-05-15 NOTE — ANESTHESIA PREPROCEDURE EVALUATION
Anesthesia Evaluation     Nursing notes reviewed   history of anesthetic complications:  PONV  NPO Solid Status: > 8 hours  NPO Liquid Status: > 8 hours           Airway   Mallampati: I  TM distance: >3 FB  Neck ROM: full  No difficulty expected  Dental - normal exam     Pulmonary     breath sounds clear to auscultation  (+) COPD moderate,sleep apnea (no longer wears cpap)  Cardiovascular - normal exam  Exercise tolerance: good (4-7 METS)    Rhythm: regular  Rate: normal    (+) hypertension well controlled, hyperlipidemia      Neuro/Psych  (+) headaches, psychiatric history Depression  GI/Hepatic/Renal/Endo    (+) obesity, GERD well controlled, hepatitis, liver disease cirrhosis, renal disease- CRI, diabetes mellitus type 2 well controlled    Musculoskeletal     Abdominal    Substance History      OB/GYN          Other        ROS/Med Hx Other: Abd pain     05/14/25 05:30  RBC: 2.87 (L)  Hemoglobin: 9.4 (L)  Hematocrit: 28.0 (L)  Platelets: 39 (C)    9/28/23 EKG  HEART RATE= 81  bpm  RR Interval= 744  ms  SC Interval= 152  ms  P Horizontal Axis= 31  deg  P Front Axis= 4  deg  QRSD Interval= 87  ms  QT Interval= 414  ms  QTcB= 480  ms  QRS Axis= 42  deg  T Wave Axis= 9  deg  - BORDERLINE ECG -  Sinus rhythm  Borderline T wave abnormalities    ECHO 11/19/24  ·  Left ventricular systolic function is normal. Left ventricular ejection fraction appears to be 61 - 65%.  ·  Left ventricular diastolic function is normal.  ·  There are no significant valvular abnormalities.         Phys Exam Other: Zofran 4 mg IV in preop for PONV               Anesthesia Plan    ASA 4     general   total IV anesthesia  (Total IV Anesthesia    Patient understands anesthesia not responsible for dental damage.      Discussed risks with pt including aspiration, allergic reactions, apnea, advanced airway placement. Pt verbalized understanding. All questions answered.     )  intravenous induction     Anesthetic plan, risks, benefits, and  alternatives have been provided, discussed and informed consent has been obtained with: patient.  Pre-procedure education provided  Plan discussed with CRNA.      CODE STATUS:    Code Status (Patient has no pulse and is not breathing): CPR (Attempt to Resuscitate)  Medical Interventions (Patient has pulse or is breathing): Full Support  Level Of Support Discussed With: Patient       age appropriate assistance

## 2025-05-15 NOTE — PLAN OF CARE
Goal Outcome Evaluation:  Plan of Care Reviewed With: patient        Progress: no change   Pt reports no diarrhea during this shift. Stool samples sent to lab were obtained from formed stool. Pt medicated with a one time order of oxycodone. Pt reports no nausea or vomiting this shift. VS stable with no s/s of distress noted.

## 2025-05-15 NOTE — PROGRESS NOTES
[facility]   Hospitalist Progress Note  Date: 5/15/2025  Patient Name: Tawny Lennon  : 1965  MRN: [id number]  Date of admission: 2025      Subjective   Subjective     Chief complaint: Abdominal pain    Summary:  59-year-old female history of COPD, CKD stage IIIa, MEDRANO liver cirrhosis, GERD without esophagitis, esophageal varices, hospitalized on 2025 chief complaint of worsening abdominal pain for several days, with intermittent chills at home with increased shortness of breath and diarrhea.  Hospitalized for further monitoring and management of concern for SBP, in the setting of possible gastroenteritis, GI consulted, IR guided paracentesis ordered, placed on empiric antibiotics.  Pancytopenia likely due to underlying liver disease.  Attempted paracentesis, not enough volume.  Still with abdominal pain with any oral intake.  GI planning endoscopic evaluation.    Interval follow-up: Seen and examined, no distress, no events overnight, still has abdominal pain, less diarrhea but still present, still cramping in her abdomen diffuse.  Nonevolving for paracentesis, discussed with GI, planning EGD.  Labs not done this morning, still waiting.  Remains weak and fatigued.    Review of systems:  All systems reviewed and negative except for weakness, fatigue, abdominal pain, nausea, diarrhea    Objective   Objective     Vitals:   Temp:  [97.7 °F (36.5 °C)-98.6 °F (37 °C)] 98.1 °F (36.7 °C)  Heart Rate:  [77-89] 79  Resp:  [18-20] 18  BP: (103-130)/(41-69) 121/69  Physical Exam                 Constitutional: Awake, alert, no acute distress laying in bed              Eyes: Pupils equal, sclerae anicteric, no conjunctival injection              HENT: NCAT, mucous membranes moist              Respiratory: Clear to auscultation bilaterally, nonlabored respirations               Cardiovascular: RRR, no murmurs              Gastrointestinal: soft, mildly distended, diffuse abdominal tenderness               Musculoskeletal: No bilateral ankle edema, no clubbing or cyanosis to extremities              Neurologic: Oriented x 3, strength symmetric in all extremities, Cranial Nerves grossly intact to confrontation, speech clear    Result Review    Result Review:  I have personally reviewed the pertinent results from the past 24 hours to 5/15/2025 12:23 EDT and agree with these findings:  [x]  Laboratory   CBC          4/14/2025    10:36 5/13/2025    16:33 5/14/2025    05:30   CBC   WBC  4.23  4.33    RBC  3.36  2.87    Hemoglobin  11.1  9.4    Hematocrit  33.3  28.0    MCV  99.1  97.6    MCH  33.0  32.8    MCHC  33.3  33.6    RDW  14.7  14.7    Platelets 52  57  39      BMP          11/19/2024    11:00 5/13/2025    16:33 5/14/2025    05:30   BMP   Glucose 120         BUN 19     17  18    Creatinine 1.1     1.09  1.07    Sodium 139     136  137    Potassium 4.2     4.2  3.6    Chloride 107     109  111    CO2 22     16.7  18.0    Calcium 8.9     9.1  8.1       Details          This result is from an external source.             LIVER FUNCTION TESTS:      Lab 05/14/25  0530 05/13/25  1633   TOTAL PROTEIN 4.8* 5.6*   ALBUMIN 2.4* 2.9*   GLOBULIN 2.4 2.7   ALT (SGPT) 14 17   AST (SGOT) 32 39*   BILIRUBIN 3.7* 5.1*   ALK PHOS 121* 154*   LIPASE  --  54       [x]  Microbiology   Microbiology Results (last 10 days)       ** No results found for the last 240 hours. **              [x]  Radiology US Paracentesis  Result Date: 5/14/2025  Impression: Small amount of right abdominal ascites.  Electronically Signed: Justin Johnson MD  5/14/2025 2:03 PM EDT  Workstation ID: [id number]    XR Chest 1 View  Result Date: 5/13/2025  Impression: No acute cardiopulmonary disease. Electronically Signed: Louie Allen MD  5/13/2025 8:04 PM EDT  Workstation ID: [id number]    CT Abdomen Pelvis With Contrast  Result Date: 5/13/2025  Impression: No acute findings in the abdomen or pelvis. Cirrhosis with sequela of portal hypertension including  splenomegaly, varices, and small volume ascites. Electronically Signed: Kalpesh Betancourt MD  5/13/2025 6:39 PM EDT  Workstation ID: [id number]        []  EKG/Telemetry   No orders to display       []  Cardiology/Vascular   []  Pathology  [x]  Old records  []  Other:    Assessment & Plan   Assessment / Plan     Assessment/Plan:  Assessment:  Diffuse abdominal pain  Concern for bacterial peritonitis  Small ascites  Diarrhea-possible gastroenteritis  Chronic thrombocytopenia  MEDRANO liver cirrhosis  Hyperbilirubinemia due to above  CKD stage IIIa  COPD  GERD  History of esophageal varices    Plan:  Labs and imaging reviewed  Continue ceftriaxone  IR guided paracentesis n.p.o. after midnight for EGD  GI consulted, follow-up recommendations  Continue Protonix 40 mg daily  Continue ceftriaxone 2 g IV daily  Continue monitoring potassium  Check stool for enteric panel C. difficile  PT/OT  Following blood counts  A.m. labs  Full code  DVT prophylaxis with SCDs  Clinical course to dictate further management  Discussed with nurse at the bedside    VTE Prophylaxis:  Mechanical VTE prophylaxis orders are present.        CODE STATUS:   Code Status (Patient has no pulse and is not breathing): CPR (Attempt to Resuscitate)  Medical Interventions (Patient has pulse or is breathing): Full Support  Level Of Support Discussed With: Patient        Electronically signed by Nicole Conklin MD, 5/15/2025, 12:23 EDT.    Portions of this documentation were transcribed electronically from a voice recognition software.  I confirm all data accurately represents the service(s) I performed at today's visit.

## 2025-05-15 NOTE — PLAN OF CARE
Goal Outcome Evaluation:  Plan of Care Reviewed With: patient        Progress: improving  Outcome Evaluation: OT evaluation completed with no additional therapy services recommended.  Patient currently walking to and from the bathroom and with all self-care activities.  Patient agreeable that no additional occupational therapy is necessary at this time    Anticipated Discharge Disposition (OT): home

## 2025-05-15 NOTE — THERAPY EVALUATION
Patient Name: Tawny Lennon  : 1965    MRN: [id number]                              Today's Date: 5/15/2025       Admit Date: 2025    Visit Dx:     ICD-10-CM ICD-9-CM   1. Hypervolemia, unspecified hypervolemia type  E87.70 276.69   2. Cirrhosis of liver with ascites, unspecified hepatic cirrhosis type  K74.60 571.5    R18.8      Patient Active Problem List   Diagnosis    Benign essential HTN    Colon polyps    Depression    Diabetes mellitus without complication    Diverticulitis    Hematuria    High blood pressure    High cholesterol    Interstitial cystitis    Migraine without aura    Narcolepsy    Obesity    Other specified chronic obstructive pulmonary disease    Panic attacks    Paroxysmal SVT (supraventricular tachycardia)    Sleep apnea    Cirrhosis of liver without ascites    Esophageal varices without bleeding    Preop cardiovascular exam    MEDRANO (nonalcoholic steatohepatitis)    Gastroesophageal reflux disease without esophagitis    Hyperkalemia    Severe malnutrition    Pancreatitis    SBO (small bowel obstruction)    Abdominal pain     Past Medical History:   Diagnosis Date    Anemia     Chronic kidney disease     Cirrhosis     liver    Cirrhosis, nonalcoholic     Colon polyps     COPD (chronic obstructive pulmonary disease)     Depression     Diabetes     Diverticulitis     Esophageal varices     Fatty liver     GERD (gastroesophageal reflux disease)     Hematuria     Hernia     High blood pressure     High cholesterol     Interstitial cystitis     Migraine headache     Narcolepsy     Pancreatitis     PONV (postoperative nausea and vomiting)     Sleep apnea     Spinal headache     DURING PREGNANCY     Past Surgical History:   Procedure Laterality Date    ABDOMINAL SURGERY      BLADDER REPAIR  2009    CHOLECYSTECTOMY      COLONOSCOPY  2014 x2 2020    ENDOSCOPY  2014    ENDOSCOPY N/A 2021    Procedure: ESOPHAGOGASTRODUODENOSCOPY with biopsies;  Surgeon: Jesse Watkins MD;   Location: [facility] ENDOSCOPY;  Service: Gastroenterology;  Laterality: N/A;  esophageal varices    ENDOSCOPY N/A 03/25/2022    Procedure: ESOPHAGOGASTRODUODENOSCOPY WITH BIOPSIES;  Surgeon: Jesse Watkins MD;  Location: [facility] ENDOSCOPY;  Service: Gastroenterology;  Laterality: N/A;  ESOPHAGEAL VARICIES    ENDOSCOPY N/A 09/28/2023    Procedure: ESOPHAGOGASTRODUODENOSCOPY WITH COLD BIOPSIES;  Surgeon: Jesse Watkins MD;  Location: [facility] ENDOSCOPY;  Service: Gastroenterology;  Laterality: N/A;  GASTRITIS, ESOPHAGEAL VARICES    ENDOSCOPY N/A 11/17/2023    Procedure: ESOPHAGOGASTRODUODENOSCOPY WITH BIOPSIES AND  VARICEAL BANDING X 4;  Surgeon: Jesse Watkins MD;  Location: [facility] ENDOSCOPY;  Service: Gastroenterology;  Laterality: N/A;  GASTRITIS, PORTAL HYPERTENSIVE GASTROPATHY, ESOPHAGEAL VARICIES    ENDOSCOPY N/A 12/21/2023    Procedure: ESOPHAGOGASTRODUODENOSCOPY WITH BIOPSIES AND ESOPHAGEAL BANDING;  Surgeon: Jesse Watkins MD;  Location: [facility] ENDOSCOPY;  Service: Gastroenterology;  Laterality: N/A;  ESOPHAGEAL VARICES, GASTRITIS    HAND SURGERY      HERNIA REPAIR      HYSTERECTOMY  2009    ILEOSTOMY  07/2022    OTHER SURGICAL HISTORY      rectocele repair    UPPER GASTROINTESTINAL ENDOSCOPY        General Information       Row Name 05/15/25 0846          OT Time and Intention    Document Type evaluation  -PG     Mode of Treatment individual therapy;occupational therapy  -PG       Row Name 05/15/25 0846          General Information    Patient Profile Reviewed yes  Patient lives in the basement of her sisters home.  Reports independence with all self-care and functional transfers using no assistive device.  Has a walk-in shower for bathing  -PG     Prior Level of Function independent:;transfer;ADL's  -PG     Existing Precautions/Restrictions no known precautions/restrictions  -PG     Barriers to Rehab none identified  -PG       Row Name 05/15/25 0846          Occupational Profile     Reason for Services/Referral (Occupational Profile) Pt is a 59-year-old female admitted for cirrhosis of the liver with abdominal pain.  Patient is being evaluated by Occupational Therapy due to recent decline in ADL function.  No previous OT services identified  -PG       Row Name 05/15/25 0846          Living Environment    Current Living Arrangements home  -PG       Row Name 05/15/25 0846          Cognition    Orientation Status (Cognition) oriented x 3  -PG               User Key  (r) = Recorded By, (t) = Taken By, (c) = Cosigned By      Initials Name Provider Type    PG Jaxson Fong OT Occupational Therapist                     Mobility/ADL's       Row Name 05/15/25 0852          Transfers    Transfers bed-chair transfer  -PG       Row Name 05/15/25 0852          Bed-Chair Transfer    Bed-Chair [place] (Transfers) independent  -PG       Row Name 05/15/25 0852          Activities of Daily Living    BADL Assessment/Intervention bathing;upper body dressing;lower body dressing;grooming;toileting  -PG       Row Name 05/15/25 0852          Bathing Assessment/Intervention    [place] Level (Bathing) bathing skills;independent  -PG       Row Name 05/15/25 0852          Upper Body Dressing Assessment/Training    [place] Level (Upper Body Dressing) upper body dressing skills;independent  -PG       Row Name 05/15/25 0852          Lower Body Dressing Assessment/Training    [place] Level (Lower Body Dressing) lower body dressing skills;independent  -PG       Row Name 05/15/25 0852          Grooming Assessment/Training    [place] Level (Grooming) grooming skills;independent  -PG       Row Name 05/15/25 0852          Toileting Assessment/Training    [place] Level (Toileting) toileting skills;independent  -PG               User Key  (r) = Recorded By, (t) = Taken By, (c) = Cosigned By      Initials Name Provider Type    Jaxson Fierro, OT Occupational Therapist                    Obj/Interventions       Row Name 05/15/25 0853          Sensory Assessment (Somatosensory)    Sensory Assessment (Somatosensory) sensation intact  -PG       Row Name 05/15/25 0853          Vision Assessment/Intervention    Visual Impairment/Limitations WFL  -PG       Row Name 05/15/25 0853          Range of Motion Comprehensive    General Range of Motion no range of motion deficits identified  -PG       Row Name 05/15/25 0853          Strength Comprehensive (MMT)    General Manual Muscle Testing (MMT) Assessment no strength deficits identified  -PG       Row Name 05/15/25 0853          Motor Skills    Motor Skills coordination;functional endurance  -PG     Coordination WFL  -PG     Functional Endurance good minus  -PG               User Key  (r) = Recorded By, (t) = Taken By, (c) = Cosigned By      Initials Name Provider Type    PG Jaxson Fong, SUSI Occupational Therapist                   Goals/Plan    No documentation.                  Clinical Impression       Row Name 05/15/25 0854          Pain Assessment    Pretreatment Pain Rating 0/10 - no pain  -PG     Posttreatment Pain Rating 0/10 - no pain  -PG       Row Name 05/15/25 0854          Plan of Care Review    Plan of Care Reviewed With patient  -PG     Progress improving  -PG     Outcome Evaluation OT evaluation completed with no additional therapy services recommended.  Patient currently walking to and from the bathroom and with all self-care activities.  Patient agreeable that no additional occupational therapy is necessary at this time  -PG       Row Name 05/15/25 0854          Therapy Assessment/Plan (OT)    Criteria for Skilled Therapeutic Interventions Met (OT) no;no problems identified which require skilled intervention  -PG     Therapy Frequency (OT) evaluation only  -PG       Row Name 05/15/25 0854          Therapy Plan Review/Discharge Plan (OT)    Anticipated Discharge Disposition (OT) home  -PG               User Key  (r) = Recorded By, (t) =  Taken By, (c) = Cosigned By      Initials Name Provider Type    PG Jaxson Fong OT Occupational Therapist                   Outcome Measures       Row Name 05/15/25 0855          How much help from another is currently needed...    Putting on and taking off regular lower body clothing? 4  -PG     Bathing (including washing, rinsing, and drying) 4  -PG     Toileting (which includes using toilet bed pan or urinal) 4  -PG     Putting on and taking off regular upper body clothing 4  -PG     Taking care of personal grooming (such as brushing teeth) 4  -PG     Eating meals 4  -PG     AM-PAC 6 Clicks Score (OT) 24  -PG       Row Name 05/15/25 0855          Functional Assessment    Outcome Measure Options AM-PAC 6 Clicks Daily Activity (OT);Optimal Instrument  -PG       Row Name 05/15/25 0855          Optimal Instrument    Optimal Instrument Optimal - 3  -PG     Bending/Stooping 1  -PG     Standing 1  -PG     Reaching 1  -PG     From the list, choose the 3 activities you would most like to be able to do without any difficulty Bending/stooping;Standing;Reaching  -PG     Total Score Optimal - 3 3  -PG               User Key  (r) = Recorded By, (t) = Taken By, (c) = Cosigned By      Initials Name Provider Type    PG Jaxson Fong OT Occupational Therapist                      OT Recommendation and Plan  Therapy Frequency (OT): evaluation only  Plan of Care Review  Plan of Care Reviewed With: patient  Progress: improving  Outcome Evaluation: OT evaluation completed with no additional therapy services recommended.  Patient currently walking to and from the bathroom and with all self-care activities.  Patient agreeable that no additional occupational therapy is necessary at this time     Time Calculation:         Time Calculation- OT       Row Name 05/15/25 0856             Time Calculation- OT    OT Received On 05/15/25  -PG         Untimed Charges    OT Eval/Re-eval Minutes 30  -PG         Total Minutes    Untimed Charges  Total Minutes 30  -PG       Total Minutes 30  -PG                User Key  (r) = Recorded By, (t) = Taken By, (c) = Cosigned By      Initials Name Provider Type    PG Jaxson Fong OT Occupational Therapist                  Therapy Charges for Today       Code Description Service Date Service Provider Modifiers Qty    [id number] HC OT EVAL LOW COMPLEXITY 2 5/15/2025 Jaxson Fong OT GO 1                 Jaxson Fong OT  5/15/2025

## 2025-05-15 NOTE — PROGRESS NOTES
[facility]  Inpatient Progress Note    Reason for Follow Up:  Abdominal pain    Subjective     Interval History:   Patient went to have US paracentesis and there wasn't enough fluid to have procedure.  Patient was able to tolerate breakfast this morning, but every time she eats she has an increase in abdominal pain.  No nausea or vomiting.  Patient feels like she is getting a cold.      Current Facility-Administered Medications:     cefTRIAXone (ROCEPHIN) in NS 2 GRAMS/20ml IV PUSH syringe, 2,000 mg, Intravenous, Q24H, Alecia Santana MD, 2,000 mg at 05/14/25 1201    folic acid (FOLVITE) tablet 1 mg, 1 mg, Oral, Daily, Nicole Conklin MD, 1 mg at 05/15/25 0837    furosemide (LASIX) tablet 20 mg, 20 mg, Oral, Daily, Nicole Conklin MD, 20 mg at 05/15/25 0837    pantoprazole (PROTONIX) EC tablet 40 mg, 40 mg, Oral, Daily, Alecia Santana MD, 40 mg at 05/15/25 0837    sodium chloride 0.9 % flush 10 mL, 10 mL, Intravenous, PRN, Alecia Santana MD    sodium chloride 0.9 % flush 10 mL, 10 mL, Intravenous, Q12H, Alecia Santana MD, 10 mL at 05/15/25 0839    sodium chloride 0.9 % flush 10 mL, 10 mL, Intravenous, PRNMax Ranadheer R, MD    sodium chloride 0.9 % infusion 40 mL, 40 mL, Intravenous, PRN, Alecia Santana MD    spironolactone (ALDACTONE) tablet 50 mg, 50 mg, Oral, Daily, Nicole Conklin MD, 50 mg at 05/15/25 0837  Review of Systems:    Pertinent items are noted in HPI    Objective     Vital Signs  Temp:  [97.7 °F (36.5 °C)-98.6 °F (37 °C)] 98.1 °F (36.7 °C)  Heart Rate:  [77-89] 79  Resp:  [18-20] 18  BP: (103-130)/(41-69) 121/69  Body mass index is 33.83 kg/m².    Intake/Output Summary (Last 24 hours) at 5/15/2025 0859  Last data filed at 5/14/2025 1859  Gross per 24 hour   Intake 20 ml   Output 300 ml   Net -280 ml     No intake/output data recorded.     Physical Exam:   General: awake, alert and in no acute distress   Eyes: eyes move symmetrical in all  directions, no scleral icterus   Neck: supple, trachea is midline   Skin: warm and dry, not jaundiced   Cardiovascular: regular rhythm and rate   Pulm: breathing unlabored   Abdomen: soft, tender, non distended   Rectal: deferred   Extremities: no rash or edema   Psychiatric: mental status within normal limits, alert and oriented      Results Review:     I reviewed the patient's new clinical results.    Results from last 7 days   Lab Units 05/14/25  0530 05/13/25  1633   WBC 10*3/mm3 4.33 4.23   HEMOGLOBIN g/dL 9.4* 11.1*   HEMATOCRIT % 28.0* 33.3*   PLATELETS 10*3/mm3 39* 57*     Results from last 7 days   Lab Units 05/14/25  0530 05/13/25  1633   SODIUM mmol/L 137 136   POTASSIUM mmol/L 3.6 4.2   CHLORIDE mmol/L 111* 109*   CO2 mmol/L 18.0* 16.7*   BUN mg/dL 18 17   CREATININE mg/dL 1.07* 1.09*   CALCIUM mg/dL 8.1* 9.1   BILIRUBIN mg/dL 3.7* 5.1*   ALK PHOS U/L 121* 154*   ALT (SGPT) U/L 14 17   AST (SGOT) U/L 32 39*   GLUCOSE mg/dL 126* 128*     Results from last 7 days   Lab Units 05/14/25  0747   INR  1.50*     Lab Results   Lab Value Date/Time    LIPASE 54 05/13/2025 1633    LIPASE 56 10/01/2024 1143    LIPASE 185 (H) 09/18/2024 0512       Radiology:    US Paracentesis  Result Date: 5/14/2025  Impression: Small amount of right abdominal ascites.  Electronically Signed: Justin Johnson MD  5/14/2025 2:03 PM EDT  Workstation ID: [id number]    XR Chest 1 View  Result Date: 5/13/2025  Impression: No acute cardiopulmonary disease. Electronically Signed: Louie Allen MD  5/13/2025 8:04 PM EDT  Workstation ID: [id number]    CT Abdomen Pelvis With Contrast  Result Date: 5/13/2025  Impression: No acute findings in the abdomen or pelvis. Cirrhosis with sequela of portal hypertension including splenomegaly, varices, and small volume ascites. Electronically Signed: Kalpesh Betancourt MD  5/13/2025 6:39 PM EDT  Workstation ID: [id number]        Assessment & Plan   Assessment:   Abdominal pain  Reich cirrhosis  Nausea      Plan:    Patient didn't have enough fluid to have paracentesis performed  Patient still having abdominal discomfort especially after eating-discussed with her about performing EGD to further evaluate-will plan for martha since patient ate breakfast this morning  Patient will need to be NPO after midnight   Patient understands and agrees to the plan    I discussed the patients findings and my recommendations with patient.      Electronically signed by ANDRE Villalba, 5/15/2025, 08:59 EDT.

## 2025-05-15 NOTE — PLAN OF CARE
Goal Outcome Evaluation:  Plan of Care Reviewed With: patient        Progress: no change  Outcome Evaluation: VSS on RA. Patient AAOx4. Platelet count trending down, MD aware. Patient is NPO at mignight for EGD tomorrow. No other concerns or complaints. Continue plan of care.

## 2025-05-16 ENCOUNTER — ANESTHESIA (OUTPATIENT)
Dept: GASTROENTEROLOGY | Facility: HOSPITAL | Age: 60
End: 2025-05-16
Payer: MEDICARE

## 2025-05-16 LAB
ALBUMIN SERPL-MCNC: 2.3 G/DL (ref 3.5–5.2)
ALP SERPL-CCNC: 111 U/L (ref 39–117)
ALT SERPL W P-5'-P-CCNC: 13 U/L (ref 1–33)
ANION GAP SERPL CALCULATED.3IONS-SCNC: 8.2 MMOL/L (ref 5–15)
AST SERPL-CCNC: 38 U/L (ref 1–32)
BASOPHILS # BLD AUTO: 0.01 10*3/MM3 (ref 0–0.2)
BASOPHILS NFR BLD AUTO: 0.3 % (ref 0–1.5)
BILIRUB CONJ SERPL-MCNC: 1.6 MG/DL (ref 0–0.3)
BILIRUB INDIRECT SERPL-MCNC: 1.1 MG/DL
BILIRUB SERPL-MCNC: 2.7 MG/DL (ref 0–1.2)
BUN SERPL-MCNC: 18 MG/DL (ref 8–23)
BUN/CREAT SERPL: 18.9 (ref 7–25)
CALCIUM SPEC-SCNC: 8.1 MG/DL (ref 8.6–10.5)
CHLORIDE SERPL-SCNC: 110 MMOL/L (ref 98–107)
CO2 SERPL-SCNC: 17.8 MMOL/L (ref 22–29)
CREAT SERPL-MCNC: 0.95 MG/DL (ref 0.57–1)
DEPRECATED RDW RBC AUTO: 52.6 FL (ref 37–54)
EGFRCR SERPLBLD CKD-EPI 2021: 68.3 ML/MIN/1.73
EOSINOPHIL # BLD AUTO: 0.11 10*3/MM3 (ref 0–0.4)
EOSINOPHIL NFR BLD AUTO: 3.1 % (ref 0.3–6.2)
ERYTHROCYTE [DISTWIDTH] IN BLOOD BY AUTOMATED COUNT: 14.6 % (ref 12.3–15.4)
GLUCOSE BLDC GLUCOMTR-MCNC: 96 MG/DL (ref 70–99)
GLUCOSE SERPL-MCNC: 100 MG/DL (ref 65–99)
HCT VFR BLD AUTO: 27.1 % (ref 34–46.6)
HGB BLD-MCNC: 9 G/DL (ref 12–15.9)
IMM GRANULOCYTES # BLD AUTO: 0.03 10*3/MM3 (ref 0–0.05)
IMM GRANULOCYTES NFR BLD AUTO: 0.9 % (ref 0–0.5)
INR PPP: 1.55 (ref 0.86–1.15)
LYMPHOCYTES # BLD AUTO: 0.64 10*3/MM3 (ref 0.7–3.1)
LYMPHOCYTES NFR BLD AUTO: 18.2 % (ref 19.6–45.3)
MAGNESIUM SERPL-MCNC: 1.7 MG/DL (ref 1.6–2.4)
MCH RBC QN AUTO: 33 PG (ref 26.6–33)
MCHC RBC AUTO-ENTMCNC: 33.2 G/DL (ref 31.5–35.7)
MCV RBC AUTO: 99.3 FL (ref 79–97)
MONOCYTES # BLD AUTO: 0.3 10*3/MM3 (ref 0.1–0.9)
MONOCYTES NFR BLD AUTO: 8.5 % (ref 5–12)
NEUTROPHILS NFR BLD AUTO: 2.43 10*3/MM3 (ref 1.7–7)
NEUTROPHILS NFR BLD AUTO: 69 % (ref 42.7–76)
NRBC BLD AUTO-RTO: 0 /100 WBC (ref 0–0.2)
PHOSPHATE SERPL-MCNC: 2.3 MG/DL (ref 2.5–4.5)
PLATELET # BLD AUTO: 36 10*3/MM3 (ref 140–450)
PMV BLD AUTO: 12.4 FL (ref 6–12)
POTASSIUM SERPL-SCNC: 3.8 MMOL/L (ref 3.5–5.2)
PROT SERPL-MCNC: 4.6 G/DL (ref 6–8.5)
PROTHROMBIN TIME: 19.3 SECONDS (ref 11.8–14.9)
RBC # BLD AUTO: 2.73 10*6/MM3 (ref 3.77–5.28)
SODIUM SERPL-SCNC: 136 MMOL/L (ref 136–145)
WBC NRBC COR # BLD AUTO: 3.52 10*3/MM3 (ref 3.4–10.8)

## 2025-05-16 PROCEDURE — 80048 BASIC METABOLIC PNL TOTAL CA: CPT | Performed by: FAMILY MEDICINE

## 2025-05-16 PROCEDURE — 85610 PROTHROMBIN TIME: CPT | Performed by: FAMILY MEDICINE

## 2025-05-16 PROCEDURE — 43235 EGD DIAGNOSTIC BRUSH WASH: CPT | Performed by: INTERNAL MEDICINE

## 2025-05-16 PROCEDURE — 82948 REAGENT STRIP/BLOOD GLUCOSE: CPT

## 2025-05-16 PROCEDURE — 25810000003 LACTATED RINGERS PER 1000 ML: Performed by: NURSE ANESTHETIST, CERTIFIED REGISTERED

## 2025-05-16 PROCEDURE — 25010000002 ONDANSETRON PER 1 MG

## 2025-05-16 PROCEDURE — 83735 ASSAY OF MAGNESIUM: CPT | Performed by: FAMILY MEDICINE

## 2025-05-16 PROCEDURE — 84100 ASSAY OF PHOSPHORUS: CPT | Performed by: FAMILY MEDICINE

## 2025-05-16 PROCEDURE — 85025 COMPLETE CBC W/AUTO DIFF WBC: CPT | Performed by: FAMILY MEDICINE

## 2025-05-16 PROCEDURE — 80076 HEPATIC FUNCTION PANEL: CPT | Performed by: FAMILY MEDICINE

## 2025-05-16 PROCEDURE — 97161 PT EVAL LOW COMPLEX 20 MIN: CPT

## 2025-05-16 PROCEDURE — 25010000002 PROPOFOL 10 MG/ML EMULSION: Performed by: NURSE ANESTHETIST, CERTIFIED REGISTERED

## 2025-05-16 PROCEDURE — 25010000002 CEFTRIAXONE PER 250 MG: Performed by: STUDENT IN AN ORGANIZED HEALTH CARE EDUCATION/TRAINING PROGRAM

## 2025-05-16 PROCEDURE — 25010000002 LIDOCAINE PF 2% 2 % SOLUTION: Performed by: NURSE ANESTHETIST, CERTIFIED REGISTERED

## 2025-05-16 PROCEDURE — 0DJ08ZZ INSPECTION OF UPPER INTESTINAL TRACT, VIA NATURAL OR ARTIFICIAL OPENING ENDOSCOPIC: ICD-10-PCS | Performed by: INTERNAL MEDICINE

## 2025-05-16 PROCEDURE — 99232 SBSQ HOSP IP/OBS MODERATE 35: CPT | Performed by: FAMILY MEDICINE

## 2025-05-16 RX ORDER — ONDANSETRON 2 MG/ML
4 INJECTION INTRAMUSCULAR; INTRAVENOUS ONCE
Status: COMPLETED | OUTPATIENT
Start: 2025-05-16 | End: 2025-05-16

## 2025-05-16 RX ORDER — SODIUM CHLORIDE, SODIUM LACTATE, POTASSIUM CHLORIDE, CALCIUM CHLORIDE 600; 310; 30; 20 MG/100ML; MG/100ML; MG/100ML; MG/100ML
30 INJECTION, SOLUTION INTRAVENOUS CONTINUOUS
Status: DISCONTINUED | OUTPATIENT
Start: 2025-05-16 | End: 2025-05-17

## 2025-05-16 RX ORDER — FLUTICASONE PROPIONATE 50 MCG
2 SPRAY, SUSPENSION (ML) NASAL DAILY
Status: DISCONTINUED | OUTPATIENT
Start: 2025-05-16 | End: 2025-05-20 | Stop reason: HOSPADM

## 2025-05-16 RX ORDER — PROPOFOL 10 MG/ML
VIAL (ML) INTRAVENOUS AS NEEDED
Status: DISCONTINUED | OUTPATIENT
Start: 2025-05-16 | End: 2025-05-16 | Stop reason: SURG

## 2025-05-16 RX ORDER — LIDOCAINE HYDROCHLORIDE 20 MG/ML
INJECTION, SOLUTION EPIDURAL; INFILTRATION; INTRACAUDAL; PERINEURAL AS NEEDED
Status: DISCONTINUED | OUTPATIENT
Start: 2025-05-16 | End: 2025-05-16 | Stop reason: SURG

## 2025-05-16 RX ADMIN — FUROSEMIDE 20 MG: 20 TABLET ORAL at 12:18

## 2025-05-16 RX ADMIN — ONDANSETRON 4 MG: 2 INJECTION INTRAMUSCULAR; INTRAVENOUS at 10:03

## 2025-05-16 RX ADMIN — LIDOCAINE HYDROCHLORIDE 60 MG: 20 INJECTION, SOLUTION EPIDURAL; INFILTRATION; INTRACAUDAL; PERINEURAL at 10:33

## 2025-05-16 RX ADMIN — Medication 10 ML: at 20:32

## 2025-05-16 RX ADMIN — SPIRONOLACTONE 50 MG: 25 TABLET ORAL at 12:18

## 2025-05-16 RX ADMIN — CEFTRIAXONE SODIUM 2000 MG: 2 INJECTION, POWDER, FOR SOLUTION INTRAMUSCULAR; INTRAVENOUS at 12:17

## 2025-05-16 RX ADMIN — Medication 10 ML: at 08:03

## 2025-05-16 RX ADMIN — BENZONATATE 100 MG: 100 CAPSULE ORAL at 12:18

## 2025-05-16 RX ADMIN — Medication 10 MG: at 20:31

## 2025-05-16 RX ADMIN — PROPOFOL 80 MG: 10 INJECTION, EMULSION INTRAVENOUS at 10:33

## 2025-05-16 RX ADMIN — PROPOFOL 125 MCG/KG/MIN: 10 INJECTION, EMULSION INTRAVENOUS at 10:34

## 2025-05-16 RX ADMIN — SODIUM CHLORIDE, POTASSIUM CHLORIDE, SODIUM LACTATE AND CALCIUM CHLORIDE: 600; 310; 30; 20 INJECTION, SOLUTION INTRAVENOUS at 10:33

## 2025-05-16 RX ADMIN — FLUTICASONE PROPIONATE 2 SPRAY: 50 SPRAY, METERED NASAL at 12:18

## 2025-05-16 RX ADMIN — PANTOPRAZOLE SODIUM 40 MG: 40 TABLET, DELAYED RELEASE ORAL at 12:18

## 2025-05-16 RX ADMIN — FOLIC ACID 1 MG: 1 TABLET ORAL at 12:18

## 2025-05-16 RX ADMIN — BENZONATATE 100 MG: 100 CAPSULE ORAL at 20:31

## 2025-05-16 NOTE — PLAN OF CARE
Goal Outcome Evaluation:           Progress: no change  Outcome Evaluation: Vital signs stable. Pt had EGD today, se report for details. Pt was able to eat small lunch, so far no nausea or pain. No other significant changes today. Continue plan of care.

## 2025-05-16 NOTE — PLAN OF CARE
Goal Outcome Evaluation:              Outcome Evaluation: PAtient AOx4  Patietn medicated for pain per MAR, patient been NPO since midnight awaiting EGD today. No new issues.

## 2025-05-16 NOTE — THERAPY EVALUATION
Acute Care - Physical Therapy Initial Evaluation   [place]     Patient Name: Tawny Lennon  : 1965  MRN: [id number]  Today's Date: 2025      Visit Dx:     ICD-10-CM ICD-9-CM   1. Hypervolemia, unspecified hypervolemia type  E87.70 276.69   2. Cirrhosis of liver with ascites, unspecified hepatic cirrhosis type  K74.60 571.5    R18.8    3. Abdominal pain, unspecified abdominal location  R10.9 789.00   4. Difficulty walking  R26.2 719.7     Patient Active Problem List   Diagnosis    Benign essential HTN    Colon polyps    Depression    Diabetes mellitus without complication    Diverticulitis    Hematuria    High blood pressure    High cholesterol    Interstitial cystitis    Migraine without aura    Narcolepsy    Obesity    Other specified chronic obstructive pulmonary disease    Panic attacks    Paroxysmal SVT (supraventricular tachycardia)    Sleep apnea    Cirrhosis of liver without ascites    Esophageal varices without bleeding    Preop cardiovascular exam    MEDRANO (nonalcoholic steatohepatitis)    Gastroesophageal reflux disease without esophagitis    Hyperkalemia    Severe malnutrition    Pancreatitis    SBO (small bowel obstruction)    Abdominal pain     Past Medical History:   Diagnosis Date    Anemia     Chronic kidney disease     Cirrhosis     liver    Cirrhosis, nonalcoholic     Colon polyps     COPD (chronic obstructive pulmonary disease)     Depression     Diabetes     Diverticulitis     Esophageal varices     Fatty liver     GERD (gastroesophageal reflux disease)     Hematuria     Hernia     High blood pressure     High cholesterol     Interstitial cystitis     Migraine headache     Narcolepsy     Pancreatitis     PONV (postoperative nausea and vomiting)     Sleep apnea     Spinal headache     DURING PREGNANCY     Past Surgical History:   Procedure Laterality Date    ABDOMINAL SURGERY      BLADDER REPAIR  2009    CHOLECYSTECTOMY      COLONOSCOPY  2014 x2     ENDOSCOPY  2014     ENDOSCOPY N/A 07/12/2021    Procedure: ESOPHAGOGASTRODUODENOSCOPY with biopsies;  Surgeon: Jesse Watkins MD;  Location: [facility] ENDOSCOPY;  Service: Gastroenterology;  Laterality: N/A;  esophageal varices    ENDOSCOPY N/A 03/25/2022    Procedure: ESOPHAGOGASTRODUODENOSCOPY WITH BIOPSIES;  Surgeon: Jesse Watkins MD;  Location: [facility] ENDOSCOPY;  Service: Gastroenterology;  Laterality: N/A;  ESOPHAGEAL VARICIES    ENDOSCOPY N/A 09/28/2023    Procedure: ESOPHAGOGASTRODUODENOSCOPY WITH COLD BIOPSIES;  Surgeon: Jesse Watkins MD;  Location: [facility] ENDOSCOPY;  Service: Gastroenterology;  Laterality: N/A;  GASTRITIS, ESOPHAGEAL VARICES    ENDOSCOPY N/A 11/17/2023    Procedure: ESOPHAGOGASTRODUODENOSCOPY WITH BIOPSIES AND  VARICEAL BANDING X 4;  Surgeon: Jesse Watkins MD;  Location: [facility] ENDOSCOPY;  Service: Gastroenterology;  Laterality: N/A;  GASTRITIS, PORTAL HYPERTENSIVE GASTROPATHY, ESOPHAGEAL VARICIES    ENDOSCOPY N/A 12/21/2023    Procedure: ESOPHAGOGASTRODUODENOSCOPY WITH BIOPSIES AND ESOPHAGEAL BANDING;  Surgeon: Jesse Watkins MD;  Location: [facility] ENDOSCOPY;  Service: Gastroenterology;  Laterality: N/A;  ESOPHAGEAL VARICES, GASTRITIS    HAND SURGERY      HERNIA REPAIR      HYSTERECTOMY  2009    ILEOSTOMY  07/2022    OTHER SURGICAL HISTORY      rectocele repair    UPPER GASTROINTESTINAL ENDOSCOPY       PT Assessment (Last 12 Hours)       PT Evaluation and Treatment       Row Name 05/16/25 1400          Physical Therapy Time and Intention    Subjective Information no complaints  -DP     Document Type evaluation  -DP     Mode of Treatment individual therapy;physical therapy  -DP     Patient Effort good  -DP       Row Name 05/16/25 1400          General Information    Patient Profile Reviewed yes  -DP     Patient Observations alert;cooperative;agree to therapy  -DP     Prior Level of Function independent:;gait;transfer;bed mobility;ADL's  -DP     Existing  Precautions/Restrictions no known precautions/restrictions  -DP     Barriers to Rehab none identified  -DP       Row Name 05/16/25 1400          Living Environment    Current Living Arrangements home  -DP       Row Name 05/16/25 1400          Cognition    Orientation Status (Cognition) oriented x 3  -DP       Row Name 05/16/25 1400          Range of Motion Comprehensive    General Range of Motion bilateral lower extremity ROM WFL  -DP       Row Name 05/16/25 1400          Strength (Manual Muscle Testing)    Strength (Manual Muscle Testing) bilateral lower extremities;strength is WFL  -DP       Row Name 05/16/25 1400          Bed Mobility    Bed Mobility supine-sit-supine  -DP     Supine-Sit-Supine [place] (Bed Mobility) independent  -DP       Row Name 05/16/25 1400          Transfers    Transfers sit-stand transfer  -DP       Row Name 05/16/25 1400          Sit-Stand Transfer    Sit-Stand [place] (Transfers) independent  -DP       Row Name 05/16/25 1400          Gait/Stairs (Locomotion)    Gait/Stairs Locomotion gait/ambulation independence  -DP     [place] Level (Gait) modified independence  -DP     Comment, (Gait/Stairs) Patient able to ambulate ad maureen. in her room  -DP       Row Name 05/16/25 1400          Balance    Balance Assessment standing dynamic balance  -DP     Dynamic Standing Balance supervision  -DP       Row Name 05/16/25 1400          Plan of Care Review    Plan of Care Reviewed With patient  -DP     Outcome Evaluation Patient is able to complete all transfers and bed mobilities indepedently in the room. Pt is able to ambulate ad maureen in the room. Pt does not need inpatient PT services. Recommend DC home.  -DP       Row Name 05/16/25 1400          Therapy Assessment/Plan (PT)    Criteria for Skilled Interventions Met (PT) no problems identified which require skilled intervention  -DP     Therapy Frequency (PT) evaluation only  -DP       Row Name 05/16/25 1400          PT Evaluation  Complexity    History, PT Evaluation Complexity no personal factors and/or comorbidities  -DP     Examination of Body Systems (PT Eval Complexity) total of 4 or more elements  -DP     Clinical Presentation (PT Evaluation Complexity) stable  -DP     Clinical Decision Making (PT Evaluation Complexity) low complexity  -DP     Overall Complexity (PT Evaluation Complexity) low complexity  -DP       Row Name 05/16/25 1400          Physical Therapy Goals    Problem Specific Goal Selection (PT) problem specific goal 1, PT  -DP       Row Name 05/16/25 1400          Problem Specific Goal 1 (PT)    Problem Specific Goal 1 (PT) Complete PT evaluation.  -DP     Time Frame (Problem Specific Goal 1, PT) 1 day  -DP     Progress/Outcome (Problem Specific Goal 1, PT) goal met  -DP               User Key  (r) = Recorded By, (t) = Taken By, (c) = Cosigned By      Initials Name Provider Type    Soren Reyes, PT Physical Therapist                      PT Recommendation and Plan  Anticipated Discharge Disposition (PT): home  Therapy Frequency (PT): evaluation only  Plan of Care Reviewed With: patient  Outcome Evaluation: Patient is able to complete all transfers and bed mobilities indepedently in the room. Pt is able to ambulate ad maureen in the room. Pt does not need inpatient PT services. Recommend DC home.   Outcome Measures       Row Name 05/16/25 1400             How much help from another person do you currently need...    Turning from your back to your side while in flat bed without using bedrails? 4  -DP      Moving from lying on back to sitting on the side of a flat bed without bedrails? 4  -DP      Moving to and from a bed to a chair (including a wheelchair)? 4  -DP      Standing up from a chair using your arms (e.g., wheelchair, bedside chair)? 4  -DP      Climbing 3-5 steps with a railing? 4  -DP      To walk in hospital room? 4  -DP      AM-PAC 6 Clicks Score (PT) 24  -DP         Functional Assessment    Outcome Measure  Options AM-PAC 6 Clicks Basic Mobility (PT)  -DP                User Key  (r) = Recorded By, (t) = Taken By, (c) = Cosigned By      Initials Name Provider Type    Soren Reyes, PT Physical Therapist                     Time Calculation:    PT Charges       Row Name 05/16/25 1417             Time Calculation    PT Received On 05/16/25  -DP         Untimed Charges    PT Eval/Re-eval Minutes 12  -DP         Total Minutes    Untimed Charges Total Minutes 12  -DP       Total Minutes 12  -DP                User Key  (r) = Recorded By, (t) = Taken By, (c) = Cosigned By      Initials Name Provider Type    Soren Reyes, PT Physical Therapist                      PT G-Codes  Outcome Measure Options: AM-PAC 6 Clicks Basic Mobility (PT)  AM-PAC 6 Clicks Score (PT): 24  AM-PAC 6 Clicks Score (OT): 24    Soren Booth PT  5/16/2025

## 2025-05-16 NOTE — PROGRESS NOTES
[facility]   Hospitalist Progress Note  Date: 2025  Patient Name: Tawny Lennon  : 1965  MRN: [id number]  Date of admission: 2025      Subjective   Subjective     Chief complaint: Abdominal pain    Summary:  59-year-old female history of COPD, CKD stage IIIa, MEDRANO liver cirrhosis, GERD without esophagitis, esophageal varices, hospitalized on 2025 chief complaint of worsening abdominal pain for several days, with intermittent chills at home with increased shortness of breath and diarrhea.  Hospitalized for further monitoring and management of concern for SBP, in the setting of possible gastroenteritis, GI consulted, IR guided paracentesis ordered, placed on empiric antibiotics.  Pancytopenia likely due to underlying liver disease.  Attempted paracentesis, not enough volume.  Still with abdominal pain with any oral intake.  GI planning endoscopic evaluation.    Interval follow-up: Seen and examined, no distress, no events overnight, still has abdominal pain, eager for EGD today.  Remains weak and fatigued.  Bicarb 17, sodium 138, potassium 2.6, phosphorus 2.3, creatinine 0.95, white blood count 3000, hemoglobin 9.0, platelets 36,000.  No signs of acute bleeding.    Review of systems:  All systems reviewed and negative except for weakness, fatigue, abdominal pain, nausea    Objective   Objective     Vitals:   Temp:  [98.1 °F (36.7 °C)-99.3 °F (37.4 °C)] 98.5 °F (36.9 °C)  Heart Rate:  [68-81] 77  Resp:  [13-18] 13  BP: ()/(43-66) 104/60  Physical Exam                 Constitutional: Awake, alert, no acute distress laying in bed, nontoxic-appearing              Eyes: Pupils equal, sclerae anicteric, no conjunctival injection              HENT: NCAT, mucous membranes moist              Respiratory: Clear to auscultation bilaterally, nonlabored respirations               Cardiovascular: RRR, no murmurs              Gastrointestinal: soft, mildly distended, diffuse abdominal tenderness               Musculoskeletal: No bilateral ankle edema, no clubbing or cyanosis to extremities              Neurologic: Oriented x 3, strength symmetric in all extremities, Cranial Nerves grossly intact to confrontation, speech clear    Result Review    Result Review:  I have personally reviewed the pertinent results from the past 24 hours to 5/16/2025 10:23 EDT and agree with these findings:  [x]  Laboratory   CBC          5/14/2025    05:30 5/15/2025    12:48 5/16/2025    05:20   CBC   WBC 4.33  3.63  3.52    RBC 2.87  2.93  2.73    Hemoglobin 9.4  9.6  9.0    Hematocrit 28.0  29.1  27.1    MCV 97.6  99.3  99.3    MCH 32.8  32.8  33.0    MCHC 33.6  33.0  33.2    RDW 14.7  14.7  14.6    Platelets 39  38  36      BMP          5/14/2025    05:30 5/15/2025    12:48 5/16/2025    05:20   BMP   BUN 18  19  18    Creatinine 1.07  1.11  0.95    Sodium 137  136  136    Potassium 3.6  3.7  3.8    Chloride 111  110  110    CO2 18.0  17.2  17.8    Calcium 8.1  8.3  8.1      LIVER FUNCTION TESTS:      Lab 05/16/25  0520 05/15/25  1248 05/14/25  0530 05/13/25  1633   TOTAL PROTEIN 4.6* 4.9* 4.8* 5.6*   ALBUMIN 2.3* 2.4* 2.4* 2.9*   GLOBULIN  --   --  2.4 2.7   ALT (SGPT) 13 14 14 17   AST (SGOT) 38* 38* 32 39*   BILIRUBIN 2.7* 2.9* 3.7* 5.1*   INDIRECT BILIRUBIN 1.1 1.1  --   --    BILIRUBIN DIRECT 1.6* 1.8*  --   --    ALK PHOS 111 123* 121* 154*   LIPASE  --   --   --  54       [x]  Microbiology   Microbiology Results (last 10 days)       Procedure Component Value - Date/Time    COVID-19, FLU A/B, RSV PCR 1 HR TAT - Swab, Nasopharynx [[id number]]  (Normal) Collected: 05/15/25 1432    Lab Status: Final result Specimen: Swab from Nasopharynx Updated: 05/15/25 1523     COVID19 Not Detected     Influenza A PCR Not Detected     Influenza B PCR Not Detected     RSV, PCR Not Detected    Narrative:      Fact sheet for providers: https://www.fda.gov/media/556158/download    Fact sheet for patients:  https://www.fda.gov/media/006061/download    Test performed by PCR.              [x]  Radiology US Paracentesis  Result Date: 5/14/2025  Impression: Small amount of right abdominal ascites.  Electronically Signed: Justin Johnson MD  5/14/2025 2:03 PM EDT  Workstation ID: [id number]    XR Chest 1 View  Result Date: 5/13/2025  Impression: No acute cardiopulmonary disease. Electronically Signed: Louie Allen MD  5/13/2025 8:04 PM EDT  Workstation ID: [id number]    CT Abdomen Pelvis With Contrast  Result Date: 5/13/2025  Impression: No acute findings in the abdomen or pelvis. Cirrhosis with sequela of portal hypertension including splenomegaly, varices, and small volume ascites. Electronically Signed: Kalpesh Betancourt MD  5/13/2025 6:39 PM EDT  Workstation ID: [id number]        []  EKG/Telemetry   No orders to display       []  Cardiology/Vascular   []  Pathology  [x]  Old records  []  Other:    Assessment & Plan   Assessment / Plan     Assessment/Plan:  Assessment:  Diffuse abdominal pain  Concern for bacterial peritonitis  Small ascites  Diarrhea-possible gastroenteritis  Chronic thrombocytopenia  MEDRANO liver cirrhosis  Hyperbilirubinemia due to above  CKD stage IIIa  COPD  GERD  History of esophageal varices    Plan:  Labs and imaging reviewed  Continue ceftriaxone  N.p.o. for EGD  Resume diet after EGD  GI consulted, follow-up recommendations  Continue Protonix 40 mg daily  Continue ceftriaxone 2 g IV daily  Continue monitoring potassium  PT/OT  Following blood counts  A.m. labs  Full code  DVT prophylaxis with SCDs  Clinical course to dictate further management  Discussed with nurse at the bedside    VTE Prophylaxis:  Mechanical VTE prophylaxis orders are present.        CODE STATUS:   Code Status (Patient has no pulse and is not breathing): CPR (Attempt to Resuscitate)  Medical Interventions (Patient has pulse or is breathing): Full Support  Level Of Support Discussed With: Patient        Electronically signed by  Nicole Conklin MD, 5/16/2025, 10:23 EDT.    Portions of this documentation were transcribed electronically from a voice recognition software.  I confirm all data accurately represents the service(s) I performed at today's visit.

## 2025-05-16 NOTE — ANESTHESIA POSTPROCEDURE EVALUATION
Patient: Tawny Lennon    Procedure Summary       Date: 05/16/25 Room / Location: [facility] ENDOSCOPY 4 / [facility] ENDOSCOPY    Anesthesia Start: 1032 Anesthesia Stop: 1046    Procedure: ESOPHAGOGASTRODUODENOSCOPY Diagnosis:       Abdominal pain, unspecified abdominal location      (Abdominal pain, unspecified abdominal location [R10.9])    Surgeons: Tuan Novoa MD Provider: Zain Ayala CRNA    Anesthesia Type: general ASA Status: 4            Anesthesia Type: general    Vitals  Vitals Value Taken Time   /55 05/16/25 11:01   Temp 36.9 °C (98.4 °F) 05/16/25 11:00   Pulse 74 05/16/25 11:01   Resp 20 05/16/25 11:00   SpO2 95 % 05/16/25 11:01   Vitals shown include unfiled device data.        Post Anesthesia Care and Evaluation    Patient location during evaluation: bedside  Patient participation: complete - patient participated  Level of consciousness: awake  Pain management: adequate    Airway patency: patent  Anesthetic complications: No anesthetic complications  PONV Status: controlled  Cardiovascular status: acceptable and stable  Respiratory status: acceptable

## 2025-05-17 LAB
ALBUMIN SERPL-MCNC: 2.2 G/DL (ref 3.5–5.2)
ALP SERPL-CCNC: 108 U/L (ref 39–117)
ALT SERPL W P-5'-P-CCNC: 13 U/L (ref 1–33)
ANION GAP SERPL CALCULATED.3IONS-SCNC: 7.8 MMOL/L (ref 5–15)
AST SERPL-CCNC: 39 U/L (ref 1–32)
B PARAPERT DNA SPEC QL NAA+PROBE: NOT DETECTED
B PERT DNA SPEC QL NAA+PROBE: NOT DETECTED
BASOPHILS # BLD AUTO: 0.02 10*3/MM3 (ref 0–0.2)
BASOPHILS NFR BLD AUTO: 0.6 % (ref 0–1.5)
BILIRUB CONJ SERPL-MCNC: 1.7 MG/DL (ref 0–0.3)
BILIRUB INDIRECT SERPL-MCNC: 1 MG/DL
BILIRUB SERPL-MCNC: 2.7 MG/DL (ref 0–1.2)
BUN SERPL-MCNC: 18 MG/DL (ref 8–23)
BUN/CREAT SERPL: 13.6 (ref 7–25)
C PNEUM DNA NPH QL NAA+NON-PROBE: NOT DETECTED
CALCIUM SPEC-SCNC: 7.9 MG/DL (ref 8.6–10.5)
CHLORIDE SERPL-SCNC: 109 MMOL/L (ref 98–107)
CO2 SERPL-SCNC: 19.2 MMOL/L (ref 22–29)
CREAT SERPL-MCNC: 1.32 MG/DL (ref 0.57–1)
DEPRECATED RDW RBC AUTO: 51.1 FL (ref 37–54)
EGFRCR SERPLBLD CKD-EPI 2021: 46 ML/MIN/1.73
EOSINOPHIL # BLD AUTO: 0.11 10*3/MM3 (ref 0–0.4)
EOSINOPHIL NFR BLD AUTO: 3.4 % (ref 0.3–6.2)
ERYTHROCYTE [DISTWIDTH] IN BLOOD BY AUTOMATED COUNT: 14.4 % (ref 12.3–15.4)
FLUAV SUBTYP SPEC NAA+PROBE: NOT DETECTED
FLUBV RNA ISLT QL NAA+PROBE: NOT DETECTED
GLUCOSE SERPL-MCNC: 103 MG/DL (ref 65–99)
HADV DNA SPEC NAA+PROBE: NOT DETECTED
HCOV 229E RNA SPEC QL NAA+PROBE: NOT DETECTED
HCOV HKU1 RNA SPEC QL NAA+PROBE: NOT DETECTED
HCOV NL63 RNA SPEC QL NAA+PROBE: NOT DETECTED
HCOV OC43 RNA SPEC QL NAA+PROBE: NOT DETECTED
HCT VFR BLD AUTO: 26.2 % (ref 34–46.6)
HGB BLD-MCNC: 8.8 G/DL (ref 12–15.9)
HMPV RNA NPH QL NAA+NON-PROBE: NOT DETECTED
HPIV1 RNA ISLT QL NAA+PROBE: NOT DETECTED
HPIV2 RNA SPEC QL NAA+PROBE: NOT DETECTED
HPIV3 RNA NPH QL NAA+PROBE: NOT DETECTED
HPIV4 P GENE NPH QL NAA+PROBE: NOT DETECTED
IMM GRANULOCYTES # BLD AUTO: 0.01 10*3/MM3 (ref 0–0.05)
IMM GRANULOCYTES NFR BLD AUTO: 0.3 % (ref 0–0.5)
LYMPHOCYTES # BLD AUTO: 0.58 10*3/MM3 (ref 0.7–3.1)
LYMPHOCYTES NFR BLD AUTO: 17.8 % (ref 19.6–45.3)
M PNEUMO IGG SER IA-ACNC: NOT DETECTED
MAGNESIUM SERPL-MCNC: 1.7 MG/DL (ref 1.6–2.4)
MCH RBC QN AUTO: 32.7 PG (ref 26.6–33)
MCHC RBC AUTO-ENTMCNC: 33.6 G/DL (ref 31.5–35.7)
MCV RBC AUTO: 97.4 FL (ref 79–97)
MONOCYTES # BLD AUTO: 0.3 10*3/MM3 (ref 0.1–0.9)
MONOCYTES NFR BLD AUTO: 9.2 % (ref 5–12)
NEUTROPHILS NFR BLD AUTO: 2.24 10*3/MM3 (ref 1.7–7)
NEUTROPHILS NFR BLD AUTO: 68.7 % (ref 42.7–76)
NRBC BLD AUTO-RTO: 0 /100 WBC (ref 0–0.2)
PHOSPHATE SERPL-MCNC: 2.8 MG/DL (ref 2.5–4.5)
PLATELET # BLD AUTO: 33 10*3/MM3 (ref 140–450)
PMV BLD AUTO: 11.5 FL (ref 6–12)
POTASSIUM SERPL-SCNC: 4.1 MMOL/L (ref 3.5–5.2)
PROT SERPL-MCNC: 4.4 G/DL (ref 6–8.5)
RBC # BLD AUTO: 2.69 10*6/MM3 (ref 3.77–5.28)
RHINOVIRUS RNA SPEC NAA+PROBE: DETECTED
RSV RNA NPH QL NAA+NON-PROBE: NOT DETECTED
SARS-COV-2 RNA RESP QL NAA+PROBE: NOT DETECTED
SODIUM SERPL-SCNC: 136 MMOL/L (ref 136–145)
WBC NRBC COR # BLD AUTO: 3.26 10*3/MM3 (ref 3.4–10.8)

## 2025-05-17 PROCEDURE — 84100 ASSAY OF PHOSPHORUS: CPT | Performed by: FAMILY MEDICINE

## 2025-05-17 PROCEDURE — 99232 SBSQ HOSP IP/OBS MODERATE 35: CPT | Performed by: FAMILY MEDICINE

## 2025-05-17 PROCEDURE — 83735 ASSAY OF MAGNESIUM: CPT | Performed by: FAMILY MEDICINE

## 2025-05-17 PROCEDURE — 85025 COMPLETE CBC W/AUTO DIFF WBC: CPT | Performed by: FAMILY MEDICINE

## 2025-05-17 PROCEDURE — 25010000002 CEFTRIAXONE PER 250 MG: Performed by: FAMILY MEDICINE

## 2025-05-17 PROCEDURE — 0202U NFCT DS 22 TRGT SARS-COV-2: CPT | Performed by: PHYSICIAN ASSISTANT

## 2025-05-17 PROCEDURE — 80076 HEPATIC FUNCTION PANEL: CPT | Performed by: FAMILY MEDICINE

## 2025-05-17 PROCEDURE — 80048 BASIC METABOLIC PNL TOTAL CA: CPT | Performed by: FAMILY MEDICINE

## 2025-05-17 RX ORDER — PANTOPRAZOLE SODIUM 40 MG/1
40 TABLET, DELAYED RELEASE ORAL EVERY 24 HOURS
Status: DISCONTINUED | OUTPATIENT
Start: 2025-05-18 | End: 2025-05-20 | Stop reason: HOSPADM

## 2025-05-17 RX ADMIN — CEFTRIAXONE SODIUM 2000 MG: 2 INJECTION, POWDER, FOR SOLUTION INTRAMUSCULAR; INTRAVENOUS at 11:13

## 2025-05-17 RX ADMIN — FOLIC ACID 1 MG: 1 TABLET ORAL at 09:00

## 2025-05-17 RX ADMIN — BENZONATATE 100 MG: 100 CAPSULE ORAL at 08:58

## 2025-05-17 RX ADMIN — Medication 10 ML: at 21:12

## 2025-05-17 RX ADMIN — Medication 10 MG: at 21:12

## 2025-05-17 RX ADMIN — FLUTICASONE PROPIONATE 2 SPRAY: 50 SPRAY, METERED NASAL at 08:59

## 2025-05-17 RX ADMIN — BENZONATATE 100 MG: 100 CAPSULE ORAL at 17:32

## 2025-05-17 RX ADMIN — SPIRONOLACTONE 50 MG: 25 TABLET ORAL at 08:58

## 2025-05-17 RX ADMIN — FUROSEMIDE 20 MG: 20 TABLET ORAL at 08:59

## 2025-05-17 RX ADMIN — PANTOPRAZOLE SODIUM 40 MG: 40 TABLET, DELAYED RELEASE ORAL at 08:59

## 2025-05-17 RX ADMIN — Medication 10 ML: at 08:58

## 2025-05-17 NOTE — PLAN OF CARE
Goal Outcome Evaluation:           Progress: no change  Outcome Evaluation: Pt has had no c/o of abdominal pain or nausea this shift.  continue plan of care

## 2025-05-17 NOTE — PROGRESS NOTES
[facility]   Hospitalist Progress Note  Date: 2025  Patient Name: Tawny Lennon  : 1965  MRN: [id number]  Date of admission: 2025      Subjective   Subjective     Chief complaint: Abdominal pain    Summary:  59-year-old female history of COPD, CKD stage IIIa, MEDRANO liver cirrhosis, GERD without esophagitis, esophageal varices, hospitalized on 2025 chief complaint of worsening abdominal pain for several days, with intermittent chills at home with increased shortness of breath and diarrhea.  Hospitalized for further monitoring and management of concern for SBP, in the setting of possible gastroenteritis, GI consulted, IR guided paracentesis ordered, placed on empiric antibiotics.  Pancytopenia likely due to underlying liver disease.  Attempted paracentesis, not enough volume.  Still with abdominal pain with any oral intake.  GI planning endoscopic evaluation.  Status post EGD, scarring lower third of esophagus, portal hypertensive gastropathy, tolerated procedure well.  Developed URI symptoms, negative for COVID, RSV, flu.    Interval follow-up: Seen and examined, no distress, no events overnight, less abdominal pain, more nasal congestion and cough no chest discomfort, satting well on room air.  White blood cell count 3000, platelets in the 30,000, hemoglobin 8.8.  Creatinine 1.32, sodium 136.,  Potassium 4.1.    Review of systems:  All systems reviewed and negative except for weakness, fatigue, abdominal pain, nausea    Objective   Objective     Vitals:   Temp:  [97.9 °F (36.6 °C)-98.4 °F (36.9 °C)] 98.4 °F (36.9 °C)  Heart Rate:  [74-77] 74  Resp:  [16-18] 18  BP: ()/(50-64) 107/61  Physical Exam               Constitutional: Awake, alert, no acute distress laying in bed, nontoxic-appearing              Eyes: Pupils equal, sclerae anicteric, no conjunctival injection              HENT: NCAT, mucous membranes moist              Respiratory: Clear to auscultation bilaterally,  nonlabored respirations               Cardiovascular: RRR, no murmurs              Gastrointestinal: soft, mildly distended, diffuse abdominal tenderness              Musculoskeletal: No bilateral ankle edema, no clubbing or cyanosis to extremities              Neurologic: Oriented x 3, strength symmetric in all extremities, Cranial Nerves grossly intact to confrontation, speech clear    Result Review    Result Review:  I have personally reviewed the pertinent results from the past 24 hours to 5/17/2025 11:34 EDT and agree with these findings:  [x]  Laboratory   CBC          5/15/2025    12:48 5/16/2025    05:20 5/17/2025    05:58   CBC   WBC 3.63  3.52  3.26    RBC 2.93  2.73  2.69    Hemoglobin 9.6  9.0  8.8    Hematocrit 29.1  27.1  26.2    MCV 99.3  99.3  97.4    MCH 32.8  33.0  32.7    MCHC 33.0  33.2  33.6    RDW 14.7  14.6  14.4    Platelets 38  36  33      BMP          5/15/2025    12:48 5/16/2025    05:20 5/17/2025    05:58   BMP   BUN 19  18  18    Creatinine 1.11  0.95  1.32    Sodium 136  136  136    Potassium 3.7  3.8  4.1    Chloride 110  110  109    CO2 17.2  17.8  19.2    Calcium 8.3  8.1  7.9      LIVER FUNCTION TESTS:      Lab 05/17/25  0558 05/16/25  0520 05/15/25  1248 05/14/25  0530 05/13/25  1633   TOTAL PROTEIN 4.4* 4.6* 4.9* 4.8* 5.6*   ALBUMIN 2.2* 2.3* 2.4* 2.4* 2.9*   GLOBULIN  --   --   --  2.4 2.7   ALT (SGPT) 13 13 14 14 17   AST (SGOT) 39* 38* 38* 32 39*   BILIRUBIN 2.7* 2.7* 2.9* 3.7* 5.1*   INDIRECT BILIRUBIN 1.0 1.1 1.1  --   --    BILIRUBIN DIRECT 1.7* 1.6* 1.8*  --   --    ALK PHOS 108 111 123* 121* 154*   LIPASE  --   --   --   --  54       [x]  Microbiology   Microbiology Results (last 10 days)       Procedure Component Value - Date/Time    COVID-19, FLU A/B, RSV PCR 1 HR TAT - Swab, Nasopharynx [[id number]]  (Normal) Collected: 05/15/25 1432    Lab Status: Final result Specimen: Swab from Nasopharynx Updated: 05/15/25 1523     COVID19 Not Detected     Influenza A PCR Not  Detected     Influenza B PCR Not Detected     RSV, PCR Not Detected    Narrative:      Fact sheet for providers: https://www.fda.gov/media/138615/download    Fact sheet for patients: https://www.fda.gov/media/717991/download    Test performed by PCR.              [x]  Radiology US Paracentesis  Result Date: 5/14/2025  Impression: Small amount of right abdominal ascites.  Electronically Signed: Justin Johnson MD  5/14/2025 2:03 PM EDT  Workstation ID: [id number]    XR Chest 1 View  Result Date: 5/13/2025  Impression: No acute cardiopulmonary disease. Electronically Signed: Louie Allen MD  5/13/2025 8:04 PM EDT  Workstation ID: [id number]    CT Abdomen Pelvis With Contrast  Result Date: 5/13/2025  Impression: No acute findings in the abdomen or pelvis. Cirrhosis with sequela of portal hypertension including splenomegaly, varices, and small volume ascites. Electronically Signed: Kalpesh Betancourt MD  5/13/2025 6:39 PM EDT  Workstation ID: [id number]        []  EKG/Telemetry   No orders to display       []  Cardiology/Vascular   []  Pathology  [x]  Old records  []  Other:    Assessment & Plan   Assessment / Plan     Assessment/Plan:  Assessment:  Diffuse abdominal pain  Concern for bacterial peritonitis  Small ascites  Diarrhea-possible gastroenteritis  Chronic thrombocytopenia  MEDRANO liver cirrhosis  Hyperbilirubinemia due to above  CKD stage IIIa  COPD  GERD  History of esophageal varices    Plan:  Labs and imaging reviewed  Continue ceftriaxone  Check respiratory panel  Continue oral intake per tolerance  GI consulted, follow-up recommendations  Continue Protonix 40 mg daily  Continue spironolactone 50 mg daily  Continue Lasix 20 mg daily  Continue ceftriaxone 2 g IV daily  Continue monitoring potassium  PT/OT  Following blood counts  A.m. labs  Full code  DVT prophylaxis with SCDs  Clinical course to dictate further management  Discussed with nurse at the bedside    VTE Prophylaxis:  Mechanical VTE prophylaxis orders  are present.        CODE STATUS:   Code Status (Patient has no pulse and is not breathing): CPR (Attempt to Resuscitate)  Medical Interventions (Patient has pulse or is breathing): Full Support  Level Of Support Discussed With: Patient        Electronically signed by Nicole Conklin MD, 5/17/2025, 11:34 EDT.    Portions of this documentation were transcribed electronically from a voice recognition software.  I confirm all data accurately represents the service(s) I performed at today's visit.

## 2025-05-17 NOTE — PLAN OF CARE
Goal Outcome Evaluation:           Progress: no change  Outcome Evaluation: Pt stated she does not feel well today. Respiratory panel done, pt is positive for human rhinovirus. Vital sign stable. Epigastric abdominal pain still present per pt, no nausea this shift. Platelets at 33 today, MD notified. Continue plan of care.

## 2025-05-18 LAB
ALBUMIN SERPL-MCNC: 2.2 G/DL (ref 3.5–5.2)
ALP SERPL-CCNC: 112 U/L (ref 39–117)
ALT SERPL W P-5'-P-CCNC: 14 U/L (ref 1–33)
ANION GAP SERPL CALCULATED.3IONS-SCNC: 5.7 MMOL/L (ref 5–15)
AST SERPL-CCNC: 41 U/L (ref 1–32)
BASOPHILS # BLD AUTO: 0.01 10*3/MM3 (ref 0–0.2)
BASOPHILS NFR BLD AUTO: 0.3 % (ref 0–1.5)
BILIRUB CONJ SERPL-MCNC: 1.5 MG/DL (ref 0–0.3)
BILIRUB INDIRECT SERPL-MCNC: 0.8 MG/DL
BILIRUB SERPL-MCNC: 2.3 MG/DL (ref 0–1.2)
BUN SERPL-MCNC: 20 MG/DL (ref 8–23)
BUN/CREAT SERPL: 16.5 (ref 7–25)
CALCIUM SPEC-SCNC: 7.8 MG/DL (ref 8.6–10.5)
CHLORIDE SERPL-SCNC: 110 MMOL/L (ref 98–107)
CO2 SERPL-SCNC: 19.3 MMOL/L (ref 22–29)
CREAT SERPL-MCNC: 1.21 MG/DL (ref 0.57–1)
DEPRECATED RDW RBC AUTO: 52.5 FL (ref 37–54)
EGFRCR SERPLBLD CKD-EPI 2021: 51.1 ML/MIN/1.73
EOSINOPHIL # BLD AUTO: 0.1 10*3/MM3 (ref 0–0.4)
EOSINOPHIL NFR BLD AUTO: 2.9 % (ref 0.3–6.2)
ERYTHROCYTE [DISTWIDTH] IN BLOOD BY AUTOMATED COUNT: 14.7 % (ref 12.3–15.4)
GLUCOSE SERPL-MCNC: 117 MG/DL (ref 65–99)
HCT VFR BLD AUTO: 26.2 % (ref 34–46.6)
HGB BLD-MCNC: 8.7 G/DL (ref 12–15.9)
IMM GRANULOCYTES # BLD AUTO: 0.02 10*3/MM3 (ref 0–0.05)
IMM GRANULOCYTES NFR BLD AUTO: 0.6 % (ref 0–0.5)
LYMPHOCYTES # BLD AUTO: 0.61 10*3/MM3 (ref 0.7–3.1)
LYMPHOCYTES NFR BLD AUTO: 17.9 % (ref 19.6–45.3)
MAGNESIUM SERPL-MCNC: 1.7 MG/DL (ref 1.6–2.4)
MCH RBC QN AUTO: 32.6 PG (ref 26.6–33)
MCHC RBC AUTO-ENTMCNC: 33.2 G/DL (ref 31.5–35.7)
MCV RBC AUTO: 98.1 FL (ref 79–97)
MONOCYTES # BLD AUTO: 0.3 10*3/MM3 (ref 0.1–0.9)
MONOCYTES NFR BLD AUTO: 8.8 % (ref 5–12)
NEUTROPHILS NFR BLD AUTO: 2.36 10*3/MM3 (ref 1.7–7)
NEUTROPHILS NFR BLD AUTO: 69.5 % (ref 42.7–76)
NRBC BLD AUTO-RTO: 0 /100 WBC (ref 0–0.2)
PHOSPHATE SERPL-MCNC: 2.7 MG/DL (ref 2.5–4.5)
PLATELET # BLD AUTO: 36 10*3/MM3 (ref 140–450)
PMV BLD AUTO: 12.5 FL (ref 6–12)
POTASSIUM SERPL-SCNC: 3.8 MMOL/L (ref 3.5–5.2)
PROT SERPL-MCNC: 4.4 G/DL (ref 6–8.5)
RBC # BLD AUTO: 2.67 10*6/MM3 (ref 3.77–5.28)
SODIUM SERPL-SCNC: 135 MMOL/L (ref 136–145)
WBC NRBC COR # BLD AUTO: 3.4 10*3/MM3 (ref 3.4–10.8)

## 2025-05-18 PROCEDURE — 85025 COMPLETE CBC W/AUTO DIFF WBC: CPT | Performed by: FAMILY MEDICINE

## 2025-05-18 PROCEDURE — 84100 ASSAY OF PHOSPHORUS: CPT | Performed by: FAMILY MEDICINE

## 2025-05-18 PROCEDURE — 99232 SBSQ HOSP IP/OBS MODERATE 35: CPT | Performed by: FAMILY MEDICINE

## 2025-05-18 PROCEDURE — 83735 ASSAY OF MAGNESIUM: CPT | Performed by: FAMILY MEDICINE

## 2025-05-18 PROCEDURE — 80048 BASIC METABOLIC PNL TOTAL CA: CPT | Performed by: FAMILY MEDICINE

## 2025-05-18 PROCEDURE — 25810000003 LACTATED RINGERS SOLUTION: Performed by: FAMILY MEDICINE

## 2025-05-18 PROCEDURE — 25010000002 CEFTRIAXONE PER 250 MG: Performed by: FAMILY MEDICINE

## 2025-05-18 PROCEDURE — 80076 HEPATIC FUNCTION PANEL: CPT | Performed by: FAMILY MEDICINE

## 2025-05-18 RX ORDER — HYDROXYZINE HYDROCHLORIDE 25 MG/1
25 TABLET, FILM COATED ORAL ONCE AS NEEDED
Status: COMPLETED | OUTPATIENT
Start: 2025-05-18 | End: 2025-05-18

## 2025-05-18 RX ADMIN — PANTOPRAZOLE SODIUM 40 MG: 40 TABLET, DELAYED RELEASE ORAL at 07:08

## 2025-05-18 RX ADMIN — BENZONATATE 100 MG: 100 CAPSULE ORAL at 20:04

## 2025-05-18 RX ADMIN — Medication 10 ML: at 20:04

## 2025-05-18 RX ADMIN — SODIUM CHLORIDE, POTASSIUM CHLORIDE, SODIUM LACTATE AND CALCIUM CHLORIDE 500 ML: 600; 310; 30; 20 INJECTION, SOLUTION INTRAVENOUS at 08:11

## 2025-05-18 RX ADMIN — FLUTICASONE PROPIONATE 2 SPRAY: 50 SPRAY, METERED NASAL at 08:10

## 2025-05-18 RX ADMIN — FOLIC ACID 1 MG: 1 TABLET ORAL at 08:10

## 2025-05-18 RX ADMIN — HYDROXYZINE HYDROCHLORIDE 25 MG: 25 TABLET, FILM COATED ORAL at 02:15

## 2025-05-18 RX ADMIN — BENZONATATE 100 MG: 100 CAPSULE ORAL at 02:15

## 2025-05-18 RX ADMIN — Medication 10 MG: at 20:04

## 2025-05-18 RX ADMIN — CEFTRIAXONE SODIUM 2000 MG: 2 INJECTION, POWDER, FOR SOLUTION INTRAMUSCULAR; INTRAVENOUS at 12:03

## 2025-05-18 RX ADMIN — Medication 10 ML: at 08:11

## 2025-05-18 NOTE — PROGRESS NOTES
[facility]   Hospitalist Progress Note  Date: 2025  Patient Name: Tawny Lennon  : 1965  MRN: [id number]  Date of admission: 2025      Subjective   Subjective       Chief complaint: Abdominal pain    Summary:  59-year-old female history of COPD, CKD stage IIIa, MEDRANO liver cirrhosis, GERD without esophagitis, esophageal varices, hospitalized on 2025 chief complaint of worsening abdominal pain for several days, with intermittent chills at home with increased shortness of breath and diarrhea.  Hospitalized for further monitoring and management of concern for SBP, in the setting of possible gastroenteritis, GI consulted, IR guided paracentesis ordered, placed on empiric antibiotics.  Pancytopenia likely due to underlying liver disease.  Attempted paracentesis, not enough volume.  Still with abdominal pain with any oral intake.  GI planning endoscopic evaluation.  Status post EGD, scarring lower third of esophagus, portal hypertensive gastropathy, tolerated procedure well.  Developed URI symptoms, negative for COVID, RSV, flu.    Interval follow-up: Seen and examined, no distress, could not sleep last night, got a dose of Atarax, blood pressure soft this morning, no nausea or vomiting.  Blood pressures improved after small bolus.  Patient is too sleepy this morning.  Sodium 135, potassium 3.8, bicarb 19, creatinine 1.21, white blood cell count 3000, platelets 36,000, hemoglobin 8.7.  Respiratory panel came back positive for human rhinovirus and enterovirus which is fitting of recurrent symptoms of nasal congestion.    Review of systems:  All systems reviewed and negative except for weakness, fatigue, abdominal pain    Objective   Objective     Vitals:   Temp:  [97.8 °F (36.6 °C)-98.6 °F (37 °C)] 97.9 °F (36.6 °C)  Heart Rate:  [65-78] 78  Resp:  [18-19] 19  BP: ()/(38-92) 116/92  Physical Exam               Constitutional: Arousable, alert, no acute distress laying in bed,  nontoxic-appearing              Eyes: Pupils equal, sclerae anicteric, no conjunctival injection              HENT: NCAT, mucous membranes moist              Respiratory: Clear to auscultation bilaterally, nonlabored respirations               Cardiovascular: RRR, no murmurs              Gastrointestinal: soft, mildly distended, diffuse abdominal tenderness              Musculoskeletal: No bilateral ankle edema, no clubbing or cyanosis to extremities              Neurologic: Oriented x 3, strength symmetric in all extremities, Cranial Nerves grossly intact to confrontation, speech clear    Result Review    Result Review:  I have personally reviewed the pertinent results from the past 24 hours to 5/18/2025 11:28 EDT and agree with these findings:  [x]  Laboratory   CBC          5/16/2025    05:20 5/17/2025    05:58 5/18/2025    04:43   CBC   WBC 3.52  3.26  3.40    RBC 2.73  2.69  2.67    Hemoglobin 9.0  8.8  8.7    Hematocrit 27.1  26.2  26.2    MCV 99.3  97.4  98.1    MCH 33.0  32.7  32.6    MCHC 33.2  33.6  33.2    RDW 14.6  14.4  14.7    Platelets 36  33  36      BMP          5/16/2025    05:20 5/17/2025    05:58 5/18/2025    04:43   BMP   BUN 18  18  20    Creatinine 0.95  1.32  1.21    Sodium 136  136  135    Potassium 3.8  4.1  3.8    Chloride 110  109  110    CO2 17.8  19.2  19.3    Calcium 8.1  7.9  7.8      LIVER FUNCTION TESTS:      Lab 05/18/25  0443 05/17/25  0558 05/16/25  0520 05/15/25  1248 05/14/25  0530 05/13/25  1633   TOTAL PROTEIN 4.4* 4.4* 4.6* 4.9* 4.8* 5.6*   ALBUMIN 2.2* 2.2* 2.3* 2.4* 2.4* 2.9*   GLOBULIN  --   --   --   --  2.4 2.7   ALT (SGPT) 14 13 13 14 14 17   AST (SGOT) 41* 39* 38* 38* 32 39*   BILIRUBIN 2.3* 2.7* 2.7* 2.9* 3.7* 5.1*   INDIRECT BILIRUBIN 0.8 1.0 1.1 1.1  --   --    BILIRUBIN DIRECT 1.5* 1.7* 1.6* 1.8*  --   --    ALK PHOS 112 108 111 123* 121* 154*   LIPASE  --   --   --   --   --  54       [x]  Microbiology   Microbiology Results (last 10 days)       Procedure  Component Value - Date/Time    Respiratory Panel PCR w/COVID-19(SARS-CoV-2) DANIELLE/VERO/BERTA/PAD/COR/ROMIE In-House, NP Swab in UTM/VTM, 2 HR TAT - Swab, Nasopharynx [[id number]]  (Abnormal) Collected: 05/17/25 1114    Lab Status: Final result Specimen: Swab from Nasopharynx Updated: 05/17/25 1226     ADENOVIRUS, PCR Not Detected     Coronavirus 229E Not Detected     Coronavirus HKU1 Not Detected     Coronavirus NL63 Not Detected     Coronavirus OC43 Not Detected     COVID19 Not Detected     Human Metapneumovirus Not Detected     Human Rhinovirus/Enterovirus Detected     Influenza A PCR Not Detected     Influenza B PCR Not Detected     Parainfluenza Virus 1 Not Detected     Parainfluenza Virus 2 Not Detected     Parainfluenza Virus 3 Not Detected     Parainfluenza Virus 4 Not Detected     RSV, PCR Not Detected     Bordetella pertussis pcr Not Detected     Bordetella parapertussis PCR Not Detected     Chlamydophila pneumoniae PCR Not Detected     Mycoplasma pneumo by PCR Not Detected    Narrative:      In the setting of a positive respiratory panel with a viral infection PLUS a negative procalcitonin without other underlying concern for bacterial infection, consider observing off antibiotics or discontinuation of antibiotics and continue supportive care. If the respiratory panel is positive for atypical bacterial infection (Bordetella pertussis, Chlamydophila pneumoniae, or Mycoplasma pneumoniae), consider antibiotic de-escalation to target atypical bacterial infection.    COVID-19, FLU A/B, RSV PCR 1 HR TAT - Swab, Nasopharynx [[id number]]  (Normal) Collected: 05/15/25 1432    Lab Status: Final result Specimen: Swab from Nasopharynx Updated: 05/15/25 1523     COVID19 Not Detected     Influenza A PCR Not Detected     Influenza B PCR Not Detected     RSV, PCR Not Detected    Narrative:      Fact sheet for providers: https://www.fda.gov/media/732798/download    Fact sheet for patients:  https://www.fda.gov/media/745836/download    Test performed by PCR.              [x]  Radiology US Paracentesis  Result Date: 5/14/2025  Impression: Small amount of right abdominal ascites.  Electronically Signed: Justin Johnson MD  5/14/2025 2:03 PM EDT  Workstation ID: [id number]    XR Chest 1 View  Result Date: 5/13/2025  Impression: No acute cardiopulmonary disease. Electronically Signed: Louie Allen MD  5/13/2025 8:04 PM EDT  Workstation ID: [id number]    CT Abdomen Pelvis With Contrast  Result Date: 5/13/2025  Impression: No acute findings in the abdomen or pelvis. Cirrhosis with sequela of portal hypertension including splenomegaly, varices, and small volume ascites. Electronically Signed: Kalpesh Betancourt MD  5/13/2025 6:39 PM EDT  Workstation ID: [id number]        []  EKG/Telemetry   No orders to display       []  Cardiology/Vascular   []  Pathology  [x]  Old records  []  Other:    Assessment & Plan   Assessment / Plan     Assessment/Plan:  Assessment:  Diffuse abdominal pain  Viral URI due to rhinovirus/enterovirus  Concern for bacterial peritonitis  Small ascites  Diarrhea-possible gastroenteritis  Chronic thrombocytopenia  MEDRANO liver cirrhosis  Hyperbilirubinemia due to above  CKD stage IIIa  COPD  GERD  History of esophageal varices    Plan:  Labs and imaging reviewed  Continue ceftriaxone to complete 7-day course  Continue oral intake per tolerance  GI consulted, follow-up recommendations  Continue Protonix 40 mg daily  Hold spironolactone 50 mg daily  Hold Lasix 20 mg daily  500 cc bolus of LR  Continue monitoring potassium  PT/OT  Following blood counts  A.m. labs  Full code  DVT prophylaxis with SCDs  Clinical course to dictate further management  Discussed with nurse at the bedside    VTE Prophylaxis:  Mechanical VTE prophylaxis orders are present.        CODE STATUS:   Code Status (Patient has no pulse and is not breathing): CPR (Attempt to Resuscitate)  Medical Interventions (Patient has pulse or  is breathing): Full Support  Level Of Support Discussed With: Patient        Electronically signed by Nicole Conklin MD, 5/18/2025, 11:28 EDT.    Portions of this documentation were transcribed electronically from a voice recognition software.  I confirm all data accurately represents the service(s) I performed at today's visit.

## 2025-05-18 NOTE — PLAN OF CARE
Goal Outcome Evaluation:           Progress: no change  Outcome Evaluation: Pt having trouble sleeping this shift after melatonin given.  Medicated with Atarax. Pt able to sleep some after. Still having some c/o abdominal discomfort. no n/v or emesis noted.

## 2025-05-18 NOTE — PLAN OF CARE
Problem: Adult Inpatient Plan of Care  Goal: Plan of Care Review  Outcome: Progressing  Flowsheets (Taken 5/18/2025 1600)  Progress: no change  Outcome Evaluation: Patient is alert and oriented x4. Patient had 500ml LR bolus for low BP this morning. Patient has no complaints at this time.  Plan of Care Reviewed With: patient  Goal: Patient-Specific Goal (Individualized)  Outcome: Progressing  Goal: Absence of Hospital-Acquired Illness or Injury  Outcome: Progressing  Intervention: Identify and Manage Fall Risk  Recent Flowsheet Documentation  Taken 5/18/2025 1415 by Mima Quintero, RN  Safety Promotion/Fall Prevention:   assistive device/personal items within reach   clutter free environment maintained   fall prevention program maintained   lighting adjusted   nonskid shoes/slippers when out of bed   room organization consistent   safety round/check completed  Taken 5/18/2025 1203 by Mima Quintero, RN  Safety Promotion/Fall Prevention:   assistive device/personal items within reach   clutter free environment maintained   fall prevention program maintained   lighting adjusted   nonskid shoes/slippers when out of bed   room organization consistent   safety round/check completed  Taken 5/18/2025 1115 by Mima Quintero, RN  Safety Promotion/Fall Prevention:   assistive device/personal items within reach   clutter free environment maintained   fall prevention program maintained   lighting adjusted   nonskid shoes/slippers when out of bed   room organization consistent   safety round/check completed  Taken 5/18/2025 0921 by Mima Quintero, RN  Safety Promotion/Fall Prevention:   assistive device/personal items within reach   clutter free environment maintained   fall prevention program maintained   lighting adjusted   nonskid shoes/slippers when out of bed   room organization consistent   safety round/check completed  Taken 5/18/2025 0810 by Mima Quintero, RN  Safety Promotion/Fall Prevention:   assistive  device/personal items within reach   clutter free environment maintained   fall prevention program maintained   lighting adjusted   nonskid shoes/slippers when out of bed   room organization consistent   safety round/check completed  Taken 5/18/2025 0711 by Mima Quintero RN  Safety Promotion/Fall Prevention:   assistive device/personal items within reach   clutter free environment maintained   fall prevention program maintained   lighting adjusted   nonskid shoes/slippers when out of bed   room organization consistent   safety round/check completed  Intervention: Prevent Infection  Recent Flowsheet Documentation  Taken 5/18/2025 1415 by Mima Quintero RN  Infection Prevention:   cohorting utilized   environmental surveillance performed   equipment surfaces disinfected   hand hygiene promoted   personal protective equipment utilized   rest/sleep promoted   single patient room provided  Taken 5/18/2025 1203 by Mima Quintero RN  Infection Prevention:   cohorting utilized   environmental surveillance performed   equipment surfaces disinfected   hand hygiene promoted   personal protective equipment utilized   rest/sleep promoted   single patient room provided  Taken 5/18/2025 1115 by Mima Quintero RN  Infection Prevention:   cohorting utilized   environmental surveillance performed   equipment surfaces disinfected   hand hygiene promoted   personal protective equipment utilized   rest/sleep promoted   single patient room provided  Taken 5/18/2025 0921 by Mima Quintero RN  Infection Prevention:   cohorting utilized   environmental surveillance performed   equipment surfaces disinfected   hand hygiene promoted   personal protective equipment utilized   rest/sleep promoted   single patient room provided  Taken 5/18/2025 0810 by Mima Quintero RN  Infection Prevention:   cohorting utilized   environmental surveillance performed   equipment surfaces disinfected   hand hygiene promoted   personal protective  equipment utilized   rest/sleep promoted   single patient room provided  Taken 5/18/2025 0711 by Mima Quintero, RN  Infection Prevention:   cohorting utilized   environmental surveillance performed   equipment surfaces disinfected   hand hygiene promoted   personal protective equipment utilized   rest/sleep promoted   single patient room provided  Goal: Optimal Comfort and Wellbeing  Outcome: Progressing  Intervention: Provide Person-Centered Care  Recent Flowsheet Documentation  Taken 5/18/2025 0810 by Mima Quintero, RN  Trust Relationship/Rapport:   care explained   choices provided   emotional support provided   empathic listening provided   questions answered   questions encouraged   reassurance provided   thoughts/feelings acknowledged  Goal: Readiness for Transition of Care  Outcome: Progressing     Problem: Fall Injury Risk  Goal: Absence of Fall and Fall-Related Injury  Outcome: Progressing  Intervention: Identify and Manage Contributors  Recent Flowsheet Documentation  Taken 5/18/2025 0810 by Mima Quintero RN  Medication Review/Management:   medications reviewed   high-risk medications identified  Intervention: Promote Injury-Free Environment  Recent Flowsheet Documentation  Taken 5/18/2025 1415 by Mima Quintero, RN  Safety Promotion/Fall Prevention:   assistive device/personal items within reach   clutter free environment maintained   fall prevention program maintained   lighting adjusted   nonskid shoes/slippers when out of bed   room organization consistent   safety round/check completed  Taken 5/18/2025 1203 by Mima Quintero, RN  Safety Promotion/Fall Prevention:   assistive device/personal items within reach   clutter free environment maintained   fall prevention program maintained   lighting adjusted   nonskid shoes/slippers when out of bed   room organization consistent   safety round/check completed  Taken 5/18/2025 1115 by Mima Quintero, RN  Safety Promotion/Fall Prevention:    assistive device/personal items within reach   clutter free environment maintained   fall prevention program maintained   lighting adjusted   nonskid shoes/slippers when out of bed   room organization consistent   safety round/check completed  Taken 5/18/2025 0921 by Mima Quintero RN  Safety Promotion/Fall Prevention:   assistive device/personal items within reach   clutter free environment maintained   fall prevention program maintained   lighting adjusted   nonskid shoes/slippers when out of bed   room organization consistent   safety round/check completed  Taken 5/18/2025 0810 by Mima Quintero RN  Safety Promotion/Fall Prevention:   assistive device/personal items within reach   clutter free environment maintained   fall prevention program maintained   lighting adjusted   nonskid shoes/slippers when out of bed   room organization consistent   safety round/check completed  Taken 5/18/2025 0711 by Mima Quintero RN  Safety Promotion/Fall Prevention:   assistive device/personal items within reach   clutter free environment maintained   fall prevention program maintained   lighting adjusted   nonskid shoes/slippers when out of bed   room organization consistent   safety round/check completed     Problem: Comorbidity Management  Goal: Maintenance of COPD Symptom Control  Outcome: Progressing  Intervention: Maintain COPD (Chronic Obstructive Pulmonary Disease) Symptom Control  Recent Flowsheet Documentation  Taken 5/18/2025 0810 by Mima Quintero, RN  Medication Review/Management:   medications reviewed   high-risk medications identified  Goal: Maintenance of Heart Failure Symptom Control  Outcome: Progressing  Intervention: Maintain Heart Failure Management  Recent Flowsheet Documentation  Taken 5/18/2025 0810 by Mima Quintero, RN  Medication Review/Management:   medications reviewed   high-risk medications identified  Goal: Blood Pressure in Desired Range  Outcome: Progressing  Intervention: Maintain Blood  Pressure Management  Recent Flowsheet Documentation  Taken 5/18/2025 0810 by Mima Quintero RN  Medication Review/Management:   medications reviewed   high-risk medications identified     Problem: Skin Injury Risk Increased  Goal: Skin Health and Integrity  Outcome: Progressing     Problem: Sepsis/Septic Shock  Goal: Optimal Coping  Outcome: Progressing  Goal: Absence of Bleeding  Outcome: Progressing  Goal: Blood Glucose Level Within Target Range  Outcome: Progressing  Goal: Absence of Infection Signs and Symptoms  Outcome: Progressing  Intervention: Initiate Sepsis Management  Recent Flowsheet Documentation  Taken 5/18/2025 1415 by Mima Quintero RN  Infection Prevention:   cohorting utilized   environmental surveillance performed   equipment surfaces disinfected   hand hygiene promoted   personal protective equipment utilized   rest/sleep promoted   single patient room provided  Taken 5/18/2025 1203 by Mima Quintero RN  Infection Prevention:   cohorting utilized   environmental surveillance performed   equipment surfaces disinfected   hand hygiene promoted   personal protective equipment utilized   rest/sleep promoted   single patient room provided  Taken 5/18/2025 1115 by Mima Quintero RN  Infection Prevention:   cohorting utilized   environmental surveillance performed   equipment surfaces disinfected   hand hygiene promoted   personal protective equipment utilized   rest/sleep promoted   single patient room provided  Taken 5/18/2025 0921 by Mima Quintero RN  Infection Prevention:   cohorting utilized   environmental surveillance performed   equipment surfaces disinfected   hand hygiene promoted   personal protective equipment utilized   rest/sleep promoted   single patient room provided  Taken 5/18/2025 0810 by Mima Quintero RN  Infection Prevention:   cohorting utilized   environmental surveillance performed   equipment surfaces disinfected   hand hygiene promoted   personal protective  equipment utilized   rest/sleep promoted   single patient room provided  Taken 5/18/2025 0711 by Mima Quintero, RN  Infection Prevention:   cohorting utilized   environmental surveillance performed   equipment surfaces disinfected   hand hygiene promoted   personal protective equipment utilized   rest/sleep promoted   single patient room provided  Goal: Optimal Nutrition Delivery  Outcome: Progressing   Goal Outcome Evaluation:  Plan of Care Reviewed With: patient        Progress: no change  Outcome Evaluation: Patient is alert and oriented x4. Patient had 500ml LR bolus for low BP this morning. Patient has no complaints at this time.

## 2025-05-19 ENCOUNTER — APPOINTMENT (OUTPATIENT)
Dept: GENERAL RADIOLOGY | Facility: HOSPITAL | Age: 60
End: 2025-05-19
Payer: MEDICARE

## 2025-05-19 LAB
ALBUMIN SERPL-MCNC: 2.2 G/DL (ref 3.5–5.2)
ALP SERPL-CCNC: 106 U/L (ref 39–117)
ALT SERPL W P-5'-P-CCNC: 13 U/L (ref 1–33)
ANION GAP SERPL CALCULATED.3IONS-SCNC: 6.9 MMOL/L (ref 5–15)
AST SERPL-CCNC: 40 U/L (ref 1–32)
BASOPHILS # BLD AUTO: 0.01 10*3/MM3 (ref 0–0.2)
BASOPHILS NFR BLD AUTO: 0.3 % (ref 0–1.5)
BILIRUB CONJ SERPL-MCNC: 1.5 MG/DL (ref 0–0.3)
BILIRUB INDIRECT SERPL-MCNC: 1.1 MG/DL
BILIRUB SERPL-MCNC: 2.6 MG/DL (ref 0–1.2)
BUN SERPL-MCNC: 19 MG/DL (ref 8–23)
BUN/CREAT SERPL: 16.8 (ref 7–25)
BURR CELLS BLD QL SMEAR: NORMAL
CALCIUM SPEC-SCNC: 7.7 MG/DL (ref 8.6–10.5)
CHLORIDE SERPL-SCNC: 108 MMOL/L (ref 98–107)
CO2 SERPL-SCNC: 19.1 MMOL/L (ref 22–29)
CREAT SERPL-MCNC: 1.13 MG/DL (ref 0.57–1)
DEPRECATED RDW RBC AUTO: 53.4 FL (ref 37–54)
EGFRCR SERPLBLD CKD-EPI 2021: 55.5 ML/MIN/1.73
EOSINOPHIL # BLD AUTO: 0.11 10*3/MM3 (ref 0–0.4)
EOSINOPHIL NFR BLD AUTO: 3.8 % (ref 0.3–6.2)
ERYTHROCYTE [DISTWIDTH] IN BLOOD BY AUTOMATED COUNT: 14.8 % (ref 12.3–15.4)
GLUCOSE SERPL-MCNC: 99 MG/DL (ref 65–99)
HCT VFR BLD AUTO: 26.1 % (ref 34–46.6)
HGB BLD-MCNC: 8.6 G/DL (ref 12–15.9)
IMM GRANULOCYTES # BLD AUTO: 0.01 10*3/MM3 (ref 0–0.05)
IMM GRANULOCYTES NFR BLD AUTO: 0.3 % (ref 0–0.5)
LYMPHOCYTES # BLD AUTO: 0.57 10*3/MM3 (ref 0.7–3.1)
LYMPHOCYTES NFR BLD AUTO: 19.7 % (ref 19.6–45.3)
MAGNESIUM SERPL-MCNC: 1.7 MG/DL (ref 1.6–2.4)
MCH RBC QN AUTO: 32.6 PG (ref 26.6–33)
MCHC RBC AUTO-ENTMCNC: 33 G/DL (ref 31.5–35.7)
MCV RBC AUTO: 98.9 FL (ref 79–97)
MONOCYTES # BLD AUTO: 0.25 10*3/MM3 (ref 0.1–0.9)
MONOCYTES NFR BLD AUTO: 8.7 % (ref 5–12)
NEUTROPHILS NFR BLD AUTO: 1.94 10*3/MM3 (ref 1.7–7)
NEUTROPHILS NFR BLD AUTO: 67.2 % (ref 42.7–76)
NRBC BLD AUTO-RTO: 0 /100 WBC (ref 0–0.2)
PHOSPHATE SERPL-MCNC: 2.6 MG/DL (ref 2.5–4.5)
PLATELET # BLD AUTO: 35 10*3/MM3 (ref 140–450)
PMV BLD AUTO: 12.9 FL (ref 6–12)
POTASSIUM SERPL-SCNC: 3.9 MMOL/L (ref 3.5–5.2)
PROT SERPL-MCNC: 4.2 G/DL (ref 6–8.5)
RBC # BLD AUTO: 2.64 10*6/MM3 (ref 3.77–5.28)
SMALL PLATELETS BLD QL SMEAR: NORMAL
SODIUM SERPL-SCNC: 134 MMOL/L (ref 136–145)
WBC MORPH BLD: NORMAL
WBC NRBC COR # BLD AUTO: 2.89 10*3/MM3 (ref 3.4–10.8)

## 2025-05-19 PROCEDURE — 25810000003 LACTATED RINGERS SOLUTION: Performed by: PHYSICIAN ASSISTANT

## 2025-05-19 PROCEDURE — 83735 ASSAY OF MAGNESIUM: CPT | Performed by: FAMILY MEDICINE

## 2025-05-19 PROCEDURE — 80076 HEPATIC FUNCTION PANEL: CPT | Performed by: FAMILY MEDICINE

## 2025-05-19 PROCEDURE — 84100 ASSAY OF PHOSPHORUS: CPT | Performed by: FAMILY MEDICINE

## 2025-05-19 PROCEDURE — 80048 BASIC METABOLIC PNL TOTAL CA: CPT | Performed by: FAMILY MEDICINE

## 2025-05-19 PROCEDURE — 85025 COMPLETE CBC W/AUTO DIFF WBC: CPT | Performed by: FAMILY MEDICINE

## 2025-05-19 PROCEDURE — 71045 X-RAY EXAM CHEST 1 VIEW: CPT

## 2025-05-19 PROCEDURE — 99232 SBSQ HOSP IP/OBS MODERATE 35: CPT | Performed by: FAMILY MEDICINE

## 2025-05-19 PROCEDURE — 85007 BL SMEAR W/DIFF WBC COUNT: CPT | Performed by: FAMILY MEDICINE

## 2025-05-19 PROCEDURE — 25010000002 CEFTRIAXONE PER 250 MG: Performed by: FAMILY MEDICINE

## 2025-05-19 PROCEDURE — 94640 AIRWAY INHALATION TREATMENT: CPT

## 2025-05-19 PROCEDURE — 94799 UNLISTED PULMONARY SVC/PX: CPT

## 2025-05-19 RX ORDER — IPRATROPIUM BROMIDE AND ALBUTEROL SULFATE 2.5; .5 MG/3ML; MG/3ML
3 SOLUTION RESPIRATORY (INHALATION) EVERY 4 HOURS PRN
Status: DISCONTINUED | OUTPATIENT
Start: 2025-05-19 | End: 2025-05-20 | Stop reason: HOSPADM

## 2025-05-19 RX ADMIN — FLUTICASONE PROPIONATE 2 SPRAY: 50 SPRAY, METERED NASAL at 08:13

## 2025-05-19 RX ADMIN — CEFTRIAXONE SODIUM 2000 MG: 2 INJECTION, POWDER, FOR SOLUTION INTRAMUSCULAR; INTRAVENOUS at 12:55

## 2025-05-19 RX ADMIN — Medication 10 ML: at 08:13

## 2025-05-19 RX ADMIN — IPRATROPIUM BROMIDE AND ALBUTEROL SULFATE 3 ML: .5; 3 SOLUTION RESPIRATORY (INHALATION) at 19:47

## 2025-05-19 RX ADMIN — PANTOPRAZOLE SODIUM 40 MG: 40 TABLET, DELAYED RELEASE ORAL at 05:42

## 2025-05-19 RX ADMIN — FOLIC ACID 1 MG: 1 TABLET ORAL at 08:13

## 2025-05-19 RX ADMIN — BENZONATATE 100 MG: 100 CAPSULE ORAL at 21:10

## 2025-05-19 RX ADMIN — Medication 10 MG: at 21:10

## 2025-05-19 RX ADMIN — Medication 10 ML: at 21:10

## 2025-05-19 RX ADMIN — SODIUM CHLORIDE, POTASSIUM CHLORIDE, SODIUM LACTATE AND CALCIUM CHLORIDE 500 ML: 600; 310; 30; 20 INJECTION, SOLUTION INTRAVENOUS at 03:41

## 2025-05-19 RX ADMIN — OXYCODONE 5 MG: 5 TABLET ORAL at 18:06

## 2025-05-19 NOTE — PLAN OF CARE
Goal Outcome Evaluation:  Plan of Care Reviewed With: patient        Progress: no change     Patient BP 82/46 this am. 500 ml bolus infusing. Rested well.

## 2025-05-19 NOTE — PLAN OF CARE
Problem: Adult Inpatient Plan of Care  Goal: Plan of Care Review  Outcome: Progressing  Flowsheets (Taken 5/19/2025 1628)  Progress: no change  Outcome Evaluation: Patient is alert and oriented x4. Patient has had no complaints this shift and has no complaints at this time.  Plan of Care Reviewed With: patient  Goal: Patient-Specific Goal (Individualized)  Outcome: Progressing  Goal: Absence of Hospital-Acquired Illness or Injury  Outcome: Progressing  Intervention: Identify and Manage Fall Risk  Recent Flowsheet Documentation  Taken 5/19/2025 1627 by Mima Quintero, RN  Safety Promotion/Fall Prevention:   assistive device/personal items within reach   clutter free environment maintained   fall prevention program maintained   lighting adjusted   nonskid shoes/slippers when out of bed   room organization consistent   safety round/check completed  Taken 5/19/2025 1500 by Mima Quintero, RN  Safety Promotion/Fall Prevention:   assistive device/personal items within reach   clutter free environment maintained   fall prevention program maintained   lighting adjusted   nonskid shoes/slippers when out of bed   room organization consistent   safety round/check completed  Taken 5/19/2025 1255 by Mima Quintero, RN  Safety Promotion/Fall Prevention:   assistive device/personal items within reach   clutter free environment maintained   fall prevention program maintained   lighting adjusted   nonskid shoes/slippers when out of bed   room organization consistent   safety round/check completed  Taken 5/19/2025 1215 by Mima Quintero, RN  Safety Promotion/Fall Prevention:   assistive device/personal items within reach   clutter free environment maintained   fall prevention program maintained   lighting adjusted   nonskid shoes/slippers when out of bed   room organization consistent   safety round/check completed  Taken 5/19/2025 1015 by Mima Quintero, RN  Safety Promotion/Fall Prevention:   assistive device/personal items  within reach   clutter free environment maintained   fall prevention program maintained   lighting adjusted   nonskid shoes/slippers when out of bed   room organization consistent   safety round/check completed  Taken 5/19/2025 0813 by Mima Quintero RN  Safety Promotion/Fall Prevention:   assistive device/personal items within reach   clutter free environment maintained   fall prevention program maintained   lighting adjusted   nonskid shoes/slippers when out of bed   room organization consistent   safety round/check completed  Taken 5/19/2025 0710 by Mima Quintero RN  Safety Promotion/Fall Prevention:   assistive device/personal items within reach   clutter free environment maintained   fall prevention program maintained   lighting adjusted   nonskid shoes/slippers when out of bed   room organization consistent   safety round/check completed  Intervention: Prevent Infection  Recent Flowsheet Documentation  Taken 5/19/2025 1627 by Mima Quintero RN  Infection Prevention:   cohorting utilized   environmental surveillance performed   equipment surfaces disinfected   hand hygiene promoted   personal protective equipment utilized   rest/sleep promoted   single patient room provided  Taken 5/19/2025 1500 by Mima Quintero RN  Infection Prevention:   cohorting utilized   environmental surveillance performed   equipment surfaces disinfected   hand hygiene promoted   personal protective equipment utilized   rest/sleep promoted   single patient room provided  Taken 5/19/2025 1255 by Mima Quintero RN  Infection Prevention:   cohorting utilized   environmental surveillance performed   equipment surfaces disinfected   hand hygiene promoted   personal protective equipment utilized   rest/sleep promoted   single patient room provided  Taken 5/19/2025 1215 by Mima Quintero RN  Infection Prevention:   cohorting utilized   environmental surveillance performed   equipment surfaces disinfected   hand hygiene promoted    personal protective equipment utilized   rest/sleep promoted   single patient room provided  Taken 5/19/2025 1015 by Mima Quintero RN  Infection Prevention:   cohorting utilized   environmental surveillance performed   equipment surfaces disinfected   hand hygiene promoted   personal protective equipment utilized   rest/sleep promoted   single patient room provided  Taken 5/19/2025 0813 by Mima Quintero RN  Infection Prevention:   cohorting utilized   environmental surveillance performed   equipment surfaces disinfected   hand hygiene promoted   personal protective equipment utilized   rest/sleep promoted   single patient room provided  Taken 5/19/2025 0710 by Mima Quintero RN  Infection Prevention:   cohorting utilized   environmental surveillance performed   equipment surfaces disinfected   hand hygiene promoted   personal protective equipment utilized   rest/sleep promoted   single patient room provided  Goal: Optimal Comfort and Wellbeing  Outcome: Progressing  Intervention: Provide Person-Centered Care  Recent Flowsheet Documentation  Taken 5/19/2025 0813 by Mima Quintero RN  Trust Relationship/Rapport:   care explained   choices provided   emotional support provided   empathic listening provided   questions answered   questions encouraged   reassurance provided   thoughts/feelings acknowledged  Goal: Readiness for Transition of Care  Outcome: Progressing     Problem: Fall Injury Risk  Goal: Absence of Fall and Fall-Related Injury  Outcome: Progressing  Intervention: Identify and Manage Contributors  Recent Flowsheet Documentation  Taken 5/19/2025 0813 by Mima Quintero RN  Medication Review/Management:   medications reviewed   high-risk medications identified  Intervention: Promote Injury-Free Environment  Recent Flowsheet Documentation  Taken 5/19/2025 1627 by Mima Quintero RN  Safety Promotion/Fall Prevention:   assistive device/personal items within reach   clutter free environment  maintained   fall prevention program maintained   lighting adjusted   nonskid shoes/slippers when out of bed   room organization consistent   safety round/check completed  Taken 5/19/2025 1500 by Mima Quintero, RN  Safety Promotion/Fall Prevention:   assistive device/personal items within reach   clutter free environment maintained   fall prevention program maintained   lighting adjusted   nonskid shoes/slippers when out of bed   room organization consistent   safety round/check completed  Taken 5/19/2025 1255 by Mima Quintero, RN  Safety Promotion/Fall Prevention:   assistive device/personal items within reach   clutter free environment maintained   fall prevention program maintained   lighting adjusted   nonskid shoes/slippers when out of bed   room organization consistent   safety round/check completed  Taken 5/19/2025 1215 by Mima Quintero, RN  Safety Promotion/Fall Prevention:   assistive device/personal items within reach   clutter free environment maintained   fall prevention program maintained   lighting adjusted   nonskid shoes/slippers when out of bed   room organization consistent   safety round/check completed  Taken 5/19/2025 1015 by Mima Quintero, RN  Safety Promotion/Fall Prevention:   assistive device/personal items within reach   clutter free environment maintained   fall prevention program maintained   lighting adjusted   nonskid shoes/slippers when out of bed   room organization consistent   safety round/check completed  Taken 5/19/2025 0813 by Mima Quintero, RN  Safety Promotion/Fall Prevention:   assistive device/personal items within reach   clutter free environment maintained   fall prevention program maintained   lighting adjusted   nonskid shoes/slippers when out of bed   room organization consistent   safety round/check completed  Taken 5/19/2025 0710 by Mima Quintero, RN  Safety Promotion/Fall Prevention:   assistive device/personal items within reach   clutter free environment  maintained   fall prevention program maintained   lighting adjusted   nonskid shoes/slippers when out of bed   room organization consistent   safety round/check completed     Problem: Comorbidity Management  Goal: Maintenance of COPD Symptom Control  Outcome: Progressing  Intervention: Maintain COPD (Chronic Obstructive Pulmonary Disease) Symptom Control  Recent Flowsheet Documentation  Taken 5/19/2025 0813 by Mima Quintero RN  Medication Review/Management:   medications reviewed   high-risk medications identified  Goal: Maintenance of Heart Failure Symptom Control  Outcome: Progressing  Intervention: Maintain Heart Failure Management  Recent Flowsheet Documentation  Taken 5/19/2025 0813 by Mima Quintero RN  Medication Review/Management:   medications reviewed   high-risk medications identified  Goal: Blood Pressure in Desired Range  Outcome: Progressing  Intervention: Maintain Blood Pressure Management  Recent Flowsheet Documentation  Taken 5/19/2025 0813 by Mima Quintero RN  Medication Review/Management:   medications reviewed   high-risk medications identified     Problem: Skin Injury Risk Increased  Goal: Skin Health and Integrity  Outcome: Progressing     Problem: Sepsis/Septic Shock  Goal: Optimal Coping  Outcome: Progressing  Goal: Absence of Bleeding  Outcome: Progressing  Goal: Blood Glucose Level Within Target Range  Outcome: Progressing  Goal: Absence of Infection Signs and Symptoms  Outcome: Progressing  Intervention: Initiate Sepsis Management  Recent Flowsheet Documentation  Taken 5/19/2025 1627 by Mima Quintero, RN  Infection Prevention:   cohorting utilized   environmental surveillance performed   equipment surfaces disinfected   hand hygiene promoted   personal protective equipment utilized   rest/sleep promoted   single patient room provided  Taken 5/19/2025 1500 by Mima Quintero RN  Infection Prevention:   cohorting utilized   environmental surveillance performed   equipment surfaces  disinfected   hand hygiene promoted   personal protective equipment utilized   rest/sleep promoted   single patient room provided  Taken 5/19/2025 1255 by Mima Quintero RN  Infection Prevention:   cohorting utilized   environmental surveillance performed   equipment surfaces disinfected   hand hygiene promoted   personal protective equipment utilized   rest/sleep promoted   single patient room provided  Taken 5/19/2025 1215 by Mima Quintero RN  Infection Prevention:   cohorting utilized   environmental surveillance performed   equipment surfaces disinfected   hand hygiene promoted   personal protective equipment utilized   rest/sleep promoted   single patient room provided  Taken 5/19/2025 1015 by Mima Quintero RN  Infection Prevention:   cohorting utilized   environmental surveillance performed   equipment surfaces disinfected   hand hygiene promoted   personal protective equipment utilized   rest/sleep promoted   single patient room provided  Taken 5/19/2025 0813 by Mima Quintero RN  Infection Prevention:   cohorting utilized   environmental surveillance performed   equipment surfaces disinfected   hand hygiene promoted   personal protective equipment utilized   rest/sleep promoted   single patient room provided  Taken 5/19/2025 0710 by Mima Quintero RN  Infection Prevention:   cohorting utilized   environmental surveillance performed   equipment surfaces disinfected   hand hygiene promoted   personal protective equipment utilized   rest/sleep promoted   single patient room provided  Goal: Optimal Nutrition Delivery  Outcome: Progressing   Goal Outcome Evaluation:  Plan of Care Reviewed With: patient        Progress: no change  Outcome Evaluation: Patient is alert and oriented x4. Patient has had no complaints this shift and has no complaints at this time.

## 2025-05-19 NOTE — PROGRESS NOTES
[facility]   Hospitalist Progress Note  Date: 2025  Patient Name: Tawny Lennon  : 1965  MRN: [id number]  Date of admission: 2025      Subjective   Subjective     Chief complaint: Abdominal pain    Summary:  59-year-old female history of COPD, CKD stage IIIa, MEDRANO liver cirrhosis, GERD without esophagitis, esophageal varices, hospitalized on 2025 chief complaint of worsening abdominal pain for several days, with intermittent chills at home with increased shortness of breath and diarrhea.  Hospitalized for further monitoring and management of concern for SBP, in the setting of possible gastroenteritis, GI consulted, IR guided paracentesis ordered, placed on empiric antibiotics.  Pancytopenia likely due to underlying liver disease.  Attempted paracentesis, not enough volume.  Still with abdominal pain with any oral intake.  GI planning endoscopic evaluation.  Status post EGD, scarring lower third of esophagus, portal hypertensive gastropathy, tolerated procedure well.  Developed URI symptoms, negative for COVID, RSV, flu.  Tested with rhinovirus/rhinovirus.    Interval follow-up: Seen and examined, no distress, mentation and blood pressure better today, remains weak and fatigued.  Resumed on diuretics.  Satting well on room air.  Still has significant nasal congestion. Bicarb 19, sodium 134, potassium 2.9, creatinine 1.13, white blood cell count 2000, hemoglobin 8.6, platelets 35,000.  No signs of bleeding.    Review of systems:  All systems reviewed and negative except for weakness, fatigue, nasal congestion    Objective   Objective     Vitals:   Temp:  [97.7 °F (36.5 °C)-98.6 °F (37 °C)] 97.7 °F (36.5 °C)  Heart Rate:  [65-73] 67  Resp:  [16-19] 18  BP: ()/(45-54) 96/48  Physical Exam               Constitutional: Arousable, alert, no acute distress laying in bed, nontoxic-appearing              Eyes: Pupils equal, sclerae anicteric, no conjunctival injection              HENT:  NCAT, mucous membranes moist              Respiratory: Diminished to auscultation bilaterally, nonlabored respirations               Cardiovascular: RRR, no murmurs              Gastrointestinal: soft, mildly distended, diffuse abdominal tenderness              Musculoskeletal: No bilateral ankle edema, no clubbing or cyanosis to extremities              Neurologic: Oriented x 3, strength symmetric in all extremities, Cranial Nerves grossly intact to confrontation, speech clear    Result Review    Result Review:  I have personally reviewed the pertinent results from the past 24 hours to 5/19/2025 11:17 EDT and agree with these findings:  [x]  Laboratory   CBC          5/17/2025    05:58 5/18/2025    04:43 5/19/2025    04:59   CBC   WBC 3.26  3.40  2.89    RBC 2.69  2.67  2.64    Hemoglobin 8.8  8.7  8.6    Hematocrit 26.2  26.2  26.1    MCV 97.4  98.1  98.9    MCH 32.7  32.6  32.6    MCHC 33.6  33.2  33.0    RDW 14.4  14.7  14.8    Platelets 33  36  35      BMP          5/17/2025    05:58 5/18/2025    04:43 5/19/2025    04:59   BMP   BUN 18  20  19    Creatinine 1.32  1.21  1.13    Sodium 136  135  134    Potassium 4.1  3.8  3.9    Chloride 109  110  108    CO2 19.2  19.3  19.1    Calcium 7.9  7.8  7.7      LIVER FUNCTION TESTS:      Lab 05/19/25  0459 05/18/25  0443 05/17/25  0558 05/16/25  0520 05/15/25  1248 05/14/25  0530 05/13/25  1633   TOTAL PROTEIN 4.2* 4.4* 4.4* 4.6* 4.9* 4.8* 5.6*   ALBUMIN 2.2* 2.2* 2.2* 2.3* 2.4* 2.4* 2.9*   GLOBULIN  --   --   --   --   --  2.4 2.7   ALT (SGPT) 13 14 13 13 14 14 17   AST (SGOT) 40* 41* 39* 38* 38* 32 39*   BILIRUBIN 2.6* 2.3* 2.7* 2.7* 2.9* 3.7* 5.1*   INDIRECT BILIRUBIN 1.1 0.8 1.0 1.1 1.1  --   --    BILIRUBIN DIRECT 1.5* 1.5* 1.7* 1.6* 1.8*  --   --    ALK PHOS 106 112 108 111 123* 121* 154*   LIPASE  --   --   --   --   --   --  54       [x]  Microbiology   Microbiology Results (last 10 days)       Procedure Component Value - Date/Time    Respiratory Panel PCR  w/COVID-19(SARS-CoV-2) DANIELLE/VERO/BERTA/PAD/COR/ROMIE In-House, NP Swab in UTM/VTM, 2 HR TAT - Swab, Nasopharynx [[id number]]  (Abnormal) Collected: 05/17/25 1114    Lab Status: Final result Specimen: Swab from Nasopharynx Updated: 05/17/25 1226     ADENOVIRUS, PCR Not Detected     Coronavirus 229E Not Detected     Coronavirus HKU1 Not Detected     Coronavirus NL63 Not Detected     Coronavirus OC43 Not Detected     COVID19 Not Detected     Human Metapneumovirus Not Detected     Human Rhinovirus/Enterovirus Detected     Influenza A PCR Not Detected     Influenza B PCR Not Detected     Parainfluenza Virus 1 Not Detected     Parainfluenza Virus 2 Not Detected     Parainfluenza Virus 3 Not Detected     Parainfluenza Virus 4 Not Detected     RSV, PCR Not Detected     Bordetella pertussis pcr Not Detected     Bordetella parapertussis PCR Not Detected     Chlamydophila pneumoniae PCR Not Detected     Mycoplasma pneumo by PCR Not Detected    Narrative:      In the setting of a positive respiratory panel with a viral infection PLUS a negative procalcitonin without other underlying concern for bacterial infection, consider observing off antibiotics or discontinuation of antibiotics and continue supportive care. If the respiratory panel is positive for atypical bacterial infection (Bordetella pertussis, Chlamydophila pneumoniae, or Mycoplasma pneumoniae), consider antibiotic de-escalation to target atypical bacterial infection.    COVID-19, FLU A/B, RSV PCR 1 HR TAT - Swab, Nasopharynx [[id number]]  (Normal) Collected: 05/15/25 1432    Lab Status: Final result Specimen: Swab from Nasopharynx Updated: 05/15/25 1523     COVID19 Not Detected     Influenza A PCR Not Detected     Influenza B PCR Not Detected     RSV, PCR Not Detected    Narrative:      Fact sheet for providers: https://www.fda.gov/media/054302/download    Fact sheet for patients: https://www.fda.gov/media/214374/download    Test performed by PCR.              [x]  Radiology  US Paracentesis  Result Date: 5/14/2025  Impression: Small amount of right abdominal ascites.  Electronically Signed: Justin Johnson MD  5/14/2025 2:03 PM EDT  Workstation ID: [id number]    XR Chest 1 View  Result Date: 5/13/2025  Impression: No acute cardiopulmonary disease. Electronically Signed: Louie Allen MD  5/13/2025 8:04 PM EDT  Workstation ID: [id number]    CT Abdomen Pelvis With Contrast  Result Date: 5/13/2025  Impression: No acute findings in the abdomen or pelvis. Cirrhosis with sequela of portal hypertension including splenomegaly, varices, and small volume ascites. Electronically Signed: Kalpesh Betancourt MD  5/13/2025 6:39 PM EDT  Workstation ID: [id number]        []  EKG/Telemetry   No orders to display       []  Cardiology/Vascular   []  Pathology  [x]  Old records  []  Other:    Assessment & Plan   Assessment / Plan     Assessment/Plan:  Assessment:  Diffuse abdominal pain  Viral URI due to rhinovirus/enterovirus  Concern for bacterial peritonitis  Small ascites  Diarrhea-possible gastroenteritis  Chronic thrombocytopenia  MEDRANO liver cirrhosis  Hyperbilirubinemia due to above  CKD stage IIIa  COPD  GERD  History of esophageal varices    Plan:  Labs and imaging reviewed  Follow-up chest x-ray  Continue ceftriaxone to complete 7-day course  Continue oral intake per tolerance  GI consulted, follow-up recommendations  Continue Protonix 40 mg daily  Resume spironolactone 50 mg daily  Resume Lasix 20 mg daily  Continue monitoring potassium  PT/OT  Following blood counts  A.m. labs  Full code  DVT prophylaxis with SCDs  Clinical course to dictate further management  Discussed with nurse at the bedside    VTE Prophylaxis:  Mechanical VTE prophylaxis orders are present.        CODE STATUS:   Code Status (Patient has no pulse and is not breathing): CPR (Attempt to Resuscitate)  Medical Interventions (Patient has pulse or is breathing): Full Support  Level Of Support Discussed With:  Patient        Electronically signed by Nicole Conklin MD, 5/19/2025, 11:17 EDT.    Portions of this documentation were transcribed electronically from a voice recognition software.  I confirm all data accurately represents the service(s) I performed at today's visit.

## 2025-05-20 ENCOUNTER — READMISSION MANAGEMENT (OUTPATIENT)
Dept: CALL CENTER | Facility: HOSPITAL | Age: 60
End: 2025-05-20
Payer: MEDICARE

## 2025-05-20 VITALS
WEIGHT: 184.97 LBS | OXYGEN SATURATION: 94 % | BODY MASS INDEX: 34.04 KG/M2 | DIASTOLIC BLOOD PRESSURE: 57 MMHG | HEART RATE: 76 BPM | RESPIRATION RATE: 18 BRPM | HEIGHT: 62 IN | SYSTOLIC BLOOD PRESSURE: 100 MMHG | TEMPERATURE: 98.1 F

## 2025-05-20 PROBLEM — R10.9 ABDOMINAL PAIN: Status: RESOLVED | Noted: 2025-05-13 | Resolved: 2025-05-20

## 2025-05-20 PROBLEM — D68.4 ACQUIRED COAGULATION FACTOR DEFICIENCY: Chronic | Status: ACTIVE | Noted: 2021-01-01

## 2025-05-20 PROBLEM — J06.9 VIRAL URI WITH COUGH: Status: ACTIVE | Noted: 2025-05-20

## 2025-05-20 LAB
ALBUMIN SERPL-MCNC: 2.3 G/DL (ref 3.5–5.2)
ALP SERPL-CCNC: 115 U/L (ref 39–117)
ALT SERPL W P-5'-P-CCNC: 15 U/L (ref 1–33)
ANION GAP SERPL CALCULATED.3IONS-SCNC: 7 MMOL/L (ref 5–15)
AST SERPL-CCNC: 43 U/L (ref 1–32)
BASOPHILS # BLD AUTO: 0.02 10*3/MM3 (ref 0–0.2)
BASOPHILS NFR BLD AUTO: 0.5 % (ref 0–1.5)
BILIRUB CONJ SERPL-MCNC: 1.6 MG/DL (ref 0–0.3)
BILIRUB INDIRECT SERPL-MCNC: 1 MG/DL
BILIRUB SERPL-MCNC: 2.6 MG/DL (ref 0–1.2)
BUN SERPL-MCNC: 20 MG/DL (ref 8–23)
BUN/CREAT SERPL: 17.9 (ref 7–25)
CALCIUM SPEC-SCNC: 7.9 MG/DL (ref 8.6–10.5)
CHLORIDE SERPL-SCNC: 111 MMOL/L (ref 98–107)
CO2 SERPL-SCNC: 19 MMOL/L (ref 22–29)
CREAT SERPL-MCNC: 1.12 MG/DL (ref 0.57–1)
DEPRECATED RDW RBC AUTO: 54 FL (ref 37–54)
EGFRCR SERPLBLD CKD-EPI 2021: 56.1 ML/MIN/1.73
EOSINOPHIL # BLD AUTO: 0.09 10*3/MM3 (ref 0–0.4)
EOSINOPHIL NFR BLD AUTO: 2.5 % (ref 0.3–6.2)
ERYTHROCYTE [DISTWIDTH] IN BLOOD BY AUTOMATED COUNT: 15 % (ref 12.3–15.4)
GLUCOSE SERPL-MCNC: 103 MG/DL (ref 65–99)
HCT VFR BLD AUTO: 27 % (ref 34–46.6)
HGB BLD-MCNC: 9 G/DL (ref 12–15.9)
IMM GRANULOCYTES # BLD AUTO: 0.01 10*3/MM3 (ref 0–0.05)
IMM GRANULOCYTES NFR BLD AUTO: 0.3 % (ref 0–0.5)
LYMPHOCYTES # BLD AUTO: 0.68 10*3/MM3 (ref 0.7–3.1)
LYMPHOCYTES NFR BLD AUTO: 18.6 % (ref 19.6–45.3)
MAGNESIUM SERPL-MCNC: 1.8 MG/DL (ref 1.6–2.4)
MCH RBC QN AUTO: 32.8 PG (ref 26.6–33)
MCHC RBC AUTO-ENTMCNC: 33.3 G/DL (ref 31.5–35.7)
MCV RBC AUTO: 98.5 FL (ref 79–97)
MONOCYTES # BLD AUTO: 0.31 10*3/MM3 (ref 0.1–0.9)
MONOCYTES NFR BLD AUTO: 8.5 % (ref 5–12)
NEUTROPHILS NFR BLD AUTO: 2.54 10*3/MM3 (ref 1.7–7)
NEUTROPHILS NFR BLD AUTO: 69.6 % (ref 42.7–76)
NRBC BLD AUTO-RTO: 0 /100 WBC (ref 0–0.2)
PHOSPHATE SERPL-MCNC: 3.3 MG/DL (ref 2.5–4.5)
PLATELET # BLD AUTO: 39 10*3/MM3 (ref 140–450)
PMV BLD AUTO: 12.6 FL (ref 6–12)
POTASSIUM SERPL-SCNC: 3.8 MMOL/L (ref 3.5–5.2)
PROT SERPL-MCNC: 4.6 G/DL (ref 6–8.5)
RBC # BLD AUTO: 2.74 10*6/MM3 (ref 3.77–5.28)
SODIUM SERPL-SCNC: 137 MMOL/L (ref 136–145)
WBC NRBC COR # BLD AUTO: 3.65 10*3/MM3 (ref 3.4–10.8)

## 2025-05-20 PROCEDURE — 84100 ASSAY OF PHOSPHORUS: CPT | Performed by: FAMILY MEDICINE

## 2025-05-20 PROCEDURE — 80076 HEPATIC FUNCTION PANEL: CPT | Performed by: FAMILY MEDICINE

## 2025-05-20 PROCEDURE — 85025 COMPLETE CBC W/AUTO DIFF WBC: CPT | Performed by: FAMILY MEDICINE

## 2025-05-20 PROCEDURE — 94799 UNLISTED PULMONARY SVC/PX: CPT

## 2025-05-20 PROCEDURE — 80048 BASIC METABOLIC PNL TOTAL CA: CPT | Performed by: FAMILY MEDICINE

## 2025-05-20 PROCEDURE — 99239 HOSP IP/OBS DSCHRG MGMT >30: CPT | Performed by: FAMILY MEDICINE

## 2025-05-20 PROCEDURE — 83735 ASSAY OF MAGNESIUM: CPT | Performed by: FAMILY MEDICINE

## 2025-05-20 PROCEDURE — 94664 DEMO&/EVAL PT USE INHALER: CPT

## 2025-05-20 RX ORDER — ALBUTEROL SULFATE 90 UG/1
2 INHALANT RESPIRATORY (INHALATION) EVERY 4 HOURS PRN
Qty: 18 G | Refills: 0 | Status: SHIPPED | OUTPATIENT
Start: 2025-05-20

## 2025-05-20 RX ORDER — FLUTICASONE PROPIONATE 50 MCG
2 SPRAY, SUSPENSION (ML) NASAL DAILY
Qty: 16 G | Refills: 0 | Status: SHIPPED | OUTPATIENT
Start: 2025-05-21

## 2025-05-20 RX ORDER — BENZONATATE 100 MG/1
100 CAPSULE ORAL 3 TIMES DAILY PRN
Qty: 30 CAPSULE | Refills: 0 | Status: SHIPPED | OUTPATIENT
Start: 2025-05-20

## 2025-05-20 RX ADMIN — PANTOPRAZOLE SODIUM 40 MG: 40 TABLET, DELAYED RELEASE ORAL at 05:30

## 2025-05-20 RX ADMIN — FUROSEMIDE 20 MG: 20 TABLET ORAL at 08:33

## 2025-05-20 RX ADMIN — BENZONATATE 100 MG: 100 CAPSULE ORAL at 05:30

## 2025-05-20 RX ADMIN — Medication 10 ML: at 08:36

## 2025-05-20 RX ADMIN — IPRATROPIUM BROMIDE AND ALBUTEROL SULFATE 3 ML: .5; 3 SOLUTION RESPIRATORY (INHALATION) at 10:11

## 2025-05-20 RX ADMIN — IPRATROPIUM BROMIDE AND ALBUTEROL SULFATE 3 ML: .5; 3 SOLUTION RESPIRATORY (INHALATION) at 00:28

## 2025-05-20 RX ADMIN — FLUTICASONE PROPIONATE 2 SPRAY: 50 SPRAY, METERED NASAL at 08:33

## 2025-05-20 RX ADMIN — FOLIC ACID 1 MG: 1 TABLET ORAL at 08:33

## 2025-05-20 NOTE — PLAN OF CARE
Goal Outcome Evaluation:  Plan of Care Reviewed With: patient      Pt to discharge home.

## 2025-05-20 NOTE — DISCHARGE SUMMARY
[facility]         HOSPITALIST  DISCHARGE SUMMARY    Patient Name: Tawny [facility]  : 1965  MRN: [id number]    Date of Admission: 2025  Date of Discharge:  2025    Primary Care Physician: Zain Valentine MD    Consults       Date and Time Order Name Status Description    2025  8:44 AM Inpatient Gastroenterology Consult      2025  9:04 PM Inpatient Hospitalist Consult              Active and Resolved Hospital Problems:  Diffuse abdominal pain  Viral URI due to rhinovirus/enterovirus  Concern for bacterial peritonitis  Small ascites  Diarrhea-possible gastroenteritis  Chronic thrombocytopenia  MEDRANO liver cirrhosis  Hyperbilirubinemia due to above  CKD stage IIIa  COPD  GERD  History of esophageal varices     Active Hospital Problems    Diagnosis POA   • Viral URI with cough [J06.9] Yes      Resolved Hospital Problems    Diagnosis POA   • **Abdominal pain [R10.9] Yes       Hospital Course     Hospital Course:    59-year-old female history of COPD, CKD stage IIIa, MEDRANO liver cirrhosis, GERD without esophagitis, esophageal varices, hospitalized on 2025 chief complaint of worsening abdominal pain for several days, with intermittent chills at home with increased shortness of breath and diarrhea.  Hospitalized for further monitoring and management of concern for SBP, in the setting of possible gastroenteritis, GI consulted, IR guided paracentesis ordered, placed on empiric antibiotics.  Pancytopenia likely due to underlying liver disease.  Attempted paracentesis, not enough volume.  Still with abdominal pain with any oral intake.  GI planning endoscopic evaluation.  Status post EGD, scarring lower third of esophagus, portal hypertensive gastropathy, tolerated procedure well.  Developed URI symptoms, negative for COVID, RSV, flu.  Tested with rhinovirus/rhinovirus.  Chest x-ray shows no infiltrate.  Volume status corrected.  Patient discharged in hemodynamic stable  condition on 5/20/2025 to follow-up with GI as outpatient.      Day of Discharge     Vital Signs:  Temp:  [97.8 °F (36.6 °C)-98.4 °F (36.9 °C)] 98.1 °F (36.7 °C)  Heart Rate:  [69-78] 72  Resp:  [14-18] 18  BP: ()/(32-58) 100/57  Review of systems:  All systems reviewed and negative except for weakness, fatigue, nasal congestion    Physical Exam                         Constitutional: Arousable, alert, no acute distress laying in bed, nontoxic-appearing              Eyes: Pupils equal, sclerae anicteric, no conjunctival injection              HENT: NCAT, mucous membranes moist              Respiratory: Diminished to auscultation bilaterally, nonlabored respirations               Cardiovascular: RRR, no murmurs              Gastrointestinal: soft, mildly distended, diffuse abdominal tenderness              Musculoskeletal: No bilateral ankle edema, no clubbing or cyanosis to extremities              Neurologic: Oriented x 3, strength symmetric in all extremities, Cranial Nerves grossly intact to confrontation, speech clear    Discharge Details        Discharge Medications        New Medications        Instructions Start Date   benzonatate 100 MG capsule  Commonly known as: TESSALON   100 mg, Oral, 3 Times Daily PRN      fluticasone 50 MCG/ACT nasal spray  Commonly known as: FLONASE   2 sprays, Each Nare, Daily   Start Date: May 21, 2025            Changes to Medications        Instructions Start Date   albuterol sulfate  (90 Base) MCG/ACT inhaler  Commonly known as: PROVENTIL HFA;VENTOLIN HFA;PROAIR HFA  What changed: Another medication with the same name was added. Make sure you understand how and when to take each.   2 puffs, Every 4 Hours PRN      albuterol sulfate  (90 Base) MCG/ACT inhaler  Commonly known as: PROVENTIL HFA;VENTOLIN HFA;PROAIR HFA  What changed: You were already taking a medication with the same name, and this prescription was added. Make sure you understand how and when to  take each.   2 puffs, Inhalation, Every 4 Hours PRN             Continue These Medications        Instructions Start Date   folic acid 1 MG tablet  Commonly known as: FOLVITE   1 mg, Daily      furosemide 20 MG tablet  Commonly known as: LASIX   20 mg, Oral, Daily      pantoprazole 40 MG EC tablet  Commonly known as: PROTONIX   40 mg, Daily      spironolactone 50 MG tablet  Commonly known as: ALDACTONE   50 mg, Oral, Daily               No Known Allergies    Discharge Disposition:  Home or Self Care    Diet:  Hospital:  Diet Order   Procedures   • Diet: Regular/House; Fluid Consistency: Thin (IDDSI 0)       Discharge Activity:       CODE STATUS:  Code Status and Medical Interventions: CPR (Attempt to Resuscitate); Full Support   Ordered at: 05/13/25 9184     Code Status (Patient has no pulse and is not breathing):    CPR (Attempt to Resuscitate)     Medical Interventions (Patient has pulse or is breathing):    Full Support     Level Of Support Discussed With:    Patient         Future Appointments   Date Time Provider Department Center   5/30/2025 10:45 AM Sharri Mei APRN [facility]       Additional Instructions for the Follow-ups that You Need to Schedule       Discharge Follow-up with PCP   As directed       Currently Documented PCP:    Zain Valentine MD    PCP Phone Number:    [phone]     Follow Up Details: 3 to 7 days                Pertinent  and/or Most Recent Results     PROCEDURES:   XR Chest 1 View  Result Date: 5/19/2025  XR CHEST 1 VW Date of Exam: 5/19/2025 12:03 PM EDT Indication: SOB Comparison: 5/13/2025, 9/6/2024 FINDINGS: No new consolidations or pleural effusions are observed. The cardiac silhouette and mediastinum are stable. No acute osseous abnormalities are identified.     There is no significant change when compared to the prior study. There is no evidence for acute cardiopulmonary process. Electronically Signed: Omar Campuzano MD  5/19/2025 12:16 PM EDT  Workstation ID:  [id number]    US Paracentesis  Result Date: 5/14/2025  US PARACENTESIS Date of Exam: 5/14/2025 12:50 PM EDT Indication: abdominal pain with ascites. Comparison: None available. CONSENT: Prior to the procedure, the risks, benefits and alternatives were thoroughly explained. This included the risk of bleeding, infection, injury to associated structures, and risk of bowel injury. The patient expressed understanding and wished to continue. Informed written consent was obtained. Findings: Preliminary ultrasound demonstrated small amount of fluid in the right abdomen. This was only visible in a decubitus position. Procedure was challenging due to large body habitus. Discussed with referring physician and procedure was not performed. Procedure can be attempted with CT or ultrasound guidance if patient has persisting symptoms or clinical concern increases.     Impression: Small amount of right abdominal ascites.  Electronically Signed: Justin Johnson MD  5/14/2025 2:03 PM EDT  Workstation ID: [id number]    XR Chest 1 View  Result Date: 5/13/2025  XR CHEST 1 VW Date of Exam: 5/13/2025 7:45 PM EDT Indication: SOA, hx copd Comparison: 9/6/2024 Findings: No focal consolidation. No pneumothorax or pleural effusion. Cardiac size is normal. The visualized clavicles appear intact. No displaced rib fractures. The visualized upper abdomen is normal.     Impression: No acute cardiopulmonary disease. Electronically Signed: Louie Allen MD  5/13/2025 8:04 PM EDT  Workstation ID: [id number]    CT Abdomen Pelvis With Contrast  Result Date: 5/13/2025  CT ABDOMEN PELVIS W CONTRAST Date of Exam: 5/13/2025 5:55 PM EDT Indication: abd pain, diarrhea. Comparison: CT abdomen 10/17/2024. CT abdomen pelvis 10/1/2024. Technique: Axial CT images were obtained of the abdomen and pelvis after the uneventful intravenous administration of iodinated contrast. Reconstructed coronal and sagittal images were also obtained. Automated exposure control and  iterative construction methods were used. Findings: Included Chest: Bibasal atelectasis Liver: Cirrhotic morphology.  Gallbladder and biliary tree: No significant abnormality.  Spleen: Cholecystectomy splenomegaly measuring 15 cm in craniocaudal dimension.  Pancreas: No significant abnormality.  Adrenal glands: No significant abnormality.  Kidneys and ureters: No significant abnormality.  Stomach and duodenum:No significant abnormality. Small and large bowel: Partial colectomies.. Colonic diverticulosis. No evidence of bowel obstruction. No significant pericolonic inflammatory changes seen. Peritoneal cavity: Small volume ascites. No free air. Bladder: Under distended and incompletely evaluated.  Pelvic organs: Hysterectomy  Vasculature: Atherosclerotic calcifications. Upper abdominal varices including prominent periesophageal varices.  Lymph nodes: No pathologic appearing lymph nodes by imaging criteria.  Bones and soft tissues: Degenerative changes of the imaged spine. No acute osseous abnormality. Nonspecific anterior abdominal wall fat stranding. Postsurgical changes along the anterior abdominal wall with similar-appearing broad-based fascial defects near the umbilicus.     Impression: No acute findings in the abdomen or pelvis. Cirrhosis with sequela of portal hypertension including splenomegaly, varices, and small volume ascites. Electronically Signed: Kalpesh Betancourt MD  5/13/2025 6:39 PM EDT  Workstation ID: [id number]        LAB RESULTS:      Lab 05/20/25  0515 05/19/25  0459 05/18/25  0443 05/17/25  0558 05/16/25  0520 05/15/25  1248 05/14/25  0747 05/14/25  0530 05/13/25  1633   WBC 3.65 2.89* 3.40 3.26* 3.52   < >  --    < > 4.23   HEMOGLOBIN 9.0* 8.6* 8.7* 8.8* 9.0*   < >  --    < > 11.1*   HEMATOCRIT 27.0* 26.1* 26.2* 26.2* 27.1*   < >  --    < > 33.3*   PLATELETS 39* 35* 36* 33* 36*   < >  --    < > 57*   NEUTROS ABS 2.54 1.94 2.36 2.24 2.43   < >  --    < > 3.17   IMMATURE GRANS (ABS) 0.01 0.01 0.02  0.01 0.03   < >  --    < > 0.01   LYMPHS ABS 0.68* 0.57* 0.61* 0.58* 0.64*   < >  --    < > 0.67*   MONOS ABS 0.31 0.25 0.30 0.30 0.30   < >  --    < > 0.25   EOS ABS 0.09 0.11 0.10 0.11 0.11   < >  --    < > 0.11   MCV 98.5* 98.9* 98.1* 97.4* 99.3*   < >  --    < > 99.1*   LACTATE  --   --   --   --   --   --   --   --  1.8   PROTIME  --   --   --   --  19.3*  --  18.8*  --   --    APTT  --   --   --   --   --   --  36.5*  --   --     < > = values in this interval not displayed.         Lab 05/20/25 0515 05/19/25 0459 05/18/25 0443 05/17/25  0558 05/16/25  0520   SODIUM 137 134* 135* 136 136   POTASSIUM 3.8 3.9 3.8 4.1 3.8   CHLORIDE 111* 108* 110* 109* 110*   CO2 19.0* 19.1* 19.3* 19.2* 17.8*   ANION GAP 7.0 6.9 5.7 7.8 8.2   BUN 20 19 20 18 18   CREATININE 1.12* 1.13* 1.21* 1.32* 0.95   EGFR 56.1* 55.5* 51.1* 46.0* 68.3   GLUCOSE 103* 99 117* 103* 100*   CALCIUM 7.9* 7.7* 7.8* 7.9* 8.1*   MAGNESIUM 1.8 1.7 1.7 1.7 1.7   PHOSPHORUS 3.3 2.6 2.7 2.8 2.3*         Lab 05/20/25 0515 05/19/25 0459 05/18/25 0443 05/17/25  0558 05/16/25  0520 05/15/25  1248 05/14/25  0530 05/13/25  1633   TOTAL PROTEIN 4.6* 4.2* 4.4* 4.4* 4.6*   < > 4.8* 5.6*   ALBUMIN 2.3* 2.2* 2.2* 2.2* 2.3*   < > 2.4* 2.9*   GLOBULIN  --   --   --   --   --   --  2.4 2.7   ALT (SGPT) 15 13 14 13 13   < > 14 17   AST (SGOT) 43* 40* 41* 39* 38*   < > 32 39*   BILIRUBIN 2.6* 2.6* 2.3* 2.7* 2.7*   < > 3.7* 5.1*   INDIRECT BILIRUBIN 1.0 1.1 0.8 1.0 1.1   < >  --   --    BILIRUBIN DIRECT 1.6* 1.5* 1.5* 1.7* 1.6*   < >  --   --    ALK PHOS 115 106 112 108 111   < > 121* 154*   LIPASE  --   --   --   --   --   --   --  54    < > = values in this interval not displayed.         Lab 05/16/25  0520 05/14/25  0747 05/13/25  1633   PROBNP  --   --  243.0   PROTIME 19.3* 18.8*  --    INR 1.55* 1.50*  --                  Brief Urine Lab Results  (Last result in the past 365 days)        Color   Clarity   Blood   Leuk Est   Nitrite   Protein   CREAT   Urine HCG         05/14/25 1127 Dark Yellow   Clear   Negative   Trace   Negative   Negative                 Microbiology Results (last 10 days)       Procedure Component Value - Date/Time    Respiratory Panel PCR w/COVID-19(SARS-CoV-2) DANIELLE/VERO/BERTA/PAD/COR/ROMIE In-House, NP Swab in UTM/VTM, 2 HR TAT - Swab, Nasopharynx [[id number]]  (Abnormal) Collected: 05/17/25 1114    Lab Status: Final result Specimen: Swab from Nasopharynx Updated: 05/17/25 1226     ADENOVIRUS, PCR Not Detected     Coronavirus 229E Not Detected     Coronavirus HKU1 Not Detected     Coronavirus NL63 Not Detected     Coronavirus OC43 Not Detected     COVID19 Not Detected     Human Metapneumovirus Not Detected     Human Rhinovirus/Enterovirus Detected     Influenza A PCR Not Detected     Influenza B PCR Not Detected     Parainfluenza Virus 1 Not Detected     Parainfluenza Virus 2 Not Detected     Parainfluenza Virus 3 Not Detected     Parainfluenza Virus 4 Not Detected     RSV, PCR Not Detected     Bordetella pertussis pcr Not Detected     Bordetella parapertussis PCR Not Detected     Chlamydophila pneumoniae PCR Not Detected     Mycoplasma pneumo by PCR Not Detected    Narrative:      In the setting of a positive respiratory panel with a viral infection PLUS a negative procalcitonin without other underlying concern for bacterial infection, consider observing off antibiotics or discontinuation of antibiotics and continue supportive care. If the respiratory panel is positive for atypical bacterial infection (Bordetella pertussis, Chlamydophila pneumoniae, or Mycoplasma pneumoniae), consider antibiotic de-escalation to target atypical bacterial infection.    COVID-19, FLU A/B, RSV PCR 1 HR TAT - Swab, Nasopharynx [[id number]]  (Normal) Collected: 05/15/25 1432    Lab Status: Final result Specimen: Swab from Nasopharynx Updated: 05/15/25 1523     COVID19 Not Detected     Influenza A PCR Not Detected     Influenza B PCR Not Detected     RSV, PCR Not Detected     Narrative:      Fact sheet for providers: https://www.fda.gov/media/302098/download    Fact sheet for patients: https://www.fda.gov/media/234328/download    Test performed by PCR.            XR Chest 1 View  Result Date: 5/19/2025  Impression: There is no significant change when compared to the prior study. There is no evidence for acute cardiopulmonary process. Electronically Signed: Omar Campuzano MD  5/19/2025 12:16 PM EDT  Workstation ID: [id number]    US Paracentesis  Result Date: 5/14/2025  Impression: Impression: Small amount of right abdominal ascites.  Electronically Signed: Justin Johnson MD  5/14/2025 2:03 PM EDT  Workstation ID: [id number]    XR Chest 1 View  Result Date: 5/13/2025  Impression: Impression: No acute cardiopulmonary disease. Electronically Signed: Louie Allen MD  5/13/2025 8:04 PM EDT  Workstation ID: [id number]    CT Abdomen Pelvis With Contrast  Result Date: 5/13/2025  Impression: Impression: No acute findings in the abdomen or pelvis. Cirrhosis with sequela of portal hypertension including splenomegaly, varices, and small volume ascites. Electronically Signed: Kalpesh Betancourt MD  5/13/2025 6:39 PM EDT  Workstation ID: [id number]              Results for orders placed during the hospital encounter of 11/19/24    Adult Transthoracic Echo Complete W/ Cont if Necessary Per Protocol    Interpretation Summary  •  Left ventricular systolic function is normal. Left ventricular ejection fraction appears to be 61 - 65%.  •  Left ventricular diastolic function is normal.  •  There are no significant valvular abnormalities.      Labs Pending at Discharge:        Time spent on Discharge including face to face service:  35 minutes    Electronically signed by Nicole Conklin MD, 05/20/25, 8:54 AM EDT.    Portions of this documentation were transcribed electronically from a voice recognition software.  I confirm all data accurately represents the service(s) I performed at today's visit.

## 2025-05-20 NOTE — PLAN OF CARE
Goal Outcome Evaluation:  Plan of Care Reviewed With: patient        Progress: no change     Patient BP still running soft. PRN breathing tx on board now for SOA from coughing. Given Tessalon for cough. Patient did not rest very well

## 2025-05-21 NOTE — OUTREACH NOTE
Prep Survey      Flowsheet Row Responses   [facility] facility patient discharged from? [facility]   Is LACE score < 7 ? No   Eligibility Readm Mgmt   Discharge diagnosis Abdominal pain-EGD this visit   Does the patient have one of the following disease processes/diagnoses(primary or secondary)? Other   Does the patient have Home health ordered? No   Is there a DME ordered? No   Prep survey completed? Yes            PETTY CORONA - Registered Nurse

## 2025-05-22 ENCOUNTER — PREP FOR SURGERY (OUTPATIENT)
Dept: OTHER | Facility: HOSPITAL | Age: 60
End: 2025-05-22
Payer: MEDICARE

## 2025-05-22 DIAGNOSIS — K74.60 CIRRHOSIS OF LIVER WITH ASCITES, UNSPECIFIED HEPATIC CIRRHOSIS TYPE: Primary | ICD-10-CM

## 2025-05-22 DIAGNOSIS — R18.8 CIRRHOSIS OF LIVER WITH ASCITES, UNSPECIFIED HEPATIC CIRRHOSIS TYPE: Primary | ICD-10-CM

## 2025-05-22 DIAGNOSIS — R18.8 OTHER ASCITES: ICD-10-CM

## 2025-05-22 RX ORDER — ALBUMIN (HUMAN) 12.5 G/50ML
12.5 SOLUTION INTRAVENOUS ONCE
OUTPATIENT
Start: 2025-05-22 | End: 2025-05-22

## 2025-05-23 ENCOUNTER — TELEPHONE (OUTPATIENT)
Dept: GASTROENTEROLOGY | Facility: CLINIC | Age: 60
End: 2025-05-23
Payer: MEDICARE

## 2025-05-23 ENCOUNTER — TELEPHONE (OUTPATIENT)
Dept: GASTROENTEROLOGY | Facility: CLINIC | Age: 60
End: 2025-05-23

## 2025-05-23 DIAGNOSIS — R19.7 DIARRHEA, UNSPECIFIED TYPE: Primary | ICD-10-CM

## 2025-05-23 NOTE — TELEPHONE ENCOUNTER
"Spoke with pt. She stating she has lost 6 lbs over night, stated her swelling \"is better today\". Pt confirmed she is taking spironolactone QD and furosemide qd.  Pt stated a few days ago, had a hard time moving/breathing d/t swelling.   I advised pt if symptoms returns to go to ER. Advised no provider in office d/t holiday weekend. Pt voiced understanding. meaghan  "

## 2025-05-23 NOTE — TELEPHONE ENCOUNTER
Received a vm from Jonna a nurse care manager, Transitional care nurse.  Stated she spoke with pt, pt was D/C from [facility] on 5/20/25 for Abd pain and Hx of Liver Cirrhosis.  Stated while pt was IP there was not enough fluid to be drawn off abdomen.  Stated while pt has been home, has had a 12-15 lb weight gain.  Jonna stated she advised pt she may need to report to the ER, if she is in distress to call 911.   Jonna stated pt is taking Spironolactone and possibly furosemide.  She wanted us to be aware.    I left vm for pt to contact office.  (Paracentesis order has been placed)  Juancarlos

## 2025-05-23 NOTE — TELEPHONE ENCOUNTER
Patient had EGD with Dr. Novoa on 05/16/2025  Findings included-  Scar in the lower third of the esophagus  Portal hypertensive gastropathy  Normal duodenum    Patient has apt with Shae POWELL on 06/26/2025

## 2025-05-23 NOTE — TELEPHONE ENCOUNTER
Hub staff attempted to follow warm transfer process and was unsuccessful     Caller: Tawny [facility]    Relationship to patient: Self    Best call back number: [phone]    Patient is needing: PT IS RETURNING MISS CALL FROM [facility] VALENCIA AND [facility]UL[facility] CHAND. PLEASE GIVE PT A CALL BACK. IF NOT ABLE TO REACH PT. IT IS OKAY TO [facility].

## 2025-05-27 NOTE — TELEPHONE ENCOUNTER
Patient recently hospitalized and received broad-spectrum antibiotics therefore I would recommend stool studies for further evaluation.  Orders placed.

## 2025-05-27 NOTE — TELEPHONE ENCOUNTER
Please call and check on patient's symptoms. Okay to schedule outpatient paracentesis if needed. If having shortness of breath, fever, chills, abdominal tenderness then recommend ED.

## 2025-05-27 NOTE — TELEPHONE ENCOUNTER
"Spoke with pt.  Her main complaint is diarrhea, she will have on average 7 BM's a day. Pt states what ever she eats, goes right thru her. Unsure if she see' blood, but stated her stool is like \"black coffee grounds\", see's some mucus.   Pt would like to do a paracentesis. I did a warm tranfer to radiology scheduling to set up appt.      Please advise. Regina harding  "

## 2025-05-30 ENCOUNTER — LAB (OUTPATIENT)
Dept: LAB | Facility: HOSPITAL | Age: 60
End: 2025-05-30
Payer: MEDICARE

## 2025-05-30 ENCOUNTER — RESULTS FOLLOW-UP (OUTPATIENT)
Dept: INTERVENTIONAL RADIOLOGY/VASCULAR | Facility: HOSPITAL | Age: 60
End: 2025-05-30
Payer: MEDICARE

## 2025-05-30 ENCOUNTER — READMISSION MANAGEMENT (OUTPATIENT)
Dept: CALL CENTER | Facility: HOSPITAL | Age: 60
End: 2025-05-30
Payer: MEDICARE

## 2025-05-30 ENCOUNTER — HOSPITAL ENCOUNTER (OUTPATIENT)
Dept: INTERVENTIONAL RADIOLOGY/VASCULAR | Facility: HOSPITAL | Age: 60
Discharge: HOME | End: 2025-05-30
Payer: MEDICARE

## 2025-05-30 VITALS
DIASTOLIC BLOOD PRESSURE: 62 MMHG | HEART RATE: 69 BPM | SYSTOLIC BLOOD PRESSURE: 126 MMHG | OXYGEN SATURATION: 93 % | RESPIRATION RATE: 18 BRPM

## 2025-05-30 DIAGNOSIS — R18.8 CIRRHOSIS OF LIVER WITH ASCITES, UNSPECIFIED HEPATIC CIRRHOSIS TYPE: ICD-10-CM

## 2025-05-30 DIAGNOSIS — K74.60 CIRRHOSIS OF LIVER WITH ASCITES, UNSPECIFIED HEPATIC CIRRHOSIS TYPE: ICD-10-CM

## 2025-05-30 DIAGNOSIS — R18.8 OTHER ASCITES: ICD-10-CM

## 2025-05-30 DIAGNOSIS — R19.7 DIARRHEA, UNSPECIFIED TYPE: ICD-10-CM

## 2025-05-30 LAB
APTT PPP: 34.4 SECONDS (ref 24.2–34.2)
BASOPHILS # BLD AUTO: 0.02 10*3/MM3 (ref 0–0.2)
BASOPHILS NFR BLD AUTO: 0.5 % (ref 0–1.5)
DEPRECATED RDW RBC AUTO: 55.7 FL (ref 37–54)
EOSINOPHIL # BLD AUTO: 0.11 10*3/MM3 (ref 0–0.4)
EOSINOPHIL NFR BLD AUTO: 2.6 % (ref 0.3–6.2)
ERYTHROCYTE [DISTWIDTH] IN BLOOD BY AUTOMATED COUNT: 15.2 % (ref 12.3–15.4)
HCT VFR BLD AUTO: 30.9 % (ref 34–46.6)
HEMOCCULT STL QL IA: POSITIVE
HGB BLD-MCNC: 10.1 G/DL (ref 12–15.9)
IMM GRANULOCYTES # BLD AUTO: 0.01 10*3/MM3 (ref 0–0.05)
IMM GRANULOCYTES NFR BLD AUTO: 0.2 % (ref 0–0.5)
INR PPP: 1.5 (ref 0.86–1.15)
LYMPHOCYTES # BLD AUTO: 0.62 10*3/MM3 (ref 0.7–3.1)
LYMPHOCYTES NFR BLD AUTO: 14.8 % (ref 19.6–45.3)
MCH RBC QN AUTO: 32.7 PG (ref 26.6–33)
MCHC RBC AUTO-ENTMCNC: 32.7 G/DL (ref 31.5–35.7)
MCV RBC AUTO: 100 FL (ref 79–97)
MONOCYTES # BLD AUTO: 0.25 10*3/MM3 (ref 0.1–0.9)
MONOCYTES NFR BLD AUTO: 6 % (ref 5–12)
NEUTROPHILS NFR BLD AUTO: 3.18 10*3/MM3 (ref 1.7–7)
NEUTROPHILS NFR BLD AUTO: 75.9 % (ref 42.7–76)
NRBC BLD AUTO-RTO: 0 /100 WBC (ref 0–0.2)
PLATELET # BLD AUTO: 52 10*3/MM3 (ref 140–450)
PMV BLD AUTO: 10.8 FL (ref 6–12)
PROTHROMBIN TIME: 18.8 SECONDS (ref 11.8–14.9)
RBC # BLD AUTO: 3.09 10*6/MM3 (ref 3.77–5.28)
WBC NRBC COR # BLD AUTO: 4.19 10*3/MM3 (ref 3.4–10.8)

## 2025-05-30 PROCEDURE — 85025 COMPLETE CBC W/AUTO DIFF WBC: CPT

## 2025-05-30 PROCEDURE — 82274 ASSAY TEST FOR BLOOD FECAL: CPT

## 2025-05-30 PROCEDURE — 85730 THROMBOPLASTIN TIME PARTIAL: CPT

## 2025-05-30 PROCEDURE — 85610 PROTHROMBIN TIME: CPT

## 2025-05-30 PROCEDURE — 76942 ECHO GUIDE FOR BIOPSY: CPT

## 2025-05-30 NOTE — OUTREACH NOTE
Medical Week 2 Survey      Flowsheet Row Responses   [facility] patient discharged from? [facility]   Does the patient have one of the following disease processes/diagnoses(primary or secondary)? Other   Week 2 attempt successful? Yes   Call start time 1026   Discharge diagnosis Abdominal pain-EGD this visit   Call end time 1030   Meds reviewed with patient/caregiver? Yes   Is the patient having any side effects they believe may be caused by any medication additions or changes? No   Does the patient have all medications ordered at discharge? Yes   Is the patient taking all medications as directed (includes completed medication regime)? Yes   Comments regarding appointments Pt. had appt. with PCP 5/28/25   Does the patient have a primary care provider?  Yes   Does the patient have an appointment with their PCP within 7 days of discharge? Yes   Has the patient kept scheduled appointments due by today? Yes   Comments JUN 26  Follow Up with Gastroenterology/Shae POWELL  Thursday Jun 26, 2025 9:15 AM   Has home health visited the patient within 72 hours of discharge? N/A   Psychosocial issues? No   Did the patient receive a copy of their discharge instructions? Yes   Nursing interventions Reviewed instructions with patient   What is the patient's perception of their health status since discharge? Same   Is the patient/caregiver able to teach back signs and symptoms related to disease process for when to call PCP? Yes   Is the patient/caregiver able to teach back signs and symptoms related to disease process for when to call 911? Yes   Is the patient/caregiver able to teach back the hierarchy of who to call/visit for symptoms/problems? PCP, Specialist, Home health nurse, Urgent Care, ED, 911 Yes   If the patient is a current smoker, are they able to teach back resources for cessation? Not a smoker   Week 2 Call Completed? Yes   Is the patient interested in additional calls from an ambulatory ? No    Would this patient benefit from a Referral to [facility] Social Work? No   Wrap up additional comments Pt. reports having diarrhea x3 weeks. Provided stool sample with MD padillat. awaiting results. She reports decreased appetite. Encouraged fluids. Advised any worsening symptoms go to ED, any questions call Gastroenterology/PCP v/u.   Call end time 1030            Nilsa LOVE - Registered Nurse

## 2025-06-05 NOTE — TELEPHONE ENCOUNTER
Patient called back and was notified of her results and recommendations. Patient verbalized understanding and will keep follow up appointment.

## 2025-06-10 ENCOUNTER — READMISSION MANAGEMENT (OUTPATIENT)
Dept: CALL CENTER | Facility: HOSPITAL | Age: 60
End: 2025-06-10
Payer: MEDICARE

## 2025-06-10 NOTE — OUTREACH NOTE
Medical Week 3 Survey      Flowsheet Row Responses   [facility] facility patient discharged from? [facility]   Does the patient have one of the following disease processes/diagnoses(primary or secondary)? Other   Week 3 attempt successful? No   Unsuccessful attempts Attempt 1            Sonam HUGHES - Registered Nurse

## 2025-06-26 ENCOUNTER — OFFICE VISIT (OUTPATIENT)
Dept: GASTROENTEROLOGY | Facility: CLINIC | Age: 60
End: 2025-06-26
Payer: MEDICARE

## 2025-06-26 VITALS
SYSTOLIC BLOOD PRESSURE: 116 MMHG | WEIGHT: 187.5 LBS | HEIGHT: 62 IN | HEART RATE: 75 BPM | DIASTOLIC BLOOD PRESSURE: 57 MMHG | BODY MASS INDEX: 34.5 KG/M2

## 2025-06-26 DIAGNOSIS — K75.81 NASH (NONALCOHOLIC STEATOHEPATITIS): ICD-10-CM

## 2025-06-26 DIAGNOSIS — R18.8 CIRRHOSIS OF LIVER WITH ASCITES, UNSPECIFIED HEPATIC CIRRHOSIS TYPE: Primary | ICD-10-CM

## 2025-06-26 DIAGNOSIS — K74.60 CIRRHOSIS OF LIVER WITH ASCITES, UNSPECIFIED HEPATIC CIRRHOSIS TYPE: Primary | ICD-10-CM

## 2025-06-26 DIAGNOSIS — Z86.0100 HISTORY OF COLON POLYPS: ICD-10-CM

## 2025-06-26 DIAGNOSIS — R19.5 FECAL OCCULT BLOOD TEST POSITIVE: ICD-10-CM

## 2025-06-26 DIAGNOSIS — D50.9 IRON DEFICIENCY ANEMIA, UNSPECIFIED IRON DEFICIENCY ANEMIA TYPE: ICD-10-CM

## 2025-06-26 DIAGNOSIS — Z98.890 HISTORY OF CLOSURE OF ILEOSTOMY: ICD-10-CM

## 2025-06-26 DIAGNOSIS — Z87.19 HISTORY OF DIVERTICULITIS: ICD-10-CM

## 2025-06-26 PROCEDURE — 3074F SYST BP LT 130 MM HG: CPT | Performed by: NURSE PRACTITIONER

## 2025-06-26 PROCEDURE — 1159F MED LIST DOCD IN RCRD: CPT | Performed by: NURSE PRACTITIONER

## 2025-06-26 PROCEDURE — 3078F DIAST BP <80 MM HG: CPT | Performed by: NURSE PRACTITIONER

## 2025-06-26 PROCEDURE — 99214 OFFICE O/P EST MOD 30 MIN: CPT | Performed by: NURSE PRACTITIONER

## 2025-06-26 PROCEDURE — 1160F RVW MEDS BY RX/DR IN RCRD: CPT | Performed by: NURSE PRACTITIONER

## 2025-06-26 RX ORDER — SODIUM, POTASSIUM,MAG SULFATES 17.5-3.13G
2 SOLUTION, RECONSTITUTED, ORAL ORAL TAKE AS DIRECTED
Qty: 177 ML | Refills: 0 | Status: SHIPPED | OUTPATIENT
Start: 2025-06-26 | End: 2025-06-27 | Stop reason: SDUPTHER

## 2025-06-26 RX ORDER — LOPERAMIDE HYDROCHLORIDE 2 MG/1
2 TABLET ORAL
COMMUNITY
Start: 2025-06-03 | End: 2025-06-26

## 2025-06-26 NOTE — PROGRESS NOTES
Chief Complaint   Cirrhosis, Anemia, Edema, and Weight Gain (10lbs )    History of Present Illness       Tawny Lennon is a 59 y.o. female who presents to [facility] for follow-up         History of Present Illness  The patient is a 59-year-old female who presents today for an office follow-up for cirrhosis. She was recently in the hospital at [facility] on 05/14/2025 for abdominal pain. Our group was consulted. She has a past medical history of COPD, CKD, MEDRANO, liver cirrhosis, GERD, and esophageal varices. She initially presented to the ER with abdominal pain, diarrhea, nausea (without vomiting), and dark urine. A CT scan of the abdomen and pelvis on 05/13/2025 showed no acute findings but did reveal cirrhosis with sequela of portal hypertension, including splenomegaly, varices, and small volume ascites. A paracentesis was performed to rule out SBP.     Her history includes an EGD with Dr. Watkins in 12/2023, which showed nonsevere esophagitis with no bleeding, grade 3 esophageal varices (completely eradicated and banded), a normal stomach, and a normal duodenum. She was on nadolol but had to discontinue it due to intolerance. Another EGD was performed by Dr. Novoa on 05/16/2025, showing a scar in the lower third of the esophagus, portal hypertensive gastropathy, a normal stomach, and a normal duodenum.    She reports experiencing swelling and has been advised to increase her fluid intake. Despite this, she has gained approximately 10 pounds over the past two days. She has been managing her condition with Lasix 20 mg twice daily and Aldactone 50 mg once daily. She has been living with cirrhosis for about five years.    She continues to experience diarrhea, although it has not been severe recently. She underwent a colonoscopy in 2022 at [place], during which she had an ileostomy closure following a diverting loop ileostomy status post rectosigmoid colon resection  for perforated diverticulitis. She has a history of colon polyps removal.    She has a history of COPD and was previously on blood thinners but discontinued them due to frequent bleeding episodes.    PAST SURGICAL HISTORY:  Ileostomy closure following diverting loop ileostomy status post rectosigmoid colon resection for perforated diverticulitis in .    FAMILY HISTORY  - Father: Liver cancer  - Sister: Cirrhosis,  2 years ago        Results       Result Review :       CMP          2025    04:43 2025    04:59 2025    05:15   CMP   Glucose 117  99  103    BUN 20      Creatinine 1.21  1.13  1.12    EGFR 51.1  55.5  56.1    Sodium 135  134  137    Potassium 3.8  3.9  3.8    Chloride 110  108  111    Calcium 7.8  7.7  7.9    Total Protein 4.4  4.2  4.6    Albumin 2.2  2.2  2.3    Total Bilirubin 2.3  2.6  2.6    Alkaline Phosphatase 112  106  115    AST (SGOT) 41  40  43    ALT (SGPT) 14  13  15    BUN/Creatinine Ratio 16.5  16.8  17.9    Anion Gap 5.7  6.9  7.0      CBC          2025    04:59 2025    05:15 2025    09:01   CBC   WBC 2.89  3.65  4.19    RBC 2.64  2.74  3.09    Hemoglobin 8.6  9.0  10.1    Hematocrit 26.1  27.0  30.9    MCV 98.9  98.5  100.0    MCH 32.6  32.8  32.7    MCHC 33.0  33.3  32.7    RDW 14.8  15.0  15.2    Platelets 35  39  52      CBC w/diff          2025    04:59 2025    05:15 2025    09:01   CBC w/Diff   WBC 2.89  3.65  4.19    RBC 2.64  2.74  3.09    Hemoglobin 8.6  9.0  10.1    Hematocrit 26.1  27.0  30.9    MCV 98.9  98.5  100.0    MCH 32.6  32.8  32.7    MCHC 33.0  33.3  32.7    RDW 14.8  15.0  15.2    Platelets 35  39  52    Neutrophil Rel % 67.2  69.6  75.9    Immature Granulocyte Rel % 0.3  0.3  0.2    Lymphocyte Rel % 19.7  18.6  14.8    Monocyte Rel % 8.7  8.5  6.0    Eosinophil Rel % 3.8  2.5  2.6    Basophil Rel % 0.3  0.5  0.5          Electrolytes          2025    04:43 2025    04:59 2025    05:15  "  Electrolytes   Sodium 135  134  137    Potassium 3.8  3.9  3.8    Chloride 110  108  111    Calcium 7.8  7.7  7.9      Renal Profile          5/18/2025    04:43 5/19/2025    04:59 5/20/2025    05:15   Renal Profile   BUN 20 19 20    Creatinine 1.21  1.13  1.12      BMP          5/18/2025    04:43 5/19/2025    04:59 5/20/2025    05:15   BMP   BUN 20 19 20    Creatinine 1.21  1.13  1.12    Sodium 135  134  137    Potassium 3.8  3.9  3.8    Chloride 110  108  111    CO2 19.3  19.1  19.0    Calcium 7.8  7.7  7.9      Most Recent A1C          4/15/2025    14:07   HGBA1C Most Recent   Hemoglobin A1C 5.2          Details          This result is from an external source.               Lipase   Lipase   Date Value Ref Range Status   05/13/2025 54 13 - 60 U/L Final     Amylase No results found for: \"AMYLASE\"  Iron Profile   Iron   Date Value Ref Range Status   11/19/2024 69 28 - 170 ug/dL Final     TIBC   Date Value Ref Range Status   11/19/2024 386 261 - 478 ug/dL Final     Iron Saturation   Date Value Ref Range Status   11/28/2023 39 20 - 55 % Final     Ferritin   Ferritin   Date Value Ref Range Status   11/19/2024 31 26 - 246 ng/mL Final     ESR (Sed Rate) No results found for: \"SEDRATE\"  CRP (C-Reactive) No results found for: \"CRP\"  Liver Workup   Ferritin   Date Value Ref Range Status   11/19/2024 31 26 - 246 ng/mL Final     Iron   Date Value Ref Range Status   11/19/2024 69 28 - 170 ug/dL Final     TIBC   Date Value Ref Range Status   11/19/2024 386 261 - 478 ug/dL Final     Iron Saturation   Date Value Ref Range Status   11/28/2023 39 20 - 55 % Final     Protime   Date Value Ref Range Status   05/30/2025 18.8 (H) 11.8 - 14.9 Seconds Final     INR   Date Value Ref Range Status   05/30/2025 1.50 (H) 0.86 - 1.15 Final     AFP   Date Value Ref Range Status   05/08/2024 4 0 - 9 ng/mL Final     Comment:     (NOTE)  INTERPRETIVE INFORMATION: Alpha Fetoprotein Tumor Marker    The [employer] Access DxI AFP method is " "used. Results   obtained with different assay methods or kits cannot be used   interchangeably. AFP is a valuable aid in the management of   nonseminomatous testicular cancer patients when used in   conjunction with information available from the clinical   evaluation and other diagnostic procedures. Increased AFP   concentrations have also been observed in ataxia telangiectasia,   hereditary tyrosinemia, primary hepatocellular carcinoma,   teratocarcinoma, gastrointestinal tract cancers with and without   liver metastases, and in benign hepatic conditions such as acute   viral hepatitis, chronic active hepatitis, and cirrhosis. The   result cannot be interpreted as absolute evidence of the presence   or absence of malignant disease. The result is not interpretable   as a tumor marker in pregnant females.     Access complete set of age- and/or gender-specific reference   intervals for this test in the [facility] Test Directory   (aruplab.com).  Performed By: [facility]  [address] [place] [address]  : Arnaldo Decker MD, PhD  CLIA Number: [id number]     ALPHA-FETOPROTEIN   Date Value Ref Range Status   09/06/2024 4.61 0 - 8.3 ng/mL Final     Acute HEP Panel No results found for: \"HEPBSAG\", \"HEPAIGM\", \"HEPBIGMCORE\", \"HEPCVIRUSABY\"  Alpha 1 No results found for: \"AFPTM\"  NI No results found for: \"DSDNA\", \"EXPANDEDENA\"  ASMA No results found for: \"SMOOTHMUSCAB\"  AFP   AFP   Date Value Ref Range Status   05/08/2024 4 0 - 9 ng/mL Final     Comment:     (NOTE)  INTERPRETIVE INFORMATION: Alpha Fetoprotein Tumor Marker    The [employer] Access DxI AFP method is used. Results   obtained with different assay methods or kits cannot be used   interchangeably. AFP is a valuable aid in the management of   nonseminomatous testicular cancer patients when used in   conjunction with information available from the clinical   evaluation and other diagnostic procedures. Increased AFP "   concentrations have also been observed in ataxia telangiectasia,   hereditary tyrosinemia, primary hepatocellular carcinoma,   teratocarcinoma, gastrointestinal tract cancers with and without   liver metastases, and in benign hepatic conditions such as acute   viral hepatitis, chronic active hepatitis, and cirrhosis. The   result cannot be interpreted as absolute evidence of the presence   or absence of malignant disease. The result is not interpretable   as a tumor marker in pregnant females.     Access complete set of age- and/or gender-specific reference   intervals for this test in the [facility] Test Directory   (arupl[facility].com).  Performed By: [facility]  [address] [place] [id number]  : Arnaldo Decker MD, PhD  CLIA Number: [id number]     ALPHA-FETOPROTEIN   Date Value Ref Range Status   09/06/2024 4.61 0 - 8.3 ng/mL Final                Results  Labs   - Fecal occult blood stool: 05/30/2025, Positive   - CBC: Hemoglobin at 10.1, white count was normal, platelets were low at 52   - AST: 43   - Alkaline phosphatase: Normal   - Total bilirubin: 2.6    Imaging   - CT scan of the abdomen and pelvis: 05/13/2025, No acute findings, cirrhosis with sequela of portal hypertension including splenomegaly, varices and small volume ascites    Diagnostic Testing   - EGD: 05/16/2025, Scar in the lower third of the esophagus, portal hypertensive gastropathy, normal stomach, normal duodenum      Past Medical History       Past Medical History:   Diagnosis Date    Anemia     Chronic kidney disease     Cirrhosis     liver    Cirrhosis, nonalcoholic     Colon polyps     COPD (chronic obstructive pulmonary disease)     Depression     Diabetes     Diverticulitis     Esophageal varices     Fatty liver     GERD (gastroesophageal reflux disease)     Hematuria     Hernia     High blood pressure     High cholesterol     Interstitial cystitis     Migraine headache     Narcolepsy      Pancreatitis     PONV (postoperative nausea and vomiting)     Sleep apnea     Spinal headache     DURING PREGNANCY    Ulcerative colitis        Past Surgical History:   Procedure Laterality Date    ABDOMINAL SURGERY      BLADDER REPAIR  2009    CHOLECYSTECTOMY      COLONOSCOPY  2014 x2 2020    ENDOSCOPY  2014 2020    ENDOSCOPY N/A 07/12/2021    Procedure: ESOPHAGOGASTRODUODENOSCOPY with biopsies;  Surgeon: Jesse Watkins MD;  Location: [facility] ENDOSCOPY;  Service: Gastroenterology;  Laterality: N/A;  esophageal varices    ENDOSCOPY N/A 03/25/2022    Procedure: ESOPHAGOGASTRODUODENOSCOPY WITH BIOPSIES;  Surgeon: Jesse Watkins MD;  Location: [facility] ENDOSCOPY;  Service: Gastroenterology;  Laterality: N/A;  ESOPHAGEAL VARICIES    ENDOSCOPY N/A 09/28/2023    Procedure: ESOPHAGOGASTRODUODENOSCOPY WITH COLD BIOPSIES;  Surgeon: Jesse Watkins MD;  Location: [facility] ENDOSCOPY;  Service: Gastroenterology;  Laterality: N/A;  GASTRITIS, ESOPHAGEAL VARICES    ENDOSCOPY N/A 11/17/2023    Procedure: ESOPHAGOGASTRODUODENOSCOPY WITH BIOPSIES AND  VARICEAL BANDING X 4;  Surgeon: Jesse Watkins MD;  Location: [facility] ENDOSCOPY;  Service: Gastroenterology;  Laterality: N/A;  GASTRITIS, PORTAL HYPERTENSIVE GASTROPATHY, ESOPHAGEAL VARICIES    ENDOSCOPY N/A 12/21/2023    Procedure: ESOPHAGOGASTRODUODENOSCOPY WITH BIOPSIES AND ESOPHAGEAL BANDING;  Surgeon: Jesse Watkins MD;  Location: [facility] ENDOSCOPY;  Service: Gastroenterology;  Laterality: N/A;  ESOPHAGEAL VARICES, GASTRITIS    ENDOSCOPY N/A 5/16/2025    Procedure: ESOPHAGOGASTRODUODENOSCOPY;  Surgeon: Tuan Novoa MD;  Location: [facility] ENDOSCOPY;  Service: Gastroenterology;  Laterality: N/A;  PORTAL HYPERTENSIVE GASTROPATHY, ESOPHAGEAL SCARRING FROM PREVIOUS BANDING    HAND SURGERY      HERNIA REPAIR      HYSTERECTOMY  2009    ILEOSTOMY  07/2022    OTHER SURGICAL HISTORY      rectocele repair    UPPER GASTROINTESTINAL ENDOSCOPY    "        Current Outpatient Medications:     albuterol sulfate  (90 Base) MCG/ACT inhaler, Inhale 2 puffs Every 4 (Four) Hours As Needed., Disp: , Rfl:     albuterol sulfate  (90 Base) MCG/ACT inhaler, Inhale 2 puffs Every 4 (Four) Hours As Needed for Wheezing or Shortness of Air., Disp: 18 g, Rfl: 0    folic acid (FOLVITE) 1 MG tablet, Take 1 tablet by mouth Daily., Disp: , Rfl:     pantoprazole (PROTONIX) 40 MG EC tablet, Take 1 tablet by mouth Daily., Disp: , Rfl:     spironolactone (ALDACTONE) 50 MG tablet, Take 1 tablet by mouth Daily., Disp: 90 tablet, Rfl: 5    furosemide (LASIX) 20 MG tablet, Take 1 tablet by mouth Daily., Disp: 30 tablet, Rfl: 11    sodium-potassium-magnesium sulfates (Suprep Bowel Prep Kit) 17.5-3.13-1.6 GM/177ML solution oral solution, Take 2 bottles by mouth Take As Directed., Disp: 177 mL, Rfl: 0     No Known Allergies    Family History   Problem Relation Age of Onset    Dementia Mother     Liver cancer Father     Cirrhosis Sister         She  2023    Malig Hyperthermia Neg Hx     Colon cancer Neg Hx     Esophageal cancer Neg Hx         Social History     Social History Narrative    Not on file       Objective       Review of Systems   Constitutional:  Positive for appetite change, fatigue and unexpected weight gain. Negative for fever and unexpected weight loss.   HENT:  Negative for trouble swallowing.    Respiratory:  Negative for cough, choking, chest tightness, shortness of breath, wheezing and stridor.    Cardiovascular:  Positive for leg swelling. Negative for chest pain and palpitations.   Gastrointestinal:  Positive for abdominal distention and diarrhea. Negative for abdominal pain, anal bleeding, blood in stool, constipation, nausea, rectal pain, vomiting, GERD and indigestion.        Vital Signs:   /57 (BP Location: Left arm, Patient Position: Sitting, Cuff Size: Adult)   Pulse 75   Ht 157.5 cm (62\")   Wt 85 kg (187 lb 8 oz)   BMI 34.29 " kg/m²       Physical Exam  Constitutional:       General: She is not in acute distress.     Appearance: She is well-developed. She is not ill-appearing.   HENT:      Head: Normocephalic.   Eyes:      General: No scleral icterus.     Pupils: Pupils are equal, round, and reactive to light.   Cardiovascular:      Rate and Rhythm: Normal rate and regular rhythm.      Heart sounds: Normal heart sounds.   Pulmonary:      Effort: Pulmonary effort is normal.      Breath sounds: Normal breath sounds.   Abdominal:      General: Bowel sounds are normal. There is distension.      Palpations: Abdomen is soft. There is no mass.      Tenderness: There is no abdominal tenderness. There is no guarding or rebound.      Hernia: No hernia is present.   Musculoskeletal:         General: Normal range of motion.      Right lower leg: Edema present.      Left lower leg: Edema present.   Skin:     General: Skin is warm and dry.      Coloration: Skin is not jaundiced.   Neurological:      Mental Status: She is alert and oriented to person, place, and time.   Psychiatric:         Speech: Speech normal.         Behavior: Behavior normal.         Judgment: Judgment normal.         Physical Exam  General: Patient appears swollen with noticeable fluid retention.  Abdomen: Abdomen is tight with increased fluid accumulation.        Assessment & Plan          Assessment and Plan    Diagnoses and all orders for this visit:    1. Cirrhosis of liver with ascites, unspecified hepatic cirrhosis type (Primary)  -     CBC & Differential  -     Comprehensive Metabolic Panel  -     US Liver; Future  -     Ammonia; Future  -     Case Request; Standing  -     Follow Anesthesia Guidelines / Protocol; Future  -     Case Request  -     sodium-potassium-magnesium sulfates (Suprep Bowel Prep Kit) 17.5-3.13-1.6 GM/177ML solution oral solution; Take 2 bottles by mouth Take As Directed.  Dispense: 177 mL; Refill: 0    2. MEDRANO (nonalcoholic steatohepatitis)  -     CBC  & Differential  -     Comprehensive Metabolic Panel  -     US Liver; Future  -     Ammonia; Future  -     Case Request; Standing  -     Follow Anesthesia Guidelines / Protocol; Future  -     Case Request  -     sodium-potassium-magnesium sulfates (Suprep Bowel Prep Kit) 17.5-3.13-1.6 GM/177ML solution oral solution; Take 2 bottles by mouth Take As Directed.  Dispense: 177 mL; Refill: 0    3. Iron deficiency anemia, unspecified iron deficiency anemia type  -     CBC & Differential  -     Comprehensive Metabolic Panel  -     Case Request; Standing  -     Follow Anesthesia Guidelines / Protocol; Future  -     Case Request  -     sodium-potassium-magnesium sulfates (Suprep Bowel Prep Kit) 17.5-3.13-1.6 GM/177ML solution oral solution; Take 2 bottles by mouth Take As Directed.  Dispense: 177 mL; Refill: 0    4. History of closure of ileostomy  -     Case Request; Standing  -     Follow Anesthesia Guidelines / Protocol; Future  -     Case Request  -     sodium-potassium-magnesium sulfates (Suprep Bowel Prep Kit) 17.5-3.13-1.6 GM/177ML solution oral solution; Take 2 bottles by mouth Take As Directed.  Dispense: 177 mL; Refill: 0    5. History of diverticulitis  -     Case Request; Standing  -     Follow Anesthesia Guidelines / Protocol; Future  -     Case Request  -     sodium-potassium-magnesium sulfates (Suprep Bowel Prep Kit) 17.5-3.13-1.6 GM/177ML solution oral solution; Take 2 bottles by mouth Take As Directed.  Dispense: 177 mL; Refill: 0    6. Fecal occult blood test positive  -     Case Request; Standing  -     Follow Anesthesia Guidelines / Protocol; Future  -     Case Request  -     sodium-potassium-magnesium sulfates (Suprep Bowel Prep Kit) 17.5-3.13-1.6 GM/177ML solution oral solution; Take 2 bottles by mouth Take As Directed.  Dispense: 177 mL; Refill: 0    7. History of colon polyps  -     Case Request; Standing  -     Follow Anesthesia Guidelines / Protocol; Future  -     Case Request  -      sodium-potassium-magnesium sulfates (Suprep Bowel Prep Kit) 17.5-3.13-1.6 GM/177ML solution oral solution; Take 2 bottles by mouth Take As Directed.  Dispense: 177 mL; Refill: 0    Other orders  -     Follow Anesthesia Guidelines / Protocol; Standing  -     Verify NPO; Standing  -     Verify Bowel Prep Was Successful; Standing  -     Give Tap Water Enema If Bowel Prep Insufficient; Standing  -     Obtain Informed Consent; Standing    Surgical Risk and Benefits discussed: Possible risks/complications, benefits, and alternatives to surgical or invasive procedure have been explained to patient and/or legal guardian; risks include bleeding, infection, and perforation. Patient has been evaluated and can tolerate anesthesia and/or sedation. Risks, benefits, and alternatives to anesthesia and sedation have been explained to patient and/or legal guardian.    Assessment & Plan  1. Cirrhosis:  - Significant swelling and weight gain of 10 pounds in 14 days reported.  - Lasix 20 mg twice daily and Aldactone 50 mg once daily currently prescribed.  - Increase Aldactone to 100 mg daily for one week, then decrease back to regular dose.  Continue with Lasix 40 mg daily for now.  - Order liver ultrasound to assess fluid accumulation.  - Check non-fasting labs, including ammonia level, at a facility affiliated with [facility].  - Weigh daily.  - Follow a low-salt diet.  - Go to ER for immediate evaluation if symptoms worsen.    2.  Suspected gastrointestinal Bleeding:  - Positive fecal occult blood test on 05/30/2025.  - Upper endoscopy in the hospital showed no active bleeding.  - Schedule colonoscopy with Dr. Watkins.  - Send low-volume prep for colonoscopy to pharmacy.  - Will need pulmonary clearance    3. Chronic Obstructive Pulmonary Disease (COPD):  - History of COPD.  - Discontinued blood thinners due to frequent bleeding episodes.    Follow-up: Follow up after the scope.          Follow Up       Follow Up   Return for F/U  AFTER PROCEDURE.  Patient was given instructions and counseling regarding her condition or for health maintenance advice. Please see specific information pulled into the AVS if appropriate.       Patient or patient representative verbalized consent for the use of Ambient Listening during the visit with  ANDRE Brown for chart documentation. 6/26/2025  12:12 EDT

## 2025-06-26 NOTE — PATIENT INSTRUCTIONS
Continue lasix 40 mg daily Am per PCP  Increase aldactone (spirolactone) to 100 mg daily to help with fluid for 1 week then decrease back to 50 mg daily after that  Weigh daily  Low salt diet     Call office next week with update     If symptoms worsen please go to ER for immediate evaluation

## 2025-06-27 ENCOUNTER — TELEPHONE (OUTPATIENT)
Dept: GASTROENTEROLOGY | Facility: CLINIC | Age: 60
End: 2025-06-27
Payer: MEDICARE

## 2025-06-27 NOTE — TELEPHONE ENCOUNTER
Hub staff attempted to follow warm transfer process and was unsuccessful     Caller: Tawny Lennon    Relationship to patient: Self    Best call back number: [phone]    Patient is needing: PT WAS SEEN IN THE OFFICE ON 06/26/2025 AND WAS NEEDING LABS DONE AT THE [place] LOCATION. PT HAS GONE TWICE AND THEY STILL DO NOT HAVE THE ORDER. [facility]. PLEASE CALL PT AND ADVISE. PREP MEDICATION IS ALSO NOT COVERED BY THE INSURANCE.

## 2025-06-27 NOTE — TELEPHONE ENCOUNTER
Faxed labs to [place]. Sending Golytely bowel prep to patient pharmacy due to kidney function as suprep is not covered by insurance.     Spoke to patient and informed her of this information she will call the office if she has any other issues.

## 2025-06-30 ENCOUNTER — TELEPHONE (OUTPATIENT)
Dept: GASTROENTEROLOGY | Facility: CLINIC | Age: 60
End: 2025-06-30
Payer: MEDICARE

## 2025-06-30 NOTE — TELEPHONE ENCOUNTER
Spoke to Brittany at Pulmonary. Pt is scheduled for an appt on 7/15/25 and if she is cleared we will have clearance back same day.

## 2025-07-01 ENCOUNTER — TELEPHONE (OUTPATIENT)
Dept: GASTROENTEROLOGY | Facility: CLINIC | Age: 60
End: 2025-07-01
Payer: MEDICARE

## 2025-07-07 ENCOUNTER — TELEPHONE (OUTPATIENT)
Dept: GASTROENTEROLOGY | Facility: CLINIC | Age: 60
End: 2025-07-07
Payer: MEDICARE

## 2025-07-07 NOTE — TELEPHONE ENCOUNTER
Tawny Lennon  1965    Patient requested to cancel their Colonoscopy. I have offered to reschedule this patient and patient has declined.    Reason for cancelling:   PT HAS BROKEN HER ARM IN 4 PLACES AND IS SEEING AN ORTHOPEDIC SURGEON ON 07/11/25.    Original date of case request: 06.26.25    This procedure was ordered by ANDRE Carmen for an important reason. I have thoroughly discussed with the patient that there are risks associated with not proceeding with the procedure at this time such as a delay in diagnosis, risk of incurable disease, or cancer. Patient verbalized understanding the risks discussed.    Patient plans to call us back to reschedule: Yes    Is there a follow up appointment that needs to be cancelled? No    Has endo scheduling been notified? Yes    If yes, who did you speak to? GISELLE    Has the case request been updated? No    Has a letter been mailed to the patient? No

## 2025-07-14 ENCOUNTER — TELEPHONE (OUTPATIENT)
Dept: GASTROENTEROLOGY | Facility: CLINIC | Age: 60
End: 2025-07-14
Payer: MEDICARE

## 2025-07-14 NOTE — TELEPHONE ENCOUNTER
"Received vm from pts sister, Minda Chaudhari.  Stating puja had broken her arm and is in pain.  Stated puja is not able to get out of bed or sit up. Stated all she is able to eat is pickles and drink mcgill juice.  Stated puja stars into space, is confused.  Asking what can dr pride do.      Pts sister is NOT on pts SILVIA. Not able to speak with sister..  I attempted to call pt, vm box not set up.      I spoke with sister, aware she is not on SILVIA.  Minda stated they are currently at [facility] for treatment.  Minda stated the nurse commented on how \"yellow\" Puja is.. I encouraged minda that the ER is the best place for Puja at this moment.   Voiced understanding. meaghan  "

## 2025-07-14 NOTE — TELEPHONE ENCOUNTER
Received call from Tamar @ [facility] satinohemi the ER provider would like to speak w/ Dr Ontiveros in regard to patient.   Advised her he is currently in ENDO but we will send him a message to call back as soon as possible   -Return call to Dr Vance Delgado  [phone]

## 2025-07-15 ENCOUNTER — TELEPHONE (OUTPATIENT)
Dept: ORTHOPEDIC SURGERY | Facility: CLINIC | Age: 60
End: 2025-07-15

## 2025-07-15 NOTE — TELEPHONE ENCOUNTER
ATTEMPTED TO WARM TRANSFER      Caller: KELSEY FROM [facility]    Relationship to patient: HOSPITAL    Best call back number: [phone]    Chief complaint:  CALLED TO RESCHEDULE APPT. TODAY WITH DR. SESAY AT 10:00. PATIENT IS IN THE HOSPITAL.    Type of visit: NEW PATIENT-FRACTURE    Requested date: NEXT WEEK?     If rescheduling, when is the original appointment:  7/15/25

## 2025-07-18 ENCOUNTER — TELEPHONE (OUTPATIENT)
Dept: GASTROENTEROLOGY | Facility: CLINIC | Age: 60
End: 2025-07-18
Payer: MEDICARE

## 2025-07-19 ENCOUNTER — HOSPITAL ENCOUNTER (OUTPATIENT)
Facility: HOSPITAL | Age: 60
Setting detail: OBSERVATION
LOS: 1 days | Discharge: NURSING, REHAB OR LONG-TERM CARE | End: 2025-07-23
Payer: MEDICARE

## 2025-07-19 DIAGNOSIS — Z78.9 DECREASED ACTIVITIES OF DAILY LIVING (ADL): Primary | ICD-10-CM

## 2025-07-19 DIAGNOSIS — R18.8 CIRRHOSIS OF LIVER WITH ASCITES, UNSPECIFIED HEPATIC CIRRHOSIS TYPE: ICD-10-CM

## 2025-07-19 DIAGNOSIS — R13.12 OROPHARYNGEAL DYSPHAGIA: ICD-10-CM

## 2025-07-19 DIAGNOSIS — R26.2 DIFFICULTY WALKING: ICD-10-CM

## 2025-07-19 DIAGNOSIS — K74.60 CIRRHOSIS OF LIVER WITH ASCITES, UNSPECIFIED HEPATIC CIRRHOSIS TYPE: ICD-10-CM

## 2025-07-19 DIAGNOSIS — K72.90 DECOMPENSATED CIRRHOSIS: ICD-10-CM

## 2025-07-19 DIAGNOSIS — K74.60 DECOMPENSATED CIRRHOSIS: ICD-10-CM

## 2025-07-19 PROBLEM — K72.00 ACUTE LIVER FAILURE: Status: ACTIVE | Noted: 2025-07-19

## 2025-07-19 LAB
ALBUMIN SERPL-MCNC: 2.7 G/DL (ref 3.5–5.2)
ALBUMIN/GLOB SERPL: 1 G/DL
ALP SERPL-CCNC: 215 U/L (ref 39–117)
ALT SERPL W P-5'-P-CCNC: 23 U/L (ref 1–33)
AMMONIA BLD-SCNC: 93 UMOL/L (ref 11–51)
ANION GAP SERPL CALCULATED.3IONS-SCNC: 10.8 MMOL/L (ref 5–15)
AST SERPL-CCNC: 44 U/L (ref 1–32)
BASOPHILS # BLD AUTO: 0.06 10*3/MM3 (ref 0–0.2)
BASOPHILS NFR BLD AUTO: 0.7 % (ref 0–1.5)
BILIRUB SERPL-MCNC: 6.6 MG/DL (ref 0–1.2)
BUN SERPL-MCNC: 25 MG/DL (ref 8–23)
BUN/CREAT SERPL: 18 (ref 7–25)
CALCIUM SPEC-SCNC: 8.6 MG/DL (ref 8.6–10.5)
CHLORIDE SERPL-SCNC: 102 MMOL/L (ref 98–107)
CO2 SERPL-SCNC: 19.2 MMOL/L (ref 22–29)
CREAT SERPL-MCNC: 1.39 MG/DL (ref 0.57–1)
DEPRECATED RDW RBC AUTO: 57.3 FL (ref 37–54)
EGFRCR SERPLBLD CKD-EPI 2021: 43.3 ML/MIN/1.73
EOSINOPHIL # BLD AUTO: 0.2 10*3/MM3 (ref 0–0.4)
EOSINOPHIL NFR BLD AUTO: 2.4 % (ref 0.3–6.2)
ERYTHROCYTE [DISTWIDTH] IN BLOOD BY AUTOMATED COUNT: 16.1 % (ref 12.3–15.4)
GLOBULIN UR ELPH-MCNC: 2.7 GM/DL
GLUCOSE SERPL-MCNC: 177 MG/DL (ref 65–99)
HCT VFR BLD AUTO: 25.8 % (ref 34–46.6)
HGB BLD-MCNC: 8.4 G/DL (ref 12–15.9)
HOLD SPECIMEN: NORMAL
IMM GRANULOCYTES # BLD AUTO: 0.21 10*3/MM3 (ref 0–0.05)
IMM GRANULOCYTES NFR BLD AUTO: 2.5 % (ref 0–0.5)
LYMPHOCYTES # BLD AUTO: 0.73 10*3/MM3 (ref 0.7–3.1)
LYMPHOCYTES NFR BLD AUTO: 8.8 % (ref 19.6–45.3)
MCH RBC QN AUTO: 31.8 PG (ref 26.6–33)
MCHC RBC AUTO-ENTMCNC: 32.6 G/DL (ref 31.5–35.7)
MCV RBC AUTO: 97.7 FL (ref 79–97)
MONOCYTES # BLD AUTO: 0.74 10*3/MM3 (ref 0.1–0.9)
MONOCYTES NFR BLD AUTO: 9 % (ref 5–12)
NEUTROPHILS NFR BLD AUTO: 6.31 10*3/MM3 (ref 1.7–7)
NEUTROPHILS NFR BLD AUTO: 76.6 % (ref 42.7–76)
NRBC BLD AUTO-RTO: 0 /100 WBC (ref 0–0.2)
PLATELET # BLD AUTO: 73 10*3/MM3 (ref 140–450)
PMV BLD AUTO: 11.9 FL (ref 6–12)
POTASSIUM SERPL-SCNC: 4.2 MMOL/L (ref 3.5–5.2)
PROT SERPL-MCNC: 5.4 G/DL (ref 6–8.5)
RBC # BLD AUTO: 2.64 10*6/MM3 (ref 3.77–5.28)
SODIUM SERPL-SCNC: 132 MMOL/L (ref 136–145)
WBC NRBC COR # BLD AUTO: 8.25 10*3/MM3 (ref 3.4–10.8)

## 2025-07-19 PROCEDURE — 99223 1ST HOSP IP/OBS HIGH 75: CPT | Performed by: STUDENT IN AN ORGANIZED HEALTH CARE EDUCATION/TRAINING PROGRAM

## 2025-07-19 PROCEDURE — 96374 THER/PROPH/DIAG INJ IV PUSH: CPT

## 2025-07-19 PROCEDURE — 96372 THER/PROPH/DIAG INJ SC/IM: CPT

## 2025-07-19 PROCEDURE — 80053 COMPREHEN METABOLIC PANEL: CPT | Performed by: STUDENT IN AN ORGANIZED HEALTH CARE EDUCATION/TRAINING PROGRAM

## 2025-07-19 PROCEDURE — 25010000002 HEPARIN (PORCINE) PER 1000 UNITS: Performed by: STUDENT IN AN ORGANIZED HEALTH CARE EDUCATION/TRAINING PROGRAM

## 2025-07-19 PROCEDURE — 85025 COMPLETE CBC W/AUTO DIFF WBC: CPT | Performed by: STUDENT IN AN ORGANIZED HEALTH CARE EDUCATION/TRAINING PROGRAM

## 2025-07-19 PROCEDURE — 82140 ASSAY OF AMMONIA: CPT | Performed by: STUDENT IN AN ORGANIZED HEALTH CARE EDUCATION/TRAINING PROGRAM

## 2025-07-19 RX ORDER — SODIUM CHLORIDE 9 MG/ML
40 INJECTION, SOLUTION INTRAVENOUS AS NEEDED
Status: DISCONTINUED | OUTPATIENT
Start: 2025-07-19 | End: 2025-07-23 | Stop reason: HOSPADM

## 2025-07-19 RX ORDER — SODIUM CHLORIDE 0.9 % (FLUSH) 0.9 %
10 SYRINGE (ML) INJECTION EVERY 12 HOURS SCHEDULED
Status: DISCONTINUED | OUTPATIENT
Start: 2025-07-19 | End: 2025-07-23 | Stop reason: HOSPADM

## 2025-07-19 RX ORDER — LACTULOSE 10 G/15ML
30 SOLUTION ORAL 2 TIMES DAILY
Status: DISCONTINUED | OUTPATIENT
Start: 2025-07-19 | End: 2025-07-20

## 2025-07-19 RX ORDER — AMOXICILLIN 250 MG
2 CAPSULE ORAL 2 TIMES DAILY PRN
Status: DISCONTINUED | OUTPATIENT
Start: 2025-07-19 | End: 2025-07-23 | Stop reason: HOSPADM

## 2025-07-19 RX ORDER — SODIUM CHLORIDE 0.9 % (FLUSH) 0.9 %
10 SYRINGE (ML) INJECTION AS NEEDED
Status: DISCONTINUED | OUTPATIENT
Start: 2025-07-19 | End: 2025-07-23 | Stop reason: HOSPADM

## 2025-07-19 RX ORDER — BISACODYL 5 MG/1
5 TABLET, DELAYED RELEASE ORAL DAILY PRN
Status: DISCONTINUED | OUTPATIENT
Start: 2025-07-19 | End: 2025-07-23 | Stop reason: HOSPADM

## 2025-07-19 RX ORDER — ONDANSETRON 2 MG/ML
4 INJECTION INTRAMUSCULAR; INTRAVENOUS EVERY 6 HOURS PRN
Status: DISCONTINUED | OUTPATIENT
Start: 2025-07-19 | End: 2025-07-23 | Stop reason: HOSPADM

## 2025-07-19 RX ORDER — BISACODYL 10 MG
10 SUPPOSITORY, RECTAL RECTAL DAILY PRN
Status: DISCONTINUED | OUTPATIENT
Start: 2025-07-19 | End: 2025-07-23 | Stop reason: HOSPADM

## 2025-07-19 RX ORDER — MORPHINE SULFATE 2 MG/ML
2 INJECTION, SOLUTION INTRAMUSCULAR; INTRAVENOUS EVERY 4 HOURS PRN
Status: DISCONTINUED | OUTPATIENT
Start: 2025-07-19 | End: 2025-07-20

## 2025-07-19 RX ORDER — HEPARIN SODIUM 5000 [USP'U]/ML
5000 INJECTION, SOLUTION INTRAVENOUS; SUBCUTANEOUS EVERY 12 HOURS SCHEDULED
Status: DISCONTINUED | OUTPATIENT
Start: 2025-07-19 | End: 2025-07-20

## 2025-07-19 RX ORDER — NITROGLYCERIN 0.4 MG/1
0.4 TABLET SUBLINGUAL
Status: DISCONTINUED | OUTPATIENT
Start: 2025-07-19 | End: 2025-07-23 | Stop reason: HOSPADM

## 2025-07-19 RX ORDER — ONDANSETRON 4 MG/1
4 TABLET, ORALLY DISINTEGRATING ORAL EVERY 6 HOURS PRN
Status: DISCONTINUED | OUTPATIENT
Start: 2025-07-19 | End: 2025-07-23 | Stop reason: HOSPADM

## 2025-07-19 RX ORDER — POLYETHYLENE GLYCOL 3350 17 G/17G
17 POWDER, FOR SOLUTION ORAL DAILY PRN
Status: DISCONTINUED | OUTPATIENT
Start: 2025-07-19 | End: 2025-07-19

## 2025-07-19 RX ORDER — PANTOPRAZOLE SODIUM 40 MG/10ML
40 INJECTION, POWDER, LYOPHILIZED, FOR SOLUTION INTRAVENOUS EVERY 12 HOURS SCHEDULED
Status: DISCONTINUED | OUTPATIENT
Start: 2025-07-19 | End: 2025-07-23 | Stop reason: HOSPADM

## 2025-07-19 RX ADMIN — Medication 10 ML: at 22:16

## 2025-07-19 RX ADMIN — LACTULOSE SOLUTION USP, 10 G/15 ML 30 G: 10 SOLUTION ORAL; RECTAL at 22:15

## 2025-07-19 RX ADMIN — HEPARIN SODIUM 5000 UNITS: 5000 INJECTION INTRAVENOUS; SUBCUTANEOUS at 22:16

## 2025-07-19 RX ADMIN — PANTOPRAZOLE SODIUM 40 MG: 40 INJECTION, POWDER, FOR SOLUTION INTRAVENOUS at 22:16

## 2025-07-19 NOTE — H&P
Ms. is a 59-year-old female patient with past medical history of Reich cirrhosis who was admitted at [facility] for altered mental status likely decompensated liver cirrhosis.  Per hospitalist, patient is back to baseline mentation.  Of note, on 2 July patient had T4 and bouquet's right humerus.  Orthopedic surgery was consulted at [place] and reported no intervention to be done currently.  However, this morning patient was found to have extensive edema in the right upper extremity with ecchymosis and was unable to tolerate shoulder immobilizer brace.  On arrival, patient will need an ultrasound of the upper extremity to rule out any DVT or other complications.  Dr. Caballero was contacted and will see the patient.  Vascular was also contacted, Dr. Bahena will also see the patient.  GI, Dr. Mccarthy was also contacted for the decompensated cirrhosis, he agrees to see the patient.

## 2025-07-20 ENCOUNTER — APPOINTMENT (OUTPATIENT)
Dept: GENERAL RADIOLOGY | Facility: HOSPITAL | Age: 60
End: 2025-07-20
Payer: MEDICARE

## 2025-07-20 LAB
ALBUMIN SERPL-MCNC: 2.4 G/DL (ref 3.5–5.2)
ALP SERPL-CCNC: 200 U/L (ref 39–117)
ALT SERPL W P-5'-P-CCNC: 22 U/L (ref 1–33)
ANION GAP SERPL CALCULATED.3IONS-SCNC: 9.6 MMOL/L (ref 5–15)
AST SERPL-CCNC: 40 U/L (ref 1–32)
BASOPHILS # BLD AUTO: 0.03 10*3/MM3 (ref 0–0.2)
BASOPHILS NFR BLD AUTO: 0.4 % (ref 0–1.5)
BILIRUB CONJ SERPL-MCNC: 4.1 MG/DL (ref 0–0.3)
BILIRUB INDIRECT SERPL-MCNC: 2.7 MG/DL
BILIRUB SERPL-MCNC: 6.8 MG/DL (ref 0–1.2)
BUN SERPL-MCNC: 25.4 MG/DL (ref 8–23)
BUN/CREAT SERPL: 21.5 (ref 7–25)
CALCIUM SPEC-SCNC: 8.8 MG/DL (ref 8.6–10.5)
CHLORIDE SERPL-SCNC: 103 MMOL/L (ref 98–107)
CO2 SERPL-SCNC: 20.4 MMOL/L (ref 22–29)
CREAT SERPL-MCNC: 1.18 MG/DL (ref 0.57–1)
D-LACTATE SERPL-SCNC: 2 MMOL/L (ref 0.5–2)
D-LACTATE SERPL-SCNC: 2.3 MMOL/L (ref 0.5–2)
DEPRECATED RDW RBC AUTO: 58.3 FL (ref 37–54)
EGFRCR SERPLBLD CKD-EPI 2021: 52.7 ML/MIN/1.73
EOSINOPHIL # BLD AUTO: 0.16 10*3/MM3 (ref 0–0.4)
EOSINOPHIL NFR BLD AUTO: 2.3 % (ref 0.3–6.2)
ERYTHROCYTE [DISTWIDTH] IN BLOOD BY AUTOMATED COUNT: 16.2 % (ref 12.3–15.4)
GLUCOSE BLDC GLUCOMTR-MCNC: 168 MG/DL (ref 70–99)
GLUCOSE BLDC GLUCOMTR-MCNC: 288 MG/DL (ref 70–99)
GLUCOSE SERPL-MCNC: 145 MG/DL (ref 65–99)
HCT VFR BLD AUTO: 24 % (ref 34–46.6)
HGB BLD-MCNC: 8 G/DL (ref 12–15.9)
HOLD SPECIMEN: NORMAL
IMM GRANULOCYTES # BLD AUTO: 0.14 10*3/MM3 (ref 0–0.05)
IMM GRANULOCYTES NFR BLD AUTO: 2 % (ref 0–0.5)
INR PPP: 1.45 (ref 0.86–1.15)
INR PPP: 1.58 (ref 0.86–1.15)
LYMPHOCYTES # BLD AUTO: 0.69 10*3/MM3 (ref 0.7–3.1)
LYMPHOCYTES NFR BLD AUTO: 10 % (ref 19.6–45.3)
MCH RBC QN AUTO: 32.7 PG (ref 26.6–33)
MCHC RBC AUTO-ENTMCNC: 33.3 G/DL (ref 31.5–35.7)
MCV RBC AUTO: 98 FL (ref 79–97)
MONOCYTES # BLD AUTO: 0.54 10*3/MM3 (ref 0.1–0.9)
MONOCYTES NFR BLD AUTO: 7.8 % (ref 5–12)
NEUTROPHILS NFR BLD AUTO: 5.33 10*3/MM3 (ref 1.7–7)
NEUTROPHILS NFR BLD AUTO: 77.5 % (ref 42.7–76)
NRBC BLD AUTO-RTO: 0 /100 WBC (ref 0–0.2)
PLATELET # BLD AUTO: 65 10*3/MM3 (ref 140–450)
PMV BLD AUTO: 12 FL (ref 6–12)
POTASSIUM SERPL-SCNC: 4.4 MMOL/L (ref 3.5–5.2)
PROT SERPL-MCNC: 4.9 G/DL (ref 6–8.5)
PROTHROMBIN TIME: 18.3 SECONDS (ref 11.8–14.9)
PROTHROMBIN TIME: 19.6 SECONDS (ref 11.8–14.9)
RBC # BLD AUTO: 2.45 10*6/MM3 (ref 3.77–5.28)
SODIUM SERPL-SCNC: 133 MMOL/L (ref 136–145)
WBC NRBC COR # BLD AUTO: 6.89 10*3/MM3 (ref 3.4–10.8)
WHOLE BLOOD HOLD SPECIMEN: NORMAL

## 2025-07-20 PROCEDURE — 63710000001 LACTULOSE 20 GM/30ML SOLUTION: Performed by: INTERNAL MEDICINE

## 2025-07-20 PROCEDURE — 82948 REAGENT STRIP/BLOOD GLUCOSE: CPT

## 2025-07-20 PROCEDURE — 63710000001 TRAMADOL 50 MG TABLET: Performed by: INTERNAL MEDICINE

## 2025-07-20 PROCEDURE — A9270 NON-COVERED ITEM OR SERVICE: HCPCS | Performed by: INTERNAL MEDICINE

## 2025-07-20 PROCEDURE — 94799 UNLISTED PULMONARY SVC/PX: CPT

## 2025-07-20 PROCEDURE — 83605 ASSAY OF LACTIC ACID: CPT

## 2025-07-20 PROCEDURE — A9270 NON-COVERED ITEM OR SERVICE: HCPCS

## 2025-07-20 PROCEDURE — 85610 PROTHROMBIN TIME: CPT

## 2025-07-20 PROCEDURE — 63710000001 HYDROCODONE-ACETAMINOPHEN 5-325 MG TABLET

## 2025-07-20 PROCEDURE — 99222 1ST HOSP IP/OBS MODERATE 55: CPT | Performed by: INTERNAL MEDICINE

## 2025-07-20 PROCEDURE — 80076 HEPATIC FUNCTION PANEL: CPT | Performed by: INTERNAL MEDICINE

## 2025-07-20 PROCEDURE — 63710000001 LACTULOSE 10 GM/15ML SOLUTION: Performed by: INTERNAL MEDICINE

## 2025-07-20 PROCEDURE — 85025 COMPLETE CBC W/AUTO DIFF WBC: CPT | Performed by: INTERNAL MEDICINE

## 2025-07-20 PROCEDURE — 83036 HEMOGLOBIN GLYCOSYLATED A1C: CPT

## 2025-07-20 PROCEDURE — 99233 SBSQ HOSP IP/OBS HIGH 50: CPT

## 2025-07-20 PROCEDURE — 63710000001 INSULIN LISPRO (HUMAN) PER 5 UNITS

## 2025-07-20 PROCEDURE — 63710000001 HYDROCORTISONE 25 MG SUPPOSITORY: Performed by: INTERNAL MEDICINE

## 2025-07-20 PROCEDURE — 80048 BASIC METABOLIC PNL TOTAL CA: CPT | Performed by: INTERNAL MEDICINE

## 2025-07-20 PROCEDURE — 85610 PROTHROMBIN TIME: CPT | Performed by: INTERNAL MEDICINE

## 2025-07-20 PROCEDURE — 99221 1ST HOSP IP/OBS SF/LOW 40: CPT | Performed by: ORTHOPAEDIC SURGERY

## 2025-07-20 PROCEDURE — 96376 TX/PRO/DX INJ SAME DRUG ADON: CPT

## 2025-07-20 PROCEDURE — 73030 X-RAY EXAM OF SHOULDER: CPT

## 2025-07-20 PROCEDURE — 25810000003 SODIUM CHLORIDE 0.9 % SOLUTION

## 2025-07-20 PROCEDURE — G0378 HOSPITAL OBSERVATION PER HR: HCPCS

## 2025-07-20 PROCEDURE — 94640 AIRWAY INHALATION TREATMENT: CPT

## 2025-07-20 RX ORDER — HYDROCODONE BITARTRATE AND ACETAMINOPHEN 5; 325 MG/1; MG/1
1 TABLET ORAL EVERY 4 HOURS PRN
Refills: 0 | Status: DISCONTINUED | OUTPATIENT
Start: 2025-07-20 | End: 2025-07-20

## 2025-07-20 RX ORDER — LACTULOSE 10 G/15ML
10 SOLUTION ORAL; RECTAL 4 TIMES DAILY
Status: ON HOLD | COMMUNITY
Start: 2025-06-30 | End: 2025-07-20

## 2025-07-20 RX ORDER — LACTULOSE 10 G/15ML
300 SOLUTION ORAL ONCE
Status: COMPLETED | OUTPATIENT
Start: 2025-07-20 | End: 2025-07-20

## 2025-07-20 RX ORDER — NICOTINE POLACRILEX 4 MG
15 LOZENGE BUCCAL
Status: DISCONTINUED | OUTPATIENT
Start: 2025-07-20 | End: 2025-07-23 | Stop reason: HOSPADM

## 2025-07-20 RX ORDER — IBUPROFEN 600 MG/1
1 TABLET ORAL
Status: DISCONTINUED | OUTPATIENT
Start: 2025-07-20 | End: 2025-07-23 | Stop reason: HOSPADM

## 2025-07-20 RX ORDER — SPIRONOLACTONE 25 MG/1
50 TABLET ORAL DAILY
Status: DISCONTINUED | OUTPATIENT
Start: 2025-07-20 | End: 2025-07-23 | Stop reason: HOSPADM

## 2025-07-20 RX ORDER — INSULIN LISPRO 100 [IU]/ML
2-7 INJECTION, SOLUTION INTRAVENOUS; SUBCUTANEOUS
Status: DISCONTINUED | OUTPATIENT
Start: 2025-07-20 | End: 2025-07-23 | Stop reason: HOSPADM

## 2025-07-20 RX ORDER — PANTOPRAZOLE SODIUM 40 MG/1
40 TABLET, DELAYED RELEASE ORAL DAILY
Status: CANCELLED | OUTPATIENT
Start: 2025-07-20

## 2025-07-20 RX ORDER — FUROSEMIDE 20 MG/1
20 TABLET ORAL DAILY
Status: CANCELLED | OUTPATIENT
Start: 2025-07-20

## 2025-07-20 RX ORDER — FLUTICASONE PROPIONATE 50 MCG
2 SPRAY, SUSPENSION (ML) NASAL DAILY
COMMUNITY
Start: 2025-05-21

## 2025-07-20 RX ORDER — FUROSEMIDE 20 MG/1
20 TABLET ORAL DAILY
Status: DISCONTINUED | OUTPATIENT
Start: 2025-07-20 | End: 2025-07-23 | Stop reason: HOSPADM

## 2025-07-20 RX ORDER — FOLIC ACID 1 MG/1
1 TABLET ORAL DAILY
Status: DISCONTINUED | OUTPATIENT
Start: 2025-07-20 | End: 2025-07-23 | Stop reason: HOSPADM

## 2025-07-20 RX ORDER — HYDROCODONE BITARTRATE AND ACETAMINOPHEN 5; 325 MG/1; MG/1
1 TABLET ORAL EVERY 6 HOURS PRN
Status: ON HOLD | COMMUNITY
Start: 2025-07-07 | End: 2025-07-23

## 2025-07-20 RX ORDER — IPRATROPIUM BROMIDE AND ALBUTEROL SULFATE 2.5; .5 MG/3ML; MG/3ML
3 SOLUTION RESPIRATORY (INHALATION)
Status: DISCONTINUED | OUTPATIENT
Start: 2025-07-20 | End: 2025-07-23 | Stop reason: HOSPADM

## 2025-07-20 RX ORDER — TRAMADOL HYDROCHLORIDE 50 MG/1
50 TABLET ORAL EVERY 6 HOURS PRN
Status: DISCONTINUED | OUTPATIENT
Start: 2025-07-20 | End: 2025-07-23 | Stop reason: HOSPADM

## 2025-07-20 RX ORDER — HYDROCORTISONE ACETATE 25 MG/1
25 SUPPOSITORY RECTAL 2 TIMES DAILY
Status: DISCONTINUED | OUTPATIENT
Start: 2025-07-20 | End: 2025-07-23 | Stop reason: HOSPADM

## 2025-07-20 RX ORDER — DEXTROSE MONOHYDRATE 25 G/50ML
25 INJECTION, SOLUTION INTRAVENOUS
Status: DISCONTINUED | OUTPATIENT
Start: 2025-07-20 | End: 2025-07-23 | Stop reason: HOSPADM

## 2025-07-20 RX ORDER — LACTULOSE 10 G/15ML
20 SOLUTION ORAL 3 TIMES DAILY
Status: DISCONTINUED | OUTPATIENT
Start: 2025-07-20 | End: 2025-07-23 | Stop reason: HOSPADM

## 2025-07-20 RX ORDER — PHYTONADIONE 5 MG/1
10 TABLET ORAL DAILY
Status: COMPLETED | OUTPATIENT
Start: 2025-07-21 | End: 2025-07-23

## 2025-07-20 RX ORDER — SPIRONOLACTONE 25 MG/1
50 TABLET ORAL DAILY
Status: CANCELLED | OUTPATIENT
Start: 2025-07-20

## 2025-07-20 RX ORDER — ALBUTEROL SULFATE 0.83 MG/ML
2.5 SOLUTION RESPIRATORY (INHALATION) EVERY 6 HOURS PRN
Status: CANCELLED | OUTPATIENT
Start: 2025-07-20

## 2025-07-20 RX ADMIN — PANTOPRAZOLE SODIUM 40 MG: 40 INJECTION, POWDER, FOR SOLUTION INTRAVENOUS at 09:12

## 2025-07-20 RX ADMIN — TRAMADOL HYDROCHLORIDE 50 MG: 50 TABLET, COATED ORAL at 20:52

## 2025-07-20 RX ADMIN — Medication 10 ML: at 20:53

## 2025-07-20 RX ADMIN — INSULIN LISPRO 2 UNITS: 100 INJECTION, SOLUTION INTRAVENOUS; SUBCUTANEOUS at 20:52

## 2025-07-20 RX ADMIN — IPRATROPIUM BROMIDE AND ALBUTEROL SULFATE 3 ML: .5; 3 SOLUTION RESPIRATORY (INHALATION) at 19:00

## 2025-07-20 RX ADMIN — LACTULOSE 300 ML: 10 SOLUTION ORAL at 19:40

## 2025-07-20 RX ADMIN — HYDROCORTISONE ACETATE 25 MG: 25 SUPPOSITORY RECTAL at 20:52

## 2025-07-20 RX ADMIN — INSULIN LISPRO 4 UNITS: 100 INJECTION, SOLUTION INTRAVENOUS; SUBCUTANEOUS at 18:29

## 2025-07-20 RX ADMIN — IPRATROPIUM BROMIDE AND ALBUTEROL SULFATE 3 ML: .5; 3 SOLUTION RESPIRATORY (INHALATION) at 13:40

## 2025-07-20 RX ADMIN — HYDROCODONE BITARTRATE AND ACETAMINOPHEN 1 TABLET: 5; 325 TABLET ORAL at 16:50

## 2025-07-20 RX ADMIN — PANTOPRAZOLE SODIUM 40 MG: 40 INJECTION, POWDER, FOR SOLUTION INTRAVENOUS at 21:00

## 2025-07-20 RX ADMIN — LACTULOSE SOLUTION USP, 10 G/15 ML 20 G: 10 SOLUTION ORAL; RECTAL at 20:52

## 2025-07-20 RX ADMIN — SODIUM CHLORIDE 250 ML: 9 INJECTION, SOLUTION INTRAVENOUS at 07:34

## 2025-07-20 NOTE — PROGRESS NOTES
[facility]   Hospitalist Progress Note  Date: 2025  Patient Name: Tawny Lennon  : 1965  MRN: [id number]  Date of admission: 2025  Room/Bed: 512/1      Subjective   Subjective     Chief Complaint: Altered mental status and right upper extremity fracture    Summary:Tawny Lennon is a 59 y.o. female with extensive edema of the right upper extremity with ecchymosis.  Patient presented to the [facility] with decompensated liver cirrhosis and altered mentation that is apparently back to baseline.  She has a history of Reich cirrhosis.  Patient was found to have a broken right humerus that was reportedly not amenable to intervention.  She was intended to have a shoulder immobilizer brace however she could not tolerate it due to the edema.  Ultrasound was requested by the hospitalist at [place] however they do not have that available on the weekend.  Patient also apparently had a positive FOBT.  Last hemoglobin was 9.  It actually went up from 7.8 from the past several days.  She does complain of some abdominal pain and did receive fentanyl en route with EMS.  She had an ultrasound of the abdomen that showed no ascites on 2025.     Interval Followup: Patient seen and evaluated at bedside.  Reports being in pain from the right humeral fracture.  However denies all other symptoms.  She is currently oriented x 4.    Review of Systems    All systems reviewed and negative except for what is outlined above.      Objective   Objective     Vitals:   Temp:  [97.3 °F (36.3 °C)-98.1 °F (36.7 °C)] 98.1 °F (36.7 °C)  Heart Rate:  [87-97] 87  Resp:  [12-18] 12  BP: (106-120)/(53-83) 111/53  Flow (L/min) (Oxygen Therapy):  [2] 2    Physical Exam   General: Awake, alert, NAD  HENT: NCAT, MMM  Eyes: pupils equal, no scleral icterus  Cardiovascular: RRR, no murmurs   Pulmonary: CTA bilaterally; no wheezes; no conversational dyspnea  Gastrointestinal: S/ND/NT, +BS  Musculoskeletal: No gross deformities  Skin: No  jaundice, no rash on exposed skin appreciated  Neuro: CN II through XII grossly intact; speech clear; no tremor  Psych: Mood and affect appropriate  : No Rodriguez catheter; no suprapubic tenderness    Result Review    Result Review:  I have personally reviewed these results:  [x]  Laboratory      Lab 07/20/25  0709 07/20/25 0425 07/19/25 2012   WBC  --   --  8.25   HEMOGLOBIN  --   --  8.4*   HEMATOCRIT  --   --  25.8*   PLATELETS  --   --  73*   NEUTROS ABS  --   --  6.31   IMMATURE GRANS (ABS)  --   --  0.21*   LYMPHS ABS  --   --  0.73   MONOS ABS  --   --  0.74   EOS ABS  --   --  0.20   MCV  --   --  97.7*   LACTATE 2.0 2.3*  --    PROTIME  --  18.3*  --          Lab 07/19/25 2012   SODIUM 132*   POTASSIUM 4.2   CHLORIDE 102   CO2 19.2*   ANION GAP 10.8   BUN 25.0*   CREATININE 1.39*   EGFR 43.3*   GLUCOSE 177*   CALCIUM 8.6         Lab 07/19/25 2012   TOTAL PROTEIN 5.4*   ALBUMIN 2.7*   GLOBULIN 2.7   ALT (SGPT) 23   AST (SGOT) 44*   BILIRUBIN 6.6*   ALK PHOS 215*         Lab 07/20/25 0425   PROTIME 18.3*   INR 1.45*                 Brief Urine Lab Results  (Last result in the past 365 days)        Color   Clarity   Blood   Leuk Est   Nitrite   Protein   CREAT   Urine HCG        05/14/25 1127 Dark Yellow   Clear   Negative   Trace   Negative   Negative                 [x]  Microbiology   Microbiology Results (last 10 days)       ** No results found for the last 240 hours. **          [x]  Radiology  CT humerus right without contrast  Result Date: 7/19/2025  1. Still noted a fracture proximal humerus with a comminuted fracture of the humeral head, the humeral head seen impacted with patchy sclerosis, stable 2. Soft tissue hematoma is seen at the upper arm. 3. The scanned part of the right upper quadrant shows perihepatic ascites. Dedicated study is advised.     XR Chest 1 View  Result Date: 7/18/2025  1. No acute cardiopulmonary disease.    US Renal Bilateral  Result Date: 7/15/2025  Normal ultrasound of  the kidneys. [facility]    US Abdomen Limited  Result Date: 7/15/2025  No ascites. [id number]    XR Chest 1 View  Result Date: 7/15/2025  No active disease. [id number]    CT Abdomen Pelvis With Contrast  Result Date: 7/14/2025  1.  Suspect active gastrointestinal hemorrhage along the posterior wall of the body the stomach. Endoscopy may be useful. 2.  Cirrhosis with mild splenomegaly, mild amount of ascites, anasarca, and gastroesophageal varices. 3.  Sigmoid diverticulosis without diverticula. 4.  Dr. Delgado was notified on 7/14/2025 at 1208. [id number]    []  EKG/Telemetry   []  Cardiology/Vascular   []  Pathology  []  Old records  []  Other:    Assessment & Plan   Assessment / Plan     Assessment:  Right humeral fracture  FOBT positive  Acute metabolic encephalopathy likely secondary to decompensated cirrhosis, resolved  Chronic thrombocytopenia secondary to decompensated cirrhosis  MEDRANO liver cirrhosis  Hyperbilirubinemia due to above  CKD stage IIIa  COPD  GERD  History of esophageal varices status post attempted banding 12/2023      Plan:  GI consulted for positive FOBT - will consider colonoscopy in the outpatient setting since EGD was recently performed by Dr. Novoa in May 2025 showed scar in the lower third of the esophagus, portal hypertensive gastropathy, and a normal stomach and a normal duodenum.  Ortho following-will continue with nonoperative care of left humeral fracture.  To follow-up with orthopedic doctor in the outpatient setting in 7 to 10 days post discharge  Sliding scale insulin ordered, check A1c    Dispo plan: Patient to be discharged to rehab; however, still pending PT eval.    Discussed with RN.    VTE Prophylaxis:  No VTE prophylaxis order currently exists.        CODE STATUS:   Code Status (Patient has no pulse and is not breathing): No CPR (Do Not Attempt to Resuscitate)  Medical Interventions (Patient has pulse or is breathing): Limited Support  Medical  Intervention Limits: No intubation (DNI)  Level Of Support Discussed With: Patient      Electronically signed by Tono Beltran DO, 7/20/2025, 07:57 EDT.

## 2025-07-20 NOTE — CONSULTS
[facility]  Initial Inpatient Consult Note    Referring Provider: Tono Beltran DO    Reason for Consultation: FOBT positive, decompensated cirrhosis of liver    History of present illness:  Patient is 59 y.o. female with known medical history of decompensated MEDRANO cirrhosis, thrombocytopenia, ascites, encephalopathy, esophageal varices status post banding with complete eradication of varices, portal hypertensive gastropathy, history of diverticular perforation s/p rectosigmoid resection with diverting ileostomy and closure on 7/21/2022, CKD, COPD admitted from [facility] as a transfer case for extensive edema of right upper extremity with ecchymosis in presence of fracture proximal humerus with a comminuted fracture of the humeral head CT Humerus right without contrast) and decompensated cirrhosis of liver with FOBT positive.  Apparently patient has altered mental status with elevated ammonia level of 93 at [facility] that has been resolved now.    Patient denies confusion.  She mentions that she started lactulose couple of weeks ago.  She had last BM on Friday but earlier this morning has good BM.  She is currently n.p.o. Patient denies melena, hematochezia or bright red blood per rectum.  No coffee-ground emesis or hematemesis.  She denies abdominal distention, swelling or abdominal pain.  Patient was scheduled for colonoscopy on last Friday but due to her right shoulder fracture she canceled the procedure.  She has EGD on 05/16/2025 performed by Dr. Iain Novoa with following findings    - A post variceal banding scar was found in the lower third of the esophagus. The scar tissue was healthy in appearance.   - Mild portal hypertensive gastropathy was found in the gastric body.   - The first portion of the duodenum and second portion of the duodenum were normal.    Lab workup with CBC, BMP, LFTs, INR showed    Hemoglobin 8 g/dL, MCV 98.0, platelet count 65,000  INR 1.58  BUN 25, creatinine 1.18,  sodium 133  LFTs with total bilirubin level of 6.8, direct bilirubin 4.1, indirect bilirubin 2.7, AST 40, ALT 22, total protein 4.9, albumin 2.4    Ultrasound Abdomen: 07/14/2025     No ascites    CT humerus Right without contrast: 07/19/2025    Still noted a fracture proximal humerus with a comminuted fracture of the humeral head, the humeral head seen impacted with patchy sclerosis, stable   2. Soft tissue hematoma is seen at the upper arm.   3. The scanned part of the right upper quadrant shows perihepatic ascites. Dedicated study is advised.     Past Medical History:  Past Medical History:   Diagnosis Date    Anemia     Chronic kidney disease     Cirrhosis     liver    Cirrhosis, nonalcoholic     Colon polyps     COPD (chronic obstructive pulmonary disease)     Depression     Diabetes     Diverticulitis     Esophageal varices     Fatty liver     GERD (gastroesophageal reflux disease)     Hematuria     Hernia     High blood pressure     High cholesterol     Interstitial cystitis     Migraine headache     Narcolepsy     Pancreatitis     PONV (postoperative nausea and vomiting)     Sleep apnea     Spinal headache     DURING PREGNANCY    Ulcerative colitis      Past Surgical History:  Past Surgical History:   Procedure Laterality Date    ABDOMINAL SURGERY      BLADDER REPAIR  2009    CHOLECYSTECTOMY      COLONOSCOPY  2014 x2 2020    ENDOSCOPY  2014 2020    ENDOSCOPY N/A 07/12/2021    Procedure: ESOPHAGOGASTRODUODENOSCOPY with biopsies;  Surgeon: Jesse Watkins MD;  Location: [facility] ENDOSCOPY;  Service: Gastroenterology;  Laterality: N/A;  esophageal varices    ENDOSCOPY N/A 03/25/2022    Procedure: ESOPHAGOGASTRODUODENOSCOPY WITH BIOPSIES;  Surgeon: Jesse Watkins MD;  Location: [facility] ENDOSCOPY;  Service: Gastroenterology;  Laterality: N/A;  ESOPHAGEAL VARICIES    ENDOSCOPY N/A 09/28/2023    Procedure: ESOPHAGOGASTRODUODENOSCOPY WITH COLD BIOPSIES;  Surgeon: Jesse Watkins MD;  Location:   [facility] ENDOSCOPY;  Service: Gastroenterology;  Laterality: N/A;  GASTRITIS, ESOPHAGEAL VARICES    ENDOSCOPY N/A 2023    Procedure: ESOPHAGOGASTRODUODENOSCOPY WITH BIOPSIES AND  VARICEAL BANDING X 4;  Surgeon: Jesse Watkins MD;  Location: [facility] ENDOSCOPY;  Service: Gastroenterology;  Laterality: N/A;  GASTRITIS, PORTAL HYPERTENSIVE GASTROPATHY, ESOPHAGEAL VARICIES    ENDOSCOPY N/A 2023    Procedure: ESOPHAGOGASTRODUODENOSCOPY WITH BIOPSIES AND ESOPHAGEAL BANDING;  Surgeon: Jesse Watkins MD;  Location: [facility] ENDOSCOPY;  Service: Gastroenterology;  Laterality: N/A;  ESOPHAGEAL VARICES, GASTRITIS    ENDOSCOPY N/A 2025    Procedure: ESOPHAGOGASTRODUODENOSCOPY;  Surgeon: Tuan Novoa MD;  Location: [facility] ENDOSCOPY;  Service: Gastroenterology;  Laterality: N/A;  PORTAL HYPERTENSIVE GASTROPATHY, ESOPHAGEAL SCARRING FROM PREVIOUS BANDING    HAND SURGERY      HERNIA REPAIR      HYSTERECTOMY  2009    ILEOSTOMY  2022    OTHER SURGICAL HISTORY      rectocele repair    UPPER GASTROINTESTINAL ENDOSCOPY        Social History:   Social History     Tobacco Use    Smoking status: Never     Passive exposure: Past    Smokeless tobacco: Never   Substance Use Topics    Alcohol use: Never      Family History:  Family History   Problem Relation Age of Onset    Dementia Mother     Liver cancer Father     Cirrhosis Sister         She  2023    Malig Hyperthermia Neg Hx     Colon cancer Neg Hx     Esophageal cancer Neg Hx      Home Meds:  Medications Prior to Admission   Medication Sig Dispense Refill Last Dose/Taking    albuterol sulfate  (90 Base) MCG/ACT inhaler Inhale 2 puffs Every 4 (Four) Hours As Needed.   Taking As Needed    albuterol sulfate  (90 Base) MCG/ACT inhaler Inhale 2 puffs Every 4 (Four) Hours As Needed for Wheezing or Shortness of Air. 18 g 0 Taking As Needed    folic acid (FOLVITE) 1 MG tablet Take 1 tablet by mouth Daily.   2025 at 10:00 AM     furosemide (LASIX) 20 MG tablet Take 1 tablet by mouth Daily. 30 tablet 11 Taking    lactulose (CHRONULAC) 10 GM/15ML solution Take 15-30 mL by mouth 2 (Two) Times a Day. Goal of 2-3 bowel movements per day. 900 mL 5 7/19/2025 at 10:00 AM    pantoprazole (PROTONIX) 40 MG EC tablet Take 1 tablet by mouth Daily.   7/19/2025 at  6:00 AM    spironolactone (ALDACTONE) 50 MG tablet Take 1 tablet by mouth Daily. 90 tablet 5 7/19/2025 at 10:00 AM    polyethylene glycol (GoLYTELY) 236 g solution Starting at noon on day prior to procedure, drink 8 ounces every 30 minutes until all gone or stools are clear. May add flavor packet. 4000 mL 0     riFAXIMin (Xifaxan) 550 MG tablet Take 1 tablet by mouth Every 12 (Twelve) Hours. 180 tablet 1      Current Meds:   folic acid, 1 mg, Oral, Daily  furosemide, 20 mg, Oral, Daily  ipratropium-albuterol, 3 mL, Nebulization, 4x Daily - RT  lactulose, 30 g, Oral, BID  pantoprazole, 40 mg, Intravenous, Q12H  sodium chloride, 10 mL, Intravenous, Q12H  spironolactone, 50 mg, Oral, Daily      Allergies:  No Known Allergies    Objective     Vital Signs  Temp:  [97.3 °F (36.3 °C)-98.1 °F (36.7 °C)] 97.7 °F (36.5 °C)  Heart Rate:  [84-97] 84  Resp:  [12-18] 16  BP: (106-120)/(48-83) 108/48  Physical Exam:  General Appearance:    Alert and oriented, normal affect   Head:    Normocephalic, without obvious abnormality, atraumatic   Eyes:          conjunctivae and sclerae normal, no icterus, pupils round and reactive to light   Oral cavity: Moist and intact, normal dentation   Neck:   Supple, no adenopathy   Chest Wall/Lungs:    No abnormalities observed, equal on expansion bilaterally, no use of extrarespiratory muscles noted   Abdomen:     Soft, nondistended, nontender; normal bowel sounds, no hepatospleenomegaly, no ascites   Extremities:   3+ edema of right upper extremity with difficulty in mobilization.  No clubbing, or cyanosis   Skin:   No bruising or rash   Psychiatric:  normal mood and  insight     Results Review:   I reviewed the patient's new clinical results.    Results from last 7 days   Lab Units 07/19/25 2012   WBC 10*3/mm3 8.25   HEMOGLOBIN g/dL 8.4*   MCV fL 97.7*   PLATELETS 10*3/mm3 73*         Lab 07/19/25 2012   NEUTROS ABS 6.31     Results from last 7 days   Lab Units 07/19/25 2012   BUN mg/dL 25.0*   CREATININE mg/dL 1.39*   SODIUM mmol/L 132*   POTASSIUM mmol/L 4.2   CHLORIDE mmol/L 102   CO2 mmol/L 19.2*   GLUCOSE mg/dL 177*      Results from last 7 days   Lab Units 07/20/25  0425   PROTIME Seconds 18.3*   INR  1.45*     Results from last 7 days   Lab Units 07/19/25 2012   AST (SGOT) U/L 44*   ALT (SGPT) U/L 23   ALK PHOS U/L 215*   BILIRUBIN mg/dL 6.6*   TOTAL PROTEIN g/dL 5.4*   ALBUMIN g/dL 2.7*                Radiology:  XR Shoulder 2+ View Right  Result Date: 7/20/2025  Impression: Comminuted displaced fracture of the right humeral head and neck. Electronically Signed: Cruz Jaimes MD  7/20/2025 8:06 AM EDT  Workstation ID: [id number]    CT humerus right without contrast  Result Date: 7/19/2025  1. Still noted a fracture proximal humerus with a comminuted fracture of the humeral head, the humeral head seen impacted with patchy sclerosis, stable 2. Soft tissue hematoma is seen at the upper arm. 3. The scanned part of the right upper quadrant shows perihepatic ascites. Dedicated study is advised.     XR Chest 1 View  Result Date: 7/18/2025  1. No acute cardiopulmonary disease.    US Renal Bilateral  Result Date: 7/15/2025  Normal ultrasound of the kidneys. [facility]    US Abdomen Limited  Result Date: 7/15/2025  No ascites. [facility]    XR Chest 1 View  Result Date: 7/15/2025  No active disease. [facility]    CT Abdomen Pelvis With Contrast  Result Date: 7/14/2025  1.  Suspect active gastrointestinal hemorrhage along the posterior wall of the body the stomach. Endoscopy may be useful. 2.  Cirrhosis with mild splenomegaly, mild amount of ascites, anasarca, and  gastroesophageal varices. 3.  Sigmoid diverticulosis without diverticula. 4.  Dr. Delgado was notified on 7/14/2025 at 1208. [id number]    XR Shoulder 2+ View Right  Result Date: 7/8/2025  No change in humeral neck fracture. [email or web address]    XR Shoulder 2+ View Right  Result Date: 7/2/2025  1. Four-fragment fracture of the proximal humerus with subluxation of the glenohumeral joint. 2. Suspected mild joint effusion and soft tissue swelling around the shoulder joint.  Disclaimer: A subtle bone abnormality or fracture may not be readily apparent on X-rays, thus clinical correlation and further imaging including follow-up CT, MRI, or follow-up X-rays are advised as needed.    XR Knee 3 View Right  Result Date: 7/2/2025  No evidence of acute fractures or dislocations.  Mild knee osteoarthritis.  Knee joint effusion.  Disclaimer: A subtle bone abnormality or fracture may not be readily apparent on X-rays, thus clinical correlation and further imaging including follow-up CT, MRI, or follow-up X-rays are advised as needed.      Impression:     Acute liver failure    Decompensated cirrhosis     Impression:    Decompensated cirrhosis of liver  Thrombocytopenia  Coagulopathy  FOBT positive  Anemia  Hepatic encephalopathy  History of recurrent ascites with paracentesis  Communited fracture of right humeral head    Plan and Recommendations:    Patient with history of MEDRANO cirrhosis with decompensation (ascites, hepatic encephalopathy and history of esophageal varices) admitted with FOBT positive and hemoglobin of around 8 g/dL.  Patient does not have any obvious GI symptoms of bleed.  She is FOBT positive and was scheduled for outpatient colonoscopy that after having right humerus head fracture she canceled.  Will start Thania liquid diet, keep her on Protonix 40 mg IV twice daily and monitor hemoglobin 2-3 times daily.  Yesterday, she has hemoglobin 9 g/dL and her CT scan of her right humerus showed soft tissue hematoma.   Will correct her coagulopathy with vitamin K 10 mg oral 3 times daily.      She needs venous Doppler study for further evaluation.  Repeat ultrasound abdomen versus CT abdomen and pelvis to evaluate for ascites.  Continue furosemide and spironolactone. She should be on less than 2 g sodium diet    For encephalopathy, patient should continue on lactulose 20 g oral 3 times daily and titrate with 3 BM daily.  Xifaxan 550 mg orally twice daily.  Ordered lactulose enema now. Should avoid all narcotics medication.  Would recommend tramadol for right upper extremity pain rather than Norco/morphine/Dilaudid.    Patient had recent EGD on 5/16/2025 and no varices were found.  She was FOBT positive and scheduled for outpatient colonoscopy with Dr. Watkins that she canceled after right humerus fracture and hospitalization at [facility].  Will discuss with Dr. Watkins in the a.m. for colonoscopy plan.    I discussed the patient's findings and my recommendations with patient, family, nursing staff, and primary care team.      Electronically signed by Parmjit Mccarthy MD, 07/20/25, 2:01 PM EDT.

## 2025-07-20 NOTE — H&P
Patient Care Team:  Zain Valentine MD as PCP - General (Family Medicine)  Sharri Mei APRN as Nurse Practitioner (Nurse Practitioner)  Sean Moulton MD as Consulting Physician (Pulmonary Disease)    Chief complaint transfer from [facility]    Subjective     Patient is a 59 y.o. female presents with extensive edema of the right upper extremity with ecchymosis.  Patient presented to the [facility] with decompensated liver cirrhosis and altered mentation that is apparently back to baseline.  She has a history of Reich cirrhosis.  Patient was found to have a broken right humerus that was reportedly not amenable to intervention.  She was intended to have a shoulder immobilizer brace however she could not tolerate it due to the edema.  Ultrasound was requested by the hospitalist at [facility] however they do not have that available on the weekend.  Patient also apparently had a positive FOBT.  Last hemoglobin was 9.  It actually went up from 7.8 from the past several days.  She does complain of some abdominal pain and did receive fentanyl en route with EMS.  She had an ultrasound of the abdomen that showed no ascites on 7/18/2025.    Review of Systems   Pertinent items are noted in HPI    History  Past Medical History:   Diagnosis Date    Anemia     Chronic kidney disease     Cirrhosis     liver    Cirrhosis, nonalcoholic     Colon polyps     COPD (chronic obstructive pulmonary disease)     Depression     Diabetes     Diverticulitis     Esophageal varices     Fatty liver     GERD (gastroesophageal reflux disease)     Hematuria     Hernia     High blood pressure     High cholesterol     Interstitial cystitis     Migraine headache     Narcolepsy     Pancreatitis     PONV (postoperative nausea and vomiting)     Sleep apnea     Spinal headache     DURING PREGNANCY    Ulcerative colitis      Past Surgical History:   Procedure Laterality Date    ABDOMINAL SURGERY      BLADDER REPAIR  2009    CHOLECYSTECTOMY       COLONOSCOPY  2014 x2 2020    ENDOSCOPY   2020    ENDOSCOPY N/A 2021    Procedure: ESOPHAGOGASTRODUODENOSCOPY with biopsies;  Surgeon: Jesse Watkins MD;  Location: [facility] ENDOSCOPY;  Service: Gastroenterology;  Laterality: N/A;  esophageal varices    ENDOSCOPY N/A 2022    Procedure: ESOPHAGOGASTRODUODENOSCOPY WITH BIOPSIES;  Surgeon: Jesse Watkins MD;  Location: [facility] ENDOSCOPY;  Service: Gastroenterology;  Laterality: N/A;  ESOPHAGEAL VARICIES    ENDOSCOPY N/A 2023    Procedure: ESOPHAGOGASTRODUODENOSCOPY WITH COLD BIOPSIES;  Surgeon: Jesse Watkins MD;  Location: [facility] ENDOSCOPY;  Service: Gastroenterology;  Laterality: N/A;  GASTRITIS, ESOPHAGEAL VARICES    ENDOSCOPY N/A 2023    Procedure: ESOPHAGOGASTRODUODENOSCOPY WITH BIOPSIES AND  VARICEAL BANDING X 4;  Surgeon: Jesse Watkins MD;  Location: [facility] ENDOSCOPY;  Service: Gastroenterology;  Laterality: N/A;  GASTRITIS, PORTAL HYPERTENSIVE GASTROPATHY, ESOPHAGEAL VARICIES    ENDOSCOPY N/A 2023    Procedure: ESOPHAGOGASTRODUODENOSCOPY WITH BIOPSIES AND ESOPHAGEAL BANDING;  Surgeon: Jesse Watkins MD;  Location: [facility] ENDOSCOPY;  Service: Gastroenterology;  Laterality: N/A;  ESOPHAGEAL VARICES, GASTRITIS    ENDOSCOPY N/A 2025    Procedure: ESOPHAGOGASTRODUODENOSCOPY;  Surgeon: Tuan Novoa MD;  Location: [facility] ENDOSCOPY;  Service: Gastroenterology;  Laterality: N/A;  PORTAL HYPERTENSIVE GASTROPATHY, ESOPHAGEAL SCARRING FROM PREVIOUS BANDING    HAND SURGERY      HERNIA REPAIR      HYSTERECTOMY  2009    ILEOSTOMY  2022    OTHER SURGICAL HISTORY      rectocele repair    UPPER GASTROINTESTINAL ENDOSCOPY       Family History   Problem Relation Age of Onset    Dementia Mother     Liver cancer Father     Cirrhosis Sister         She  2023    Malig Hyperthermia Neg Hx     Colon cancer Neg Hx     Esophageal cancer Neg Hx      Social History     Tobacco Use    Smoking  status: Never     Passive exposure: Past    Smokeless tobacco: Never   Vaping Use    Vaping status: Never Used   Substance Use Topics    Alcohol use: Never    Drug use: Never     Medications Prior to Admission   Medication Sig Dispense Refill Last Dose/Taking    albuterol sulfate  (90 Base) MCG/ACT inhaler Inhale 2 puffs Every 4 (Four) Hours As Needed.       albuterol sulfate  (90 Base) MCG/ACT inhaler Inhale 2 puffs Every 4 (Four) Hours As Needed for Wheezing or Shortness of Air. 18 g 0     folic acid (FOLVITE) 1 MG tablet Take 1 tablet by mouth Daily.       furosemide (LASIX) 20 MG tablet Take 1 tablet by mouth Daily. 30 tablet 11     lactulose (CHRONULAC) 10 GM/15ML solution Take 15-30 mL by mouth 2 (Two) Times a Day. Goal of 2-3 bowel movements per day. 900 mL 5     pantoprazole (PROTONIX) 40 MG EC tablet Take 1 tablet by mouth Daily.       polyethylene glycol (GoLYTELY) 236 g solution Starting at noon on day prior to procedure, drink 8 ounces every 30 minutes until all gone or stools are clear. May add flavor packet. 4000 mL 0     riFAXIMin (Xifaxan) 550 MG tablet Take 1 tablet by mouth Every 12 (Twelve) Hours. 180 tablet 1     spironolactone (ALDACTONE) 50 MG tablet Take 1 tablet by mouth Daily. 90 tablet 5      Allergies:  Patient has no known allergies.    Objective     Vital Signs       Physical Exam:      General Appearance:  Alert, cooperative, in no acute distress   Head:  Normocephalic, without obvious abnormality, atraumatic   Eyes:  Lids and lashes normal, conjunctivae and sclerae normal, no icterus, no pallor, corneas clear, PERRLA   Ears:  Ears appear intact with no abnormalities noted   Throat:  No oral lesions, no thrush, oral mucosa moist   Neck:  No adenopathy, supple, trachea midline, no thyromegaly, no carotid bruit, no JVD   Back:  No kyphosis present, no scoliosis present, no skin lesions, erythema or scars, no tenderness to percussion, or palpation, range of motion normal    Lungs:  Clear to auscultation,respirations regular, even and unlabored    Heart:  Regular rhythm and normal rate, normal S1 and S2, no murmur, no gallop, no rub, no click   Breast Exam:  Deferred   Abdomen:  Normal bowel sounds, no masses, no organomegaly, soft non-tender, non-distended, no guarding, no rebound tenderness   Genitalia:  Deferred   Extremities:  Moves all extremities well, no edema, no cyanosis, no redness   Pulses:  Pulses palpable and equal bilaterally   Skin:  No bleeding, bruising or rash   Lymph nodes:  No palpable adenopathy   Neurologic:  Cranial nerves 2 - 12 grossly intact, sensation intact, DTR present and equal bilaterally       Results Review:    I reviewed the patient's new clinical results.  I reviewed the patient's new imaging results and agree with the interpretation.  I reviewed the patient's other test results and agree with the interpretation  I personally viewed and interpreted the patient's EKG/Telemetry data    Assessment & Plan       Acute liver failure    Decompensated cirrhosis  Humerus fracture  ?  GI bleed  Right upper extremity swelling      Admit to hospitalist service  Telemetry  Lactulose  Protonix empirically  Occult blood  Stat labs: CBC CMP ammonia  Venous duplex  N.p.o. until GI bleed is ruled out  Full code        Chinedu Rogers MD  07/19/25  20:04 EDT

## 2025-07-20 NOTE — CONSULTS
[facility]   Consult Note    Patient Name: Tawny Lennon  : 1965  MRN: [id number]  Primary Care Physician:  Zain Valentine MD  Referring Physician: Chinedu Bailey DO  Date of admission: 2025    Inpatient Orthopedic Surgery Consult  Consult performed by: Addie Caballero MD  Consult ordered by: Chinedu Rogers MD        Subjective   Subjective     Reason for Consult/ Chief Complaint: Right shoulder fracture    History of Present Illness  Tawny Lennon is a 59 y.o. female was admitted for possible GI bleed/liver cirrhosis.  Of note she had a shoulder fracture approximately 2 weeks ago has been treated nonoperatively.  She has a coaptation splint at the bedside.  Patient was told she is not a surgical candidate secondary to her liver failure.  Her pain seems not too bad this morning.    Review of Systems     Personal History     Past Medical History:   Diagnosis Date    Anemia     Chronic kidney disease     Cirrhosis     liver    Cirrhosis, nonalcoholic     Colon polyps     COPD (chronic obstructive pulmonary disease)     Depression     Diabetes     Diverticulitis     Esophageal varices     Fatty liver     GERD (gastroesophageal reflux disease)     Hematuria     Hernia     High blood pressure     High cholesterol     Interstitial cystitis     Migraine headache     Narcolepsy     Pancreatitis     PONV (postoperative nausea and vomiting)     Sleep apnea     Spinal headache     DURING PREGNANCY    Ulcerative colitis        Past Surgical History:   Procedure Laterality Date    ABDOMINAL SURGERY      BLADDER REPAIR      CHOLECYSTECTOMY      COLONOSCOPY  2014 x2 2020    ENDOSCOPY  2014    ENDOSCOPY N/A 2021    Procedure: ESOPHAGOGASTRODUODENOSCOPY with biopsies;  Surgeon: Jesse Watkins MD;  Location: [facility] ENDOSCOPY;  Service: Gastroenterology;  Laterality: N/A;  esophageal varices    ENDOSCOPY N/A 2022    Procedure: ESOPHAGOGASTRODUODENOSCOPY WITH BIOPSIES;  Surgeon:  Jesse Watkins MD;  Location: [facility] ENDOSCOPY;  Service: Gastroenterology;  Laterality: N/A;  ESOPHAGEAL VARICIES    ENDOSCOPY N/A 09/28/2023    Procedure: ESOPHAGOGASTRODUODENOSCOPY WITH COLD BIOPSIES;  Surgeon: Jesse Watkins MD;  Location: [facility] ENDOSCOPY;  Service: Gastroenterology;  Laterality: N/A;  GASTRITIS, ESOPHAGEAL VARICES    ENDOSCOPY N/A 11/17/2023    Procedure: ESOPHAGOGASTRODUODENOSCOPY WITH BIOPSIES AND  VARICEAL BANDING X 4;  Surgeon: Jesse Watkins MD;  Location: [facility] ENDOSCOPY;  Service: Gastroenterology;  Laterality: N/A;  GASTRITIS, PORTAL HYPERTENSIVE GASTROPATHY, ESOPHAGEAL VARICIES    ENDOSCOPY N/A 12/21/2023    Procedure: ESOPHAGOGASTRODUODENOSCOPY WITH BIOPSIES AND ESOPHAGEAL BANDING;  Surgeon: Jesse Watkins MD;  Location: [facility] ENDOSCOPY;  Service: Gastroenterology;  Laterality: N/A;  ESOPHAGEAL VARICES, GASTRITIS    ENDOSCOPY N/A 5/16/2025    Procedure: ESOPHAGOGASTRODUODENOSCOPY;  Surgeon: Tuan Novoa MD;  Location: [facility] ENDOSCOPY;  Service: Gastroenterology;  Laterality: N/A;  PORTAL HYPERTENSIVE GASTROPATHY, ESOPHAGEAL SCARRING FROM PREVIOUS BANDING    HAND SURGERY      HERNIA REPAIR      HYSTERECTOMY  2009    ILEOSTOMY  07/2022    OTHER SURGICAL HISTORY      rectocele repair    UPPER GASTROINTESTINAL ENDOSCOPY         Family History: Her family history includes Cirrhosis in her sister; Dementia in her mother; Liver cancer in her father.     Social History: She  reports that she has never smoked. She has been exposed to tobacco smoke. She has never used smokeless tobacco. She reports that she does not drink alcohol and does not use drugs.    Home Medications:  albuterol sulfate HFA, folic acid, furosemide, lactulose, pantoprazole, polyethylene glycol, riFAXIMin, and spironolactone    Allergies:  She has no known allergies.    Objective    Objective     Vitals:    Temp:  [97.3 °F (36.3 °C)-98.1 °F (36.7 °C)] 98.1 °F (36.7 °C)  Heart  Rate:  [87-97] 87  Resp:  [12-18] 12  BP: (106-120)/(53-83) 111/53  Flow (L/min) (Oxygen Therapy):  [2] 2    Physical Exam  Pain in right shoulder, appropriate amount of swelling and bruising.  No signs or symptoms of compartment syndrome.  No obvious deformity.  Denies any elbow or wrist pain.  Result Review    Result Review:  I have personally reviewed the results from the time of this admission to 7/20/2025 08:50 EDT and agree with these findings:  []  Laboratory list / accordion  []  Microbiology  [x]  Radiology  []  EKG/Telemetry   []  Cardiology/Vascular   []  Pathology  []  Old records  []  Other:  Most notable findings include: Right proximal humerus fracture      Assessment & Plan   Assessment / Plan     Brief Patient Summary:  Tawny Lennon is a 59 y.o. female right proximal humerus fracture patient with cirrhosis    Active Hospital Problems:  Active Hospital Problems    Diagnosis     **Acute liver failure     Decompensated cirrhosis        Plan:   Patient been treated nonoperatively for the past 2 weeks.  Continue nonoperative care.  Sling.  Ice to shoulder.  Follow-up with her orthopedic doctor in approximately 7 to 10 days    Addie Caballero MD

## 2025-07-20 NOTE — SIGNIFICANT NOTE
07/20/25 0721   OTHER   Discipline speech language pathologist   Rehab Time/Intention   Session Not Performed other (see comments)  (npo until gi bleed rule out)   Recommendations   SLP - Next Appointment 07/21/25

## 2025-07-21 ENCOUNTER — APPOINTMENT (OUTPATIENT)
Facility: HOSPITAL | Age: 60
End: 2025-07-21
Payer: MEDICARE

## 2025-07-21 ENCOUNTER — TELEPHONE (OUTPATIENT)
Dept: GASTROENTEROLOGY | Facility: CLINIC | Age: 60
End: 2025-07-21
Payer: MEDICARE

## 2025-07-21 LAB
ALBUMIN SERPL-MCNC: 2.4 G/DL (ref 3.5–5.2)
ALBUMIN/GLOB SERPL: 0.9 G/DL
ALP SERPL-CCNC: 194 U/L (ref 39–117)
ALT SERPL W P-5'-P-CCNC: 23 U/L (ref 1–33)
ANION GAP SERPL CALCULATED.3IONS-SCNC: 8.9 MMOL/L (ref 5–15)
ANISOCYTOSIS BLD QL: NORMAL
AST SERPL-CCNC: 45 U/L (ref 1–32)
BASOPHILS # BLD AUTO: 0.07 10*3/MM3 (ref 0–0.2)
BASOPHILS NFR BLD AUTO: 0.9 % (ref 0–1.5)
BH CV UPPER VENOUS LEFT AXILLARY AUGMENT: NORMAL
BH CV UPPER VENOUS LEFT AXILLARY PHASIC: NORMAL
BH CV UPPER VENOUS LEFT AXILLARY SPONT: NORMAL
BH CV UPPER VENOUS LEFT BASILIC FOREARM COMPRESS: NORMAL
BH CV UPPER VENOUS LEFT BASILIC UPPER COMPRESS: NORMAL
BH CV UPPER VENOUS LEFT BRACHIAL COMPRESS: NORMAL
BH CV UPPER VENOUS LEFT CEPHALIC FOREARM COMPRESS: NORMAL
BH CV UPPER VENOUS LEFT CEPHALIC UPPER COMPRESS: NORMAL
BH CV UPPER VENOUS LEFT INTERNAL JUGULAR COMPRESS: NORMAL
BH CV UPPER VENOUS LEFT INTERNAL JUGULAR PHASIC: NORMAL
BH CV UPPER VENOUS LEFT INTERNAL JUGULAR SPONT: NORMAL
BH CV UPPER VENOUS LEFT RADIAL COMPRESS: NORMAL
BH CV UPPER VENOUS LEFT SUBCLAVIAN COMPRESS: NORMAL
BH CV UPPER VENOUS LEFT SUBCLAVIAN PHASIC: NORMAL
BH CV UPPER VENOUS LEFT SUBCLAVIAN SPONT: NORMAL
BH CV UPPER VENOUS LEFT ULNAR COMPRESS: NORMAL
BH CV UPPER VENOUS RIGHT AXILLARY COMPRESS: NORMAL
BH CV UPPER VENOUS RIGHT AXILLARY PHASIC: NORMAL
BH CV UPPER VENOUS RIGHT AXILLARY SPONT: NORMAL
BH CV UPPER VENOUS RIGHT BASILIC UPPER COMPRESS: NORMAL
BH CV UPPER VENOUS RIGHT BRACHIAL COMPRESS: NORMAL
BH CV UPPER VENOUS RIGHT CEPHALIC FOREARM COMPRESS: NORMAL
BH CV UPPER VENOUS RIGHT CEPHALIC UPPER COMPRESS: NORMAL
BH CV UPPER VENOUS RIGHT INTERNAL JUGULAR COMPRESS: NORMAL
BH CV UPPER VENOUS RIGHT INTERNAL JUGULAR PHASIC: NORMAL
BH CV UPPER VENOUS RIGHT INTERNAL JUGULAR SPONT: NORMAL
BH CV UPPER VENOUS RIGHT SUBCLAVIAN COMPRESS: NORMAL
BH CV UPPER VENOUS RIGHT SUBCLAVIAN PHASIC: NORMAL
BH CV UPPER VENOUS RIGHT SUBCLAVIAN SPONT: NORMAL
BILIRUB SERPL-MCNC: 6.8 MG/DL (ref 0–1.2)
BUN SERPL-MCNC: 29 MG/DL (ref 8–23)
BUN/CREAT SERPL: 24.4 (ref 7–25)
BURR CELLS BLD QL SMEAR: NORMAL
CALCIUM SPEC-SCNC: 8.5 MG/DL (ref 8.6–10.5)
CHLORIDE SERPL-SCNC: 103 MMOL/L (ref 98–107)
CO2 SERPL-SCNC: 18.1 MMOL/L (ref 22–29)
CREAT SERPL-MCNC: 1.19 MG/DL (ref 0.57–1)
DEPRECATED RDW RBC AUTO: 65.1 FL (ref 37–54)
EGFRCR SERPLBLD CKD-EPI 2021: 52.1 ML/MIN/1.73
EOSINOPHIL # BLD AUTO: 0.16 10*3/MM3 (ref 0–0.4)
EOSINOPHIL NFR BLD AUTO: 2.1 % (ref 0.3–6.2)
ERYTHROCYTE [DISTWIDTH] IN BLOOD BY AUTOMATED COUNT: 16.6 % (ref 12.3–15.4)
GLOBULIN UR ELPH-MCNC: 2.7 GM/DL
GLUCOSE SERPL-MCNC: 146 MG/DL (ref 65–99)
HBA1C MFR BLD: 4.8 % (ref 4.8–5.6)
HCT VFR BLD AUTO: 26.2 % (ref 34–46.6)
HGB BLD-MCNC: 8 G/DL (ref 12–15.9)
IMM GRANULOCYTES # BLD AUTO: 0.17 10*3/MM3 (ref 0–0.05)
IMM GRANULOCYTES NFR BLD AUTO: 2.3 % (ref 0–0.5)
LYMPHOCYTES # BLD AUTO: 0.77 10*3/MM3 (ref 0.7–3.1)
LYMPHOCYTES NFR BLD AUTO: 10.3 % (ref 19.6–45.3)
MAGNESIUM SERPL-MCNC: 2.2 MG/DL (ref 1.6–2.4)
MCH RBC QN AUTO: 32.4 PG (ref 26.6–33)
MCHC RBC AUTO-ENTMCNC: 30.5 G/DL (ref 31.5–35.7)
MCV RBC AUTO: 106.1 FL (ref 79–97)
MONOCYTES # BLD AUTO: 0.47 10*3/MM3 (ref 0.1–0.9)
MONOCYTES NFR BLD AUTO: 6.3 % (ref 5–12)
NEUTROPHILS NFR BLD AUTO: 5.82 10*3/MM3 (ref 1.7–7)
NEUTROPHILS NFR BLD AUTO: 78.1 % (ref 42.7–76)
NRBC BLD AUTO-RTO: 0 /100 WBC (ref 0–0.2)
PHOSPHATE SERPL-MCNC: 3.1 MG/DL (ref 2.5–4.5)
PLATELET # BLD AUTO: 63 10*3/MM3 (ref 140–450)
PMV BLD AUTO: 12.8 FL (ref 6–12)
POTASSIUM SERPL-SCNC: 4.5 MMOL/L (ref 3.5–5.2)
PROT SERPL-MCNC: 5.1 G/DL (ref 6–8.5)
RBC # BLD AUTO: 2.47 10*6/MM3 (ref 3.77–5.28)
SMALL PLATELETS BLD QL SMEAR: NORMAL
SODIUM SERPL-SCNC: 130 MMOL/L (ref 136–145)
WBC MORPH BLD: NORMAL
WBC NRBC COR # BLD AUTO: 7.46 10*3/MM3 (ref 3.4–10.8)

## 2025-07-21 PROCEDURE — 96376 TX/PRO/DX INJ SAME DRUG ADON: CPT

## 2025-07-21 PROCEDURE — A9270 NON-COVERED ITEM OR SERVICE: HCPCS

## 2025-07-21 PROCEDURE — 83735 ASSAY OF MAGNESIUM: CPT

## 2025-07-21 PROCEDURE — 63710000001 HYDROCODONE-ACETAMINOPHEN 5-325 MG TABLET: Performed by: FAMILY MEDICINE

## 2025-07-21 PROCEDURE — 84100 ASSAY OF PHOSPHORUS: CPT

## 2025-07-21 PROCEDURE — 93970 EXTREMITY STUDY: CPT | Performed by: SURGERY

## 2025-07-21 PROCEDURE — 63710000001: Performed by: INTERNAL MEDICINE

## 2025-07-21 PROCEDURE — 99232 SBSQ HOSP IP/OBS MODERATE 35: CPT

## 2025-07-21 PROCEDURE — 94799 UNLISTED PULMONARY SVC/PX: CPT

## 2025-07-21 PROCEDURE — 97165 OT EVAL LOW COMPLEX 30 MIN: CPT

## 2025-07-21 PROCEDURE — A9270 NON-COVERED ITEM OR SERVICE: HCPCS | Performed by: FAMILY MEDICINE

## 2025-07-21 PROCEDURE — 85025 COMPLETE CBC W/AUTO DIFF WBC: CPT

## 2025-07-21 PROCEDURE — 99232 SBSQ HOSP IP/OBS MODERATE 35: CPT | Performed by: INTERNAL MEDICINE

## 2025-07-21 PROCEDURE — 63710000001 FOLIC ACID 1 MG TABLET

## 2025-07-21 PROCEDURE — 93970 EXTREMITY STUDY: CPT

## 2025-07-21 PROCEDURE — 85007 BL SMEAR W/DIFF WBC COUNT: CPT

## 2025-07-21 PROCEDURE — 63710000001 INSULIN LISPRO (HUMAN) PER 5 UNITS

## 2025-07-21 PROCEDURE — G0378 HOSPITAL OBSERVATION PER HR: HCPCS

## 2025-07-21 PROCEDURE — 63710000001 FUROSEMIDE 20 MG TABLET

## 2025-07-21 PROCEDURE — 63710000001 TRAMADOL 50 MG TABLET: Performed by: INTERNAL MEDICINE

## 2025-07-21 PROCEDURE — 63710000001 RIFAXIMIN 550 MG TABLET

## 2025-07-21 PROCEDURE — A9270 NON-COVERED ITEM OR SERVICE: HCPCS | Performed by: INTERNAL MEDICINE

## 2025-07-21 PROCEDURE — 80053 COMPREHEN METABOLIC PANEL: CPT

## 2025-07-21 PROCEDURE — 63710000001 LACTULOSE 20 GM/30ML SOLUTION: Performed by: INTERNAL MEDICINE

## 2025-07-21 PROCEDURE — 63710000001 HYDROCORTISONE 25 MG SUPPOSITORY: Performed by: INTERNAL MEDICINE

## 2025-07-21 PROCEDURE — 63710000001 SPIRONOLACTONE 25 MG TABLET

## 2025-07-21 RX ORDER — HYDROCODONE BITARTRATE AND ACETAMINOPHEN 5; 325 MG/1; MG/1
1 TABLET ORAL ONCE
Refills: 0 | Status: COMPLETED | OUTPATIENT
Start: 2025-07-21 | End: 2025-07-21

## 2025-07-21 RX ADMIN — IPRATROPIUM BROMIDE AND ALBUTEROL SULFATE 3 ML: .5; 3 SOLUTION RESPIRATORY (INHALATION) at 18:53

## 2025-07-21 RX ADMIN — Medication 10 ML: at 08:46

## 2025-07-21 RX ADMIN — TRAMADOL HYDROCHLORIDE 50 MG: 50 TABLET, COATED ORAL at 19:47

## 2025-07-21 RX ADMIN — HYDROCORTISONE ACETATE 25 MG: 25 SUPPOSITORY RECTAL at 08:46

## 2025-07-21 RX ADMIN — HYDROCORTISONE ACETATE 25 MG: 25 SUPPOSITORY RECTAL at 21:00

## 2025-07-21 RX ADMIN — FUROSEMIDE 20 MG: 20 TABLET ORAL at 08:45

## 2025-07-21 RX ADMIN — INSULIN LISPRO 3 UNITS: 100 INJECTION, SOLUTION INTRAVENOUS; SUBCUTANEOUS at 21:00

## 2025-07-21 RX ADMIN — RIFAXIMIN 550 MG: 550 TABLET ORAL at 08:45

## 2025-07-21 RX ADMIN — RIFAXIMIN 550 MG: 550 TABLET ORAL at 21:00

## 2025-07-21 RX ADMIN — TRAMADOL HYDROCHLORIDE 50 MG: 50 TABLET, COATED ORAL at 04:05

## 2025-07-21 RX ADMIN — Medication 10 ML: at 21:00

## 2025-07-21 RX ADMIN — IPRATROPIUM BROMIDE AND ALBUTEROL SULFATE 3 ML: .5; 3 SOLUTION RESPIRATORY (INHALATION) at 13:49

## 2025-07-21 RX ADMIN — IPRATROPIUM BROMIDE AND ALBUTEROL SULFATE 3 ML: .5; 3 SOLUTION RESPIRATORY (INHALATION) at 06:50

## 2025-07-21 RX ADMIN — LACTULOSE SOLUTION USP, 10 G/15 ML 20 G: 10 SOLUTION ORAL; RECTAL at 20:59

## 2025-07-21 RX ADMIN — HYDROCODONE BITARTRATE AND ACETAMINOPHEN 1 TABLET: 5; 325 TABLET ORAL at 22:27

## 2025-07-21 RX ADMIN — TRAMADOL HYDROCHLORIDE 50 MG: 50 TABLET, COATED ORAL at 10:57

## 2025-07-21 RX ADMIN — LACTULOSE SOLUTION USP, 10 G/15 ML 20 G: 10 SOLUTION ORAL; RECTAL at 18:16

## 2025-07-21 RX ADMIN — INSULIN LISPRO 2 UNITS: 100 INJECTION, SOLUTION INTRAVENOUS; SUBCUTANEOUS at 12:36

## 2025-07-21 RX ADMIN — LACTULOSE SOLUTION USP, 10 G/15 ML 20 G: 10 SOLUTION ORAL; RECTAL at 08:45

## 2025-07-21 RX ADMIN — FOLIC ACID 1 MG: 1 TABLET ORAL at 08:45

## 2025-07-21 RX ADMIN — PANTOPRAZOLE SODIUM 40 MG: 40 INJECTION, POWDER, FOR SOLUTION INTRAVENOUS at 08:45

## 2025-07-21 RX ADMIN — PANTOPRAZOLE SODIUM 40 MG: 40 INJECTION, POWDER, FOR SOLUTION INTRAVENOUS at 21:00

## 2025-07-21 RX ADMIN — PHYTONADIONE 10 MG: 5 TABLET ORAL at 08:44

## 2025-07-21 RX ADMIN — SPIRONOLACTONE 50 MG: 25 TABLET ORAL at 10:57

## 2025-07-21 RX ADMIN — INSULIN LISPRO 3 UNITS: 100 INJECTION, SOLUTION INTRAVENOUS; SUBCUTANEOUS at 18:16

## 2025-07-21 NOTE — NURSING NOTE
Pt arrived to the floor via w/c.  Assisted to bed x 1 assist. Pt noted to have swollen right arm and it was dangling. Pt reported pain with touch or movement.  Assisted with positioning in bed. Propped up on a pillow. Pt continues to use 1-2 L nc and resp are unlabored. Resting at this time. No needs expressed.  Call bell explained and within reach of patient.

## 2025-07-21 NOTE — SIGNIFICANT NOTE
07/21/25 1500   Physical Therapy Time and Intention   Session Not Performed patient/family declined evaluation  (declined d/t RUE pain)

## 2025-07-21 NOTE — TELEPHONE ENCOUNTER
Pt's sister Lucia Chaudhari left  stating she is POA for Ms Lennon. Stated pt is IP now at [facility] and has questions regarding her care..    Upon review only a Health Care Surrogate paperwork list Ms Chaudhari.    I spoke with Ms Chaudhari (ph [phone]).  stated she has POA paper work with her, as she is on her way to [facility].  I advised she give them a copy of POA.  Was also advised to speak with Ms Lennon's nurse and hospitalist regarding her care.  Ms Chaudhari voiced understanding.  Juancarlos

## 2025-07-21 NOTE — PROGRESS NOTES
[facility]  Inpatient Progress Note    Reason for Follow Up: Decompensated cirrhosis of liver and FOBT positive    Interval History:     Patient is feeling a lot better as compared to yesterday.  She said that other than right shoulder fracture no GI problem.    She has 2 large BM today.  She is awake, alert, oriented x 4.  No confusion or forget fullness.  Speech better than yesterday  No melena, hematochezia or bright red blood per rectum.  No nausea or vomiting.  Hemoglobin is stable at 8 g/dL.  Unchanged LFTs.  PT/INR not checked  Venous duplex ultrasound of right upper extremity without thrombosis or phlebitis.  Right radial, ulnar and basilic vein not seen    Current Facility-Administered Medications:     sennosides-docusate (PERICOLACE) 8.6-50 MG per tablet 2 tablet, 2 tablet, Oral, BID PRN **AND** [DISCONTINUED] polyethylene glycol (MIRALAX) packet 17 g, 17 g, Oral, Daily PRN **AND** bisacodyl (DULCOLAX) EC tablet 5 mg, 5 mg, Oral, Daily PRN **AND** bisacodyl (DULCOLAX) suppository 10 mg, 10 mg, Rectal, Daily PRN, Chinedu Rogers MD    dextrose (D50W) (25 g/50 mL) IV injection 25 g, 25 g, Intravenous, Q15 Min PRN, Tono Beltran DO    dextrose (GLUTOSE) oral gel 15 g, 15 g, Oral, Q15 Min PRN, Tono Beltran,     folic acid (FOLVITE) tablet 1 mg, 1 mg, Oral, Daily, Tono Beltran DO, 1 mg at 07/21/25 0845    furosemide (LASIX) tablet 20 mg, 20 mg, Oral, Daily, Tono Beltran, , 20 mg at 07/21/25 0845    glucagon (GLUCAGEN) injection 1 mg, 1 mg, Intramuscular, Q15 Min PRN, Tono Beltran DO    hydrocortisone (ANUSOL-HC) suppository 25 mg, 25 mg, Rectal, BID, Parmjit Mccarthy MD, 25 mg at 07/21/25 0846    Insulin Lispro (humaLOG) injection 2-7 Units, 2-7 Units, Subcutaneous, 4x Daily AC & at Bedtime, Tono Beltran, , 2 Units at 07/21/25 1236    ipratropium-albuterol (DUO-NEB) nebulizer solution 3 mL, 3 mL, Nebulization, 4x Daily - RT, Tono Beltran, , 3 mL  at 07/21/25 1349    lactulose (CHRONULAC) 10 GM/15ML solution 20 g, 20 g, Oral, TID, Parmjit Mccarthy MD, 20 g at 07/21/25 0845    nitroglycerin (NITROSTAT) SL tablet 0.4 mg, 0.4 mg, Sublingual, Q5 Min PRN, Chinedu Rogers MD    ondansetron ODT (ZOFRAN-ODT) disintegrating tablet 4 mg, 4 mg, Oral, Q6H PRN **OR** ondansetron (ZOFRAN) injection 4 mg, 4 mg, Intravenous, Q6H PRN, Chinedu Rogers MD    pantoprazole (PROTONIX) injection 40 mg, 40 mg, Intravenous, Q12H, Chinedu Rogers MD, 40 mg at 07/21/25 0845    phytonadione (MEPHYTON, VITAMIN K) tablet 10 mg, 10 mg, Oral, Daily, Parmjit Mccarthy MD, 10 mg at 07/21/25 0844    riFAXIMin (XIFAXAN) tablet 550 mg, 550 mg, Oral, Q12H, Tono Beltran, DO, 550 mg at 07/21/25 0845    sodium chloride 0.9 % flush 10 mL, 10 mL, Intravenous, Q12H, Chinedu Rogers MD, 10 mL at 07/21/25 0846    sodium chloride 0.9 % flush 10 mL, 10 mL, Intravenous, PRN, Chinedu Rogers MD    sodium chloride 0.9 % infusion 40 mL, 40 mL, Intravenous, PRN, Chinedu Rogers MD    spironolactone (ALDACTONE) tablet 50 mg, 50 mg, Oral, Daily, Tono Beltran, DO, 50 mg at 07/21/25 1057    traMADol (ULTRAM) tablet 50 mg, 50 mg, Oral, Q6H PRN, Parmjit Mccarthy MD, 50 mg at 07/21/25 1057    Objective     Vital Signs  Temp:  [97.3 °F (36.3 °C)-97.9 °F (36.6 °C)] 97.5 °F (36.4 °C)  Heart Rate:  [] 111  Resp:  [16-20] 20  BP: ()/(43-64) 138/64  Body mass index is 36.33 kg/m².  No intake or output data in the 24 hours ending 07/21/25 1747  No intake/output data recorded.     Physical Exam:  General     Alert and oriented, normal affect   Head:    Normocephalic, without obvious abnormality, atraumatic   Eyes:          conjunctivae and sclerae normal, no icterus, pupils round and reactive to light   Oral cavity: Moist and intact, normal dentation   Neck:   Supple, no adenopathy   Chest Wall/Lungs:    No abnormalities observed, equal on expansion bilaterally, No use of extrarespiratory muscles  "noted   Abdomen:     Soft, nondistended, nontender; normal bowel sounds, no hepatospleenomegaly, no ascites   Extremities: Right upper extremity in the sling.  3+ edema. No clubbing, or cyanosis   Skin:   No bruising or rash   Psychiatric:  Normal mood and insight       Results Review:      Results from last 7 days   Lab Units 07/21/25 0315 07/20/25 1426 07/19/25 2012   WBC 10*3/mm3 7.46 6.89 8.25   HEMOGLOBIN g/dL 8.0* 8.0* 8.4*   HEMATOCRIT % 26.2* 24.0* 25.8*   PLATELETS 10*3/mm3 63* 65* 73*     Results from last 7 days   Lab Units 07/21/25 0315 07/20/25 1426 07/19/25 2012   SODIUM mmol/L 130* 133* 132*   POTASSIUM mmol/L 4.5 4.4 4.2   CHLORIDE mmol/L 103 103 102   CO2 mmol/L 18.1* 20.4* 19.2*   BUN mg/dL 29.0* 25.4* 25.0*   CREATININE mg/dL 1.19* 1.18* 1.39*   CALCIUM mg/dL 8.5* 8.8 8.6   BILIRUBIN mg/dL 6.8* 6.8* 6.6*   ALK PHOS U/L 194* 200* 215*   ALT (SGPT) U/L 23 22 23   AST (SGOT) U/L 45* 40* 44*   GLUCOSE mg/dL 146* 145* 177*     Invalid input(s): \"3\"   Lab Results   Lab Value Date/Time    LIPASE 54 05/13/2025 1633    LIPASE 56 10/01/2024 1143    LIPASE 185 (H) 09/18/2024 0512       Radiology:  XR Shoulder 2+ View Right  Result Date: 7/20/2025  Impression: Comminuted displaced fracture of the right humeral head and neck. Electronically Signed: Cruz Jaimes MD  7/20/2025 8:06 AM EDT  Workstation ID: [id number]       Impression:      Decompensated cirrhosis of liver  Thrombocytopenia  Coagulopathy  Hepatic encephalopathy-resolved  History of recurrent ascites with paracentesis-on diuretics  Communited fracture of right humeral head    Plan and Recommendations:    Patient with history of Reich cirrhosis with decompensation (ascites, hepatic encephalopathy and history of esophageal varices.  Patient was FOBT positive.  Hemoglobin is stable around 8 g/dL.  Discussed with Dr. Watkins and he will do outpatient colonoscopy.    Patient has soft tissue hematoma with INR of 1.58.  She is currently on vitamin K  " x 3 doses to correct coagulopathy.    Continue lactulose and titrate with 3 BM per day in addition to Xifaxan 550 mg orally twice daily    Would recommend to do US of abdomen to evaluate for ascites    I discussed the patient's findings and my recommendations with patient and nursing staff.      Electronically signed by Parmjit Mccarthy MD, 07/21/25, 5:47 PM EDT.

## 2025-07-21 NOTE — THERAPY EVALUATION
Patient Name: Tawny Lennon  : 1965    MRN: [id number]                              Today's Date: 2025       Admit Date: 2025    Visit Dx:     ICD-10-CM ICD-9-CM   1. Decreased activities of daily living (ADL)  Z78.9 V49.89     Patient Active Problem List   Diagnosis    Benign essential HTN    Colon polyps    Depression    Diabetes mellitus without complication    Diverticulitis    Hematuria    High blood pressure    High cholesterol    Interstitial cystitis    Migraine without aura    Narcolepsy    Obesity    Other specified chronic obstructive pulmonary disease    Panic attacks    Paroxysmal SVT (supraventricular tachycardia)    Sleep apnea    Cirrhosis of liver without ascites    Esophageal varices without bleeding    Preop cardiovascular exam    MEDRANO (nonalcoholic steatohepatitis)    Gastroesophageal reflux disease without esophagitis    Hyperkalemia    Severe malnutrition    Pancreatitis    SBO (small bowel obstruction)    Viral URI with cough    Acquired coagulation factor deficiency    Iron deficiency anemia    History of closure of ileostomy    History of diverticulitis    Fecal occult blood test positive    History of colon polyps    Acute liver failure    Decompensated cirrhosis     Past Medical History:   Diagnosis Date    Anemia     Chronic kidney disease     Cirrhosis     liver    Cirrhosis, nonalcoholic     Colon polyps     COPD (chronic obstructive pulmonary disease)     Depression     Diabetes     Diverticulitis     Esophageal varices     Fatty liver     GERD (gastroesophageal reflux disease)     Hematuria     Hernia     High blood pressure     High cholesterol     Interstitial cystitis     Migraine headache     Narcolepsy     Pancreatitis     PONV (postoperative nausea and vomiting)     Sleep apnea     Spinal headache     DURING PREGNANCY    Ulcerative colitis      Past Surgical History:   Procedure Laterality Date    ABDOMINAL SURGERY      BLADDER REPAIR      CHOLECYSTECTOMY       COLONOSCOPY  2014 x2 2020    ENDOSCOPY  2014 2020    ENDOSCOPY N/A 07/12/2021    Procedure: ESOPHAGOGASTRODUODENOSCOPY with biopsies;  Surgeon: Jesse Watkins MD;  Location: [facility] ENDOSCOPY;  Service: Gastroenterology;  Laterality: N/A;  esophageal varices    ENDOSCOPY N/A 03/25/2022    Procedure: ESOPHAGOGASTRODUODENOSCOPY WITH BIOPSIES;  Surgeon: Jesse Watkins MD;  Location: [facility] ENDOSCOPY;  Service: Gastroenterology;  Laterality: N/A;  ESOPHAGEAL VARICIES    ENDOSCOPY N/A 09/28/2023    Procedure: ESOPHAGOGASTRODUODENOSCOPY WITH COLD BIOPSIES;  Surgeon: Jesse Watkins MD;  Location: [facility] ENDOSCOPY;  Service: Gastroenterology;  Laterality: N/A;  GASTRITIS, ESOPHAGEAL VARICES    ENDOSCOPY N/A 11/17/2023    Procedure: ESOPHAGOGASTRODUODENOSCOPY WITH BIOPSIES AND  VARICEAL BANDING X 4;  Surgeon: Jesse Watkins MD;  Location: [facility] ENDOSCOPY;  Service: Gastroenterology;  Laterality: N/A;  GASTRITIS, PORTAL HYPERTENSIVE GASTROPATHY, ESOPHAGEAL VARICIES    ENDOSCOPY N/A 12/21/2023    Procedure: ESOPHAGOGASTRODUODENOSCOPY WITH BIOPSIES AND ESOPHAGEAL BANDING;  Surgeon: Jesse Watkins MD;  Location: [facility] ENDOSCOPY;  Service: Gastroenterology;  Laterality: N/A;  ESOPHAGEAL VARICES, GASTRITIS    ENDOSCOPY N/A 5/16/2025    Procedure: ESOPHAGOGASTRODUODENOSCOPY;  Surgeon: Tuan Novoa MD;  Location: [facility] ENDOSCOPY;  Service: Gastroenterology;  Laterality: N/A;  PORTAL HYPERTENSIVE GASTROPATHY, ESOPHAGEAL SCARRING FROM PREVIOUS BANDING    HAND SURGERY      HERNIA REPAIR      HYSTERECTOMY  2009    ILEOSTOMY  07/2022    OTHER SURGICAL HISTORY      rectocele repair    UPPER GASTROINTESTINAL ENDOSCOPY        General Information       Row Name 07/21/25 1317          OT Time and Intention    Document Type evaluation  -LF     Mode of Treatment individual therapy;occupational therapy  -LF       Row Name 07/21/25 1316          General Information    Patient Profile Reviewed  yes  -     Prior Level of Function --  (I) with ADLs, ambulated w/o a device, has a walk-in shower where she stands to shower but has been sponge bathing after recent fx, has an elevated commode, stands to groom, drives, and wears 2L home O2.  -     Existing Precautions/Restrictions fall;non-weight bearing  RUE  -     Barriers to Rehab none identified  -       Row Name 07/21/25 1317          Occupational Profile    Reason for Services/Referral (Occupational Profile) Patient is a 59 year old female admitted to [facility] for acute liver failure on July 19th, 2025. Recent right proximal humerus fx with ortho consulted, recommending non-op treatment and sling. Occupational therapy consulted due to recent decline in ADLs/functional transfers. No previous occupational therapy services for current condition.  -       Row Name 07/21/25 1317          Living Environment    Current Living Arrangements home  -     People in Home sibling(s)  -       Row Name 07/21/25 1317          Home Main Entrance    Number of Stairs, Main Entrance none  walk-out basement  -       Row Name 07/21/25 1317          Stairs Within Home, Primary    Stairs, Within Home, Primary Pt resides in the basement with walk-out entrance and her sister resides upstairs.  -       Row Name 07/21/25 1317          Cognition    Orientation Status (Cognition) oriented x 4  -       Row Name 07/21/25 1317          Safety Issues/Impairments Affecting Functional Mobility    Impairments Affecting Function (Mobility) balance;endurance/activity tolerance;pain;strength;range of motion (ROM)  -               User Key  (r) = Recorded By, (t) = Taken By, (c) = Cosigned By      Initials Name Provider Type     Debbie Bruno OT Occupational Therapist                     Mobility/ADL's       Row Name 07/21/25 1320          Bed Mobility    Bed Mobility supine-sit  -     Supine-Sit [place] (Bed Mobility) standby assist  -     Bed  Mobility, Safety Issues decreased use of arms for pushing/pulling  -     Assistive Device (Bed Mobility) head of bed elevated;bed rails  -       Row Name 07/21/25 1320          Transfers    Transfers sit-stand transfer;stand-sit transfer;toilet transfer  -       Row Name 07/21/25 1320          Sit-Stand Transfer    Sit-Stand [place] (Transfers) contact guard  -LF     Assistive Device (Sit-Stand Transfers) other (see comments)  handheld  -LF       Row Name 07/21/25 1320          Stand-Sit Transfer    Stand-Sit [place] (Transfers) contact guard  -LF     Assistive Device (Stand-Sit Transfers) other (see comments)  handheld  -LF       Row Name 07/21/25 1320          Toilet Transfer    Type (Toilet Transfer) sit-stand;stand-sit  -LF     [place] Level (Toilet Transfer) contact guard  -LF     Assistive Device (Toilet Transfer) other (see comments)  handheld  -LF       Row Name 07/21/25 1320          Functional Mobility    Functional Mobility- Ind. Level contact guard assist  -LF     Functional Mobility- Device other (see comments)  handheld  -LF     Functional Mobility- Comment Pt completed functional mobility to the bathroom and back to bed with CGA and handheld assist.  -       Row Name 07/21/25 1320          Activities of Daily Living    BADL Assessment/Intervention bathing;upper body dressing;lower body dressing;grooming;feeding;toileting  -[facility] Name 07/21/25 1320          Mobility    Extremity Weight-bearing Status right upper extremity  -     Right Upper Extremity (Weight-bearing Status) non weight-bearing (NWB)  -       Row Name 07/21/25 1320          Bathing Assessment/Intervention    [place] Level (Bathing) bathing skills;upper body;lower body;moderate assist (50% patient effort)  -       Row Name 07/21/25 1320          Upper Body Dressing Assessment/Training    [place] Level (Upper Body Dressing) upper body dressing skills;moderate assist (50% patient effort)  -        Row Name 07/21/25 1320          Lower Body Dressing Assessment/Training    [place] Level (Lower Body Dressing) lower body dressing skills;maximum assist (25% patient effort)  -LF       Row Name 07/21/25 1320          Grooming Assessment/Training    [place] Level (Grooming) grooming skills;moderate assist (50% patient effort)  -LF       Row Name 07/21/25 1320          Self-Feeding Assessment/Training    [place] Level (Feeding) feeding skills;minimum assist (75% patient effort)  -LF       Row Name 07/21/25 1320          Toileting Assessment/Training    [place] Level (Toileting) toileting skills;moderate assist (50% patient effort)  -               User Key  (r) = Recorded By, (t) = Taken By, (c) = Cosigned By      Initials Name Provider Type     Debbie Bruno OT Occupational Therapist                   Obj/Interventions       Row Name 07/21/25 1322          Sensory Assessment (Somatosensory)    Sensory Assessment (Somatosensory) UE sensation intact  -LF       Row Name 07/21/25 1322          Vision Assessment/Intervention    Visual Impairment/Limitations WFL;corrective lenses full-time  -LF       Row Name 07/21/25 1322          Range of Motion Comprehensive    Comment, General Range of Motion Full AROM of LUE, RUE swollen with edema limiting distal ROM. Proximal RUE not tested d/t recent fx.  -LF       Row Name 07/21/25 1322          Strength Comprehensive (MMT)    Comment, General Manual Muscle Testing (MMT) Assessment 4/5 LUEs, RUE not tested  -LF       Row Name 07/21/25 1322          Motor Skills    Motor Skills coordination;functional endurance  -     Coordination bilateral;upper extremity;left;WFL;right;moderate impairment  -     Functional Endurance Fair-  -LF       Row Name 07/21/25 1322          Balance    Balance Assessment sitting dynamic balance;standing dynamic balance  -     Dynamic Sitting Balance supervision  -     Position, Sitting Balance unsupported;sitting  edge of bed  -LF     Dynamic Standing Balance contact guard  -LF     Position/Device Used, Standing Balance supported;other (see comments)  handheld  -LF               User Key  (r) = Recorded By, (t) = Taken By, (c) = Cosigned By      Initials Name Provider Type    LF Debbie Bruno, OT Occupational Therapist                   Goals/Plan       Row Name 07/21/25 1324          Bed Mobility Goal 1 (OT)    Activity/Assistive Device (Bed Mobility Goal 1, OT) bed mobility activities, all  -LF     [place] Level/Cues Needed (Bed Mobility Goal 1, OT) modified independence  -LF     Time Frame (Bed Mobility Goal 1, OT) long term goal (LTG);10 days  -       Row Name 07/21/25 1324          Transfer Goal 1 (OT)    Activity/Assistive Device (Transfer Goal 1, OT) transfers, all  -LF     [place] Level/Cues Needed (Transfer Goal 1, OT) modified independence  -LF     Time Frame (Transfer Goal 1, OT) long term goal (LTG);10 days  -       Row Name 07/21/25 1324          Bathing Goal 1 (OT)    Activity/Device (Bathing Goal 1, OT) bathing skills, all  -LF     [place] Level/Cues Needed (Bathing Goal 1, OT) standby assist  -LF     Time Frame (Bathing Goal 1, OT) long term goal (LTG);10 days  -       Row Name 07/21/25 1324          Dressing Goal 1 (OT)    Activity/Device (Dressing Goal 1, OT) dressing skills, all  -LF     [place]/Cues Needed (Dressing Goal 1, OT) standby assist  -LF     Time Frame (Dressing Goal 1, OT) long term goal (LTG);10 days  -       Row Name 07/21/25 1324          Toileting Goal 1 (OT)    Activity/Device (Toileting Goal 1, OT) toileting skills, all  -LF     [place] Level/Cues Needed (Toileting Goal 1, OT) standby assist  -LF     Time Frame (Toileting Goal 1, OT) long term goal (LTG);10 days  -LF       Row Name 07/21/25 1324          Grooming Goal 1 (OT)    Activity/Device (Grooming Goal 1, OT) grooming skills, all  -LF     [place] (Grooming Goal 1, OT) modified independence   -LF     Time Frame (Grooming Goal 1, OT) long term goal (LTG);10 days  -       Row Name 07/21/25 1324          Problem Specific Goal 1 (OT)    Problem Specific Goal 1 (OT) Patient will demonstrate good- endurance to support ADLs/functional transfers.  -LF     Time Frame (Problem Specific Goal 1, OT) long term goal (LTG);10 days  -       Row Name 07/21/25 1324          Therapy Assessment/Plan (OT)    Planned Therapy Interventions (OT) activity tolerance training;BADL retraining;functional balance retraining;occupation/activity based interventions;patient/caregiver education/training;strengthening exercise;transfer/mobility retraining;ROM/therapeutic exercise  -               User Key  (r) = Recorded By, (t) = Taken By, (c) = Cosigned By      Initials Name Provider Type     Debbie Bruno, OT Occupational Therapist                   Clinical Impression       Row Name 07/21/25 1323          Pain Assessment    Additional Documentation Pain Scale: FACES Pre/Post-Treatment (Group)  -       Row Name 07/21/25 1323          Pain Scale: FACES Pre/Post-Treatment    Pain: FACES Scale, Pretreatment 4-->hurts little more  -     Posttreatment Pain Rating 4-->hurts little more  -LF       Row Name 07/21/25 1323          Plan of Care Review    Plan of Care Reviewed With patient  -LF     Progress no change  -     Outcome Evaluation Patient presents with limitations in self-care, functional transfers, balance, and endurance. She would benefit from continued skilled occupational therapy services to maximize independence with ADLs/functional transfers. Sling obtained and applied to E, nursing notified.  -       Row Name 07/21/25 1323          Therapy Assessment/Plan (OT)    Patient/Family Therapy Goal Statement (OT) To maximize independence.  -LF     Rehab Potential (OT) good  -LF     Criteria for Skilled Therapeutic Interventions Met (OT) yes;meets criteria;skilled treatment is necessary  -LF     Therapy Frequency  (OT) 5 times/wk  -LF       Row Name 07/21/25 1323          Therapy Plan Review/Discharge Plan (OT)    Anticipated Discharge Disposition (OT) sub acute care setting  -       Row Name 07/21/25 1323          Vital Signs    O2 Delivery Pre Treatment nasal cannula  -LF     O2 Delivery Intra Treatment nasal cannula  -LF     O2 Delivery Post Treatment nasal cannula  -LF       Row Name 07/21/25 1323          Positioning and Restraints    Pre-Treatment Position in bed  -LF     Post Treatment Position bed  -LF     In Bed sitting EOB;call light within reach;encouraged to call for assist  no alarm active on arrival  -               User Key  (r) = Recorded By, (t) = Taken By, (c) = Cosigned By      Initials Name Provider Type     Debbie Bruno, SUSI Occupational Therapist                   Outcome Measures       Row Name 07/21/25 1323          How much help from another is currently needed...    Putting on and taking off regular lower body clothing? 2  -LF     Bathing (including washing, rinsing, and drying) 2  -LF     Toileting (which includes using toilet bed pan or urinal) 2  -LF     Putting on and taking off regular upper body clothing 2  -LF     Taking care of personal grooming (such as brushing teeth) 3  -LF     Eating meals 3  -LF     AM-PAC 6 Clicks Score (OT) 14  -LF       Row Name 07/21/25 0845          How much help from another person do you currently need...    Turning from your back to your side while in flat bed without using bedrails? 3  -AG     Moving from lying on back to sitting on the side of a flat bed without bedrails? 3  -AG     Moving to and from a bed to a chair (including a wheelchair)? 3  -AG     Standing up from a chair using your arms (e.g., wheelchair, bedside chair)? 3  -AG     Climbing 3-5 steps with a railing? 2  -AG     To walk in hospital room? 2  -AG     AM-PAC 6 Clicks Score (PT) 16  -AG       Row Name 07/21/25 1325          Functional Assessment    Outcome Measure Options AM-PAC 6  Clicks Daily Activity (OT);Optimal Instrument  -LF       Row Name 07/21/25 1325          Optimal Instrument    Optimal Instrument Optimal - 3  -LF     Bending/Stooping 3  -LF     Standing 2  -LF     Reaching 3  -LF     From the list, choose the 3 activities you would most like to be able to do without any difficulty Bending/stooping;Standing;Reaching  -LF     Total Score Optimal - 3 8  -LF               User Key  (r) = Recorded By, (t) = Taken By, (c) = Cosigned By      Initials Name Provider Type    LF Debbie Bruno OT Occupational Therapist    Kaylan Celeste RN Registered Nurse                    Occupational Therapy Education       Title: PT OT SLP Therapies (Done)       Topic: Occupational Therapy (Done)       Point: ADL training (Done)       Learning Progress Summary            Patient Acceptance, E,TB, VU by  at 7/21/2025 1325                      Point: Precautions (Done)       Learning Progress Summary            Patient Acceptance, E,TB, VU by  at 7/21/2025 1325                      Point: Body mechanics (Done)       Learning Progress Summary            Patient Acceptance, E,TB, VU by  at 7/21/2025 1325                                      User Key       Initials Effective Dates Name Provider Type Discipline     06/16/21 -  Debbie Bruno OT Occupational Therapist OT                  OT Recommendation and Plan  Planned Therapy Interventions (OT): activity tolerance training, BADL retraining, functional balance retraining, occupation/activity based interventions, patient/caregiver education/training, strengthening exercise, transfer/mobility retraining, ROM/therapeutic exercise  Therapy Frequency (OT): 5 times/wk  Plan of Care Review  Plan of Care Reviewed With: patient  Progress: no change  Outcome Evaluation: Patient presents with limitations in self-care, functional transfers, balance, and endurance. She would benefit from continued skilled occupational therapy services to maximize  independence with ADLs/functional transfers. Sling obtained and applied to RUE, nursing notified.     Time Calculation:   Evaluation Complexity (OT)  Review Occupational Profile/Medical/Therapy History Complexity: brief/low complexity  Assessment, Occupational Performance/Identification of Deficit Complexity: 3-5 performance deficits  Clinical Decision Making Complexity (OT): problem focused assessment/low complexity  Overall Complexity of Evaluation (OT): low complexity     Time Calculation- OT       Row Name 07/21/25 1326             Time Calculation- OT    OT Received On 07/21/25  -LF      OT Goal Re-Cert Due Date 07/30/25  -LF         Untimed Charges    OT Eval/Re-eval Minutes 48  -LF         Total Minutes    Untimed Charges Total Minutes 48  -LF       Total Minutes 48  -LF                User Key  (r) = Recorded By, (t) = Taken By, (c) = Cosigned By      Initials Name Provider Type    LF Debbie Bruno OT Occupational Therapist                  Therapy Charges for Today       Code Description Service Date Service Provider Modifiers Qty    05171259647 HC OT EVAL LOW COMPLEXITY 4 7/21/2025 Debbie Bruno OT GO 1                 Debbie Bruno OT  7/21/2025

## 2025-07-21 NOTE — PROGRESS NOTES
[facility]   Hospitalist Progress Note  Date: 2025  Patient Name: Tawny Lennon  : 1965  MRN: [id number]  Date of admission: 2025  Room/Bed: 419/1      Subjective   Subjective     Chief Complaint: Altered mental status and right upper extremity fracture    Summary:Tawny Lennon is a 59 y.o. female with extensive edema of the right upper extremity with ecchymosis.  Patient presented to the [facility] with decompensated liver cirrhosis and altered mentation that is apparently back to baseline.  She has a history of Reich cirrhosis.  Patient was found to have a broken right humerus that was reportedly not amenable to intervention.  She was intended to have a shoulder immobilizer brace however she could not tolerate it due to the edema.  Ultrasound was requested by the hospitalist at [place] however they do not have that available on the weekend.  Patient also apparently had a positive FOBT.  Last hemoglobin was 9.  It actually went up from 7.8 from the past several days.  She does complain of some abdominal pain and did receive fentanyl en route with EMS.  She had an ultrasound of the abdomen that showed no ascites on 2025.  Patient currently on clear liquid diet. Unclear if colonoscopy will be done inpatient, pending GI recs.  Patient will be discharged to rehab once cleared by GI, also pending PT eval.  Case management aware    Interval Followup: Patient seen and evaluated at bedside.  Reports abdominal soreness and moderate pain from the right humeral fracture, improved from yesterday.  However denies all other symptoms.  She is currently oriented x 4.    Review of Systems    All systems reviewed and negative except for what is outlined above.      Objective   Objective     Vitals:   Temp:  [97.3 °F (36.3 °C)-98.1 °F (36.7 °C)] 97.9 °F (36.6 °C)  Heart Rate:  [83-98] 83  Resp:  [12-18] 16  BP: ()/(43-60) 114/54  Flow (L/min) (Oxygen Therapy):  [1-2] 1    Physical Exam   General:  Awake, alert, NAD  HENT: NCAT, MMM  Eyes: pupils equal, scleral icterus present  Cardiovascular: RRR, no murmurs   Pulmonary: CTA bilaterally; no wheezes; no conversational dyspnea  Gastrointestinal: S/ND/NT, +BS  Musculoskeletal: Right upper extremity with edema, and soft tissue hematoma, right humeral fracture  Skin: jaundice present, no rash on exposed skin appreciated  Neuro: CN II through XII grossly intact; speech clear; no tremor  Psych: Mood and affect appropriate  : No Rodriguez catheter; no suprapubic tenderness    Result Review    Result Review:  I have personally reviewed these results:  [x]  Laboratory      Lab 07/21/25 0315 07/20/25  1426 07/20/25  0709 07/20/25  0425 07/19/25 2012   WBC 7.46 6.89  --   --  8.25   HEMOGLOBIN 8.0* 8.0*  --   --  8.4*   HEMATOCRIT 26.2* 24.0*  --   --  25.8*   PLATELETS 63* 65*  --   --  73*   NEUTROS ABS 5.82 5.33  --   --  6.31   IMMATURE GRANS (ABS) 0.17* 0.14*  --   --  0.21*   LYMPHS ABS 0.77 0.69*  --   --  0.73   MONOS ABS 0.47 0.54  --   --  0.74   EOS ABS 0.16 0.16  --   --  0.20   .1* 98.0*  --   --  97.7*   LACTATE  --   --  2.0 2.3*  --    PROTIME  --  19.6*  --  18.3*  --          Lab 07/21/25 0315 07/20/25  1426 07/19/25 2012   SODIUM 130* 133* 132*   POTASSIUM 4.5 4.4 4.2   CHLORIDE 103 103 102   CO2 18.1* 20.4* 19.2*   ANION GAP 8.9 9.6 10.8   BUN 29.0* 25.4* 25.0*   CREATININE 1.19* 1.18* 1.39*   EGFR 52.1* 52.7* 43.3*   GLUCOSE 146* 145* 177*   CALCIUM 8.5* 8.8 8.6   MAGNESIUM 2.2  --   --    PHOSPHORUS 3.1  --   --          Lab 07/21/25  0315 07/20/25  1426 07/19/25 2012   TOTAL PROTEIN 5.1* 4.9* 5.4*   ALBUMIN 2.4* 2.4* 2.7*   GLOBULIN 2.7  --  2.7   ALT (SGPT) 23 22 23   AST (SGOT) 45* 40* 44*   BILIRUBIN 6.8* 6.8* 6.6*   INDIRECT BILIRUBIN  --  2.7  --    BILIRUBIN DIRECT  --  4.1*  --    ALK PHOS 194* 200* 215*         Lab 07/20/25  1426 07/20/25  0425   PROTIME 19.6* 18.3*   INR 1.58* 1.45*                 Brief Urine Lab Results  (Last  result in the past 365 days)        Color   Clarity   Blood   Leuk Est   Nitrite   Protein   CREAT   Urine HCG        05/14/25 1127 Dark Yellow   Clear   Negative   Trace   Negative   Negative                 [x]  Microbiology   Microbiology Results (last 10 days)       ** No results found for the last 240 hours. **          [x]  Radiology  XR Shoulder 2+ View Right  Result Date: 7/20/2025  Impression: Comminuted displaced fracture of the right humeral head and neck. Electronically Signed: Cruz Jaimes MD  7/20/2025 8:06 AM EDT  Workstation ID: [id number]    CT humerus right without contrast  Result Date: 7/19/2025  1. Still noted a fracture proximal humerus with a comminuted fracture of the humeral head, the humeral head seen impacted with patchy sclerosis, stable 2. Soft tissue hematoma is seen at the upper arm. 3. The scanned part of the right upper quadrant shows perihepatic ascites. Dedicated study is advised.     XR Chest 1 View  Result Date: 7/18/2025  1. No acute cardiopulmonary disease.    US Renal Bilateral  Result Date: 7/15/2025  Normal ultrasound of the kidneys. [facility]-[id number]    US Abdomen Limited  Result Date: 7/15/2025  No ascites. [facility][id number]    XR Chest 1 View  Result Date: 7/15/2025  No active disease. [id number]    CT Abdomen Pelvis With Contrast  Result Date: 7/14/2025  1.  Suspect active gastrointestinal hemorrhage along the posterior wall of the body the stomach. Endoscopy may be useful. 2.  Cirrhosis with mild splenomegaly, mild amount of ascites, anasarca, and gastroesophageal varices. 3.  Sigmoid diverticulosis without diverticula. 4.  Dr. Delgado was notified on 7/14/2025 at 1208. [facility]PACS[facility]    []  EKG/Telemetry   []  Cardiology/Vascular   []  Pathology  []  Old records  []  Other:    Assessment & Plan   Assessment / Plan     Assessment:  Right humeral fracture  FOBT positive  Acute metabolic encephalopathy likely secondary to decompensated cirrhosis, resolved  Chronic  thrombocytopenia secondary to decompensated cirrhosis  MEDRANO liver cirrhosis  Hyperbilirubinemia due to above  CKD stage IIIa  COPD  GERD  History of esophageal varices status post attempted banding 12/2023      Plan:  GI consulted for positive FOBT -plan for colonoscopy being discussed, pending recs.    EGD was recently performed by Dr. Novoa in May 2025 showed scar in the lower third of the esophagus, portal hypertensive gastropathy, and a normal stomach and a normal duodenum.  Ortho following-will continue with nonoperative care of left humeral fracture.  To follow-up with orthopedic doctor in the outpatient setting in 7 to 10 days post discharge  Sliding scale insulin ordered, check A1c  Continue to avoid sedative and opiates.    Dispo plan: Patient to be discharged to rehab; however, still pending GI recs on colonoscopy and PT eval.    Discussed with RN.    VTE Prophylaxis:  Mechanical VTE prophylaxis orders are present.        CODE STATUS:   Code Status (Patient has no pulse and is not breathing): No CPR (Do Not Attempt to Resuscitate)  Medical Interventions (Patient has pulse or is breathing): Limited Support  Medical Intervention Limits: No intubation (DNI)  Level Of Support Discussed With: Patient      Electronically signed by Tono Beltran DO, 7/21/2025, 07:28 EDT.

## 2025-07-21 NOTE — SIGNIFICANT NOTE
07/21/25 0642   OTHER   Discipline speech language pathologist   Rehab Time/Intention   Session Not Performed patient unavailable for evaluation   Recommendations   SLP - Next Appointment 07/22/25     NPO for possible colonoscopy.

## 2025-07-21 NOTE — SIGNIFICANT NOTE
07/21/25 1400   Physical Therapy Time and Intention   Session Not Performed patient/family declined evaluation  (c/o of RUE pain)

## 2025-07-21 NOTE — PLAN OF CARE
Goal Outcome Evaluation:  Plan of Care Reviewed With: patient        Progress: no change  Outcome Evaluation: Patient presents with limitations in self-care, functional transfers, balance, and endurance. She would benefit from continued skilled occupational therapy services to maximize independence with ADLs/functional transfers. Sling obtained and applied to SUSAN, nursing notified.    Anticipated Discharge Disposition (OT): sub acute care setting

## 2025-07-21 NOTE — PLAN OF CARE
Problem: Adult Inpatient Plan of Care  Goal: Plan of Care Review  Outcome: Adequate for Care Transition  Flowsheets (Taken 7/21/2025 2762)  Progress: no change  Outcome Evaluation: Pt is a transfer to this unit at this time. Pt discussed goals and treatmentsl.  Pt will continue poc on 4mtu.  Currently resting at this time.  Plan of Care Reviewed With: patient  Goal: Patient-Specific Goal (Individualized)  Outcome: Adequate for Care Transition  Goal: Absence of Hospital-Acquired Illness or Injury  Outcome: Adequate for Care Transition  Goal: Optimal Comfort and Wellbeing  Outcome: Adequate for Care Transition  Intervention: Monitor Pain and Promote Comfort  Recent Flowsheet Documentation  Taken 7/21/2025 6968 by Najma Jessica RN  Pain Management Interventions:   position adjusted   pillow support provided  Goal: Readiness for Transition of Care  Outcome: Adequate for Care Transition     Problem: Skin Injury Risk Increased  Goal: Skin Health and Integrity  Outcome: Adequate for Care Transition     Problem: Fall Injury Risk  Goal: Absence of Fall and Fall-Related Injury  Outcome: Adequate for Care Transition     Problem: Sepsis/Septic Shock  Goal: Optimal Coping  Outcome: Adequate for Care Transition  Goal: Absence of Bleeding  Outcome: Adequate for Care Transition  Goal: Blood Glucose Level Within Target Range  Outcome: Adequate for Care Transition  Goal: Absence of Infection Signs and Symptoms  Outcome: Adequate for Care Transition  Goal: Optimal Nutrition Delivery  Outcome: Adequate for Care Transition   Goal Outcome Evaluation:  Plan of Care Reviewed With: patient        Progress: no change  Outcome Evaluation: Pt is a transfer to this unit at this time. Pt discussed goals and treatmentsl.  Pt will continue poc on 4mtu.  Currently resting at this time.

## 2025-07-22 ENCOUNTER — APPOINTMENT (OUTPATIENT)
Dept: ULTRASOUND IMAGING | Facility: HOSPITAL | Age: 60
End: 2025-07-22
Payer: MEDICARE

## 2025-07-22 LAB
ALBUMIN SERPL-MCNC: 2.6 G/DL (ref 3.5–5.2)
ALP SERPL-CCNC: 204 U/L (ref 39–117)
ALT SERPL W P-5'-P-CCNC: 24 U/L (ref 1–33)
AST SERPL-CCNC: 41 U/L (ref 1–32)
BILIRUB CONJ SERPL-MCNC: 3.8 MG/DL (ref 0–0.3)
BILIRUB INDIRECT SERPL-MCNC: 3 MG/DL
BILIRUB SERPL-MCNC: 6.8 MG/DL (ref 0–1.2)
GLUCOSE BLDC GLUCOMTR-MCNC: 156 MG/DL (ref 70–99)
INR PPP: 1.5 (ref 0.86–1.15)
PROT SERPL-MCNC: 5.2 G/DL (ref 6–8.5)
PROTHROMBIN TIME: 18.8 SECONDS (ref 11.8–14.9)

## 2025-07-22 PROCEDURE — 80076 HEPATIC FUNCTION PANEL: CPT | Performed by: INTERNAL MEDICINE

## 2025-07-22 PROCEDURE — 63710000001 FOLIC ACID 1 MG TABLET

## 2025-07-22 PROCEDURE — 63710000001 INSULIN LISPRO (HUMAN) PER 5 UNITS

## 2025-07-22 PROCEDURE — A9270 NON-COVERED ITEM OR SERVICE: HCPCS | Performed by: INTERNAL MEDICINE

## 2025-07-22 PROCEDURE — A9270 NON-COVERED ITEM OR SERVICE: HCPCS

## 2025-07-22 PROCEDURE — 63710000001 SPIRONOLACTONE 25 MG TABLET

## 2025-07-22 PROCEDURE — 85610 PROTHROMBIN TIME: CPT | Performed by: INTERNAL MEDICINE

## 2025-07-22 PROCEDURE — 63710000001 LACTULOSE 20 GM/30ML SOLUTION: Performed by: INTERNAL MEDICINE

## 2025-07-22 PROCEDURE — 94760 N-INVAS EAR/PLS OXIMETRY 1: CPT

## 2025-07-22 PROCEDURE — 96376 TX/PRO/DX INJ SAME DRUG ADON: CPT

## 2025-07-22 PROCEDURE — 63710000001 FUROSEMIDE 20 MG TABLET

## 2025-07-22 PROCEDURE — 92610 EVALUATE SWALLOWING FUNCTION: CPT

## 2025-07-22 PROCEDURE — 76705 ECHO EXAM OF ABDOMEN: CPT

## 2025-07-22 PROCEDURE — 99232 SBSQ HOSP IP/OBS MODERATE 35: CPT | Performed by: INTERNAL MEDICINE

## 2025-07-22 PROCEDURE — 63710000001: Performed by: INTERNAL MEDICINE

## 2025-07-22 PROCEDURE — 82948 REAGENT STRIP/BLOOD GLUCOSE: CPT

## 2025-07-22 PROCEDURE — 63710000001 TRAMADOL 50 MG TABLET: Performed by: INTERNAL MEDICINE

## 2025-07-22 PROCEDURE — 97161 PT EVAL LOW COMPLEX 20 MIN: CPT

## 2025-07-22 PROCEDURE — 94799 UNLISTED PULMONARY SVC/PX: CPT

## 2025-07-22 PROCEDURE — 63710000001 HYDROCORTISONE 25 MG SUPPOSITORY: Performed by: INTERNAL MEDICINE

## 2025-07-22 PROCEDURE — G0378 HOSPITAL OBSERVATION PER HR: HCPCS

## 2025-07-22 PROCEDURE — 63710000001 RIFAXIMIN 550 MG TABLET

## 2025-07-22 RX ORDER — MORPHINE SULFATE 2 MG/ML
1 INJECTION, SOLUTION INTRAMUSCULAR; INTRAVENOUS ONCE
Status: COMPLETED | OUTPATIENT
Start: 2025-07-22 | End: 2025-07-23

## 2025-07-22 RX ADMIN — IPRATROPIUM BROMIDE AND ALBUTEROL SULFATE 3 ML: .5; 3 SOLUTION RESPIRATORY (INHALATION) at 06:49

## 2025-07-22 RX ADMIN — IPRATROPIUM BROMIDE AND ALBUTEROL SULFATE 3 ML: .5; 3 SOLUTION RESPIRATORY (INHALATION) at 13:15

## 2025-07-22 RX ADMIN — SPIRONOLACTONE 50 MG: 25 TABLET ORAL at 09:48

## 2025-07-22 RX ADMIN — RIFAXIMIN 550 MG: 550 TABLET ORAL at 20:44

## 2025-07-22 RX ADMIN — INSULIN LISPRO 3 UNITS: 100 INJECTION, SOLUTION INTRAVENOUS; SUBCUTANEOUS at 12:15

## 2025-07-22 RX ADMIN — PANTOPRAZOLE SODIUM 40 MG: 40 INJECTION, POWDER, FOR SOLUTION INTRAVENOUS at 09:48

## 2025-07-22 RX ADMIN — FUROSEMIDE 20 MG: 20 TABLET ORAL at 09:47

## 2025-07-22 RX ADMIN — Medication 10 ML: at 20:46

## 2025-07-22 RX ADMIN — LACTULOSE SOLUTION USP, 10 G/15 ML 20 G: 10 SOLUTION ORAL; RECTAL at 20:44

## 2025-07-22 RX ADMIN — INSULIN LISPRO 3 UNITS: 100 INJECTION, SOLUTION INTRAVENOUS; SUBCUTANEOUS at 17:20

## 2025-07-22 RX ADMIN — HYDROCORTISONE ACETATE 25 MG: 25 SUPPOSITORY RECTAL at 20:45

## 2025-07-22 RX ADMIN — TRAMADOL HYDROCHLORIDE 50 MG: 50 TABLET, COATED ORAL at 17:22

## 2025-07-22 RX ADMIN — INSULIN LISPRO 2 UNITS: 100 INJECTION, SOLUTION INTRAVENOUS; SUBCUTANEOUS at 09:49

## 2025-07-22 RX ADMIN — Medication 10 ML: at 09:50

## 2025-07-22 RX ADMIN — RIFAXIMIN 550 MG: 550 TABLET ORAL at 09:48

## 2025-07-22 RX ADMIN — PHYTONADIONE 10 MG: 5 TABLET ORAL at 09:50

## 2025-07-22 RX ADMIN — IPRATROPIUM BROMIDE AND ALBUTEROL SULFATE 3 ML: .5; 3 SOLUTION RESPIRATORY (INHALATION) at 18:51

## 2025-07-22 RX ADMIN — IPRATROPIUM BROMIDE AND ALBUTEROL SULFATE 3 ML: .5; 3 SOLUTION RESPIRATORY (INHALATION) at 00:33

## 2025-07-22 RX ADMIN — LACTULOSE SOLUTION USP, 10 G/15 ML 20 G: 10 SOLUTION ORAL; RECTAL at 16:12

## 2025-07-22 RX ADMIN — LACTULOSE SOLUTION USP, 10 G/15 ML 20 G: 10 SOLUTION ORAL; RECTAL at 09:48

## 2025-07-22 RX ADMIN — HYDROCORTISONE ACETATE 25 MG: 25 SUPPOSITORY RECTAL at 09:49

## 2025-07-22 RX ADMIN — FOLIC ACID 1 MG: 1 TABLET ORAL at 09:47

## 2025-07-22 RX ADMIN — PANTOPRAZOLE SODIUM 40 MG: 40 INJECTION, POWDER, FOR SOLUTION INTRAVENOUS at 20:44

## 2025-07-22 RX ADMIN — INSULIN LISPRO 3 UNITS: 100 INJECTION, SOLUTION INTRAVENOUS; SUBCUTANEOUS at 20:44

## 2025-07-22 NOTE — PLAN OF CARE
Goal Outcome Evaluation:      Pt AAOx4, VSS and complaints of pain treated per MAR.

## 2025-07-22 NOTE — PLAN OF CARE
Goal Outcome Evaluation:            Patient alert and oriented, on 1L NC, NSR on monitor, complaints of pain in right arm and hand managed with medication, possible discharge to rehab next few days.

## 2025-07-22 NOTE — THERAPY EVALUATION
Acute Care - Speech Language Pathology   Swallow Initial Evaluation  [place]     Patient Name: Tawny Lennon  : 1965  MRN: [id number]  Today's Date: 2025               Admit Date: 2025    Visit Dx:     ICD-10-CM ICD-9-CM   1. Decreased activities of daily living (ADL)  Z78.9 V49.89   2. Oropharyngeal dysphagia  R13.12 787.22     Patient Active Problem List   Diagnosis    Benign essential HTN    Colon polyps    Depression    Diabetes mellitus without complication    Diverticulitis    Hematuria    High blood pressure    High cholesterol    Interstitial cystitis    Migraine without aura    Narcolepsy    Obesity    Other specified chronic obstructive pulmonary disease    Panic attacks    Paroxysmal SVT (supraventricular tachycardia)    Sleep apnea    Cirrhosis of liver without ascites    Esophageal varices without bleeding    Preop cardiovascular exam    MEDRANO (nonalcoholic steatohepatitis)    Gastroesophageal reflux disease without esophagitis    Hyperkalemia    Severe malnutrition    Pancreatitis    SBO (small bowel obstruction)    Viral URI with cough    Acquired coagulation factor deficiency    Iron deficiency anemia    History of closure of ileostomy    History of diverticulitis    Fecal occult blood test positive    History of colon polyps    Acute liver failure    Decompensated cirrhosis     Past Medical History:   Diagnosis Date    Anemia     Chronic kidney disease     Cirrhosis     liver    Cirrhosis, nonalcoholic     Colon polyps     COPD (chronic obstructive pulmonary disease)     Depression     Diabetes     Diverticulitis     Esophageal varices     Fatty liver     GERD (gastroesophageal reflux disease)     Hematuria     Hernia     High blood pressure     High cholesterol     Interstitial cystitis     Migraine headache     Narcolepsy     Pancreatitis     PONV (postoperative nausea and vomiting)     Sleep apnea     Spinal headache     DURING PREGNANCY    Ulcerative colitis      Past Surgical  History:   Procedure Laterality Date    ABDOMINAL SURGERY      BLADDER REPAIR  2009    CHOLECYSTECTOMY      COLONOSCOPY  2014 x2 2020    ENDOSCOPY  2014 2020    ENDOSCOPY N/A 07/12/2021    Procedure: ESOPHAGOGASTRODUODENOSCOPY with biopsies;  Surgeon: Jesse Watkins MD;  Location: [facility] ENDOSCOPY;  Service: Gastroenterology;  Laterality: N/A;  esophageal varices    ENDOSCOPY N/A 03/25/2022    Procedure: ESOPHAGOGASTRODUODENOSCOPY WITH BIOPSIES;  Surgeon: Jesse Watkins MD;  Location: [facility] ENDOSCOPY;  Service: Gastroenterology;  Laterality: N/A;  ESOPHAGEAL VARICIES    ENDOSCOPY N/A 09/28/2023    Procedure: ESOPHAGOGASTRODUODENOSCOPY WITH COLD BIOPSIES;  Surgeon: Jesse Watkins MD;  Location: [facility] ENDOSCOPY;  Service: Gastroenterology;  Laterality: N/A;  GASTRITIS, ESOPHAGEAL VARICES    ENDOSCOPY N/A 11/17/2023    Procedure: ESOPHAGOGASTRODUODENOSCOPY WITH BIOPSIES AND  VARICEAL BANDING X 4;  Surgeon: Jesse Watkins MD;  Location: [facility] ENDOSCOPY;  Service: Gastroenterology;  Laterality: N/A;  GASTRITIS, PORTAL HYPERTENSIVE GASTROPATHY, ESOPHAGEAL VARICIES    ENDOSCOPY N/A 12/21/2023    Procedure: ESOPHAGOGASTRODUODENOSCOPY WITH BIOPSIES AND ESOPHAGEAL BANDING;  Surgeon: Jesse Watkins MD;  Location: [facility] ENDOSCOPY;  Service: Gastroenterology;  Laterality: N/A;  ESOPHAGEAL VARICES, GASTRITIS    ENDOSCOPY N/A 5/16/2025    Procedure: ESOPHAGOGASTRODUODENOSCOPY;  Surgeon: Tuan Novoa MD;  Location: [facility] ENDOSCOPY;  Service: Gastroenterology;  Laterality: N/A;  PORTAL HYPERTENSIVE GASTROPATHY, ESOPHAGEAL SCARRING FROM PREVIOUS BANDING    HAND SURGERY      HERNIA REPAIR      HYSTERECTOMY  2009    ILEOSTOMY  07/2022    OTHER SURGICAL HISTORY      rectocele repair    UPPER GASTROINTESTINAL ENDOSCOPY               Inpatient Speech Pathology Dysphagia Evaluation        PAIN SCALE: Complaint shoulder pain, did not rate, nursing aware.    PRECAUTIONS/CONTRAINDICATIONS:  Standard    SUSPECTED ABUSE/NEGLECT/EXPLOITATION: None indicated.    SOCIAL/PSYCHOLOGICAL NEEDS/BARRIERS: None indicated.    PAST SOCIAL HISTORY: 59-year-old female lives at home with her sisters    PRIOR FUNCTION: Independent, on regular soft diet    PATIENT GOALS/EXPECTATIONS: Continue eating orally, requesting mechanical ground meats    HISTORY: 59-year-old female the above diagnosis is referred for speech therapy evaluation assess her swallowing.  Nurse reports patient had failed dysphagia screening.  No previous speech pathology services are reported.  Patient states complaint of difficulty with chewing and swallowing harder foods such as meats.    CURRENT DIET LEVEL: Regular, thin    OBJECTIVE:    TEST ADMINISTERED: Clinical dysphagia evaluation    COGNITION/SAFETY AWARENESS: Followed basic directions and responded to questions    BEHAVIORAL OBSERVATIONS: Alert and cooperative    ORAL MOTOR EXAM: Grossly within limits, missing some dentition.    VOICE QUALITY: Adequate    REFLEX EXAM: Deferred    POSTURE: Assisted sitting upright in bed    FEEDING/SWALLOWING FUNCTION: Assessed with  thin liquids, puréed solids, crunchy solid.    CLINICAL OBSERVATIONS:   Thin liquid by cup and by straw with minimal delay, vocal quality remaining clear to cervical auscultation.  Purée solid with swallow completed with laryngeal elevation noted to palpation.  Crunchy solid with extended chewing followed by swallow completed, clear to oral cavity given additional time.    DYSPHAGIA CRITERIA: Risk of aspiration, swallow delay    FUNCTIONAL ASSESSMENT INSTRUMENT: Patient currently scored a level 6 of 7 on Functional Communication Measures for swallowing indicating a 1-19% limitation in function.    ASSESSMENT/ PLAN OF CARE:  Pt presents with limitations, noted below, that impede her ability to swallow safely and maintain nutrition. The skills of a therapist will be required to safely and effectively implement the following treatment  plan to restore maximal level of function.    PROBLEMS:  1.   Swallow delay, risk of aspiration                       LTG 1: 30 days: Patient will increase score for swallowing on the Functional Communication Measures to level 7 of 7 indicating a 0% limitation in function.                       STG 1a: 14 days: Patient will tolerate diet of soft to chew solids and thin liquids with minimal assistance for strategies.                       STG 1b: 14 days: Patient will tolerate 8 of 10 trials of thin liquids with min to no signs or symptoms of aspiration.                       STG 1c: 14 days:  Patient/family education.                       TREATMENT: Dysphagia therapy to address swallow function through exercises and education of strategies.     FREQUENCY/DURATION: Once daily 5 times per week    REHAB POTENTIAL:  Pt has good rehab potential.  The following limitations may influence improvement/ length of tx: Medical status.    RECOMMENDATIONS:   1.   DIET: Soft to chew solids cut small, thin liquid.  Patient requesting mechanical ground.    2.  POSITION: Positioning fully upright for all p.o. intake and 30 minutes following.    3.  COMPENSATORY STRATEGIES: Cut all food items small.  Alternate small bites and small sips of solids and liquids at a slow rate.  Larger medications whole in applesauce or with protein drink.    Pt/responsible party agrees with plan of care and has been informed of all alternatives, risks and benefits.                            Anticipated Discharge Disposition (SLP): skilled nursing facility (07/22/25 0910)                                                               EDUCATION  The patient has been educated in the following areas:   Modified Diet Instruction.                Time Calculation:    Time Calculation- SLP       Row Name 07/22/25 0910             Time Calculation- SLP    SLP Start Time 0645  -TB      SLP Stop Time 0745  -TB      SLP Time Calculation (min) 60 min  -TB      SLP  Received On 07/22/25  -TB         Untimed Charges    SLP Eval/Re-eval  ST Eval Oral Pharyng Swallow - 76926  -TB      48043-OS Eval Oral Pharyng Swallow Minutes 60  -TB         Total Minutes    Untimed Charges Total Minutes 60  -TB       Total Minutes 60  -TB                User Key  (r) = Recorded By, (t) = Taken By, (c) = Cosigned By      Initials Name Provider Type    TB Nel Villegas SLP Speech and Language Pathologist                    Therapy Charges for Today       Code Description Service Date Service Provider Modifiers Qty    [id number]  ST EVAL ORAL PHARYNG SWALLOW 4 7/22/2025 Nel Villegas SLP GN 1                 JEANNIE An  7/22/2025

## 2025-07-22 NOTE — PLAN OF CARE
Problem: Adult Inpatient Plan of Care  Goal: Plan of Care Review  Recent Flowsheet Documentation  Taken 7/22/2025 0900 by Soren Booth, PT  Outcome Evaluation: Pt presents with decreased strength, transfers and functional mobility. Will benefit from inpatient PT services and continued PT services upon discharge.   Goal Outcome Evaluation:  Plan of Care Reviewed With: patient           Outcome Evaluation: Pt presents with decreased strength, transfers and functional mobility. Will benefit from inpatient PT services and continued PT services upon discharge.    Anticipated Discharge Disposition (PT): sub acute care setting

## 2025-07-22 NOTE — THERAPY EVALUATION
Acute Care - Physical Therapy Initial Evaluation   [place]     Patient Name: Tawny Lennon  : 1965  MRN: [id number]  Today's Date: 2025      Visit Dx:     ICD-10-CM ICD-9-CM   1. Decreased activities of daily living (ADL)  Z78.9 V49.89   2. Oropharyngeal dysphagia  R13.12 787.22   3. Difficulty walking  R26.2 719.7     Patient Active Problem List   Diagnosis    Benign essential HTN    Colon polyps    Depression    Diabetes mellitus without complication    Diverticulitis    Hematuria    High blood pressure    High cholesterol    Interstitial cystitis    Migraine without aura    Narcolepsy    Obesity    Other specified chronic obstructive pulmonary disease    Panic attacks    Paroxysmal SVT (supraventricular tachycardia)    Sleep apnea    Cirrhosis of liver without ascites    Esophageal varices without bleeding    Preop cardiovascular exam    MEDRANO (nonalcoholic steatohepatitis)    Gastroesophageal reflux disease without esophagitis    Hyperkalemia    Severe malnutrition    Pancreatitis    SBO (small bowel obstruction)    Viral URI with cough    Acquired coagulation factor deficiency    Iron deficiency anemia    History of closure of ileostomy    History of diverticulitis    Fecal occult blood test positive    History of colon polyps    Acute liver failure    Decompensated cirrhosis     Past Medical History:   Diagnosis Date    Anemia     Chronic kidney disease     Cirrhosis     liver    Cirrhosis, nonalcoholic     Colon polyps     COPD (chronic obstructive pulmonary disease)     Depression     Diabetes     Diverticulitis     Esophageal varices     Fatty liver     GERD (gastroesophageal reflux disease)     Hematuria     Hernia     High blood pressure     High cholesterol     Interstitial cystitis     Migraine headache     Narcolepsy     Pancreatitis     PONV (postoperative nausea and vomiting)     Sleep apnea     Spinal headache     DURING PREGNANCY    Ulcerative colitis      Past Surgical History:    Procedure Laterality Date    ABDOMINAL SURGERY      BLADDER REPAIR  2009    CHOLECYSTECTOMY      COLONOSCOPY  2014 x2 2020    ENDOSCOPY  2014 2020    ENDOSCOPY N/A 07/12/2021    Procedure: ESOPHAGOGASTRODUODENOSCOPY with biopsies;  Surgeon: Jesse Watkins MD;  Location: [facility] ENDOSCOPY;  Service: Gastroenterology;  Laterality: N/A;  esophageal varices    ENDOSCOPY N/A 03/25/2022    Procedure: ESOPHAGOGASTRODUODENOSCOPY WITH BIOPSIES;  Surgeon: Jesse Watkins MD;  Location: [facility] ENDOSCOPY;  Service: Gastroenterology;  Laterality: N/A;  ESOPHAGEAL VARICIES    ENDOSCOPY N/A 09/28/2023    Procedure: ESOPHAGOGASTRODUODENOSCOPY WITH COLD BIOPSIES;  Surgeon: Jesse Watkins MD;  Location: [facility] ENDOSCOPY;  Service: Gastroenterology;  Laterality: N/A;  GASTRITIS, ESOPHAGEAL VARICES    ENDOSCOPY N/A 11/17/2023    Procedure: ESOPHAGOGASTRODUODENOSCOPY WITH BIOPSIES AND  VARICEAL BANDING X 4;  Surgeon: Jesse Watkins MD;  Location: [facility] ENDOSCOPY;  Service: Gastroenterology;  Laterality: N/A;  GASTRITIS, PORTAL HYPERTENSIVE GASTROPATHY, ESOPHAGEAL VARICIES    ENDOSCOPY N/A 12/21/2023    Procedure: ESOPHAGOGASTRODUODENOSCOPY WITH BIOPSIES AND ESOPHAGEAL BANDING;  Surgeon: Jesse Watkins MD;  Location: [facility] ENDOSCOPY;  Service: Gastroenterology;  Laterality: N/A;  ESOPHAGEAL VARICES, GASTRITIS    ENDOSCOPY N/A 5/16/2025    Procedure: ESOPHAGOGASTRODUODENOSCOPY;  Surgeon: Tuan Novoa MD;  Location: [facility] ENDOSCOPY;  Service: Gastroenterology;  Laterality: N/A;  PORTAL HYPERTENSIVE GASTROPATHY, ESOPHAGEAL SCARRING FROM PREVIOUS BANDING    HAND SURGERY      HERNIA REPAIR      HYSTERECTOMY  2009    ILEOSTOMY  07/2022    OTHER SURGICAL HISTORY      rectocele repair    UPPER GASTROINTESTINAL ENDOSCOPY       PT Assessment (Last 12 Hours)       PT Evaluation and Treatment       Row Name 07/22/25 0900          Physical Therapy Time and Intention    Subjective Information no  complaints  -DP     Document Type evaluation  -DP     Mode of Treatment individual therapy;physical therapy  -DP     Patient Effort good  -DP       Row Name 07/22/25 0900          General Information    Patient Profile Reviewed yes  -DP     Patient Observations alert;cooperative  -DP     General Observations of Patient pt recently had a R shoulder fracture and was at rehab prior to this hospital admission  -DP     Prior Level of Function independent:;gait;transfer;bed mobility;ADL's  -DP     Existing Precautions/Restrictions fall;shoulder;non-weight bearing  -DP     Barriers to Rehab none identified  -DP       Row Name 07/22/25 0900          Living Environment    Current Living Arrangements home  -DP     Home Accessibility stairs to enter home;stairs within home  -DP     People in Home sibling(s)  -DP       Row Name 07/22/25 0900          Home Main Entrance    Number of Stairs, Main Entrance two  -DP       Row Name 07/22/25 0900          Stairs Within Home, Primary    Stairs, Within Home, Primary Pt resides in the basement  -DP       Row Name 07/22/25 0900          Range of Motion (ROM)    Range of Motion bilateral lower extremities;ROM is WFL  -DP       Row Name 07/22/25 0900          Strength Comprehensive (MMT)    General Manual Muscle Testing (MMT) Assessment lower extremity strength deficits identified  -DP     Comment, General Manual Muscle Testing (MMT) Assessment BLE: 4-/5  -DP       Row Name 07/22/25 0900          Mobility    Extremity Weight-bearing Status right upper extremity  -DP     Right Upper Extremity (Weight-bearing Status) non weight-bearing (NWB)  -DP       Row Name 07/22/25 0900          Bed Mobility    Bed Mobility supine-sit-supine  -DP     Supine-Sit-Supine [place] (Bed Mobility) contact guard  -DP     Bed Mobility, Safety Issues decreased use of arms for pushing/pulling  -DP     Assistive Device (Bed Mobility) head of bed elevated;bed rails  -DP       Row Name 07/22/25 0900           Transfers    Transfers sit-stand transfer  -DP       Row Name 07/22/25 0900          Sit-Stand Transfer    Sit-Stand [place] (Transfers) contact guard  -DP       Row Name 07/22/25 0900          Gait/Stairs (Locomotion)    Gait/Stairs Locomotion gait/ambulation assistive device  -DP     [place] Level (Gait) minimum assist (75% patient effort)  -DP     Assistive Device (Gait) walker, front-wheeled  -DP     Patient was able to Ambulate yes  -DP     Distance in Feet (Gait) 12  -DP       Row Name 07/22/25 0900          Balance    Dynamic Standing Balance minimal assist  -DP     Position/Device Used, Standing Balance supported  -DP       Row Name 07/22/25 0900          Plan of Care Review    Plan of Care Reviewed With patient  -DP     Outcome Evaluation Pt presents with decreased strength, transfers and functional mobility. Will benefit from inpatient PT services and continued PT services upon discharge.  -DP       Row Name 07/22/25 0900          Therapy Assessment/Plan (PT)    Criteria for Skilled Interventions Met (PT) yes;meets criteria  -DP     Therapy Frequency (PT) daily  -DP     Predicted Duration of Therapy Intervention (PT) 10 days  -DP     Problem List (PT) problems related to;mobility  -DP     Activity Limitations Related to Problem List (PT) unable to transfer safely;unable to ambulate safely  -DP       Row Name 07/22/25 0900          PT Evaluation Complexity    History, PT Evaluation Complexity no personal factors and/or comorbidities  -DP     Examination of Body Systems (PT Eval Complexity) total of 4 or more elements  -DP     Clinical Presentation (PT Evaluation Complexity) stable  -DP     Clinical Decision Making (PT Evaluation Complexity) low complexity  -DP     Overall Complexity (PT Evaluation Complexity) low complexity  -DP       Row Name 07/22/25 0900          Physical Therapy Goals    Transfer Goal Selection (PT) transfer, PT goal 1  -DP     Gait Training Goal Selection (PT) gait training,  PT goal 1  -DP       Row Name 07/22/25 0900          Transfer Goal 1 (PT)    Activity/Assistive Device (Transfer Goal 1, PT) sit-to-stand/stand-to-sit  -DP     [place] Level/Cues Needed (Transfer Goal 1, PT) supervision required  -DP     Time Frame (Transfer Goal 1, PT) 10 days  -DP       Row Name 07/22/25 0900          Gait Training Goal 1 (PT)    Activity/Assistive Device (Gait Training Goal 1, PT) assistive device use;walker, rolling  -DP     [place] Level (Gait Training Goal 1, PT) supervision required  -DP     Distance (Gait Training Goal 1, PT) 200  -DP     Time Frame (Gait Training Goal 1, PT) 10 days  -DP               User Key  (r) = Recorded By, (t) = Taken By, (c) = Cosigned By      Initials Name Provider Type    DP Soren Booth, PT Physical Therapist                      PT Recommendation and Plan  Anticipated Discharge Disposition (PT): sub acute care setting  Planned Therapy Interventions (PT): balance training, bed mobility training, gait training, strengthening, transfer training  Therapy Frequency (PT): daily  Plan of Care Reviewed With: patient  Outcome Evaluation: Pt presents with decreased strength, transfers and functional mobility. Will benefit from inpatient PT services and continued PT services upon discharge.   Outcome Measures       Row Name 07/22/25 0900             How much help from another person do you currently need...    Turning from your back to your side while in flat bed without using bedrails? 3  -DP      Moving from lying on back to sitting on the side of a flat bed without bedrails? 3  -DP      Moving to and from a bed to a chair (including a wheelchair)? 3  -DP      Standing up from a chair using your arms (e.g., wheelchair, bedside chair)? 3  -DP      Climbing 3-5 steps with a railing? 2  -DP      To walk in hospital room? 2  -DP      AM-PAC 6 Clicks Score (PT) 16  -DP         Functional Assessment    Outcome Measure Options AM-PAC 6 Clicks Basic Mobility (PT)   -DP                User Key  (r) = Recorded By, (t) = Taken By, (c) = Cosigned By      Initials Name Provider Type    Soren Reyes, PT Physical Therapist                     Time Calculation:    PT Charges       Row Name 07/22/25 0915             Time Calculation    PT Received On 07/22/25  -DP      PT Goal Re-Cert Due Date 07/31/25  -DP         Untimed Charges    PT Eval/Re-eval Minutes 22  -DP         Total Minutes    Untimed Charges Total Minutes 22  -DP       Total Minutes 22  -DP                User Key  (r) = Recorded By, (t) = Taken By, (c) = Cosigned By      Initials Name Provider Type    Soren Reyes, PT Physical Therapist                      PT G-Codes  Outcome Measure Options: AM-PAC 6 Clicks Basic Mobility (PT)  AM-PAC 6 Clicks Score (PT): 16  AM-PAC 6 Clicks Score (OT): 14    Soren Booth PT  7/22/2025

## 2025-07-22 NOTE — PROGRESS NOTES
[facility]   Hospitalist Progress Note  Date: 2025  Patient Name: Tawny Lennon  : 1965  MRN: [id number]  Date of admission: 2025  Consultants:   -Gastroenterology: Dr. Parmjit Mccarthy  -Orthopedic Surgery: Dr. Addie Caballero    Subjective   Subjective     Chief Complaint: Altered mental status and right upper extremity fracture    Summary:   Tawny Lennon is a 59 y.o. female with history of Reich cirrhosis with decompensation (ascites, hepatic encephalopathy, history of esophageal varices), COPD, depression, type 2 diabetes mellitus [facility] to the right upper extremity with ecchymoses and was found to have right humerus fracture as well as decompensated Reich cirrhosis evidenced by hepatic encephalopathy.  Patient transferred to [facility] for further evaluation and management.  Orthopedic Surgery and gastroenterology consulted.  Orthopedic Surgery recommended continued nonoperative management with patient to remain in sling and for patient to follow-up with her orthopedic surgeon in 7 to 10 days.  FOBT was found to be positive, however, hemoglobin remained stable and GI recommended patient to continue on lactulose and Xifaxan with outpatient GI follow-up.    Interval Followup:   No acute issues overnight.  Creatinine stable.  Patient denied any chest pain or shortness of breath.  Hemoglobin stable.  Nursing with no additional acute issues to report.    Objective   Objective     Vitals:   Temp:  [97.5 °F (36.4 °C)-98.1 °F (36.7 °C)] 97.5 °F (36.4 °C)  Heart Rate:  [87-97] 97  Resp:  [16-20] 18  BP: (111-128)/(53-64) 121/55  Flow (L/min) (Oxygen Therapy):  [1-2] 2  Physical Exam   Gen: Conversant, Pleasant, sitting up in bed  Resp: Good aeration, equal chest rise bilaterally  Card: RRR, No m/r/g  Abd: Soft, Nontender, Nondistended, + bowel sounds    Result Review    Result Review:  I have personally reviewed the results as below and  agree with these findings:  []  Laboratory:   CMP          7/20/2025    14:26 7/21/2025    03:15 7/22/2025    04:41   CMP   Glucose 145  146     BUN 25.4  29.0     Creatinine 1.18  1.19     EGFR 52.7  52.1     Sodium 133  130     Potassium 4.4  4.5     Chloride 103  103     Calcium 8.8  8.5     Total Protein 4.9  5.1  5.2    Albumin 2.4  2.4  2.6    Globulin  2.7     Total Bilirubin 6.8  6.8  6.8    Alkaline Phosphatase 200  194  204    AST (SGOT) 40  45  41    ALT (SGPT) 22  23  24    Albumin/Globulin Ratio  0.9     BUN/Creatinine Ratio 21.5  24.4     Anion Gap 9.6  8.9       CBC          7/19/2025    20:12 7/20/2025    14:26 7/21/2025    03:15   CBC   WBC 8.25  6.89  7.46    RBC 2.64  2.45  2.47    Hemoglobin 8.4  8.0  8.0    Hematocrit 25.8  24.0  26.2    MCV 97.7  98.0  106.1    MCH 31.8  32.7  32.4    MCHC 32.6  33.3  30.5    RDW 16.1  16.2  16.6    Platelets 73  65  63    INR: 1.50  []  Microbiology:   []  Radiology:   [x]  EKG/Telemetry:    []  Cardiology/Vascular:    []  Pathology:  []  Old records:  []  Other:    Assessment & Plan   Assessment / Plan     Assessment:  Right humerus fracture  Decompensated Reich cirrhosis  Acute hepatic encephalopathy-resolved  Chronic thrombocytopenia secondary to decompensated cirrhosis  Hyperbilirubinemia  CKD stage IIIa  COPD, not acutely exacerbated  GERD  History of esophageal varices    Plan:  -Gastroenterology and Orthopedic Surgery consulted and following, appreciate assistance and recommendations in the care of this patient.  -Management discussed with gastroenterology.  Abdominal ultrasound being obtained to evaluate for ascites  -Continue lactulose.  Goal of 3 bowel movements per day  -Continue Xifaxan  -Plan for patient to follow-up with Dr. Perez as outpatient for colonoscopy  -Continue appropriate home medications  -Continue Lasix and spironolactone  -Patient to follow-up with outpatient orthopedic surgeon.  Continue with nonoperative management.  -PT/OT  consulted.  Rehab was recommended.  -Will monitor electrolytes and renal function with BMP and magnesium level in the AM  -Will monitor WBC and Hgb with CBC in the AM  -Clinical course will dictate further management     DVT Prophylaxis: SCDs  GI Prophylaxis: Pantoprazole  Diet:   Diet Order   Procedures    Diet: Cardiac; Low Sodium (2g); Texture: Mechanical Ground (NDD 2); Fluid Consistency: Thin (IDDSI 0)     [facility]     Personally reviewed patient's labs and imaging, discussed with patient and nurse at bedside. Discussed management with the following consultants: Gastroenterology.     Part of this note may be an electronic transcription/translation of spoken language to printed text using the Dragon dictation system.    VTE Prophylaxis:  Mechanical VTE prophylaxis orders are present.        CODE STATUS:   Code Status (Patient has no pulse and is not breathing): No CPR (Do Not Attempt to Resuscitate)  Medical Interventions (Patient has pulse or is breathing): Limited Support  Medical Intervention Limits: No intubation (DNI)  Level Of Support Discussed With: Patient        Electronically signed by Richard Zamora MD, 7/22/2025, 15:43 EDT.

## 2025-07-22 NOTE — PLAN OF CARE
Goal Outcome Evaluation:      A/o x4. VSS. Medicated for pain x1. No additional needs at this time.

## 2025-07-23 ENCOUNTER — TELEPHONE (OUTPATIENT)
Dept: GASTROENTEROLOGY | Facility: CLINIC | Age: 60
End: 2025-07-23
Payer: MEDICARE

## 2025-07-23 VITALS
OXYGEN SATURATION: 93 % | TEMPERATURE: 98.2 F | WEIGHT: 198.63 LBS | SYSTOLIC BLOOD PRESSURE: 114 MMHG | RESPIRATION RATE: 16 BRPM | HEIGHT: 62 IN | DIASTOLIC BLOOD PRESSURE: 42 MMHG | BODY MASS INDEX: 36.55 KG/M2 | HEART RATE: 93 BPM

## 2025-07-23 LAB
ALBUMIN SERPL-MCNC: 2.5 G/DL (ref 3.5–5.2)
ALBUMIN/GLOB SERPL: 1 G/DL
ALP SERPL-CCNC: 203 U/L (ref 39–117)
ALT SERPL W P-5'-P-CCNC: 23 U/L (ref 1–33)
ANION GAP SERPL CALCULATED.3IONS-SCNC: 10.1 MMOL/L (ref 5–15)
AST SERPL-CCNC: 39 U/L (ref 1–32)
BILIRUB SERPL-MCNC: 5.4 MG/DL (ref 0–1.2)
BUN SERPL-MCNC: 26.9 MG/DL (ref 8–23)
BUN/CREAT SERPL: 22.2 (ref 7–25)
CALCIUM SPEC-SCNC: 8.9 MG/DL (ref 8.6–10.5)
CHLORIDE SERPL-SCNC: 100 MMOL/L (ref 98–107)
CO2 SERPL-SCNC: 19.9 MMOL/L (ref 22–29)
CREAT SERPL-MCNC: 1.21 MG/DL (ref 0.57–1)
DEPRECATED RDW RBC AUTO: 61.6 FL (ref 37–54)
EGFRCR SERPLBLD CKD-EPI 2021: 51.1 ML/MIN/1.73
ERYTHROCYTE [DISTWIDTH] IN BLOOD BY AUTOMATED COUNT: 16.5 % (ref 12.3–15.4)
GLOBULIN UR ELPH-MCNC: 2.6 GM/DL
GLUCOSE BLDC GLUCOMTR-MCNC: 132 MG/DL (ref 70–99)
GLUCOSE BLDC GLUCOMTR-MCNC: 258 MG/DL (ref 70–99)
GLUCOSE SERPL-MCNC: 155 MG/DL (ref 65–99)
HCT VFR BLD AUTO: 24.5 % (ref 34–46.6)
HGB BLD-MCNC: 7.7 G/DL (ref 12–15.9)
MAGNESIUM SERPL-MCNC: 2.1 MG/DL (ref 1.6–2.4)
MCH RBC QN AUTO: 32.2 PG (ref 26.6–33)
MCHC RBC AUTO-ENTMCNC: 31.4 G/DL (ref 31.5–35.7)
MCV RBC AUTO: 102.5 FL (ref 79–97)
PHOSPHATE SERPL-MCNC: 2.9 MG/DL (ref 2.5–4.5)
PLATELET # BLD AUTO: 82 10*3/MM3 (ref 140–450)
PMV BLD AUTO: 11.5 FL (ref 6–12)
POTASSIUM SERPL-SCNC: 4.4 MMOL/L (ref 3.5–5.2)
PROT SERPL-MCNC: 5.1 G/DL (ref 6–8.5)
RBC # BLD AUTO: 2.39 10*6/MM3 (ref 3.77–5.28)
SODIUM SERPL-SCNC: 130 MMOL/L (ref 136–145)
WBC NRBC COR # BLD AUTO: 8.16 10*3/MM3 (ref 3.4–10.8)

## 2025-07-23 PROCEDURE — 82948 REAGENT STRIP/BLOOD GLUCOSE: CPT

## 2025-07-23 PROCEDURE — 83735 ASSAY OF MAGNESIUM: CPT | Performed by: INTERNAL MEDICINE

## 2025-07-23 PROCEDURE — 92526 ORAL FUNCTION THERAPY: CPT

## 2025-07-23 PROCEDURE — 25010000002 MORPHINE PER 10 MG: Performed by: FAMILY MEDICINE

## 2025-07-23 PROCEDURE — 63710000001 RIFAXIMIN 550 MG TABLET

## 2025-07-23 PROCEDURE — 63710000001 LACTULOSE 20 GM/30ML SOLUTION: Performed by: INTERNAL MEDICINE

## 2025-07-23 PROCEDURE — 84100 ASSAY OF PHOSPHORUS: CPT | Performed by: INTERNAL MEDICINE

## 2025-07-23 PROCEDURE — 94760 N-INVAS EAR/PLS OXIMETRY 1: CPT

## 2025-07-23 PROCEDURE — 63710000001 INSULIN LISPRO (HUMAN) PER 5 UNITS

## 2025-07-23 PROCEDURE — 63710000001 TRAMADOL 50 MG TABLET: Performed by: INTERNAL MEDICINE

## 2025-07-23 PROCEDURE — A9270 NON-COVERED ITEM OR SERVICE: HCPCS | Performed by: INTERNAL MEDICINE

## 2025-07-23 PROCEDURE — A9270 NON-COVERED ITEM OR SERVICE: HCPCS

## 2025-07-23 PROCEDURE — 94799 UNLISTED PULMONARY SVC/PX: CPT

## 2025-07-23 PROCEDURE — 96375 TX/PRO/DX INJ NEW DRUG ADDON: CPT

## 2025-07-23 PROCEDURE — G0378 HOSPITAL OBSERVATION PER HR: HCPCS

## 2025-07-23 PROCEDURE — 85027 COMPLETE CBC AUTOMATED: CPT | Performed by: INTERNAL MEDICINE

## 2025-07-23 PROCEDURE — 63710000001: Performed by: INTERNAL MEDICINE

## 2025-07-23 PROCEDURE — 80053 COMPREHEN METABOLIC PANEL: CPT | Performed by: INTERNAL MEDICINE

## 2025-07-23 PROCEDURE — 63710000001 SPIRONOLACTONE 25 MG TABLET

## 2025-07-23 PROCEDURE — 94664 DEMO&/EVAL PT USE INHALER: CPT

## 2025-07-23 PROCEDURE — 63710000001 FUROSEMIDE 20 MG TABLET

## 2025-07-23 PROCEDURE — 99239 HOSP IP/OBS DSCHRG MGMT >30: CPT | Performed by: INTERNAL MEDICINE

## 2025-07-23 PROCEDURE — 96376 TX/PRO/DX INJ SAME DRUG ADON: CPT

## 2025-07-23 PROCEDURE — 63710000001 FOLIC ACID 1 MG TABLET

## 2025-07-23 RX ORDER — HYDROCODONE BITARTRATE AND ACETAMINOPHEN 5; 325 MG/1; MG/1
1 TABLET ORAL EVERY 6 HOURS PRN
Qty: 12 TABLET | Refills: 0 | Status: SHIPPED | OUTPATIENT
Start: 2025-07-23 | End: 2025-07-26

## 2025-07-23 RX ORDER — LACTULOSE 10 G/15ML
15-30 SOLUTION ORAL 3 TIMES DAILY
Start: 2025-07-23

## 2025-07-23 RX ORDER — FOLIC ACID 1 MG/1
1 TABLET ORAL DAILY
Start: 2025-07-23

## 2025-07-23 RX ORDER — FUROSEMIDE 20 MG/1
20 TABLET ORAL DAILY
Status: ON HOLD
Start: 2025-07-23 | End: 2025-08-04

## 2025-07-23 RX ADMIN — Medication 10 ML: at 10:37

## 2025-07-23 RX ADMIN — PHYTONADIONE 10 MG: 5 TABLET ORAL at 10:36

## 2025-07-23 RX ADMIN — FOLIC ACID 1 MG: 1 TABLET ORAL at 10:37

## 2025-07-23 RX ADMIN — RIFAXIMIN 550 MG: 550 TABLET ORAL at 10:37

## 2025-07-23 RX ADMIN — INSULIN LISPRO 4 UNITS: 100 INJECTION, SOLUTION INTRAVENOUS; SUBCUTANEOUS at 11:27

## 2025-07-23 RX ADMIN — SPIRONOLACTONE 50 MG: 25 TABLET ORAL at 10:36

## 2025-07-23 RX ADMIN — IPRATROPIUM BROMIDE AND ALBUTEROL SULFATE 3 ML: .5; 3 SOLUTION RESPIRATORY (INHALATION) at 01:09

## 2025-07-23 RX ADMIN — MORPHINE SULFATE 1 MG: 2 INJECTION, SOLUTION INTRAMUSCULAR; INTRAVENOUS at 00:45

## 2025-07-23 RX ADMIN — PANTOPRAZOLE SODIUM 40 MG: 40 INJECTION, POWDER, FOR SOLUTION INTRAVENOUS at 10:36

## 2025-07-23 RX ADMIN — LACTULOSE SOLUTION USP, 10 G/15 ML 20 G: 10 SOLUTION ORAL; RECTAL at 10:37

## 2025-07-23 RX ADMIN — FUROSEMIDE 20 MG: 20 TABLET ORAL at 10:36

## 2025-07-23 RX ADMIN — TRAMADOL HYDROCHLORIDE 50 MG: 50 TABLET, COATED ORAL at 14:45

## 2025-07-23 RX ADMIN — IPRATROPIUM BROMIDE AND ALBUTEROL SULFATE 3 ML: .5; 3 SOLUTION RESPIRATORY (INHALATION) at 13:09

## 2025-07-23 RX ADMIN — IPRATROPIUM BROMIDE AND ALBUTEROL SULFATE 3 ML: .5; 3 SOLUTION RESPIRATORY (INHALATION) at 06:37

## 2025-07-23 NOTE — SIGNIFICANT NOTE
07/23/25 0822   OTHER   Discipline occupational therapist   Rehab Time/Intention   Session Not Performed patient unavailable for treatment  (with speech therapist [phone])

## 2025-07-23 NOTE — DISCHARGE SUMMARY
[facility]         HOSPITALIST  DISCHARGE SUMMARY    Patient Name: Tawny Lennon  : 1965  MRN: [id number]    Date of Admission: 2025  Date of Discharge:  25  Primary Care Physician: Zain Valentine MD    Consultants:  -Gastroenterology: Dr. Parmjit Mccarthy  -Orthopedic Surgery: Dr. Addie Caballero    Hospital Problems:  Right humerus fracture  Decompensated Reich cirrhosis  Acute hepatic encephalopathy-resolved  Chronic thrombocytopenia secondary to decompensated cirrhosis  Hyperbilirubinemia  CKD stage IIIa  COPD, not acutely exacerbated  GERD  History of esophageal varices    Hospital Course     Hospital Course:  Tawny Lennon is a 59 y.o. female with history of Reich cirrhosis with decompensation (ascites, hepatic encephalopathy, history of esophageal varices), COPD, depression, type 2 diabetes mellitus and initially presented to outlying facility with extensive edema to the right upper extremity with ecchymoses and was found to have right humerus fracture as well as decompensated Reich cirrhosis evidenced by hepatic encephalopathy. Patient transferred to [facility] for further evaluation and management. Orthopedic Surgery and gastroenterology consulted. Orthopedic Surgery recommended continued nonoperative management with patient to remain in sling and for patient to follow-up with her orthopedic surgeon in 7 to 10 days. FOBT was found to be positive, however, hemoglobin remained stable.  Abdominal ultrasound completed that showed small amount of ascites, paracentesis was not pursued. GI recommended patient to continue on lactulose and Xifaxan with outpatient GI follow-up.  On day of discharge patient hemodynamically stable and no additional inpatient evaluation or workup necessary this time.  Given patient's medical comorbidities she is high risk for readmission to the hospital.  Patient will discharge to [facility] for continued work with skilled therapy  services as she has a follow-up with her PCP, gastroenterology and Orthopedic Surgery after discharge    DISCHARGE Follow Up Recommendations for labs and diagnostics:   -Follow-up with PCP in 3 to 5 days  -Follow-up Gastroenterology in 2 weeks  -Follow-up with her primary orthopedic surgeon within 1 week    Day of Discharge     Vital Signs:  Temp:  [97.5 °F (36.4 °C)-98.1 °F (36.7 °C)] 98.1 °F (36.7 °C)  Heart Rate:  [] 88  Resp:  [16-18] 18  BP: (101-121)/(40-55) 102/40  Flow (L/min) (Oxygen Therapy):  [1-2] 1  Physical Exam:   Gen: Sitting up in chair at bedside, pleasant, conversant  Resp: Normal respiratory effort  Card: RRR, No m/r/g  Abd: Soft, Nontender, Nondistended, + bowel sounds     Discharge Details        Discharge Medications        Changes to Medications        Instructions Start Date   furosemide 20 MG tablet  Commonly known as: LASIX  What changed:   when to take this  reasons to take this   20 mg, Oral, Daily      HYDROcodone-acetaminophen 5-325 MG per tablet  Commonly known as: NORCO  What changed: reasons to take this   1 tablet, Oral, Every 6 Hours PRN      lactulose 10 GM/15ML solution  Commonly known as: CHRONULAC  What changed: when to take this   15-30 mL, Oral, 3 Times Daily, Goal of 2-3 bowel movements per day.             Continue These Medications        Instructions Start Date   albuterol sulfate  (90 Base) MCG/ACT inhaler  Commonly known as: PROVENTIL HFA;VENTOLIN HFA;PROAIR HFA   2 puffs, Inhalation, Every 4 Hours PRN      fluticasone 50 MCG/ACT nasal spray  Commonly known as: FLONASE   2 sprays, Daily      folic acid 1 MG tablet  Commonly known as: FOLVITE   1 mg, Oral, Daily      pantoprazole 40 MG EC tablet  Commonly known as: PROTONIX   40 mg, Daily      riFAXIMin 550 MG tablet  Commonly known as: Xifaxan   550 mg, Oral, Every 12 Hours Scheduled      spironolactone 50 MG tablet  Commonly known as: ALDACTONE   50 mg, Oral, Daily               No Known  Allergies    Discharge Disposition:      Diet:  Hospital:  Diet Order   Procedures    Diet: Cardiac; Low Sodium (2g); Texture: Mechanical Ground (NDD 2); Fluid Consistency: Thin (IDDSI 0)       Discharge Activity:   Activity Instructions       Other Activity Instructions      Activity Instructions: RUE to remain in sling            CODE STATUS:  Code Status and Medical Interventions: No CPR (Do Not Attempt to Resuscitate); Limited Support; No intubation (DNI)   Ordered at: 07/20/25 0232     Code Status (Patient has no pulse and is not breathing):    No CPR (Do Not Attempt to Resuscitate)     Medical Interventions (Patient has pulse or is breathing):    Limited Support     Medical Intervention Limits:    No intubation (DNI)     Level Of Support Discussed With:    Patient       No future appointments.    Additional Instructions for the Follow-ups that You Need to Schedule       Discharge Follow-up with PCP   As directed       Currently Documented PCP:    Zain Valentine MD    PCP Phone Number:    [phone]     Follow Up Details: Follow-up in 3-5 days        Discharge Follow-up with Specified Provider: Patient's orthopedic surgeon   As directed      To: Patient's orthopedic surgeon   Follow Up Details: Follow-up in 1 week                Pertinent  and/or Most Recent Results     RADIOLOGY:  US Abdomen Limited [[id number]] Riley as Reviewed   Order Status: Completed Collected: 07/22/25 1956    Updated: 07/22/25 1959   Narrative:     US ABDOMEN LIMITED    Date of Exam: 7/22/2025 7:36 PM EDT    Indication: Evaluate for ascites.    Comparison: No comparisons available.    Technique: Limited abdominal ultrasound to assess for ascites.      Findings:  Small volume right upper quadrant ascites. Trace right lower and left lower lobe quadrant ascites.  Focal subcutaneous edema.   Impression:     Impression:  Small volume ascites.        Electronically Signed: Justin Pak MD   7/22/2025 7:57 PM EDT   Workstation ID:  [id number]    Duplex Venous Upper Extremity - Bilateral CV-READ [[id number]] Riley as Reviewed   Order Status: Completed Resulted: 07/21/25 1412    Updated: 07/21/25 1412    Right Internal Jugular Spont Y    Right Internal Jugular Phasic Y    Right Internal Jugular Compress C    Right Subclavian Spont Y    Right Subclavian Phasic Y    Right Subclavian Compress C    Right Axillary Spont Y    Right Axillary Phasic Y    Right Axillary Compress C    Right Brachial Compress C    Right Basilic Upper Compress C    Right Cephalic Upper Compress C    Right Cephalic Forearm Compress C    Left Internal Jugular Spont Y    Left Internal Jugular Phasic Y    Left Internal Jugular Compress C    Left Subclavian Spont Y    Left Subclavian Phasic Y    Left Subclavian Compress C    Left Axillary Spont Y    Left Axillary Phasic Y    Left Axillary Augment Y    Left Brachial Compress C    Left Radial Compress C    Left Ulnar Compress C    Left Basilic Upper Compress C    Left Basilic Forearm Compress C    Left Cephalic Upper Compress C    Left Cephalic Forearm Compress C   Narrative:       Normal bilateral upper extremity venous duplex scan.    XR Shoulder 2+ View Right [[id number]] Riley as Reviewed   Order Status: Completed Collected: 07/20/25 0805    Updated: 07/20/25 0809   Narrative:     XR SHOULDER 2+ VW RIGHT    Date of Exam: 7/20/2025 7:51 AM EDT    Indication: fracture    Comparison: None available.    Findings:  There is a comminuted displaced fracture of the right humeral head and neck. The AC joint is intact. There is widening of the glenohumeral joint suggesting underlying joint effusion.   Impression:     Impression:  Comminuted displaced fracture of the right humeral head and neck.      Electronically Signed: Cruz Jaimes MD   7/20/2025 8:06 AM EDT   Workstation ID: [id number]   CT humerus right without contrast [[id number]] Review Not Required   Order Status: Completed Collected: 07/19/25 1720    Updated: 07/19/25 1905    Narrative:     Study: CT HUMERUS RIGHT WO CONTRAST    REASON FOR EXAM: Fracture, humerus    TECHNIQUE:  Thin axial images of the right shoulder joint without contrast were obtained with sagittal and coronal reconstruction. One of the following dose reduction techniques were utilized for this exam: Automated exposure control, adjustment of the mA and/or kV according to patient size, and use of iterative reconstruction.    COMPARISON:  07/02/2025.    ___________________________________    FINDINGS:  Bones:   Still noted a fracture proximal humerus with a comminuted fracture of the humeral head, the humeral head seen impacted with patchy sclerosis    Glenohumeral Joint:   Mild inferior sublaxation of the humeral head     Acromioclavicular (AC) Joint:   Normal appearance of the AC joint.  No evidence of AC joint separation or degenerative changes.     Soft Tissues:   Soft tissue hematoma is seen at the upper arm.   Subcutaneous fat smudging and soft tissue edema are seen at the lower arm and the scanned portion of the forearm.   The scanned part of the right upper quadrant shows perihepatic ascites. Dedicated study is advised.    ___________________________________   Impression:     1. Still noted a fracture proximal humerus with a comminuted fracture of the humeral head, the humeral head seen impacted with patchy sclerosis, stable  2. Soft tissue hematoma is seen at the upper arm.  3. The scanned part of the right upper quadrant shows perihepatic ascites. Dedicated study is advised.   XR Chest 1 View [[id number]] Review Not Required   Order Status: Completed Resulted: 07/18/25 0531    Updated: 07/18/25 1507   Narrative:     Study: XR CHEST AP PORTABLE    REASON FOR EXAM: Hemoptysis    TECHNIQUE:  Radiograph of chest was acquired.    COMPARISON:  No    ___________________________________    FINDINGS:  The lungs are clear and well-expanded with no pulmonary infiltrate or pleural effusion.   The cardiomediastinal  silhouette is within normal limits.   No acute osseous abnormality.    ___________________________________   Impression:     1. No acute cardiopulmonary disease.   US Renal Bilateral [[id number]] Review Not Required   Order Status: Completed Collected: 07/15/25 1052    Updated: 07/18/25 1507   Narrative:     STUDY:   RENAL ULTRASOUND - COMPLETE    REASON FOR EXAM:   Female, 59 years old.  Altered mental status    TECHNIQUE:   Ultrasound evaluation of the kidneys was performed with real-time and static grey-scale imaging.    COMPARISON:   None.  ___________________________________    FINDINGS:    RIGHT KIDNEY:   Normal location of the right kidney, which is normal in size.  The right kidney measures 9.6 cm.  There is a normal cortex of the right kidney.  The renal cortex measures 2.1 cm.  There is no right renal mass or cyst.  There are no right  renal calculi.  There is no right hydronephrosis.    DISTAL RIGHT URETER:  There is non-visualization of the distal right ureter.  There is no demonstrated right ureterovesical junction calculus.  There is a visualized right ureteral jet.    LEFT KIDNEY:   Normal location of the left kidney, which is normal in size.  The left kidney measures 12.2 cm.  There is a normal cortex of the left kidney.  The renal cortex measures 2.1 cm.  There is no left renal mass or cyst.  There are no left renal   calculi.  There is no left hydronephrosis.    DISTAL LEFT URETER:  There is non-visualization of the distal left ureter.  There is no demonstrated left ureterovesical junction calculus.  There is a visualized left ureteral jet.    BLADDER: Not visualized.  ___________________________________   Impression:       Normal ultrasound of the kidneys.    [facility]   XR Chest 1 View [[id number]] Review Not Required   Order Status: Completed Collected: 07/15/25 0905    Updated: 07/18/25 1507   Narrative:     XR CHEST AP PORTABLE    INDICATION:  59 years Female altered mental  status    TECHNIQUE:  Portable AP view of the chest    COMPARISON:  2/6/2025    FINDINGS:      The lungs are clear and expanded.  Normal visualized pulmonary arteries.  There is no pleural abnormality.      Normal size heart.  Normal aortic arch and descending thoracic aorta.  Normal mediastinum and karlie.      Normal thoracic spine.  Normal visualized ribs, clavicles, and shoulders.    There is no abnormality of the visualized upper abdomen.   Impression:         No active disease.          [facility]   US Abdomen Limited [[id number]] Review Not Required   Order Status: Completed Collected: 07/15/25 0905    Updated: 07/18/25 1507   Narrative:     STUDY:  ABDOMINAL ULTRASOUND - RIGHT UPPER QUADRANT    REASON FOR VISIT:   Female, 59 years old  ascites    TECHNIQUE:   Ultrasound evaluation of the right upper quadrant was performed with real-time and static gray-scale imaging.    TECHNICAL  QUALITY:   Adequate.    COMPARISON:   None.  ___________________________________    FINDINGS:    Limited longitudinal and transverse ultrasound images of the abdomen demonstrate no ascites. .  ___________________________________   Impression:       No ascites.    [facility]   CT Abdomen Pelvis With Contrast [[id number]] Review Not Required   Order Status: Completed Collected: 07/14/25 1300    Updated: 07/18/25 1507   Narrative:     STUDY:  CT ABDOMEN AND PELVIS WITH CONTRAST    REASON FOR EXAM:   Female, 59 years old.  Abdominal pain, acute, nonlocalized    RADIATION DOSAGE (If Supplied By Facility):  CTDIvol = ( ) mGy, DLP = ( ) mGycm    TECHNIQUE:   Transaxial images were obtained from the dome of the diaphragm to the symphysis pubis without oral contrast. Contrast was administered.  Sagittal and coronal images were reconstructed.    Individualized dose optimization techniques were used for this CT.    COMPARISON:   9/16/2024  ___________________________________    FINDINGS:  The visualized lung bases are unremarkable.  The  visualized portions of the heart are within normal limits. Small amount of ascites.    There is a diffuse contour abnormality of the liver consistent with cirrhotic changes.  There is non-visualization of the gallbladder, which may be secondary to either contraction or a prior cholecystectomy.  There is mild splenomegaly.  Normal pancreas.    Normal bilateral adrenal glands.    Normal right kidney.  Normal left kidney.    Multiple gastroesophageal varices consistent with portal hypertension. Focal area of increased attenuation of the long the posterior wall of the body the stomach on image 24 worrisome for active gastrointestinal hemorrhage.  Suture line in the terminal  ileum.  There are multiple colonic diverticula consistent with diverticulosis.  There is non-visualization of the appendix.    Normal abdominal aorta.  Normal inferior vena cava.  Normal retroperitoneum.    Normal urinary bladder.    Edema in the simultaneous fat consistent with anasarca.  Normal osseous structures.  ___________________________________   Impression:       1.  Suspect active gastrointestinal hemorrhage along the posterior wall of the body the stomach. Endoscopy may be useful.  2.  Cirrhosis with mild splenomegaly, mild amount of ascites, anasarca, and gastroesophageal varices.  3.  Sigmoid diverticulosis without diverticula.    4.  Dr. Delgado was notified on 7/14/2025 at 1208.    [facility]   XR Shoulder 2+ View Right [[id number]] Review Not Required   Order Status: Completed Collected: 07/08/25 0819    Updated: 07/14/25 0940   Narrative:     XR SHOULDER RIGHT 2 OR MORE VIEWS    INDICATION:  59 years Female M25.511: Pain in right shoulder      TECHNIQUE:  3 view(s) of the shoulder    COMPARISON:  7/2/2025      FINDINGS:    Normal glenohumeral joint.  Normal acromioclavicular joint. There is a concave (type 2) acromion.    No change in the fracture of the humeral neck with extension through the greater tuberosity.      Normal soft  tissue structures.          Normal visualized lung apex.     Impression:         No change in humeral neck fracture.      [facility]   XR Shoulder 2+ View Right [[id number]] Review Not Required   Order Status: Completed Collected: 07/02/25 1744    Updated: 07/07/25 1119   Addenda:       Addendum by Clinton Rascon MD on 07/02/2025  7:09 PM CDTADDENDUM:The   report was faxed at [phone] at 6:20 PM CST, 7/02/2025 Signed:  by Provider, Generic External Data   Narrative:     Study: XR SHOULDER RIGHT 2 OR MORE VIEWS    REASON FOR EXAM: FALL    TECHNIQUE:  X-ray images of the right shoulder were obtained in anteroposterior (AP) in internal and external rotation and Y-view projections.    COMPARISON:  No prior studies available for comparison.    ___________________________________    FINDINGS:  Bone Structure:   There is comminuted fracture of the right proximal humerus with avulsion of the greater and lesser tuberosities (4 fragments fracture) with subluxation of the glenohumeral joint. Mild displacement and impaction are noted.     Soft Tissues:   Suspected mild joint effusion and soft tissue swelling around the shoulder joint.     Additional Findings:   The clavicle and acromioclavicular joint are intact.    ___________________________________   Impression:     1. Four-fragment fracture of the proximal humerus with subluxation of the glenohumeral joint.  2. Suspected mild joint effusion and soft tissue swelling around the shoulder joint.       Disclaimer: A subtle bone abnormality or fracture may not be readily apparent on X-rays, thus clinical correlation and further imaging including follow-up CT, MRI, or follow-up X-rays are advised as needed.   XR Knee 3 View Right [[id number]] Review Not Required   Order Status: Completed Collected: 07/02/25 1743    Updated: 07/07/25 1119   Narrative:     Study: XR KNEE RIGHT 3 VIEWS    REASON FOR EXAM: FALL    TECHNIQUE:  X-ray images of the right knee were obtained  in anteroposterior (AP), lateral, and sunrise/skyline (patellar) projections.    COMPARISON:  No prior studies available for comparison.    ___________________________________    FINDINGS:  Bone Structure:   Bone structure is normal and well-aligned. No evidence of acute fractures or dislocations. No osseous lesions or abnormalities identified.    Joint Spaces:   Mild narrowing of the medial and lateral tibiofemoral as well as patellofemoral joint spaces.   Suprapatellar joint effusion is noted.    Articular Surfaces:   Articular surfaces are smooth and intact. No signs of osteophyte formation or subchondral sclerosis.    Patella:   Patella is normal in position and alignment. No evidence of patellar dislocation or subluxation.    Soft Tissues:   Periarticular soft tissues appear normal and unremarkable. No soft tissue swelling, calcifications, or foreign bodies noted.    ___________________________________   Impression:     No evidence of acute fractures or dislocations.   Mild knee osteoarthritis.   Knee joint effusion.       Disclaimer: A subtle bone abnormality or fracture may not be readily apparent on X-rays, thus clinical correlation and further imaging including follow-up CT, MRI, or follow-up X-rays are advised as needed.     LAB RESULTS:      Lab 07/23/25  0455 07/22/25  0441 07/21/25  0315 07/20/25  1426 07/20/25  0709 07/20/25  0425 07/19/25 2012   WBC 8.16  --  7.46 6.89  --   --  8.25   HEMOGLOBIN 7.7*  --  8.0* 8.0*  --   --  8.4*   HEMATOCRIT 24.5*  --  26.2* 24.0*  --   --  25.8*   PLATELETS 82*  --  63* 65*  --   --  73*   NEUTROS ABS  --   --  5.82 5.33  --   --  6.31   IMMATURE GRANS (ABS)  --   --  0.17* 0.14*  --   --  0.21*   LYMPHS ABS  --   --  0.77 0.69*  --   --  0.73   MONOS ABS  --   --  0.47 0.54  --   --  0.74   EOS ABS  --   --  0.16 0.16  --   --  0.20   .5*  --  106.1* 98.0*  --   --  97.7*   LACTATE  --   --   --   --  2.0 2.3*  --    PROTIME  --  18.8*  --  19.6*  --   18.3*  --          Lab 07/23/25 0455 07/21/25 0315 07/20/25 1426 07/19/25 2012   SODIUM 130* 130* 133* 132*   POTASSIUM 4.4 4.5 4.4 4.2   CHLORIDE 100 103 103 102   CO2 19.9* 18.1* 20.4* 19.2*   ANION GAP 10.1 8.9 9.6 10.8   BUN 26.9* 29.0* 25.4* 25.0*   CREATININE 1.21* 1.19* 1.18* 1.39*   EGFR 51.1* 52.1* 52.7* 43.3*   GLUCOSE 155* 146* 145* 177*   CALCIUM 8.9 8.5* 8.8 8.6   MAGNESIUM 2.1 2.2  --   --    PHOSPHORUS 2.9 3.1  --   --    HEMOGLOBIN A1C  --   --  4.80  --          Lab 07/23/25 0455 07/22/25 0441 07/21/25 0315 07/20/25 1426 07/19/25 2012   TOTAL PROTEIN 5.1* 5.2* 5.1* 4.9* 5.4*   ALBUMIN 2.5* 2.6* 2.4* 2.4* 2.7*   GLOBULIN 2.6  --  2.7  --  2.7   ALT (SGPT) 23 24 23 22 23   AST (SGOT) 39* 41* 45* 40* 44*   BILIRUBIN 5.4* 6.8* 6.8* 6.8* 6.6*   INDIRECT BILIRUBIN  --  3.0  --  2.7  --    BILIRUBIN DIRECT  --  3.8*  --  4.1*  --    ALK PHOS 203* 204* 194* 200* 215*         Lab 07/22/25 0441 07/20/25 1426 07/20/25 0425   PROTIME 18.8* 19.6* 18.3*   INR 1.50* 1.58* 1.45*                 Brief Urine Lab Results  (Last result in the past 365 days)        Color   Clarity   Blood   Leuk Est   Nitrite   Protein   CREAT   Urine HCG        05/14/25 1127 Dark Yellow   Clear   Negative   Trace   Negative   Negative                 Microbiology Results (last 10 days)       ** No results found for the last 240 hours. **              Results for orders placed during the hospital encounter of 07/19/25    Duplex Venous Upper Extremity - Bilateral CV-READ 07/21/2025  2:12 PM    Interpretation Summary    Normal bilateral upper extremity venous duplex scan.      Results for orders placed during the hospital encounter of 07/19/25    Duplex Venous Upper Extremity - Bilateral CV-READ 07/21/2025  2:12 PM    Interpretation Summary    Normal bilateral upper extremity venous duplex scan.      Results for orders placed during the hospital encounter of 11/19/24    Adult Transthoracic Echo Complete W/ Cont if Necessary  Per Protocol 11/19/2024  4:33 PM    Interpretation Summary    Left ventricular systolic function is normal. Left ventricular ejection fraction appears to be 61 - 65%.    Left ventricular diastolic function is normal.    There are no significant valvular abnormalities.      Time spent on Discharge including face to face service:  32 minutes    Electronically signed by Richard Zamora MD, 07/23/25, 8:53 AM EDT.

## 2025-07-23 NOTE — PLAN OF CARE
Goal Outcome Evaluation:    AAO X4. TOLERATING DIET, 1L/NC, & ACTIIVTY WITH ASSIST. SUSAN IN SLING. DISCHARGE TO REHAB.

## 2025-07-23 NOTE — PLAN OF CARE
Goal Outcome Evaluation:   Required one dose of Morphine to relieve pain in right arm.  Is currently resting quietly with no complaints.

## 2025-07-23 NOTE — THERAPY TREATMENT NOTE
Acute Care - Speech Language Pathology   Swallow Treatment Note  [place]     Patient Name: Tawny Lennon  : 1965  MRN: [id number]  Today's Date: 2025               Admit Date: 2025    Visit Dx:     ICD-10-CM ICD-9-CM   1. Decreased activities of daily living (ADL)  Z78.9 V49.89   2. Oropharyngeal dysphagia  R13.12 787.22   3. Difficulty walking  R26.2 719.7   4. Cirrhosis of liver with ascites, unspecified hepatic cirrhosis type  K74.60 571.5    R18.8    5. Decompensated cirrhosis  K72.90 571.5    K74.60 572.8     Patient Active Problem List   Diagnosis    Benign essential HTN    Colon polyps    Depression    Diabetes mellitus without complication    Diverticulitis    Hematuria    High blood pressure    High cholesterol    Interstitial cystitis    Migraine without aura    Narcolepsy    Obesity    Other specified chronic obstructive pulmonary disease    Panic attacks    Paroxysmal SVT (supraventricular tachycardia)    Sleep apnea    Cirrhosis of liver without ascites    Esophageal varices without bleeding    Preop cardiovascular exam    MEDRANO (nonalcoholic steatohepatitis)    Gastroesophageal reflux disease without esophagitis    Hyperkalemia    Severe malnutrition    Pancreatitis    SBO (small bowel obstruction)    Viral URI with cough    Acquired coagulation factor deficiency    Iron deficiency anemia    History of closure of ileostomy    History of diverticulitis    Fecal occult blood test positive    History of colon polyps    Acute liver failure    Decompensated cirrhosis     Past Medical History:   Diagnosis Date    Anemia     Chronic kidney disease     Cirrhosis     liver    Cirrhosis, nonalcoholic     Colon polyps     COPD (chronic obstructive pulmonary disease)     Depression     Diabetes     Diverticulitis     Esophageal varices     Fatty liver     GERD (gastroesophageal reflux disease)     Hematuria     Hernia     High blood pressure     High cholesterol     Interstitial cystitis      Migraine headache     Narcolepsy     On home O2     Pancreatitis     PONV (postoperative nausea and vomiting)     Sleep apnea     Spinal headache     DURING PREGNANCY    Ulcerative colitis      Past Surgical History:   Procedure Laterality Date    ABDOMINAL SURGERY      BLADDER REPAIR  2009    CHOLECYSTECTOMY      COLONOSCOPY  2014 x2 2020    ENDOSCOPY  2014 2020    ENDOSCOPY N/A 07/12/2021    Procedure: ESOPHAGOGASTRODUODENOSCOPY with biopsies;  Surgeon: Jesse Watkins MD;  Location: [facility] END[facility]COPY;  Service: Gastroenterology;  Laterality: N/A;  esophageal varices    ENDOSCOPY N/A 03/25/2022    Procedure: ESOPHAGOGASTRODUODENOSCOPY WITH BIOPSIES;  Surgeon: Jesse Watkins MD;  Location: [facility] END[facility]COPY;  Service: Gastroenterology;  Laterality: N/A;  ESOPHAGEAL VARICIES    ENDOSCOPY N/A 09/28/2023    Procedure: ESOPHAGOGASTRODUODENOSCOPY WITH COLD BIOPSIES;  Surgeon: Jesse Watkins MD;  Location: [facility] END[facility]COPY;  Service: Gastroenterology;  Laterality: N/A;  GASTRITIS, ESOPHAGEAL VARICES    ENDOSCOPY N/A 11/17/2023    Procedure: ESOPHAGOGASTRODUODENOSCOPY WITH BIOPSIES AND  VARICEAL BANDING X 4;  Surgeon: Jesse Watkins MD;  Location: [facility] END[facility]COPY;  Service: Gastroenterology;  Laterality: N/A;  GASTRITIS, PORTAL HYPERTENSIVE GASTROPATHY, ESOPHAGEAL VARICIES    ENDOSCOPY N/A 12/21/2023    Procedure: ESOPHAGOGASTRODUODENOSCOPY WITH BIOPSIES AND ESOPHAGEAL BANDING;  Surgeon: Jesse Watkins MD;  Location: [facility];  Service: Gastroenterology;  Laterality: N/A;  ESOPHAGEAL VARICES, GASTRITIS    ENDOSCOPY N/A 5/16/2025    Procedure: ESOPHAGOGASTRODUODENOSCOPY;  Surgeon: Tuan Novoa MD;  Location: [facility] END[facility];  Service: Gastroenterology;  Laterality: N/A;  PORTAL HYPERTENSIVE GASTROPATHY, ESOPHAGEAL SCARRING FROM PREVIOUS BANDING    HAND SURGERY      HERNIA REPAIR      HYSTERECTOMY  2009    ILEOSTOMY  07/2022    OTHER SURGICAL HISTORY      rectocele  repair    UPPER GASTROINTESTINAL ENDOSCOPY           SPEECH PATHOLOGY DYSPHAGIA TREATMENT    Subjective/Behavioral Observations: Alert and cooperative, assisted sitting upright in bed      Day/time of Treatment: 7/23/2025      Current Diet: Mechanical ground, thin      Current Strategies:Cut all food items small. Alternate small bites and small sips of solids and liquids at a slow rate. Larger medications whole in applesauce or with protein drink.       Treatment received: Dysphagia therapy to address swallow function through exercises and education of strategies.      Results of treatment: P.o. intake with 75% of small a.m. meal.  Patient demonstrating adequate chewing and swallow.  Sips of thin liquid by cup with no overt clinical signs or symptoms of aspiration.  Patient demonstrated sequential swallow without difficulty.  Patient was noted with coughing x 1 following mixed consistencies.      Progress toward goals: Adequate      Barriers to Achieving goals: Medical status      Plan of care:/changes in plan: Continue with current plan.  Avoid mixed consistencies.  Recommend larger medications to be taken with thicker substance such as partially melted ice cream, applesauce, or protein drink.                                                                                                      EDUCATION  The patient has been educated in the following areas:   Modified Diet Instruction.                Time Calculation:    Time Calculation- SLP       Row Name 07/23/25 0910             Time Calculation- SLP    SLP Stop Time 0800  -TB      SLP Received On 07/23/25  -TB         Untimed Charges    02062-XL Treatment Swallow Minutes 40  -TB         Total Minutes    Untimed Charges Total Minutes 40  -TB       Total Minutes 40  -TB                User Key  (r) = Recorded By, (t) = Taken By, (c) = Cosigned By      Initials Name Provider Type    Nel Lutz SLP Speech and Language Pathologist                    Therapy  Charges for Today       Code Description Service Date Service Provider Modifiers Qty    [id number]  ST EVAL ORAL PHARYNG SWALLOW 4 7/22/2025 Nel Villegas, JEANNIE GN 1    [id number] HC ST TREATMENT SWALLOW 3 7/23/2025 Nel Villegas, JEANNIE GN 1                 JEANNIE An  7/23/2025   Yes

## 2025-07-29 ENCOUNTER — TELEPHONE (OUTPATIENT)
Dept: GASTROENTEROLOGY | Facility: CLINIC | Age: 60
End: 2025-07-29
Payer: MEDICARE

## 2025-07-29 ENCOUNTER — APPOINTMENT (OUTPATIENT)
Dept: GENERAL RADIOLOGY | Facility: HOSPITAL | Age: 60
DRG: 433 | End: 2025-07-29
Payer: MEDICARE

## 2025-07-29 ENCOUNTER — HOSPITAL ENCOUNTER (INPATIENT)
Facility: HOSPITAL | Age: 60
LOS: 6 days | Discharge: HOME | DRG: 433 | End: 2025-08-04
Attending: EMERGENCY MEDICINE
Payer: MEDICARE

## 2025-07-29 DIAGNOSIS — Z78.9 DECREASED ACTIVITIES OF DAILY LIVING (ADL): ICD-10-CM

## 2025-07-29 DIAGNOSIS — K74.69 OTHER CIRRHOSIS OF LIVER: ICD-10-CM

## 2025-07-29 DIAGNOSIS — R18.8 CIRRHOSIS OF LIVER WITH ASCITES, UNSPECIFIED HEPATIC CIRRHOSIS TYPE: Primary | ICD-10-CM

## 2025-07-29 DIAGNOSIS — R26.2 DIFFICULTY WALKING: ICD-10-CM

## 2025-07-29 DIAGNOSIS — K74.60 CIRRHOSIS OF LIVER WITH ASCITES, UNSPECIFIED HEPATIC CIRRHOSIS TYPE: Primary | ICD-10-CM

## 2025-07-29 LAB
ALBUMIN SERPL-MCNC: 2.6 G/DL (ref 3.5–5.2)
ALBUMIN/GLOB SERPL: 0.8 G/DL
ALP SERPL-CCNC: 223 U/L (ref 39–117)
ALT SERPL W P-5'-P-CCNC: 18 U/L (ref 1–33)
AMMONIA BLD-SCNC: 37 UMOL/L (ref 11–51)
ANION GAP SERPL CALCULATED.3IONS-SCNC: 8.4 MMOL/L (ref 5–15)
AST SERPL-CCNC: 40 U/L (ref 1–32)
BACTERIA UR QL AUTO: ABNORMAL /HPF
BASOPHILS # BLD AUTO: 0.01 10*3/MM3 (ref 0–0.2)
BASOPHILS NFR BLD AUTO: 0.2 % (ref 0–1.5)
BILIRUB SERPL-MCNC: 5.9 MG/DL (ref 0–1.2)
BILIRUB UR QL STRIP: NEGATIVE
BUN SERPL-MCNC: 21.4 MG/DL (ref 8–23)
BUN/CREAT SERPL: 19.3 (ref 7–25)
CALCIUM SPEC-SCNC: 8.6 MG/DL (ref 8.6–10.5)
CHLORIDE SERPL-SCNC: 102 MMOL/L (ref 98–107)
CLARITY UR: CLEAR
CO2 SERPL-SCNC: 20.6 MMOL/L (ref 22–29)
COLOR UR: YELLOW
CREAT SERPL-MCNC: 1.11 MG/DL (ref 0.57–1)
D-LACTATE SERPL-SCNC: 1.6 MMOL/L (ref 0.5–2)
DEPRECATED RDW RBC AUTO: 65.4 FL (ref 37–54)
EGFRCR SERPLBLD CKD-EPI 2021: 56.7 ML/MIN/1.73
EOSINOPHIL # BLD AUTO: 0.1 10*3/MM3 (ref 0–0.4)
EOSINOPHIL NFR BLD AUTO: 2.4 % (ref 0.3–6.2)
ERYTHROCYTE [DISTWIDTH] IN BLOOD BY AUTOMATED COUNT: 17.4 % (ref 12.3–15.4)
GLOBULIN UR ELPH-MCNC: 3.1 GM/DL
GLUCOSE SERPL-MCNC: 132 MG/DL (ref 65–99)
GLUCOSE UR STRIP-MCNC: NEGATIVE MG/DL
HCT VFR BLD AUTO: 24.5 % (ref 34–46.6)
HGB BLD-MCNC: 7.9 G/DL (ref 12–15.9)
HGB UR QL STRIP.AUTO: NEGATIVE
HOLD SPECIMEN: NORMAL
HOLD SPECIMEN: NORMAL
HYALINE CASTS UR QL AUTO: ABNORMAL /LPF
IMM GRANULOCYTES # BLD AUTO: 0.01 10*3/MM3 (ref 0–0.05)
IMM GRANULOCYTES NFR BLD AUTO: 0.2 % (ref 0–0.5)
KETONES UR QL STRIP: NEGATIVE
LEUKOCYTE ESTERASE UR QL STRIP.AUTO: ABNORMAL
LIPASE SERPL-CCNC: 71 U/L (ref 13–60)
LYMPHOCYTES # BLD AUTO: 0.46 10*3/MM3 (ref 0.7–3.1)
LYMPHOCYTES NFR BLD AUTO: 11.1 % (ref 19.6–45.3)
MCH RBC QN AUTO: 32.6 PG (ref 26.6–33)
MCHC RBC AUTO-ENTMCNC: 32.2 G/DL (ref 31.5–35.7)
MCV RBC AUTO: 101.2 FL (ref 79–97)
MONOCYTES # BLD AUTO: 0.36 10*3/MM3 (ref 0.1–0.9)
MONOCYTES NFR BLD AUTO: 8.7 % (ref 5–12)
NEUTROPHILS NFR BLD AUTO: 3.22 10*3/MM3 (ref 1.7–7)
NEUTROPHILS NFR BLD AUTO: 77.4 % (ref 42.7–76)
NITRITE UR QL STRIP: NEGATIVE
NRBC BLD AUTO-RTO: 0 /100 WBC (ref 0–0.2)
PH UR STRIP.AUTO: 7 [PH] (ref 5–8)
PLATELET # BLD AUTO: 64 10*3/MM3 (ref 140–450)
PMV BLD AUTO: 11 FL (ref 6–12)
POTASSIUM SERPL-SCNC: 4.4 MMOL/L (ref 3.5–5.2)
PROT SERPL-MCNC: 5.7 G/DL (ref 6–8.5)
PROT UR QL STRIP: NEGATIVE
RBC # BLD AUTO: 2.42 10*6/MM3 (ref 3.77–5.28)
RBC # UR STRIP: ABNORMAL /HPF
REF LAB TEST METHOD: ABNORMAL
SODIUM SERPL-SCNC: 131 MMOL/L (ref 136–145)
SP GR UR STRIP: 1.01 (ref 1–1.03)
SQUAMOUS #/AREA URNS HPF: ABNORMAL /HPF
UROBILINOGEN UR QL STRIP: ABNORMAL
WBC # UR STRIP: ABNORMAL /HPF
WBC NRBC COR # BLD AUTO: 4.16 10*3/MM3 (ref 3.4–10.8)
WHOLE BLOOD HOLD COAG: NORMAL
WHOLE BLOOD HOLD SPECIMEN: NORMAL

## 2025-07-29 PROCEDURE — 99222 1ST HOSP IP/OBS MODERATE 55: CPT | Performed by: STUDENT IN AN ORGANIZED HEALTH CARE EDUCATION/TRAINING PROGRAM

## 2025-07-29 PROCEDURE — 83605 ASSAY OF LACTIC ACID: CPT

## 2025-07-29 PROCEDURE — 25010000002 FUROSEMIDE PER 20 MG: Performed by: EMERGENCY MEDICINE

## 2025-07-29 PROCEDURE — 83690 ASSAY OF LIPASE: CPT

## 2025-07-29 PROCEDURE — 25010000002 HEPARIN (PORCINE) PER 1000 UNITS: Performed by: STUDENT IN AN ORGANIZED HEALTH CARE EDUCATION/TRAINING PROGRAM

## 2025-07-29 PROCEDURE — 99285 EMERGENCY DEPT VISIT HI MDM: CPT

## 2025-07-29 PROCEDURE — 25010000002 KETOROLAC TROMETHAMINE PER 15 MG: Performed by: FAMILY MEDICINE

## 2025-07-29 PROCEDURE — 25010000002 FUROSEMIDE PER 20 MG: Performed by: STUDENT IN AN ORGANIZED HEALTH CARE EDUCATION/TRAINING PROGRAM

## 2025-07-29 PROCEDURE — 82140 ASSAY OF AMMONIA: CPT | Performed by: EMERGENCY MEDICINE

## 2025-07-29 PROCEDURE — 87481 CANDIDA DNA AMP PROBE: CPT | Performed by: STUDENT IN AN ORGANIZED HEALTH CARE EDUCATION/TRAINING PROGRAM

## 2025-07-29 PROCEDURE — 85025 COMPLETE CBC W/AUTO DIFF WBC: CPT

## 2025-07-29 PROCEDURE — 81001 URINALYSIS AUTO W/SCOPE: CPT

## 2025-07-29 PROCEDURE — 80053 COMPREHEN METABOLIC PANEL: CPT

## 2025-07-29 PROCEDURE — 71045 X-RAY EXAM CHEST 1 VIEW: CPT

## 2025-07-29 RX ORDER — HEPARIN SODIUM 5000 [USP'U]/ML
5000 INJECTION, SOLUTION INTRAVENOUS; SUBCUTANEOUS EVERY 8 HOURS SCHEDULED
Status: DISCONTINUED | OUTPATIENT
Start: 2025-07-29 | End: 2025-08-04 | Stop reason: HOSPADM

## 2025-07-29 RX ORDER — SODIUM CHLORIDE 9 MG/ML
40 INJECTION, SOLUTION INTRAVENOUS AS NEEDED
Status: DISCONTINUED | OUTPATIENT
Start: 2025-07-29 | End: 2025-08-04 | Stop reason: HOSPADM

## 2025-07-29 RX ORDER — SPIRONOLACTONE 25 MG/1
50 TABLET ORAL DAILY
Status: DISCONTINUED | OUTPATIENT
Start: 2025-07-29 | End: 2025-08-04 | Stop reason: HOSPADM

## 2025-07-29 RX ORDER — LACTULOSE 10 G/15ML
10 SOLUTION ORAL 3 TIMES DAILY
Status: DISCONTINUED | OUTPATIENT
Start: 2025-07-29 | End: 2025-07-30

## 2025-07-29 RX ORDER — FUROSEMIDE 10 MG/ML
80 INJECTION INTRAMUSCULAR; INTRAVENOUS ONCE
Status: COMPLETED | OUTPATIENT
Start: 2025-07-29 | End: 2025-07-29

## 2025-07-29 RX ORDER — ACETAMINOPHEN 325 MG/1
650 TABLET ORAL EVERY 6 HOURS PRN
Status: DISCONTINUED | OUTPATIENT
Start: 2025-07-29 | End: 2025-07-30

## 2025-07-29 RX ORDER — MORPHINE SULFATE 2 MG/ML
2 INJECTION, SOLUTION INTRAMUSCULAR; INTRAVENOUS EVERY 4 HOURS PRN
Status: DISCONTINUED | OUTPATIENT
Start: 2025-07-29 | End: 2025-08-04 | Stop reason: HOSPADM

## 2025-07-29 RX ORDER — FLUTICASONE PROPIONATE 50 MCG
2 SPRAY, SUSPENSION (ML) NASAL DAILY
Status: DISCONTINUED | OUTPATIENT
Start: 2025-07-29 | End: 2025-08-04 | Stop reason: HOSPADM

## 2025-07-29 RX ORDER — PANTOPRAZOLE SODIUM 40 MG/1
40 TABLET, DELAYED RELEASE ORAL DAILY
Status: DISCONTINUED | OUTPATIENT
Start: 2025-07-29 | End: 2025-08-04 | Stop reason: HOSPADM

## 2025-07-29 RX ORDER — KETOROLAC TROMETHAMINE 15 MG/ML
15 INJECTION, SOLUTION INTRAMUSCULAR; INTRAVENOUS EVERY 6 HOURS PRN
Status: DISCONTINUED | OUTPATIENT
Start: 2025-07-29 | End: 2025-07-30

## 2025-07-29 RX ORDER — SODIUM CHLORIDE 0.9 % (FLUSH) 0.9 %
10 SYRINGE (ML) INJECTION AS NEEDED
Status: DISCONTINUED | OUTPATIENT
Start: 2025-07-29 | End: 2025-08-04 | Stop reason: HOSPADM

## 2025-07-29 RX ORDER — FOLIC ACID 1 MG/1
1 TABLET ORAL DAILY
Status: DISCONTINUED | OUTPATIENT
Start: 2025-07-29 | End: 2025-08-04 | Stop reason: HOSPADM

## 2025-07-29 RX ORDER — KETOROLAC TROMETHAMINE 15 MG/ML
15 INJECTION, SOLUTION INTRAMUSCULAR; INTRAVENOUS ONCE
Status: COMPLETED | OUTPATIENT
Start: 2025-07-29 | End: 2025-07-29

## 2025-07-29 RX ORDER — SODIUM CHLORIDE 0.9 % (FLUSH) 0.9 %
10 SYRINGE (ML) INJECTION EVERY 12 HOURS SCHEDULED
Status: DISCONTINUED | OUTPATIENT
Start: 2025-07-29 | End: 2025-08-04 | Stop reason: HOSPADM

## 2025-07-29 RX ORDER — FUROSEMIDE 10 MG/ML
60 INJECTION INTRAMUSCULAR; INTRAVENOUS ONCE
Status: COMPLETED | OUTPATIENT
Start: 2025-07-29 | End: 2025-07-29

## 2025-07-29 RX ORDER — ALBUTEROL SULFATE 0.83 MG/ML
2.5 SOLUTION RESPIRATORY (INHALATION) EVERY 6 HOURS PRN
Status: DISCONTINUED | OUTPATIENT
Start: 2025-07-29 | End: 2025-08-04 | Stop reason: HOSPADM

## 2025-07-29 RX ADMIN — FUROSEMIDE 60 MG: 10 INJECTION, SOLUTION INTRAMUSCULAR; INTRAVENOUS at 15:05

## 2025-07-29 RX ADMIN — LACTULOSE SOLUTION USP, 10 G/15 ML 10 G: 10 SOLUTION ORAL; RECTAL at 20:48

## 2025-07-29 RX ADMIN — Medication 10 ML: at 20:49

## 2025-07-29 RX ADMIN — FUROSEMIDE 80 MG: 10 INJECTION, SOLUTION INTRAMUSCULAR; INTRAVENOUS at 18:35

## 2025-07-29 RX ADMIN — HEPARIN SODIUM 5000 UNITS: 5000 INJECTION INTRAVENOUS; SUBCUTANEOUS at 20:48

## 2025-07-29 RX ADMIN — LACTULOSE SOLUTION USP, 10 G/15 ML 10 G: 10 SOLUTION ORAL; RECTAL at 18:35

## 2025-07-29 RX ADMIN — FOLIC ACID 1 MG: 1 TABLET ORAL at 18:35

## 2025-07-29 RX ADMIN — PANTOPRAZOLE SODIUM 40 MG: 40 TABLET, DELAYED RELEASE ORAL at 18:35

## 2025-07-29 RX ADMIN — SPIRONOLACTONE 50 MG: 25 TABLET ORAL at 18:35

## 2025-07-29 RX ADMIN — RIFAXIMIN 550 MG: 550 TABLET ORAL at 20:49

## 2025-07-29 RX ADMIN — KETOROLAC TROMETHAMINE 15 MG: 15 INJECTION, SOLUTION INTRAMUSCULAR; INTRAVENOUS at 22:14

## 2025-07-29 RX ADMIN — MICONAZOLE NITRATE 1 APPLICATION: 2 POWDER TOPICAL at 20:49

## 2025-07-29 NOTE — TELEPHONE ENCOUNTER
Hub staff attempted to follow warm transfer process and was unsuccessful     Caller: Tawny Lennon    Relationship to patient: Self    Best call back number: [phone]    Patient is needing: PATIENT ALONG WITH HER SISTER, ZORAIDA FUNG CALLED RETURNING MISSED CALL. PATIENT IS CURRENTLY IN THE HOSPITAL AND HAS LIMITED ACCESS TO HER PHONE, SO ZORAIDA ASKED THAT SHE WOULD BE CONTACTED. HOSPITAL IS SUPPOSE TO BE FAXING POWER OF  INFORMATION FOR HER.          Status post Cath, Right femoral site without bleeding or hematoma.  Dressing is intact.  Positive Pulses.  Will continue to monitor.                                                                                                                                                  ARGELIA Beaver, RPA-C [id number]

## 2025-07-29 NOTE — TELEPHONE ENCOUNTER
Received vm from pts sister Lucia Chaudhari.  Stating Tawny is currently in [facility].   Stating Tawny is not getting her GI meds, and Tawny is retaining a lot of fluid.      I spoke with Shruthi a nurse at [facility].  Verified pt did get her Lasix, lactulose, and Xifaxin yesterday..  Shruthi advised pts sister to reach out and speak with Dr Corbett or ANDRE Burnham..    I spoke with Pt who gave an ok to speak with her sister Lucia regarding her health care.  I advised pt to reach out to Dr Corbett and or Chacha NP. As we are not able prescribe Rx's while she is there.    I spoke with Lucia, also advised to reach out to Dr Corbett for guidance.  Lucia stated she is on her way there now to get Tawny and take to ER for treatment.  Lucia also stated she is taking Tawny out of [facility].    Voiced understanding. meaghan

## 2025-07-30 ENCOUNTER — APPOINTMENT (OUTPATIENT)
Dept: INTERVENTIONAL RADIOLOGY/VASCULAR | Facility: HOSPITAL | Age: 60
DRG: 433 | End: 2025-07-30
Payer: MEDICARE

## 2025-07-30 ENCOUNTER — TELEPHONE (OUTPATIENT)
Dept: GASTROENTEROLOGY | Facility: CLINIC | Age: 60
End: 2025-07-30
Payer: MEDICARE

## 2025-07-30 LAB
ABO GROUP BLD: NORMAL
ALBUMIN FLD-MCNC: 0.3 G/DL
AMYLASE FLD-CCNC: 46 U/L
ANION GAP SERPL CALCULATED.3IONS-SCNC: 8.6 MMOL/L (ref 5–15)
APPEARANCE FLD: CLEAR
BLD GP AB SCN SERPL QL: NEGATIVE
BUN SERPL-MCNC: 22.3 MG/DL (ref 8–23)
BUN/CREAT SERPL: 17.4 (ref 7–25)
CALCIUM SPEC-SCNC: 7.6 MG/DL (ref 8.6–10.5)
CHLORIDE SERPL-SCNC: 103 MMOL/L (ref 98–107)
CO2 SERPL-SCNC: 21.4 MMOL/L (ref 22–29)
COLOR FLD: YELLOW
CREAT SERPL-MCNC: 1.28 MG/DL (ref 0.57–1)
DEPRECATED RDW RBC AUTO: 62.6 FL (ref 37–54)
EGFRCR SERPLBLD CKD-EPI 2021: 47.8 ML/MIN/1.73
ERYTHROCYTE [DISTWIDTH] IN BLOOD BY AUTOMATED COUNT: 17.1 % (ref 12.3–15.4)
GLUCOSE FLD-MCNC: 163 MG/DL
GLUCOSE SERPL-MCNC: 93 MG/DL (ref 65–99)
HCT VFR BLD AUTO: 21 % (ref 34–46.6)
HCT VFR BLD AUTO: 26.7 % (ref 34–46.6)
HGB BLD-MCNC: 6.9 G/DL (ref 12–15.9)
HGB BLD-MCNC: 8.9 G/DL (ref 12–15.9)
LDH FLD-CCNC: 34 U/L
LYMPHOCYTES NFR FLD MANUAL: 18 %
MACROPHAGE FLUID %: 2 %
MCH RBC QN AUTO: 32.9 PG (ref 26.6–33)
MCHC RBC AUTO-ENTMCNC: 32.9 G/DL (ref 31.5–35.7)
MCV RBC AUTO: 100 FL (ref 79–97)
MESOTHL CELL NFR FLD MANUAL: 11 %
MONOCYTES NFR FLD: 24 %
NEUTROPHILS NFR FLD MANUAL: 45 %
NUC CELL # FLD: 222 /MM3
PLATELET # BLD AUTO: 56 10*3/MM3 (ref 140–450)
PMV BLD AUTO: 11.7 FL (ref 6–12)
POTASSIUM SERPL-SCNC: 4 MMOL/L (ref 3.5–5.2)
PROT FLD-MCNC: <1 G/DL
RBC # BLD AUTO: 2.1 10*6/MM3 (ref 3.77–5.28)
RBC # FLD AUTO: <2000 /MM3
RETICS # AUTO: 0.1 10*6/MM3 (ref 0.02–0.13)
RETICS/RBC NFR AUTO: 4.72 % (ref 0.7–1.9)
RH BLD: POSITIVE
SODIUM SERPL-SCNC: 133 MMOL/L (ref 136–145)
T&S EXPIRATION DATE: NORMAL
WBC NRBC COR # BLD AUTO: 3.74 10*3/MM3 (ref 3.4–10.8)

## 2025-07-30 PROCEDURE — 86923 COMPATIBILITY TEST ELECTRIC: CPT

## 2025-07-30 PROCEDURE — 25010000002 FUROSEMIDE PER 20 MG: Performed by: STUDENT IN AN ORGANIZED HEALTH CARE EDUCATION/TRAINING PROGRAM

## 2025-07-30 PROCEDURE — 86900 BLOOD TYPING SEROLOGIC ABO: CPT

## 2025-07-30 PROCEDURE — 82247 BILIRUBIN TOTAL: CPT | Performed by: FAMILY MEDICINE

## 2025-07-30 PROCEDURE — 25010000002 MORPHINE PER 10 MG: Performed by: FAMILY MEDICINE

## 2025-07-30 PROCEDURE — 80048 BASIC METABOLIC PNL TOTAL CA: CPT | Performed by: STUDENT IN AN ORGANIZED HEALTH CARE EDUCATION/TRAINING PROGRAM

## 2025-07-30 PROCEDURE — 85045 AUTOMATED RETICULOCYTE COUNT: CPT | Performed by: FAMILY MEDICINE

## 2025-07-30 PROCEDURE — 36415 COLL VENOUS BLD VENIPUNCTURE: CPT | Performed by: STUDENT IN AN ORGANIZED HEALTH CARE EDUCATION/TRAINING PROGRAM

## 2025-07-30 PROCEDURE — 88184 FLOWCYTOMETRY/ TC 1 MARKER: CPT | Performed by: FAMILY MEDICINE

## 2025-07-30 PROCEDURE — 76942 ECHO GUIDE FOR BIOPSY: CPT

## 2025-07-30 PROCEDURE — 88185 FLOWCYTOMETRY/TC ADD-ON: CPT

## 2025-07-30 PROCEDURE — 87075 CULTR BACTERIA EXCEPT BLOOD: CPT | Performed by: FAMILY MEDICINE

## 2025-07-30 PROCEDURE — P9016 RBC LEUKOCYTES REDUCED: HCPCS

## 2025-07-30 PROCEDURE — 86900 BLOOD TYPING SEROLOGIC ABO: CPT | Performed by: FAMILY MEDICINE

## 2025-07-30 PROCEDURE — 86850 RBC ANTIBODY SCREEN: CPT | Performed by: FAMILY MEDICINE

## 2025-07-30 PROCEDURE — 87102 FUNGUS ISOLATION CULTURE: CPT | Performed by: FAMILY MEDICINE

## 2025-07-30 PROCEDURE — 82150 ASSAY OF AMYLASE: CPT | Performed by: FAMILY MEDICINE

## 2025-07-30 PROCEDURE — 85018 HEMOGLOBIN: CPT | Performed by: STUDENT IN AN ORGANIZED HEALTH CARE EDUCATION/TRAINING PROGRAM

## 2025-07-30 PROCEDURE — 82945 GLUCOSE OTHER FLUID: CPT | Performed by: FAMILY MEDICINE

## 2025-07-30 PROCEDURE — C1729 CATH, DRAINAGE: HCPCS

## 2025-07-30 PROCEDURE — 83615 LACTATE (LD) (LDH) ENZYME: CPT | Performed by: FAMILY MEDICINE

## 2025-07-30 PROCEDURE — 85014 HEMATOCRIT: CPT | Performed by: STUDENT IN AN ORGANIZED HEALTH CARE EDUCATION/TRAINING PROGRAM

## 2025-07-30 PROCEDURE — 99232 SBSQ HOSP IP/OBS MODERATE 35: CPT | Performed by: STUDENT IN AN ORGANIZED HEALTH CARE EDUCATION/TRAINING PROGRAM

## 2025-07-30 PROCEDURE — 85027 COMPLETE CBC AUTOMATED: CPT | Performed by: STUDENT IN AN ORGANIZED HEALTH CARE EDUCATION/TRAINING PROGRAM

## 2025-07-30 PROCEDURE — 86901 BLOOD TYPING SEROLOGIC RH(D): CPT | Performed by: FAMILY MEDICINE

## 2025-07-30 PROCEDURE — 87070 CULTURE OTHR SPECIMN AEROBIC: CPT | Performed by: FAMILY MEDICINE

## 2025-07-30 PROCEDURE — 0W9G3ZX DRAINAGE OF PERITONEAL CAVITY, PERCUTANEOUS APPROACH, DIAGNOSTIC: ICD-10-PCS | Performed by: RADIOLOGY

## 2025-07-30 PROCEDURE — 88108 CYTOPATH CONCENTRATE TECH: CPT | Performed by: FAMILY MEDICINE

## 2025-07-30 PROCEDURE — 89051 BODY FLUID CELL COUNT: CPT | Performed by: FAMILY MEDICINE

## 2025-07-30 PROCEDURE — 25010000002 LIDOCAINE 2% SOLUTION: Performed by: STUDENT IN AN ORGANIZED HEALTH CARE EDUCATION/TRAINING PROGRAM

## 2025-07-30 PROCEDURE — 99222 1ST HOSP IP/OBS MODERATE 55: CPT | Performed by: INTERNAL MEDICINE

## 2025-07-30 PROCEDURE — 36430 TRANSFUSION BLD/BLD COMPNT: CPT

## 2025-07-30 PROCEDURE — 87205 SMEAR GRAM STAIN: CPT | Performed by: FAMILY MEDICINE

## 2025-07-30 PROCEDURE — 84157 ASSAY OF PROTEIN OTHER: CPT | Performed by: FAMILY MEDICINE

## 2025-07-30 PROCEDURE — 82042 OTHER SOURCE ALBUMIN QUAN EA: CPT | Performed by: FAMILY MEDICINE

## 2025-07-30 RX ORDER — FUROSEMIDE 10 MG/ML
80 INJECTION INTRAMUSCULAR; INTRAVENOUS ONCE
Status: COMPLETED | OUTPATIENT
Start: 2025-07-30 | End: 2025-07-30

## 2025-07-30 RX ORDER — INDOMETHACIN 25 MG/1
10 CAPSULE ORAL ONCE
Status: COMPLETED | OUTPATIENT
Start: 2025-07-30 | End: 2025-07-30

## 2025-07-30 RX ORDER — HYDROCODONE BITARTRATE AND ACETAMINOPHEN 5; 325 MG/1; MG/1
1 TABLET ORAL EVERY 6 HOURS PRN
COMMUNITY

## 2025-07-30 RX ORDER — LACTULOSE 10 G/15ML
20 SOLUTION ORAL 3 TIMES DAILY
Status: DISCONTINUED | OUTPATIENT
Start: 2025-07-30 | End: 2025-08-04 | Stop reason: HOSPADM

## 2025-07-30 RX ORDER — ACETAMINOPHEN 500 MG
1000 TABLET ORAL EVERY 8 HOURS PRN
Status: DISCONTINUED | OUTPATIENT
Start: 2025-07-30 | End: 2025-08-04 | Stop reason: HOSPADM

## 2025-07-30 RX ORDER — LIDOCAINE HYDROCHLORIDE 20 MG/ML
20 INJECTION, SOLUTION INFILTRATION; PERINEURAL ONCE
Status: COMPLETED | OUTPATIENT
Start: 2025-07-30 | End: 2025-07-30

## 2025-07-30 RX ADMIN — LIDOCAINE HYDROCHLORIDE 9 ML: 20 INJECTION, SOLUTION INFILTRATION; PERINEURAL at 15:30

## 2025-07-30 RX ADMIN — MICONAZOLE NITRATE 1 APPLICATION: 2 POWDER TOPICAL at 22:21

## 2025-07-30 RX ADMIN — LACTULOSE SOLUTION USP, 10 G/15 ML 20 G: 10 SOLUTION ORAL; RECTAL at 16:24

## 2025-07-30 RX ADMIN — MICONAZOLE NITRATE 1 APPLICATION: 2 POWDER TOPICAL at 13:27

## 2025-07-30 RX ADMIN — LACTULOSE SOLUTION USP, 10 G/15 ML 20 G: 10 SOLUTION ORAL; RECTAL at 22:15

## 2025-07-30 RX ADMIN — MORPHINE SULFATE 2 MG: 2 INJECTION, SOLUTION INTRAMUSCULAR; INTRAVENOUS at 22:15

## 2025-07-30 RX ADMIN — RIFAXIMIN 550 MG: 550 TABLET ORAL at 08:48

## 2025-07-30 RX ADMIN — LACTULOSE SOLUTION USP, 10 G/15 ML 10 G: 10 SOLUTION ORAL; RECTAL at 09:57

## 2025-07-30 RX ADMIN — FOLIC ACID 1 MG: 1 TABLET ORAL at 08:48

## 2025-07-30 RX ADMIN — Medication 10 ML: at 08:49

## 2025-07-30 RX ADMIN — RIFAXIMIN 550 MG: 550 TABLET ORAL at 22:15

## 2025-07-30 RX ADMIN — SODIUM BICARBONATE 1 ML: 84 INJECTION INTRAVENOUS at 15:30

## 2025-07-30 RX ADMIN — MORPHINE SULFATE 2 MG: 2 INJECTION, SOLUTION INTRAMUSCULAR; INTRAVENOUS at 16:24

## 2025-07-30 RX ADMIN — Medication 10 ML: at 22:15

## 2025-07-30 RX ADMIN — FLUTICASONE PROPIONATE 2 SPRAY: 50 SPRAY, METERED NASAL at 08:49

## 2025-07-30 RX ADMIN — FUROSEMIDE 80 MG: 10 INJECTION, SOLUTION INTRAMUSCULAR; INTRAVENOUS at 13:08

## 2025-07-30 RX ADMIN — SPIRONOLACTONE 50 MG: 25 TABLET ORAL at 08:48

## 2025-07-30 RX ADMIN — PANTOPRAZOLE SODIUM 40 MG: 40 TABLET, DELAYED RELEASE ORAL at 08:48

## 2025-07-30 NOTE — TELEPHONE ENCOUNTER
Caller: linda dodson    Relationship: Emergency Contact    Best call back number: [phone]    What was the call regarding: PT SISTER CALLED WANTING CALL BACK FORM CLINICAL STAFF REGARDING SCHEDULING PT FOR PROCEDURE  , PT CURRENTLY IN HOSPITAL. PLEASE ADVISE.

## 2025-07-30 NOTE — TELEPHONE ENCOUNTER
Spoke to patients sister today and she would like to know why scopes are not being done while patient is in patient. She has concerns with patients lab results. I advised her to speak to inpatient staff while she is at the hospital with patient tomorrow. She has voiced understanding.

## 2025-07-31 LAB
ANION GAP SERPL CALCULATED.3IONS-SCNC: 8.3 MMOL/L (ref 5–15)
BH BB BLOOD EXPIRATION DATE: NORMAL
BH BB BLOOD TYPE BARCODE: 6200
BH BB DISPENSE STATUS: NORMAL
BH BB PRODUCT CODE: NORMAL
BH BB UNIT NUMBER: NORMAL
BUN SERPL-MCNC: 23.5 MG/DL (ref 8–23)
BUN/CREAT SERPL: 18.7 (ref 7–25)
C AURIS DNA SPEC QL NAA+NON-PROBE: NOT DETECTED
CALCIUM SPEC-SCNC: 8.4 MG/DL (ref 8.6–10.5)
CHLORIDE SERPL-SCNC: 103 MMOL/L (ref 98–107)
CO2 SERPL-SCNC: 21.7 MMOL/L (ref 22–29)
CREAT SERPL-MCNC: 1.26 MG/DL (ref 0.57–1)
CROSSMATCH INTERPRETATION: NORMAL
DEPRECATED RDW RBC AUTO: 66.2 FL (ref 37–54)
EGFRCR SERPLBLD CKD-EPI 2021: 48.7 ML/MIN/1.73
ERYTHROCYTE [DISTWIDTH] IN BLOOD BY AUTOMATED COUNT: 18.3 % (ref 12.3–15.4)
GLUCOSE SERPL-MCNC: 157 MG/DL (ref 65–99)
HCT VFR BLD AUTO: 24.7 % (ref 34–46.6)
HEMOCCULT STL QL IA: NEGATIVE
HGB BLD-MCNC: 8.2 G/DL (ref 12–15.9)
MCH RBC QN AUTO: 32.7 PG (ref 26.6–33)
MCHC RBC AUTO-ENTMCNC: 33.2 G/DL (ref 31.5–35.7)
MCV RBC AUTO: 98.4 FL (ref 79–97)
PLATELET # BLD AUTO: 56 10*3/MM3 (ref 140–450)
PMV BLD AUTO: 10.5 FL (ref 6–12)
POTASSIUM SERPL-SCNC: 3.9 MMOL/L (ref 3.5–5.2)
RBC # BLD AUTO: 2.51 10*6/MM3 (ref 3.77–5.28)
SODIUM SERPL-SCNC: 133 MMOL/L (ref 136–145)
UNIT  ABO: NORMAL
UNIT  RH: NORMAL
WBC NRBC COR # BLD AUTO: 3.9 10*3/MM3 (ref 3.4–10.8)

## 2025-07-31 PROCEDURE — 85027 COMPLETE CBC AUTOMATED: CPT | Performed by: STUDENT IN AN ORGANIZED HEALTH CARE EDUCATION/TRAINING PROGRAM

## 2025-07-31 PROCEDURE — 25010000002 FUROSEMIDE PER 20 MG: Performed by: STUDENT IN AN ORGANIZED HEALTH CARE EDUCATION/TRAINING PROGRAM

## 2025-07-31 PROCEDURE — 80048 BASIC METABOLIC PNL TOTAL CA: CPT | Performed by: STUDENT IN AN ORGANIZED HEALTH CARE EDUCATION/TRAINING PROGRAM

## 2025-07-31 PROCEDURE — 25010000002 HEPARIN (PORCINE) PER 1000 UNITS: Performed by: STUDENT IN AN ORGANIZED HEALTH CARE EDUCATION/TRAINING PROGRAM

## 2025-07-31 PROCEDURE — 97161 PT EVAL LOW COMPLEX 20 MIN: CPT

## 2025-07-31 PROCEDURE — 82274 ASSAY TEST FOR BLOOD FECAL: CPT | Performed by: FAMILY MEDICINE

## 2025-07-31 PROCEDURE — 97165 OT EVAL LOW COMPLEX 30 MIN: CPT

## 2025-07-31 PROCEDURE — 99232 SBSQ HOSP IP/OBS MODERATE 35: CPT | Performed by: STUDENT IN AN ORGANIZED HEALTH CARE EDUCATION/TRAINING PROGRAM

## 2025-07-31 PROCEDURE — 25010000002 VITAMIN K1 PER 1 MG: Performed by: INTERNAL MEDICINE

## 2025-07-31 PROCEDURE — 99232 SBSQ HOSP IP/OBS MODERATE 35: CPT | Performed by: INTERNAL MEDICINE

## 2025-07-31 PROCEDURE — 25010000002 MORPHINE PER 10 MG: Performed by: FAMILY MEDICINE

## 2025-07-31 RX ORDER — FUROSEMIDE 10 MG/ML
80 INJECTION INTRAMUSCULAR; INTRAVENOUS ONCE
Status: COMPLETED | OUTPATIENT
Start: 2025-07-31 | End: 2025-07-31

## 2025-07-31 RX ORDER — FUROSEMIDE 10 MG/ML
40 INJECTION INTRAMUSCULAR; INTRAVENOUS DAILY
Status: DISCONTINUED | OUTPATIENT
Start: 2025-08-01 | End: 2025-08-02

## 2025-07-31 RX ADMIN — MICONAZOLE NITRATE 1 APPLICATION: 2 POWDER TOPICAL at 21:16

## 2025-07-31 RX ADMIN — RIFAXIMIN 550 MG: 550 TABLET ORAL at 21:16

## 2025-07-31 RX ADMIN — SPIRONOLACTONE 50 MG: 25 TABLET ORAL at 08:52

## 2025-07-31 RX ADMIN — HEPARIN SODIUM 5000 UNITS: 5000 INJECTION INTRAVENOUS; SUBCUTANEOUS at 21:16

## 2025-07-31 RX ADMIN — LACTULOSE SOLUTION USP, 10 G/15 ML 20 G: 10 SOLUTION ORAL; RECTAL at 15:50

## 2025-07-31 RX ADMIN — LACTULOSE SOLUTION USP, 10 G/15 ML 20 G: 10 SOLUTION ORAL; RECTAL at 21:16

## 2025-07-31 RX ADMIN — LACTULOSE SOLUTION USP, 10 G/15 ML 20 G: 10 SOLUTION ORAL; RECTAL at 08:52

## 2025-07-31 RX ADMIN — Medication 10 ML: at 08:53

## 2025-07-31 RX ADMIN — PANTOPRAZOLE SODIUM 40 MG: 40 TABLET, DELAYED RELEASE ORAL at 08:52

## 2025-07-31 RX ADMIN — PHYTONADIONE 10 MG: 10 INJECTION, EMULSION INTRAMUSCULAR; INTRAVENOUS; SUBCUTANEOUS at 16:59

## 2025-07-31 RX ADMIN — FOLIC ACID 1 MG: 1 TABLET ORAL at 08:52

## 2025-07-31 RX ADMIN — HEPARIN SODIUM 5000 UNITS: 5000 INJECTION INTRAVENOUS; SUBCUTANEOUS at 13:09

## 2025-07-31 RX ADMIN — FUROSEMIDE 80 MG: 10 INJECTION, SOLUTION INTRAMUSCULAR; INTRAVENOUS at 11:59

## 2025-07-31 RX ADMIN — Medication 10 ML: at 21:17

## 2025-07-31 RX ADMIN — RIFAXIMIN 550 MG: 550 TABLET ORAL at 08:52

## 2025-07-31 RX ADMIN — MICONAZOLE NITRATE 1 APPLICATION: 2 POWDER TOPICAL at 08:53

## 2025-07-31 RX ADMIN — MORPHINE SULFATE 2 MG: 2 INJECTION, SOLUTION INTRAMUSCULAR; INTRAVENOUS at 21:25

## 2025-07-31 RX ADMIN — FLUTICASONE PROPIONATE 2 SPRAY: 50 SPRAY, METERED NASAL at 08:52

## 2025-07-31 RX ADMIN — MORPHINE SULFATE 2 MG: 2 INJECTION, SOLUTION INTRAMUSCULAR; INTRAVENOUS at 10:55

## 2025-08-01 LAB
ANION GAP SERPL CALCULATED.3IONS-SCNC: 7.3 MMOL/L (ref 5–15)
BILIRUB FLD-MCNC: <0.2 MG/DL
BUN SERPL-MCNC: 22.2 MG/DL (ref 8–23)
BUN/CREAT SERPL: 17.3 (ref 7–25)
CALCIUM SPEC-SCNC: 8.3 MG/DL (ref 8.6–10.5)
CHLORIDE SERPL-SCNC: 103 MMOL/L (ref 98–107)
CO2 SERPL-SCNC: 22.7 MMOL/L (ref 22–29)
CREAT SERPL-MCNC: 1.28 MG/DL (ref 0.57–1)
DEPRECATED RDW RBC AUTO: 65.1 FL (ref 37–54)
EGFRCR SERPLBLD CKD-EPI 2021: 47.8 ML/MIN/1.73
ERYTHROCYTE [DISTWIDTH] IN BLOOD BY AUTOMATED COUNT: 18 % (ref 12.3–15.4)
GLUCOSE SERPL-MCNC: 133 MG/DL (ref 65–99)
HCT VFR BLD AUTO: 25.4 % (ref 34–46.6)
HGB BLD-MCNC: 8.2 G/DL (ref 12–15.9)
INR PPP: 1.58 (ref 0.86–1.15)
Lab: NORMAL
MCH RBC QN AUTO: 32.4 PG (ref 26.6–33)
MCHC RBC AUTO-ENTMCNC: 32.3 G/DL (ref 31.5–35.7)
MCV RBC AUTO: 100.4 FL (ref 79–97)
PLATELET # BLD AUTO: 60 10*3/MM3 (ref 140–450)
PMV BLD AUTO: 11.3 FL (ref 6–12)
POTASSIUM SERPL-SCNC: 4.1 MMOL/L (ref 3.5–5.2)
PROTHROMBIN TIME: 19.6 SECONDS (ref 11.8–14.9)
RBC # BLD AUTO: 2.53 10*6/MM3 (ref 3.77–5.28)
SODIUM SERPL-SCNC: 133 MMOL/L (ref 136–145)
WBC NRBC COR # BLD AUTO: 4.46 10*3/MM3 (ref 3.4–10.8)

## 2025-08-01 PROCEDURE — 85027 COMPLETE CBC AUTOMATED: CPT | Performed by: STUDENT IN AN ORGANIZED HEALTH CARE EDUCATION/TRAINING PROGRAM

## 2025-08-01 PROCEDURE — 99232 SBSQ HOSP IP/OBS MODERATE 35: CPT | Performed by: STUDENT IN AN ORGANIZED HEALTH CARE EDUCATION/TRAINING PROGRAM

## 2025-08-01 PROCEDURE — 25010000002 MORPHINE PER 10 MG: Performed by: FAMILY MEDICINE

## 2025-08-01 PROCEDURE — 25010000002 HEPARIN (PORCINE) PER 1000 UNITS: Performed by: STUDENT IN AN ORGANIZED HEALTH CARE EDUCATION/TRAINING PROGRAM

## 2025-08-01 PROCEDURE — 97110 THERAPEUTIC EXERCISES: CPT

## 2025-08-01 PROCEDURE — 25010000002 FUROSEMIDE PER 20 MG: Performed by: STUDENT IN AN ORGANIZED HEALTH CARE EDUCATION/TRAINING PROGRAM

## 2025-08-01 PROCEDURE — 85610 PROTHROMBIN TIME: CPT | Performed by: INTERNAL MEDICINE

## 2025-08-01 PROCEDURE — 25010000002 FUROSEMIDE PER 20 MG: Performed by: INTERNAL MEDICINE

## 2025-08-01 PROCEDURE — 80048 BASIC METABOLIC PNL TOTAL CA: CPT | Performed by: STUDENT IN AN ORGANIZED HEALTH CARE EDUCATION/TRAINING PROGRAM

## 2025-08-01 RX ORDER — FUROSEMIDE 10 MG/ML
40 INJECTION INTRAMUSCULAR; INTRAVENOUS ONCE
Status: COMPLETED | OUTPATIENT
Start: 2025-08-01 | End: 2025-08-01

## 2025-08-01 RX ORDER — CALCIUM CARBONATE 500 MG/1
1 TABLET, CHEWABLE ORAL 3 TIMES DAILY PRN
Status: DISCONTINUED | OUTPATIENT
Start: 2025-08-01 | End: 2025-08-04 | Stop reason: HOSPADM

## 2025-08-01 RX ADMIN — FUROSEMIDE 40 MG: 10 INJECTION, SOLUTION INTRAMUSCULAR; INTRAVENOUS at 15:23

## 2025-08-01 RX ADMIN — FOLIC ACID 1 MG: 1 TABLET ORAL at 08:24

## 2025-08-01 RX ADMIN — RIFAXIMIN 550 MG: 550 TABLET ORAL at 21:38

## 2025-08-01 RX ADMIN — MICONAZOLE NITRATE 1 APPLICATION: 2 POWDER TOPICAL at 09:35

## 2025-08-01 RX ADMIN — Medication 10 ML: at 08:25

## 2025-08-01 RX ADMIN — FUROSEMIDE 40 MG: 10 INJECTION, SOLUTION INTRAMUSCULAR; INTRAVENOUS at 08:24

## 2025-08-01 RX ADMIN — HEPARIN SODIUM 5000 UNITS: 5000 INJECTION INTRAVENOUS; SUBCUTANEOUS at 15:22

## 2025-08-01 RX ADMIN — LACTULOSE SOLUTION USP, 10 G/15 ML 20 G: 10 SOLUTION ORAL; RECTAL at 08:23

## 2025-08-01 RX ADMIN — Medication 10 ML: at 21:38

## 2025-08-01 RX ADMIN — MICONAZOLE NITRATE 1 APPLICATION: 2 POWDER TOPICAL at 21:39

## 2025-08-01 RX ADMIN — RIFAXIMIN 550 MG: 550 TABLET ORAL at 08:24

## 2025-08-01 RX ADMIN — LACTULOSE SOLUTION USP, 10 G/15 ML 20 G: 10 SOLUTION ORAL; RECTAL at 15:23

## 2025-08-01 RX ADMIN — CALCIUM CARBONATE 1 TABLET: 500 TABLET, CHEWABLE ORAL at 16:02

## 2025-08-01 RX ADMIN — HEPARIN SODIUM 5000 UNITS: 5000 INJECTION INTRAVENOUS; SUBCUTANEOUS at 06:09

## 2025-08-01 RX ADMIN — HEPARIN SODIUM 5000 UNITS: 5000 INJECTION INTRAVENOUS; SUBCUTANEOUS at 21:38

## 2025-08-01 RX ADMIN — SPIRONOLACTONE 50 MG: 25 TABLET ORAL at 08:24

## 2025-08-01 RX ADMIN — MORPHINE SULFATE 2 MG: 2 INJECTION, SOLUTION INTRAMUSCULAR; INTRAVENOUS at 21:38

## 2025-08-01 RX ADMIN — LACTULOSE SOLUTION USP, 10 G/15 ML 20 G: 10 SOLUTION ORAL; RECTAL at 21:38

## 2025-08-01 RX ADMIN — FLUTICASONE PROPIONATE 2 SPRAY: 50 SPRAY, METERED NASAL at 08:24

## 2025-08-01 RX ADMIN — PANTOPRAZOLE SODIUM 40 MG: 40 TABLET, DELAYED RELEASE ORAL at 08:24

## 2025-08-02 LAB
ANION GAP SERPL CALCULATED.3IONS-SCNC: 7.5 MMOL/L (ref 5–15)
BACTERIA FLD CULT: NORMAL
BUN SERPL-MCNC: 22.4 MG/DL (ref 8–23)
BUN/CREAT SERPL: 18.2 (ref 7–25)
CALCIUM SPEC-SCNC: 8.3 MG/DL (ref 8.6–10.5)
CHLORIDE SERPL-SCNC: 103 MMOL/L (ref 98–107)
CO2 SERPL-SCNC: 22.5 MMOL/L (ref 22–29)
CREAT SERPL-MCNC: 1.23 MG/DL (ref 0.57–1)
DEPRECATED RDW RBC AUTO: 63.9 FL (ref 37–54)
EGFRCR SERPLBLD CKD-EPI 2021: 50.1 ML/MIN/1.73
ERYTHROCYTE [DISTWIDTH] IN BLOOD BY AUTOMATED COUNT: 17.8 % (ref 12.3–15.4)
GLUCOSE SERPL-MCNC: 127 MG/DL (ref 65–99)
GRAM STN SPEC: NORMAL
HCT VFR BLD AUTO: 25.1 % (ref 34–46.6)
HGB BLD-MCNC: 8.4 G/DL (ref 12–15.9)
MCH RBC QN AUTO: 32.8 PG (ref 26.6–33)
MCHC RBC AUTO-ENTMCNC: 33.5 G/DL (ref 31.5–35.7)
MCV RBC AUTO: 98 FL (ref 79–97)
PLATELET # BLD AUTO: 57 10*3/MM3 (ref 140–450)
PMV BLD AUTO: 11.3 FL (ref 6–12)
POTASSIUM SERPL-SCNC: 4 MMOL/L (ref 3.5–5.2)
RBC # BLD AUTO: 2.56 10*6/MM3 (ref 3.77–5.28)
SODIUM SERPL-SCNC: 133 MMOL/L (ref 136–145)
WBC NRBC COR # BLD AUTO: 4.64 10*3/MM3 (ref 3.4–10.8)

## 2025-08-02 PROCEDURE — 25010000002 FUROSEMIDE PER 20 MG: Performed by: STUDENT IN AN ORGANIZED HEALTH CARE EDUCATION/TRAINING PROGRAM

## 2025-08-02 PROCEDURE — 99232 SBSQ HOSP IP/OBS MODERATE 35: CPT | Performed by: STUDENT IN AN ORGANIZED HEALTH CARE EDUCATION/TRAINING PROGRAM

## 2025-08-02 PROCEDURE — 80048 BASIC METABOLIC PNL TOTAL CA: CPT | Performed by: STUDENT IN AN ORGANIZED HEALTH CARE EDUCATION/TRAINING PROGRAM

## 2025-08-02 PROCEDURE — 25010000002 FUROSEMIDE PER 20 MG: Performed by: INTERNAL MEDICINE

## 2025-08-02 PROCEDURE — 85027 COMPLETE CBC AUTOMATED: CPT | Performed by: STUDENT IN AN ORGANIZED HEALTH CARE EDUCATION/TRAINING PROGRAM

## 2025-08-02 PROCEDURE — 25010000002 MORPHINE PER 10 MG: Performed by: FAMILY MEDICINE

## 2025-08-02 PROCEDURE — 25010000002 HEPARIN (PORCINE) PER 1000 UNITS: Performed by: STUDENT IN AN ORGANIZED HEALTH CARE EDUCATION/TRAINING PROGRAM

## 2025-08-02 RX ORDER — FUROSEMIDE 10 MG/ML
80 INJECTION INTRAMUSCULAR; INTRAVENOUS DAILY
Status: DISCONTINUED | OUTPATIENT
Start: 2025-08-02 | End: 2025-08-04 | Stop reason: HOSPADM

## 2025-08-02 RX ADMIN — PANTOPRAZOLE SODIUM 40 MG: 40 TABLET, DELAYED RELEASE ORAL at 08:26

## 2025-08-02 RX ADMIN — FUROSEMIDE 40 MG: 10 INJECTION, SOLUTION INTRAMUSCULAR; INTRAVENOUS at 08:26

## 2025-08-02 RX ADMIN — MICONAZOLE NITRATE 1 APPLICATION: 2 POWDER TOPICAL at 20:39

## 2025-08-02 RX ADMIN — LACTULOSE SOLUTION USP, 10 G/15 ML 20 G: 10 SOLUTION ORAL; RECTAL at 16:25

## 2025-08-02 RX ADMIN — RIFAXIMIN 550 MG: 550 TABLET ORAL at 08:26

## 2025-08-02 RX ADMIN — HEPARIN SODIUM 5000 UNITS: 5000 INJECTION INTRAVENOUS; SUBCUTANEOUS at 13:19

## 2025-08-02 RX ADMIN — Medication 10 ML: at 20:38

## 2025-08-02 RX ADMIN — SPIRONOLACTONE 50 MG: 25 TABLET ORAL at 08:26

## 2025-08-02 RX ADMIN — HEPARIN SODIUM 5000 UNITS: 5000 INJECTION INTRAVENOUS; SUBCUTANEOUS at 20:38

## 2025-08-02 RX ADMIN — FUROSEMIDE 80 MG: 10 INJECTION, SOLUTION INTRAMUSCULAR; INTRAVENOUS at 13:18

## 2025-08-02 RX ADMIN — LACTULOSE SOLUTION USP, 10 G/15 ML 20 G: 10 SOLUTION ORAL; RECTAL at 08:26

## 2025-08-02 RX ADMIN — MICONAZOLE NITRATE 1 APPLICATION: 2 POWDER TOPICAL at 08:25

## 2025-08-02 RX ADMIN — MORPHINE SULFATE 2 MG: 2 INJECTION, SOLUTION INTRAMUSCULAR; INTRAVENOUS at 20:38

## 2025-08-02 RX ADMIN — RIFAXIMIN 550 MG: 550 TABLET ORAL at 20:38

## 2025-08-02 RX ADMIN — HEPARIN SODIUM 5000 UNITS: 5000 INJECTION INTRAVENOUS; SUBCUTANEOUS at 06:17

## 2025-08-02 RX ADMIN — Medication 10 ML: at 08:25

## 2025-08-02 RX ADMIN — LACTULOSE SOLUTION USP, 10 G/15 ML 20 G: 10 SOLUTION ORAL; RECTAL at 20:38

## 2025-08-02 RX ADMIN — FLUTICASONE PROPIONATE 2 SPRAY: 50 SPRAY, METERED NASAL at 08:26

## 2025-08-02 RX ADMIN — FOLIC ACID 1 MG: 1 TABLET ORAL at 08:26

## 2025-08-03 LAB
ANION GAP SERPL CALCULATED.3IONS-SCNC: 6.7 MMOL/L (ref 5–15)
BUN SERPL-MCNC: 21.2 MG/DL (ref 8–23)
BUN/CREAT SERPL: 18.3 (ref 7–25)
CALCIUM SPEC-SCNC: 8.3 MG/DL (ref 8.6–10.5)
CHLORIDE SERPL-SCNC: 103 MMOL/L (ref 98–107)
CO2 SERPL-SCNC: 23.3 MMOL/L (ref 22–29)
CREAT SERPL-MCNC: 1.16 MG/DL (ref 0.57–1)
DEPRECATED RDW RBC AUTO: 64.1 FL (ref 37–54)
EGFRCR SERPLBLD CKD-EPI 2021: 53.7 ML/MIN/1.73
ERYTHROCYTE [DISTWIDTH] IN BLOOD BY AUTOMATED COUNT: 17.6 % (ref 12.3–15.4)
GLUCOSE SERPL-MCNC: 120 MG/DL (ref 65–99)
HCT VFR BLD AUTO: 24.8 % (ref 34–46.6)
HGB BLD-MCNC: 8.2 G/DL (ref 12–15.9)
MCH RBC QN AUTO: 32.8 PG (ref 26.6–33)
MCHC RBC AUTO-ENTMCNC: 33.1 G/DL (ref 31.5–35.7)
MCV RBC AUTO: 99.2 FL (ref 79–97)
PLATELET # BLD AUTO: 53 10*3/MM3 (ref 140–450)
PMV BLD AUTO: 11.7 FL (ref 6–12)
POTASSIUM SERPL-SCNC: 3.9 MMOL/L (ref 3.5–5.2)
RBC # BLD AUTO: 2.5 10*6/MM3 (ref 3.77–5.28)
SODIUM SERPL-SCNC: 133 MMOL/L (ref 136–145)
WBC NRBC COR # BLD AUTO: 3.96 10*3/MM3 (ref 3.4–10.8)

## 2025-08-03 PROCEDURE — 99232 SBSQ HOSP IP/OBS MODERATE 35: CPT | Performed by: STUDENT IN AN ORGANIZED HEALTH CARE EDUCATION/TRAINING PROGRAM

## 2025-08-03 PROCEDURE — 25010000002 MORPHINE PER 10 MG: Performed by: FAMILY MEDICINE

## 2025-08-03 PROCEDURE — 25010000002 HEPARIN (PORCINE) PER 1000 UNITS: Performed by: STUDENT IN AN ORGANIZED HEALTH CARE EDUCATION/TRAINING PROGRAM

## 2025-08-03 PROCEDURE — 85027 COMPLETE CBC AUTOMATED: CPT | Performed by: STUDENT IN AN ORGANIZED HEALTH CARE EDUCATION/TRAINING PROGRAM

## 2025-08-03 PROCEDURE — 80048 BASIC METABOLIC PNL TOTAL CA: CPT | Performed by: STUDENT IN AN ORGANIZED HEALTH CARE EDUCATION/TRAINING PROGRAM

## 2025-08-03 PROCEDURE — 25010000002 FUROSEMIDE PER 20 MG: Performed by: STUDENT IN AN ORGANIZED HEALTH CARE EDUCATION/TRAINING PROGRAM

## 2025-08-03 RX ORDER — FUROSEMIDE 10 MG/ML
80 INJECTION INTRAMUSCULAR; INTRAVENOUS ONCE
Status: COMPLETED | OUTPATIENT
Start: 2025-08-03 | End: 2025-08-03

## 2025-08-03 RX ADMIN — FLUTICASONE PROPIONATE 2 SPRAY: 50 SPRAY, METERED NASAL at 08:23

## 2025-08-03 RX ADMIN — FUROSEMIDE 80 MG: 10 INJECTION, SOLUTION INTRAMUSCULAR; INTRAVENOUS at 15:22

## 2025-08-03 RX ADMIN — HEPARIN SODIUM 5000 UNITS: 5000 INJECTION INTRAVENOUS; SUBCUTANEOUS at 15:22

## 2025-08-03 RX ADMIN — LACTULOSE SOLUTION USP, 10 G/15 ML 20 G: 10 SOLUTION ORAL; RECTAL at 08:23

## 2025-08-03 RX ADMIN — MICONAZOLE NITRATE 1 APPLICATION: 2 POWDER TOPICAL at 21:28

## 2025-08-03 RX ADMIN — PANTOPRAZOLE SODIUM 40 MG: 40 TABLET, DELAYED RELEASE ORAL at 08:21

## 2025-08-03 RX ADMIN — HEPARIN SODIUM 5000 UNITS: 5000 INJECTION INTRAVENOUS; SUBCUTANEOUS at 06:24

## 2025-08-03 RX ADMIN — LACTULOSE SOLUTION USP, 10 G/15 ML 20 G: 10 SOLUTION ORAL; RECTAL at 15:24

## 2025-08-03 RX ADMIN — MORPHINE SULFATE 2 MG: 2 INJECTION, SOLUTION INTRAMUSCULAR; INTRAVENOUS at 21:46

## 2025-08-03 RX ADMIN — FOLIC ACID 1 MG: 1 TABLET ORAL at 08:24

## 2025-08-03 RX ADMIN — SPIRONOLACTONE 50 MG: 25 TABLET ORAL at 08:21

## 2025-08-03 RX ADMIN — HEPARIN SODIUM 5000 UNITS: 5000 INJECTION INTRAVENOUS; SUBCUTANEOUS at 21:28

## 2025-08-03 RX ADMIN — MICONAZOLE NITRATE 1 APPLICATION: 2 POWDER TOPICAL at 08:24

## 2025-08-03 RX ADMIN — LACTULOSE SOLUTION USP, 10 G/15 ML 20 G: 10 SOLUTION ORAL; RECTAL at 21:27

## 2025-08-03 RX ADMIN — RIFAXIMIN 550 MG: 550 TABLET ORAL at 21:27

## 2025-08-03 RX ADMIN — Medication 10 ML: at 08:24

## 2025-08-03 RX ADMIN — Medication 10 ML: at 21:28

## 2025-08-03 RX ADMIN — RIFAXIMIN 550 MG: 550 TABLET ORAL at 08:21

## 2025-08-03 RX ADMIN — FUROSEMIDE 80 MG: 10 INJECTION, SOLUTION INTRAMUSCULAR; INTRAVENOUS at 08:23

## 2025-08-04 ENCOUNTER — READMISSION MANAGEMENT (OUTPATIENT)
Dept: CALL CENTER | Facility: HOSPITAL | Age: 60
End: 2025-08-04
Payer: MEDICARE

## 2025-08-04 VITALS
WEIGHT: 203.26 LBS | RESPIRATION RATE: 16 BRPM | OXYGEN SATURATION: 95 % | SYSTOLIC BLOOD PRESSURE: 103 MMHG | HEIGHT: 62 IN | HEART RATE: 83 BPM | DIASTOLIC BLOOD PRESSURE: 54 MMHG | BODY MASS INDEX: 37.41 KG/M2 | TEMPERATURE: 97.8 F

## 2025-08-04 LAB
ANION GAP SERPL CALCULATED.3IONS-SCNC: 7 MMOL/L (ref 5–15)
BACTERIA SPEC ANAEROBE CULT: NORMAL
BUN SERPL-MCNC: 21.2 MG/DL (ref 8–23)
BUN/CREAT SERPL: 17.4 (ref 7–25)
CALCIUM SPEC-SCNC: 8.1 MG/DL (ref 8.6–10.5)
CHLORIDE SERPL-SCNC: 102 MMOL/L (ref 98–107)
CO2 SERPL-SCNC: 24 MMOL/L (ref 22–29)
CREAT SERPL-MCNC: 1.22 MG/DL (ref 0.57–1)
CYTO UR: NORMAL
EGFRCR SERPLBLD CKD-EPI 2021: 50.6 ML/MIN/1.73
GLUCOSE SERPL-MCNC: 129 MG/DL (ref 65–99)
LAB AP CASE REPORT: NORMAL
LAB AP CLINICAL INFORMATION: NORMAL
LAB AP FLOW CYTOMETRY SUMMARY: NORMAL
PATH REPORT.FINAL DX SPEC: NORMAL
PATH REPORT.GROSS SPEC: NORMAL
POTASSIUM SERPL-SCNC: 4 MMOL/L (ref 3.5–5.2)
SODIUM SERPL-SCNC: 133 MMOL/L (ref 136–145)
WHOLE BLOOD HOLD SPECIMEN: NORMAL

## 2025-08-04 PROCEDURE — 80048 BASIC METABOLIC PNL TOTAL CA: CPT | Performed by: STUDENT IN AN ORGANIZED HEALTH CARE EDUCATION/TRAINING PROGRAM

## 2025-08-04 PROCEDURE — 25010000002 FUROSEMIDE PER 20 MG: Performed by: STUDENT IN AN ORGANIZED HEALTH CARE EDUCATION/TRAINING PROGRAM

## 2025-08-04 PROCEDURE — 25010000002 HEPARIN (PORCINE) PER 1000 UNITS: Performed by: STUDENT IN AN ORGANIZED HEALTH CARE EDUCATION/TRAINING PROGRAM

## 2025-08-04 PROCEDURE — 99239 HOSP IP/OBS DSCHRG MGMT >30: CPT | Performed by: STUDENT IN AN ORGANIZED HEALTH CARE EDUCATION/TRAINING PROGRAM

## 2025-08-04 RX ORDER — FUROSEMIDE 20 MG/1
20 TABLET ORAL DAILY
Qty: 30 TABLET | Refills: 0
Start: 2025-08-04 | End: 2025-09-03

## 2025-08-04 RX ADMIN — MICONAZOLE NITRATE 1 APPLICATION: 2 POWDER TOPICAL at 08:21

## 2025-08-04 RX ADMIN — RIFAXIMIN 550 MG: 550 TABLET ORAL at 08:21

## 2025-08-04 RX ADMIN — HEPARIN SODIUM 5000 UNITS: 5000 INJECTION INTRAVENOUS; SUBCUTANEOUS at 05:27

## 2025-08-04 RX ADMIN — FUROSEMIDE 80 MG: 10 INJECTION, SOLUTION INTRAMUSCULAR; INTRAVENOUS at 08:21

## 2025-08-04 RX ADMIN — SPIRONOLACTONE 50 MG: 25 TABLET ORAL at 08:21

## 2025-08-04 RX ADMIN — FLUTICASONE PROPIONATE 2 SPRAY: 50 SPRAY, METERED NASAL at 08:21

## 2025-08-04 RX ADMIN — Medication 10 ML: at 08:24

## 2025-08-04 RX ADMIN — LACTULOSE SOLUTION USP, 10 G/15 ML 20 G: 10 SOLUTION ORAL; RECTAL at 08:21

## 2025-08-04 RX ADMIN — PANTOPRAZOLE SODIUM 40 MG: 40 TABLET, DELAYED RELEASE ORAL at 08:21

## 2025-08-04 RX ADMIN — FOLIC ACID 1 MG: 1 TABLET ORAL at 08:21

## 2025-08-05 ENCOUNTER — OFFICE VISIT (OUTPATIENT)
Dept: ORTHOPEDIC SURGERY | Facility: CLINIC | Age: 60
End: 2025-08-05
Payer: MEDICARE

## 2025-08-05 VITALS
SYSTOLIC BLOOD PRESSURE: 117 MMHG | DIASTOLIC BLOOD PRESSURE: 69 MMHG | OXYGEN SATURATION: 88 % | HEIGHT: 62 IN | BODY MASS INDEX: 37.36 KG/M2 | HEART RATE: 87 BPM | WEIGHT: 203 LBS

## 2025-08-05 DIAGNOSIS — S42.201A CLOSED FRACTURE OF PROXIMAL END OF RIGHT HUMERUS, UNSPECIFIED FRACTURE MORPHOLOGY, INITIAL ENCOUNTER: ICD-10-CM

## 2025-08-05 DIAGNOSIS — M25.511 RIGHT SHOULDER PAIN, UNSPECIFIED CHRONICITY: Primary | ICD-10-CM

## 2025-08-06 ENCOUNTER — READMISSION MANAGEMENT (OUTPATIENT)
Dept: CALL CENTER | Facility: HOSPITAL | Age: 60
End: 2025-08-06
Payer: MEDICARE

## 2025-08-06 LAB — FUNGUS WND CULT: NORMAL

## 2025-08-08 ENCOUNTER — READMISSION MANAGEMENT (OUTPATIENT)
Dept: CALL CENTER | Facility: HOSPITAL | Age: 60
End: 2025-08-08
Payer: MEDICARE

## 2025-08-11 ENCOUNTER — TELEPHONE (OUTPATIENT)
Dept: GASTROENTEROLOGY | Facility: CLINIC | Age: 60
End: 2025-08-11
Payer: MEDICARE

## 2025-08-12 ENCOUNTER — READMISSION MANAGEMENT (OUTPATIENT)
Dept: CALL CENTER | Facility: HOSPITAL | Age: 60
End: 2025-08-12
Payer: MEDICARE

## 2025-08-12 ENCOUNTER — HOSPITAL ENCOUNTER (INPATIENT)
Facility: HOSPITAL | Age: 60
LOS: 3 days | Discharge: HOME HEALTH | DRG: 442 | End: 2025-08-15
Attending: EMERGENCY MEDICINE | Admitting: STUDENT IN AN ORGANIZED HEALTH CARE EDUCATION/TRAINING PROGRAM
Payer: MEDICARE

## 2025-08-12 ENCOUNTER — APPOINTMENT (OUTPATIENT)
Dept: CT IMAGING | Facility: HOSPITAL | Age: 60
DRG: 442 | End: 2025-08-12
Payer: MEDICARE

## 2025-08-12 ENCOUNTER — APPOINTMENT (OUTPATIENT)
Dept: GENERAL RADIOLOGY | Facility: HOSPITAL | Age: 60
DRG: 442 | End: 2025-08-12
Payer: MEDICARE

## 2025-08-12 DIAGNOSIS — R26.2 DIFFICULTY WALKING: ICD-10-CM

## 2025-08-12 DIAGNOSIS — K76.82 HEPATIC ENCEPHALOPATHY: Primary | ICD-10-CM

## 2025-08-12 LAB
ALBUMIN SERPL-MCNC: 2.8 G/DL (ref 3.5–5.2)
ALBUMIN/GLOB SERPL: 0.9 G/DL
ALP SERPL-CCNC: 194 U/L (ref 39–117)
ALT SERPL W P-5'-P-CCNC: 19 U/L (ref 1–33)
AMMONIA BLD-SCNC: 102 UMOL/L (ref 11–51)
ANION GAP SERPL CALCULATED.3IONS-SCNC: 10.3 MMOL/L (ref 5–15)
AST SERPL-CCNC: 40 U/L (ref 1–32)
BACTERIA UR QL AUTO: NORMAL /HPF
BASOPHILS # BLD AUTO: 0.02 10*3/MM3 (ref 0–0.2)
BASOPHILS NFR BLD AUTO: 0.4 % (ref 0–1.5)
BILIRUB SERPL-MCNC: 5.4 MG/DL (ref 0–1.2)
BILIRUB UR QL STRIP: NEGATIVE
BUN SERPL-MCNC: 18.8 MG/DL (ref 8–23)
BUN/CREAT SERPL: 16.2 (ref 7–25)
CALCIUM SPEC-SCNC: 9 MG/DL (ref 8.6–10.5)
CHLORIDE SERPL-SCNC: 109 MMOL/L (ref 98–107)
CLARITY UR: CLEAR
CO2 SERPL-SCNC: 16.7 MMOL/L (ref 22–29)
COLOR UR: YELLOW
CREAT SERPL-MCNC: 1.16 MG/DL (ref 0.57–1)
DEPRECATED RDW RBC AUTO: 66.3 FL (ref 37–54)
EGFRCR SERPLBLD CKD-EPI 2021: 53.7 ML/MIN/1.73
EOSINOPHIL # BLD AUTO: 0.05 10*3/MM3 (ref 0–0.4)
EOSINOPHIL NFR BLD AUTO: 1 % (ref 0.3–6.2)
ERYTHROCYTE [DISTWIDTH] IN BLOOD BY AUTOMATED COUNT: 18.1 % (ref 12.3–15.4)
GEN 5 1HR TROPONIN T REFLEX: 29 NG/L
GLOBULIN UR ELPH-MCNC: 3.2 GM/DL
GLUCOSE SERPL-MCNC: 148 MG/DL (ref 65–99)
GLUCOSE UR STRIP-MCNC: NEGATIVE MG/DL
HCT VFR BLD AUTO: 26 % (ref 34–46.6)
HGB BLD-MCNC: 8.7 G/DL (ref 12–15.9)
HGB UR QL STRIP.AUTO: NEGATIVE
HOLD SPECIMEN: NORMAL
HOLD SPECIMEN: NORMAL
HYALINE CASTS UR QL AUTO: NORMAL /LPF
IMM GRANULOCYTES # BLD AUTO: 0.04 10*3/MM3 (ref 0–0.05)
IMM GRANULOCYTES NFR BLD AUTO: 0.8 % (ref 0–0.5)
KETONES UR QL STRIP: NEGATIVE
LEUKOCYTE ESTERASE UR QL STRIP.AUTO: ABNORMAL
LYMPHOCYTES # BLD AUTO: 0.39 10*3/MM3 (ref 0.7–3.1)
LYMPHOCYTES NFR BLD AUTO: 7.9 % (ref 19.6–45.3)
MAGNESIUM SERPL-MCNC: 2 MG/DL (ref 1.6–2.4)
MCH RBC QN AUTO: 33.2 PG (ref 26.6–33)
MCHC RBC AUTO-ENTMCNC: 33.5 G/DL (ref 31.5–35.7)
MCV RBC AUTO: 99.2 FL (ref 79–97)
MONOCYTES # BLD AUTO: 0.27 10*3/MM3 (ref 0.1–0.9)
MONOCYTES NFR BLD AUTO: 5.5 % (ref 5–12)
NEUTROPHILS NFR BLD AUTO: 4.17 10*3/MM3 (ref 1.7–7)
NEUTROPHILS NFR BLD AUTO: 84.4 % (ref 42.7–76)
NITRITE UR QL STRIP: NEGATIVE
NRBC BLD AUTO-RTO: 0 /100 WBC (ref 0–0.2)
PH UR STRIP.AUTO: 6.5 [PH] (ref 5–8)
PLATELET # BLD AUTO: 50 10*3/MM3 (ref 140–450)
PMV BLD AUTO: 12.5 FL (ref 6–12)
POTASSIUM SERPL-SCNC: 4.2 MMOL/L (ref 3.5–5.2)
PROT SERPL-MCNC: 6 G/DL (ref 6–8.5)
PROT UR QL STRIP: NEGATIVE
RBC # BLD AUTO: 2.62 10*6/MM3 (ref 3.77–5.28)
RBC # UR STRIP: NORMAL /HPF
REF LAB TEST METHOD: NORMAL
SODIUM SERPL-SCNC: 136 MMOL/L (ref 136–145)
SP GR UR STRIP: 1.01 (ref 1–1.03)
SQUAMOUS #/AREA URNS HPF: NORMAL /HPF
TROPONIN T % DELTA: 12
TROPONIN T NUMERIC DELTA: 3 NG/L
TROPONIN T SERPL HS-MCNC: 26 NG/L
UROBILINOGEN UR QL STRIP: ABNORMAL
WBC # UR STRIP: NORMAL /HPF
WBC NRBC COR # BLD AUTO: 4.94 10*3/MM3 (ref 3.4–10.8)
WHOLE BLOOD HOLD COAG: NORMAL
WHOLE BLOOD HOLD SPECIMEN: NORMAL

## 2025-08-12 PROCEDURE — 36415 COLL VENOUS BLD VENIPUNCTURE: CPT

## 2025-08-12 PROCEDURE — 85025 COMPLETE CBC W/AUTO DIFF WBC: CPT

## 2025-08-12 PROCEDURE — 93005 ELECTROCARDIOGRAM TRACING: CPT

## 2025-08-12 PROCEDURE — 83735 ASSAY OF MAGNESIUM: CPT | Performed by: EMERGENCY MEDICINE

## 2025-08-12 PROCEDURE — 81001 URINALYSIS AUTO W/SCOPE: CPT | Performed by: EMERGENCY MEDICINE

## 2025-08-12 PROCEDURE — 70450 CT HEAD/BRAIN W/O DYE: CPT

## 2025-08-12 PROCEDURE — 84484 ASSAY OF TROPONIN QUANT: CPT | Performed by: EMERGENCY MEDICINE

## 2025-08-12 PROCEDURE — 80053 COMPREHEN METABOLIC PANEL: CPT | Performed by: EMERGENCY MEDICINE

## 2025-08-12 PROCEDURE — 99285 EMERGENCY DEPT VISIT HI MDM: CPT

## 2025-08-12 PROCEDURE — 99222 1ST HOSP IP/OBS MODERATE 55: CPT | Performed by: STUDENT IN AN ORGANIZED HEALTH CARE EDUCATION/TRAINING PROGRAM

## 2025-08-12 PROCEDURE — 71045 X-RAY EXAM CHEST 1 VIEW: CPT

## 2025-08-12 PROCEDURE — 93005 ELECTROCARDIOGRAM TRACING: CPT | Performed by: EMERGENCY MEDICINE

## 2025-08-12 PROCEDURE — 82140 ASSAY OF AMMONIA: CPT | Performed by: EMERGENCY MEDICINE

## 2025-08-12 RX ORDER — PANTOPRAZOLE SODIUM 40 MG/1
40 TABLET, DELAYED RELEASE ORAL DAILY
Status: DISCONTINUED | OUTPATIENT
Start: 2025-08-12 | End: 2025-08-15 | Stop reason: HOSPADM

## 2025-08-12 RX ORDER — FLUTICASONE PROPIONATE 50 MCG
2 SPRAY, SUSPENSION (ML) NASAL DAILY
Status: DISCONTINUED | OUTPATIENT
Start: 2025-08-12 | End: 2025-08-15 | Stop reason: HOSPADM

## 2025-08-12 RX ORDER — SODIUM CHLORIDE 0.9 % (FLUSH) 0.9 %
10 SYRINGE (ML) INJECTION AS NEEDED
Status: DISCONTINUED | OUTPATIENT
Start: 2025-08-12 | End: 2025-08-15 | Stop reason: HOSPADM

## 2025-08-12 RX ORDER — HYDROMORPHONE HYDROCHLORIDE 2 MG/1
2 TABLET ORAL EVERY 6 HOURS PRN
Status: DISCONTINUED | OUTPATIENT
Start: 2025-08-12 | End: 2025-08-15 | Stop reason: HOSPADM

## 2025-08-12 RX ORDER — LACTULOSE 10 G/15ML
30 SOLUTION ORAL 3 TIMES DAILY
Status: DISCONTINUED | OUTPATIENT
Start: 2025-08-12 | End: 2025-08-15 | Stop reason: HOSPADM

## 2025-08-12 RX ORDER — ACETAMINOPHEN 160 MG/5ML
650 SOLUTION ORAL EVERY 4 HOURS PRN
Status: DISCONTINUED | OUTPATIENT
Start: 2025-08-12 | End: 2025-08-15 | Stop reason: HOSPADM

## 2025-08-12 RX ORDER — SODIUM CHLORIDE 0.9 % (FLUSH) 0.9 %
10 SYRINGE (ML) INJECTION EVERY 12 HOURS SCHEDULED
Status: DISCONTINUED | OUTPATIENT
Start: 2025-08-12 | End: 2025-08-15 | Stop reason: HOSPADM

## 2025-08-12 RX ORDER — HYDROCODONE BITARTRATE AND ACETAMINOPHEN 5; 325 MG/1; MG/1
1 TABLET ORAL EVERY 6 HOURS PRN
Status: DISCONTINUED | OUTPATIENT
Start: 2025-08-12 | End: 2025-08-15 | Stop reason: HOSPADM

## 2025-08-12 RX ORDER — ACETAMINOPHEN 650 MG/1
325 SUPPOSITORY RECTAL EVERY 4 HOURS PRN
Status: DISCONTINUED | OUTPATIENT
Start: 2025-08-12 | End: 2025-08-15 | Stop reason: HOSPADM

## 2025-08-12 RX ORDER — ACETAMINOPHEN 325 MG/1
650 TABLET ORAL EVERY 4 HOURS PRN
Status: DISCONTINUED | OUTPATIENT
Start: 2025-08-12 | End: 2025-08-15 | Stop reason: HOSPADM

## 2025-08-12 RX ORDER — LACTULOSE 10 G/15ML
20 SOLUTION ORAL; RECTAL 3 TIMES DAILY
COMMUNITY
Start: 2025-08-11 | End: 2025-08-17

## 2025-08-12 RX ORDER — FUROSEMIDE 20 MG/1
20 TABLET ORAL DAILY
Status: DISCONTINUED | OUTPATIENT
Start: 2025-08-12 | End: 2025-08-15 | Stop reason: HOSPADM

## 2025-08-12 RX ORDER — FOLIC ACID 1 MG/1
1 TABLET ORAL DAILY
Status: DISCONTINUED | OUTPATIENT
Start: 2025-08-12 | End: 2025-08-15 | Stop reason: HOSPADM

## 2025-08-12 RX ORDER — ALBUTEROL SULFATE 0.83 MG/ML
2.5 SOLUTION RESPIRATORY (INHALATION) EVERY 6 HOURS PRN
Status: DISCONTINUED | OUTPATIENT
Start: 2025-08-12 | End: 2025-08-15 | Stop reason: HOSPADM

## 2025-08-12 RX ORDER — SPIRONOLACTONE 25 MG/1
50 TABLET ORAL DAILY
Status: DISCONTINUED | OUTPATIENT
Start: 2025-08-12 | End: 2025-08-15 | Stop reason: HOSPADM

## 2025-08-12 RX ORDER — LACTULOSE 10 G/15ML
300 SOLUTION ORAL ONCE
Status: COMPLETED | OUTPATIENT
Start: 2025-08-12 | End: 2025-08-12

## 2025-08-12 RX ORDER — SODIUM CHLORIDE 9 MG/ML
40 INJECTION, SOLUTION INTRAVENOUS AS NEEDED
Status: DISCONTINUED | OUTPATIENT
Start: 2025-08-12 | End: 2025-08-15 | Stop reason: HOSPADM

## 2025-08-12 RX ADMIN — PANTOPRAZOLE SODIUM 40 MG: 40 TABLET, DELAYED RELEASE ORAL at 18:12

## 2025-08-12 RX ADMIN — RIFAXIMIN 550 MG: 550 TABLET ORAL at 21:03

## 2025-08-12 RX ADMIN — Medication 10 ML: at 21:09

## 2025-08-12 RX ADMIN — LACTULOSE SOLUTION USP, 10 G/15 ML 30 G: 10 SOLUTION ORAL; RECTAL at 21:03

## 2025-08-12 RX ADMIN — LACTULOSE SOLUTION USP, 10 G/15 ML 300 ML: 10 SOLUTION ORAL; RECTAL at 18:11

## 2025-08-12 RX ADMIN — FOLIC ACID 1 MG: 1 TABLET ORAL at 18:12

## 2025-08-12 RX ADMIN — FLUTICASONE PROPIONATE 2 SPRAY: 50 SPRAY, METERED NASAL at 18:12

## 2025-08-12 RX ADMIN — SPIRONOLACTONE 50 MG: 25 TABLET ORAL at 18:12

## 2025-08-12 RX ADMIN — FUROSEMIDE 20 MG: 20 TABLET ORAL at 18:12

## 2025-08-13 LAB
ANION GAP SERPL CALCULATED.3IONS-SCNC: 8 MMOL/L (ref 5–15)
BUN SERPL-MCNC: 18 MG/DL (ref 8–23)
BUN/CREAT SERPL: 16.8 (ref 7–25)
CALCIUM SPEC-SCNC: 8.6 MG/DL (ref 8.6–10.5)
CHLORIDE SERPL-SCNC: 110 MMOL/L (ref 98–107)
CO2 SERPL-SCNC: 19 MMOL/L (ref 22–29)
CREAT SERPL-MCNC: 1.07 MG/DL (ref 0.57–1)
DEPRECATED RDW RBC AUTO: 67.7 FL (ref 37–54)
EGFRCR SERPLBLD CKD-EPI 2021: 59.2 ML/MIN/1.73
ERYTHROCYTE [DISTWIDTH] IN BLOOD BY AUTOMATED COUNT: 18.3 % (ref 12.3–15.4)
FUNGUS WND CULT: NORMAL
GLUCOSE SERPL-MCNC: 102 MG/DL (ref 65–99)
HCT VFR BLD AUTO: 23.7 % (ref 34–46.6)
HGB BLD-MCNC: 7.7 G/DL (ref 12–15.9)
MCH RBC QN AUTO: 33.2 PG (ref 26.6–33)
MCHC RBC AUTO-ENTMCNC: 32.5 G/DL (ref 31.5–35.7)
MCV RBC AUTO: 102.2 FL (ref 79–97)
PLATELET # BLD AUTO: 47 10*3/MM3 (ref 140–450)
PMV BLD AUTO: 12.5 FL (ref 6–12)
POTASSIUM SERPL-SCNC: 3.2 MMOL/L (ref 3.5–5.2)
RBC # BLD AUTO: 2.32 10*6/MM3 (ref 3.77–5.28)
SODIUM SERPL-SCNC: 137 MMOL/L (ref 136–145)
WBC NRBC COR # BLD AUTO: 4.29 10*3/MM3 (ref 3.4–10.8)

## 2025-08-13 PROCEDURE — 25010000002 CEFTRIAXONE PER 250 MG: Performed by: STUDENT IN AN ORGANIZED HEALTH CARE EDUCATION/TRAINING PROGRAM

## 2025-08-13 PROCEDURE — 80048 BASIC METABOLIC PNL TOTAL CA: CPT | Performed by: STUDENT IN AN ORGANIZED HEALTH CARE EDUCATION/TRAINING PROGRAM

## 2025-08-13 PROCEDURE — 99232 SBSQ HOSP IP/OBS MODERATE 35: CPT | Performed by: STUDENT IN AN ORGANIZED HEALTH CARE EDUCATION/TRAINING PROGRAM

## 2025-08-13 PROCEDURE — 85027 COMPLETE CBC AUTOMATED: CPT | Performed by: STUDENT IN AN ORGANIZED HEALTH CARE EDUCATION/TRAINING PROGRAM

## 2025-08-13 RX ORDER — POTASSIUM CHLORIDE 1500 MG/1
40 TABLET, EXTENDED RELEASE ORAL ONCE
Status: COMPLETED | OUTPATIENT
Start: 2025-08-13 | End: 2025-08-13

## 2025-08-13 RX ADMIN — MICONAZOLE NITRATE 1 APPLICATION: 2 POWDER TOPICAL at 20:18

## 2025-08-13 RX ADMIN — SODIUM CHLORIDE 2000 MG: 9 INJECTION, SOLUTION INTRAVENOUS at 09:29

## 2025-08-13 RX ADMIN — FLUTICASONE PROPIONATE 2 SPRAY: 50 SPRAY, METERED NASAL at 09:25

## 2025-08-13 RX ADMIN — POTASSIUM CHLORIDE 40 MEQ: 1500 TABLET, EXTENDED RELEASE ORAL at 09:26

## 2025-08-13 RX ADMIN — HYDROMORPHONE HYDROCHLORIDE 2 MG: 2 TABLET ORAL at 17:05

## 2025-08-13 RX ADMIN — LACTULOSE SOLUTION USP, 10 G/15 ML 30 G: 10 SOLUTION ORAL; RECTAL at 20:18

## 2025-08-13 RX ADMIN — LACTULOSE SOLUTION USP, 10 G/15 ML 30 G: 10 SOLUTION ORAL; RECTAL at 17:04

## 2025-08-13 RX ADMIN — PANTOPRAZOLE SODIUM 40 MG: 40 TABLET, DELAYED RELEASE ORAL at 09:27

## 2025-08-13 RX ADMIN — LACTULOSE SOLUTION USP, 10 G/15 ML 30 G: 10 SOLUTION ORAL; RECTAL at 09:26

## 2025-08-13 RX ADMIN — Medication 10 ML: at 20:18

## 2025-08-13 RX ADMIN — FUROSEMIDE 20 MG: 20 TABLET ORAL at 09:29

## 2025-08-13 RX ADMIN — RIFAXIMIN 550 MG: 550 TABLET ORAL at 09:26

## 2025-08-13 RX ADMIN — SPIRONOLACTONE 50 MG: 25 TABLET ORAL at 09:29

## 2025-08-13 RX ADMIN — RIFAXIMIN 550 MG: 550 TABLET ORAL at 20:18

## 2025-08-13 RX ADMIN — MICONAZOLE NITRATE 1 APPLICATION: 2 POWDER TOPICAL at 11:41

## 2025-08-13 RX ADMIN — Medication 10 ML: at 09:27

## 2025-08-13 RX ADMIN — FOLIC ACID 1 MG: 1 TABLET ORAL at 09:26

## 2025-08-14 ENCOUNTER — TELEPHONE (OUTPATIENT)
Dept: GASTROENTEROLOGY | Facility: CLINIC | Age: 60
End: 2025-08-14
Payer: MEDICARE

## 2025-08-14 LAB
ANION GAP SERPL CALCULATED.3IONS-SCNC: 8.5 MMOL/L (ref 5–15)
BUN SERPL-MCNC: 14.9 MG/DL (ref 8–23)
BUN/CREAT SERPL: 13.4 (ref 7–25)
CALCIUM SPEC-SCNC: 8.4 MG/DL (ref 8.6–10.5)
CHLORIDE SERPL-SCNC: 111 MMOL/L (ref 98–107)
CO2 SERPL-SCNC: 17.5 MMOL/L (ref 22–29)
CREAT SERPL-MCNC: 1.11 MG/DL (ref 0.57–1)
DEPRECATED RDW RBC AUTO: 67.8 FL (ref 37–54)
EGFRCR SERPLBLD CKD-EPI 2021: 56.7 ML/MIN/1.73
ERYTHROCYTE [DISTWIDTH] IN BLOOD BY AUTOMATED COUNT: 18.5 % (ref 12.3–15.4)
GLUCOSE SERPL-MCNC: 138 MG/DL (ref 65–99)
HCT VFR BLD AUTO: 27.4 % (ref 34–46.6)
HGB BLD-MCNC: 8.9 G/DL (ref 12–15.9)
MCH RBC QN AUTO: 32.8 PG (ref 26.6–33)
MCHC RBC AUTO-ENTMCNC: 32.5 G/DL (ref 31.5–35.7)
MCV RBC AUTO: 101.1 FL (ref 79–97)
PLATELET # BLD AUTO: 58 10*3/MM3 (ref 140–450)
PMV BLD AUTO: 10.6 FL (ref 6–12)
POTASSIUM SERPL-SCNC: 3.8 MMOL/L (ref 3.5–5.2)
RBC # BLD AUTO: 2.71 10*6/MM3 (ref 3.77–5.28)
SODIUM SERPL-SCNC: 137 MMOL/L (ref 136–145)
WBC NRBC COR # BLD AUTO: 6.2 10*3/MM3 (ref 3.4–10.8)

## 2025-08-14 PROCEDURE — 25010000002 CEFTRIAXONE PER 250 MG: Performed by: STUDENT IN AN ORGANIZED HEALTH CARE EDUCATION/TRAINING PROGRAM

## 2025-08-14 PROCEDURE — 85027 COMPLETE CBC AUTOMATED: CPT | Performed by: STUDENT IN AN ORGANIZED HEALTH CARE EDUCATION/TRAINING PROGRAM

## 2025-08-14 PROCEDURE — 80048 BASIC METABOLIC PNL TOTAL CA: CPT | Performed by: STUDENT IN AN ORGANIZED HEALTH CARE EDUCATION/TRAINING PROGRAM

## 2025-08-14 PROCEDURE — 99232 SBSQ HOSP IP/OBS MODERATE 35: CPT | Performed by: STUDENT IN AN ORGANIZED HEALTH CARE EDUCATION/TRAINING PROGRAM

## 2025-08-14 RX ADMIN — MICONAZOLE NITRATE 1 APPLICATION: 2 POWDER TOPICAL at 21:21

## 2025-08-14 RX ADMIN — RIFAXIMIN 550 MG: 550 TABLET ORAL at 21:21

## 2025-08-14 RX ADMIN — FLUTICASONE PROPIONATE 2 SPRAY: 50 SPRAY, METERED NASAL at 09:24

## 2025-08-14 RX ADMIN — SODIUM CHLORIDE 2000 MG: 9 INJECTION, SOLUTION INTRAVENOUS at 09:25

## 2025-08-14 RX ADMIN — FOLIC ACID 1 MG: 1 TABLET ORAL at 09:27

## 2025-08-14 RX ADMIN — LACTULOSE SOLUTION USP, 10 G/15 ML 30 G: 10 SOLUTION ORAL; RECTAL at 09:25

## 2025-08-14 RX ADMIN — MICONAZOLE NITRATE 1 APPLICATION: 2 POWDER TOPICAL at 09:24

## 2025-08-14 RX ADMIN — Medication 10 ML: at 09:27

## 2025-08-14 RX ADMIN — PANTOPRAZOLE SODIUM 40 MG: 40 TABLET, DELAYED RELEASE ORAL at 09:27

## 2025-08-14 RX ADMIN — FUROSEMIDE 20 MG: 20 TABLET ORAL at 09:27

## 2025-08-14 RX ADMIN — HYDROMORPHONE HYDROCHLORIDE 2 MG: 2 TABLET ORAL at 23:24

## 2025-08-14 RX ADMIN — SPIRONOLACTONE 50 MG: 25 TABLET ORAL at 09:26

## 2025-08-14 RX ADMIN — RIFAXIMIN 550 MG: 550 TABLET ORAL at 09:27

## 2025-08-14 RX ADMIN — Medication 10 ML: at 21:21

## 2025-08-14 RX ADMIN — LACTULOSE SOLUTION USP, 10 G/15 ML 30 G: 10 SOLUTION ORAL; RECTAL at 21:21

## 2025-08-14 RX ADMIN — LACTULOSE SOLUTION USP, 10 G/15 ML 30 G: 10 SOLUTION ORAL; RECTAL at 16:16

## 2025-08-15 ENCOUNTER — READMISSION MANAGEMENT (OUTPATIENT)
Dept: CALL CENTER | Facility: HOSPITAL | Age: 60
End: 2025-08-15
Payer: MEDICARE

## 2025-08-15 VITALS
SYSTOLIC BLOOD PRESSURE: 121 MMHG | HEART RATE: 95 BPM | OXYGEN SATURATION: 95 % | HEIGHT: 62 IN | WEIGHT: 184.3 LBS | DIASTOLIC BLOOD PRESSURE: 57 MMHG | RESPIRATION RATE: 18 BRPM | BODY MASS INDEX: 33.92 KG/M2 | TEMPERATURE: 98.1 F

## 2025-08-15 PROCEDURE — 97161 PT EVAL LOW COMPLEX 20 MIN: CPT

## 2025-08-15 PROCEDURE — 99238 HOSP IP/OBS DSCHRG MGMT 30/<: CPT | Performed by: STUDENT IN AN ORGANIZED HEALTH CARE EDUCATION/TRAINING PROGRAM

## 2025-08-15 PROCEDURE — 25010000002 CEFTRIAXONE PER 250 MG: Performed by: STUDENT IN AN ORGANIZED HEALTH CARE EDUCATION/TRAINING PROGRAM

## 2025-08-15 RX ADMIN — FOLIC ACID 1 MG: 1 TABLET ORAL at 08:40

## 2025-08-15 RX ADMIN — Medication 10 ML: at 08:42

## 2025-08-15 RX ADMIN — RIFAXIMIN 550 MG: 550 TABLET ORAL at 08:40

## 2025-08-15 RX ADMIN — FLUTICASONE PROPIONATE 2 SPRAY: 50 SPRAY, METERED NASAL at 08:40

## 2025-08-15 RX ADMIN — SODIUM CHLORIDE 2000 MG: 9 INJECTION, SOLUTION INTRAVENOUS at 08:40

## 2025-08-15 RX ADMIN — FUROSEMIDE 20 MG: 20 TABLET ORAL at 08:40

## 2025-08-15 RX ADMIN — PANTOPRAZOLE SODIUM 40 MG: 40 TABLET, DELAYED RELEASE ORAL at 08:40

## 2025-08-15 RX ADMIN — MICONAZOLE NITRATE 1 APPLICATION: 2 POWDER TOPICAL at 08:40

## 2025-08-15 RX ADMIN — LACTULOSE SOLUTION USP, 10 G/15 ML 30 G: 10 SOLUTION ORAL; RECTAL at 08:40

## 2025-08-16 LAB
QT INTERVAL: 396 MS
QTC INTERVAL: 493 MS

## 2025-08-18 ENCOUNTER — READMISSION MANAGEMENT (OUTPATIENT)
Dept: CALL CENTER | Facility: HOSPITAL | Age: 60
End: 2025-08-18
Payer: MEDICARE

## 2025-08-20 LAB — FUNGUS WND CULT: NORMAL

## 2025-08-26 ENCOUNTER — OFFICE VISIT (OUTPATIENT)
Dept: ORTHOPEDIC SURGERY | Facility: CLINIC | Age: 60
End: 2025-08-26
Payer: MEDICARE

## 2025-08-26 VITALS
HEIGHT: 62 IN | BODY MASS INDEX: 33.86 KG/M2 | HEART RATE: 80 BPM | OXYGEN SATURATION: 94 % | WEIGHT: 184 LBS | DIASTOLIC BLOOD PRESSURE: 72 MMHG | SYSTOLIC BLOOD PRESSURE: 113 MMHG

## 2025-08-26 DIAGNOSIS — S42.201K CLOSED FRACTURE OF PROXIMAL END OF RIGHT HUMERUS WITH NONUNION, UNSPECIFIED FRACTURE MORPHOLOGY, SUBSEQUENT ENCOUNTER: Primary | ICD-10-CM

## 2025-08-27 LAB — FUNGUS WND CULT: NORMAL

## (undated) DEVICE — SINGLE-USE BIOPSY FORCEPS: Brand: RADIAL JAW 4

## (undated) DEVICE — Device: Brand: DEFENDO AIR/WATER/SUCTION AND BIOPSY VALVE

## (undated) DEVICE — SOLIDIFIER LIQLOC PLS 1500CC BT

## (undated) DEVICE — LINER SURG CANSTR SXN S/RIGD 1500CC

## (undated) DEVICE — CONN JET HYDRA H20 AUXILIARY DISP

## (undated) DEVICE — BLCK/BITE BLOX WO/DENTL/RIM W/STRAP 54F

## (undated) DEVICE — Device

## (undated) DEVICE — SOL IRRG H2O PL/BG 1000ML STRL

## (undated) DEVICE — LIGATOR ENDO SHOOTER OPTIVU 6R/BND 9.5TO11.5 KT

## (undated) DEVICE — EGD OR ERCP KIT: Brand: MEDLINE INDUSTRIES, INC.

## (undated) DEVICE — THE STERILE LIGHT HANDLE COVER IS USED WITH STERIS SURGICAL LIGHTING AND VISUALIZATION SYSTEMS.

## (undated) DEVICE — DEFENDO AIR WATER SUCTION AND BIOPSY VALVE KIT FOR  OLYMPUS: Brand: DEFENDO AIR/WATER/SUCTION AND BIOPSY VALVE